# Patient Record
Sex: FEMALE | Race: WHITE
[De-identification: names, ages, dates, MRNs, and addresses within clinical notes are randomized per-mention and may not be internally consistent; named-entity substitution may affect disease eponyms.]

---

## 2017-03-24 ENCOUNTER — HOSPITAL ENCOUNTER (INPATIENT)
Dept: HOSPITAL 41 - JD.ED | Age: 52
LOS: 7 days | Discharge: NURSING, REHAB OR LONG-TERM CARE | DRG: 690 | End: 2017-03-31
Attending: INTERNAL MEDICINE | Admitting: INTERNAL MEDICINE
Payer: MEDICAID

## 2017-03-24 DIAGNOSIS — F32.9: ICD-10-CM

## 2017-03-24 DIAGNOSIS — Z79.899: ICD-10-CM

## 2017-03-24 DIAGNOSIS — R56.9: ICD-10-CM

## 2017-03-24 DIAGNOSIS — N39.0: Primary | ICD-10-CM

## 2017-03-24 DIAGNOSIS — M81.0: ICD-10-CM

## 2017-03-24 DIAGNOSIS — Z86.73: ICD-10-CM

## 2017-03-24 DIAGNOSIS — K59.00: ICD-10-CM

## 2017-03-24 DIAGNOSIS — G35: ICD-10-CM

## 2017-03-24 DIAGNOSIS — Z86.718: ICD-10-CM

## 2017-03-24 DIAGNOSIS — F02.80: ICD-10-CM

## 2017-03-24 DIAGNOSIS — R13.10: ICD-10-CM

## 2017-03-24 DIAGNOSIS — G47.00: ICD-10-CM

## 2017-03-24 DIAGNOSIS — E11.9: ICD-10-CM

## 2017-03-24 DIAGNOSIS — D64.9: ICD-10-CM

## 2017-03-24 DIAGNOSIS — E11.8: ICD-10-CM

## 2017-03-24 DIAGNOSIS — G30.9: ICD-10-CM

## 2017-03-24 DIAGNOSIS — B95.7: ICD-10-CM

## 2017-03-24 DIAGNOSIS — M19.90: ICD-10-CM

## 2017-03-24 DIAGNOSIS — R41.82: ICD-10-CM

## 2017-03-24 PROCEDURE — 71010: CPT

## 2017-03-24 PROCEDURE — 85025 COMPLETE CBC W/AUTO DIFF WBC: CPT

## 2017-03-24 PROCEDURE — 86140 C-REACTIVE PROTEIN: CPT

## 2017-03-24 PROCEDURE — P9612 CATHETERIZE FOR URINE SPEC: HCPCS

## 2017-03-24 PROCEDURE — 83605 ASSAY OF LACTIC ACID: CPT

## 2017-03-24 PROCEDURE — 99285 EMERGENCY DEPT VISIT HI MDM: CPT

## 2017-03-24 PROCEDURE — 96361 HYDRATE IV INFUSION ADD-ON: CPT

## 2017-03-24 PROCEDURE — 87186 SC STD MICRODIL/AGAR DIL: CPT

## 2017-03-24 PROCEDURE — 36415 COLL VENOUS BLD VENIPUNCTURE: CPT

## 2017-03-24 PROCEDURE — 87040 BLOOD CULTURE FOR BACTERIA: CPT

## 2017-03-24 PROCEDURE — 81001 URINALYSIS AUTO W/SCOPE: CPT

## 2017-03-24 PROCEDURE — 87088 URINE BACTERIA CULTURE: CPT

## 2017-03-24 PROCEDURE — 87181 SC STD AGAR DILUTION PER AGT: CPT

## 2017-03-24 PROCEDURE — 80053 COMPREHEN METABOLIC PANEL: CPT

## 2017-03-24 PROCEDURE — 87184 SC STD DISK METHOD PER PLATE: CPT

## 2017-03-24 PROCEDURE — 87804 INFLUENZA ASSAY W/OPTIC: CPT

## 2017-03-24 PROCEDURE — 87086 URINE CULTURE/COLONY COUNT: CPT

## 2017-03-24 NOTE — EDM.PDOC
ED HPI RENAL/





- General


Chief Complaint: Genitourinary Problem


Stated Complaint: [facility]


Time Seen by Provider: 03/24/17 20:05


Source of Information: Reports: EMS, Nursing home records


History Limitations: Reports: Physical impairment





- History of Present Illness


INITIAL COMMENTS - FREE TEXT/NARRATIVE: 





Patient is sent for the nursing home for evaluation and treatment of a fever 

and lethargy. Reports that she had fever of 100.5 at the nursing home. She has 

also been more lethargic than normal. Patient is nonverbal and is unable to 

provide any history. 





The patient was diagnosed with urinary tract infection and started on 

ciprofloxacin today. She has had one dose thus far.





- Related Data


Allergies/ADRs: 


 Allergies











Allergy/AdvReac Type Severity Reaction Status Date / Time


 


No Known Allergies Allergy   Verified 04/27/16 15:34











Home Meds: 


 Home Meds





Baclofen 30 mg PO TID 01/12/15 [History]


FLUoxetine [PROzac] 20 mg PO DAILY 01/12/15 [History]


LORazepam [Ativan] 0.25 mg PO BID 01/12/15 [History]


tiZANidine HCl [Zanaflex] 2 mg PO TID 01/12/15 [History]


Acetaminophen [Tylenol] 2 tab PO Q4HR PRN 04/07/15 [History]


Cholecalciferol (Vitamin D3) [Vitamin D3] 2,000 unit PO DAILY 04/07/15 [History]


Docusate Sodium/Sennosides [Senna Plus] 2 tab PO BID 04/07/15 [History]


Magnesium Oxide 400 mg PO DAILY 04/07/15 [History]


Multivitamin with Minerals [Multiple Vitamin] 1 tab PO DAILY 04/07/15 [History]


levETIRAcetam [Keppra] 1,000 mg PO BID 04/07/15 [History]


Lactulose [Chronulac] 15 ml PO ASDIRECTED PRN 05/13/15 [History]


Bisacodyl [Dulcolax] 10 mg PO ASDIRECTED PRN 04/27/16 [History]


Bisacodyl [Dulcolax] 10 mg RC ASDIRECTED PRN 04/27/16 [History]


Levofloxacin [Levaquin] 250 mg PO Q24H #3 tablet 04/30/16 [Rx]











Past Medical History


Genitourinary History: Reports: UTI, recurrent


Other Musculoskeletal History: MS


Neurological History: Reports: Alzheimers disease, CVA, MS


Psychiatric History: Reports: Depression


Endocrine/Metabolic History: Reports: Diabetes, type II


Hematologic History: Reports: Anemia, Other (see below)


Other Hematologic History: eosinophilia





Social & Family History





- Family History


Family Medical History: Unobtainable





- Tobacco Use


Smoking Status *Q: Unknown Ever Smoked


Second Hand Smoke Exposure: No





- Alcohol Use


Days Per Week of Alcohol Use: 0





- Recreational Drug Use


Recreational Drug Use: No





ED ROS GENERAL





- Review of Systems


Review Of Systems: Unable To Obtain


Constitutional: Reports: fever (100.5 per nursing home report), fatigue (per 

nursing home report)





ED EXAM, RENAL/





- Physical Exam


Exam: See Below


Exam Limited By: Language barrier (nonverbal)


General Appearance: alert, WD/WN, no apparent distress


Respiratory/Chest: no respiratory distress, lungs clear, normal breath sounds


Cardiovascular: normal peripheral pulses, regular rate, rhythm, no murmur


GI/Abdominal: soft, non tender


Neurological: alert


Skin Exam: Warm, Dry, Normal color





Course





- Vital Signs


Last Recorded V/S: 


 Last Vital Signs











Temp  37.3 C   03/24/17 22:00


 


Pulse  79   03/24/17 22:00


 


Resp  18   03/24/17 22:00


 


BP  108/66   03/24/17 22:00


 


Pulse Ox  96   03/24/17 22:00














- Orders/Labs/Meds


Orders: 


 Active Orders 24 hr











 Category Date Time Status


 


 Chest 1V Frontal [CR] Stat Exams  03/24/17 19:50 Taken


 


 BASIC METABOLIC PANEL,BMP [CHEM] Stat Lab  03/25/17 05:00 Ordered


 


 C-REACTIVE PROTEIN [CHEM] Stat Lab  03/25/17 05:00 Ordered


 


 CBC WITH AUTO DIFF [HEME] Stat Lab  03/25/17 05:00 Ordered


 


 CULTURE BLOOD [BC] Stat Lab  03/24/17 20:25 Received


 


 CULTURE BLOOD [BC] Stat Lab  03/24/17 20:30 Received


 


 CULTURE URINE [RM] Stat Lab  03/24/17 20:14 Received


 


 KEPPRA [REF] Stat Lab  03/25/17 05:00 Ordered


 


 Sodium Chloride 0.9% [Normal Saline] 1,000 ml Med  03/24/17 21:57 Active





 IV ONETIME   


 


 Sodium Chloride 0.9% [Saline Flush] Med  03/24/17 19:51 Active





 10 ml FLUSH ASDIRECTED PRN   


 


 Blood Culture x2 Reflex Set [OM.PC] Stat Oth  03/24/17 19:50 Ordered


 


 Peripheral IV Insertion Adult [OM.PC] Routine Oth  03/24/17 19:51 Ordered








 Medication Orders





Sodium Chloride (Normal Saline)  1,000 mls @ 125 mls/hr IV ONETIME ONE


   Stop: 03/25/17 05:56


   Last Admin: 03/24/17 22:13  Dose: 125 mls/hr


Sodium Chloride (Saline Flush)  10 ml FLUSH ASDIRECTED PRN


   PRN Reason: Keep Vein Open


   Last Admin: 03/24/17 20:18  Dose: 10 ml








Labs: 


 Laboratory Tests











  03/24/17 03/24/17 03/24/17 Range/Units





  20:14 20:25 20:25 


 


WBC    11.99 H  (3.98-10.04)  K/mm3


 


RBC    4.16  (3.98-5.22)  M/mm3


 


Hgb    11.8  (11.2-15.7)  gm/L


 


Hct    36.9  (34.1-44.9)  %


 


MCV    88.7  (79.4-94.8)  fl


 


MCH    28.4  (25.6-32.2)  pg


 


MCHC    32.0 L  (32.2-35.5)  g/dl


 


RDW Std Deviation    55.2 H  (36.4-46.3)  fL


 


Plt Count    249  (182-369)  K/mm3


 


MPV    10.1  (9.4-12.3)  fl


 


Neutrophils % (Manual)    65 H  (40-60)  %


 


Band Neutrophils %    0  (0-10)  %


 


Lymphocytes % (Manual)    20  (20-40)  %


 


Atypical Lymphs %    2  %


 


Monocytes % (Manual)    12 H  (2-10)  %


 


Eosinophils % (Manual)    1  (0.7-5.8)  %


 


Basophils % (Manual)    0 L  (0.1-1.2)  


 


Platelet Estimate    Adequate  


 


Plt Morphology Comment    Normal  


 


Poikilocytosis    1+ slight  


 


Anisocytosis    1+ slight  


 


Macrocytosis    1+ slight  


 


Target Cells    1+ slight  


 


RBC Morph Comment    Abnormal  


 


Sodium   142   (136-145)  mEq/L


 


Potassium   3.2 L   (3.5-5.1)  mEq/L


 


Chloride   104   ()  mEq/L


 


Carbon Dioxide   31   (21-32)  mEq/L


 


Anion Gap   10.2   (5-15)  


 


BUN   15   (7-18)  mg/dL


 


Creatinine   0.9   (0.55-1.02)  mg/dL


 


Est Cr Clr Drug Dosing   67.67   mL/min


 


Estimated GFR (MDRD)   > 60   (>60)  mL/min


 


BUN/Creatinine Ratio   16.7   (14-18)  


 


Glucose   119 H   ()  mg/dL


 


Lactic Acid     (0.4-2.0)  mmol/L


 


Calcium   9.7   (8.5-10.1)  mg/dL


 


Total Bilirubin   0.3   (0.2-1.0)  mg/dL


 


AST   29   (15-37)  U/L


 


ALT   32   (14-59)  U/L


 


Alkaline Phosphatase   78   ()  U/L


 


C-Reactive Protein   18.4 H*   (<1.0)  mg/dL


 


Total Protein   7.5   (6.4-8.2)  g/dl


 


Albumin   2.6 L   (3.4-5.0)  g/dl


 


Globulin   4.9   gm/dL


 


Albumin/Globulin Ratio   0.5 L   (1-2)  


 


Urine Color  Yellow    (Yellow)  


 


Urine Appearance  Cloudy H    (Clear)  


 


Urine pH  8.5 H    (5.0-8.0)  


 


Ur Specific Gravity  1.020    (1.005-1.030)  


 


Urine Protein  3+ H    (Negative)  


 


Urine Glucose (UA)  Negative    (Negative)  


 


Urine Ketones  Trace H    (Negative)  


 


Urine Occult Blood  3+ H    (Negative)  


 


Urine Nitrite  Negative    (Negative)  


 


Urine Bilirubin  Negative    (Negative)  


 


Urine Urobilinogen  0.2    (0.2-1.0)  


 


Ur Leukocyte Esterase  3+ H    (Negative)  


 


Urine RBC  10-20 H    (0-5)  /hpf


 


Urine WBC  10-20 H    (0-5)  /hpf


 


Ur Squamous Epith Cells  0-5    (0-5)  /hpf


 


Calcium Phosphate Cryst  Occasional    (NONE)  


 


Urine Bacteria  Many H    (FEW)  /hpf


 


Urine Mucus  Not seen    (FEW)  /hpf














  03/24/17 Range/Units





  20:25 


 


WBC   (3.98-10.04)  K/mm3


 


RBC   (3.98-5.22)  M/mm3


 


Hgb   (11.2-15.7)  gm/L


 


Hct   (34.1-44.9)  %


 


MCV   (79.4-94.8)  fl


 


MCH   (25.6-32.2)  pg


 


MCHC   (32.2-35.5)  g/dl


 


RDW Std Deviation   (36.4-46.3)  fL


 


Plt Count   (182-369)  K/mm3


 


MPV   (9.4-12.3)  fl


 


Neutrophils % (Manual)   (40-60)  %


 


Band Neutrophils %   (0-10)  %


 


Lymphocytes % (Manual)   (20-40)  %


 


Atypical Lymphs %   %


 


Monocytes % (Manual)   (2-10)  %


 


Eosinophils % (Manual)   (0.7-5.8)  %


 


Basophils % (Manual)   (0.1-1.2)  


 


Platelet Estimate   


 


Plt Morphology Comment   


 


Poikilocytosis   


 


Anisocytosis   


 


Macrocytosis   


 


Target Cells   


 


RBC Morph Comment   


 


Sodium   (136-145)  mEq/L


 


Potassium   (3.5-5.1)  mEq/L


 


Chloride   ()  mEq/L


 


Carbon Dioxide   (21-32)  mEq/L


 


Anion Gap   (5-15)  


 


BUN   (7-18)  mg/dL


 


Creatinine   (0.55-1.02)  mg/dL


 


Est Cr Clr Drug Dosing   mL/min


 


Estimated GFR (MDRD)   (>60)  mL/min


 


BUN/Creatinine Ratio   (14-18)  


 


Glucose   ()  mg/dL


 


Lactic Acid  1.3  (0.4-2.0)  mmol/L


 


Calcium   (8.5-10.1)  mg/dL


 


Total Bilirubin   (0.2-1.0)  mg/dL


 


AST   (15-37)  U/L


 


ALT   (14-59)  U/L


 


Alkaline Phosphatase   ()  U/L


 


C-Reactive Protein   (<1.0)  mg/dL


 


Total Protein   (6.4-8.2)  g/dl


 


Albumin   (3.4-5.0)  g/dl


 


Globulin   gm/dL


 


Albumin/Globulin Ratio   (1-2)  


 


Urine Color   (Yellow)  


 


Urine Appearance   (Clear)  


 


Urine pH   (5.0-8.0)  


 


Ur Specific Gravity   (1.005-1.030)  


 


Urine Protein   (Negative)  


 


Urine Glucose (UA)   (Negative)  


 


Urine Ketones   (Negative)  


 


Urine Occult Blood   (Negative)  


 


Urine Nitrite   (Negative)  


 


Urine Bilirubin   (Negative)  


 


Urine Urobilinogen   (0.2-1.0)  


 


Ur Leukocyte Esterase   (Negative)  


 


Urine RBC   (0-5)  /hpf


 


Urine WBC   (0-5)  /hpf


 


Ur Squamous Epith Cells   (0-5)  /hpf


 


Calcium Phosphate Cryst   (NONE)  


 


Urine Bacteria   (FEW)  /hpf


 


Urine Mucus   (FEW)  /hpf











Meds: 


Medications











Generic Name Dose Route Start Last Admin





  Trade Name Freq  PRN Reason Stop Dose Admin


 


Sodium Chloride  1,000 mls @ 125 mls/hr  03/24/17 21:57  03/24/17 22:13





  Normal Saline  IV  03/25/17 05:56  125 mls/hr





  ONETIME ONE   Administration


 


Sodium Chloride  10 ml  03/24/17 19:51  03/24/17 20:18





  Saline Flush  FLUSH   10 ml





  ASDIRECTED PRN   Administration





  Keep Vein Open   














Discontinued Medications














Generic Name Dose Route Start Last Admin





  Trade Name Freq  PRN Reason Stop Dose Admin


 


Sodium Chloride  1,000 mls @ 999 mls/hr  03/24/17 19:50  03/24/17 22:13





  Normal Saline  IV  03/24/17 20:50  Infused





  ONETIME ONE   Infusion


 


Ceftriaxone Sodium 1 gm/  100 mls @ 200 mls/hr  03/24/17 21:34  03/24/17 22:13





  Sodium Chloride  IV  03/24/17 22:03  200 mls/hr





  ONETIME ONE   Administration














- Radiology Interpretation


Free Text/Narrative:: 





chest 1 view shows no acute intrathoracic process. 





- Re-Assessments/Exams


Free Text/Narrative Re-Assessment/Exam: 





03/24/17 22:15


The patient's labs have returned.


WBC is 11.99, hgb is 11.8 and platelets are 249.


Sodium is 142, potassium 3.2 chloride is 104. Anion gap is 10.9. Glucose is 119.


CRP is elevated at 18.4.


UA is 3+ protein, 3+ blood, trace ketones, 3+ leukocytes and many bacteria.


influenza is negative





blood and urine cultures are pending.





IV Rocephin given.





Case discussed with Dr. Pickett Hospitalist on call. agrees to admission. Will 

admit [facility] with telemetry.





AM labs to include: basic Metabolic panel, Keppra level, CBC with differential, 

CRP.





Departure





- Departure


Time of Disposition: 22:00


Disposition: Admitted As Inpatient 64


Condition: fair


Clinical Impression: 


 UTI (urinary tract infection)








- My Orders


Last 24 Hours: 


My Active Orders





03/24/17 19:50


Chest 1V Frontal [CR] Stat 


Blood Culture x2 Reflex Set [OM.PC] Stat 





03/24/17 19:51


Sodium Chloride 0.9% [Saline Flush]   10 ml FLUSH ASDIRECTED PRN 


Peripheral IV Insertion Adult [OM.PC] Routine 





03/24/17 20:14


CULTURE URINE [RM] Stat 





03/24/17 20:25


CULTURE BLOOD [BC] Stat 





03/24/17 20:30


CULTURE BLOOD [BC] Stat 





03/24/17 21:57


Sodium Chloride 0.9% [Normal Saline] 1,000 ml IV ONETIME 





03/25/17 05:00


BASIC METABOLIC PANEL,BMP [CHEM] Stat 


C-REACTIVE PROTEIN [CHEM] Stat 


CBC WITH AUTO DIFF [HEME] Stat 


KEPPRA [REF] Stat 














- Assessment/Plan


Last 24 Hours: 


My Active Orders





03/24/17 19:50


Chest 1V Frontal [CR] Stat 


Blood Culture x2 Reflex Set [OM.PC] Stat 





03/24/17 19:51


Sodium Chloride 0.9% [Saline Flush]   10 ml FLUSH ASDIRECTED PRN 


Peripheral IV Insertion Adult [OM.PC] Routine 





03/24/17 20:14


CULTURE URINE [RM] Stat 





03/24/17 20:25


CULTURE BLOOD [BC] Stat 





03/24/17 20:30


CULTURE BLOOD [BC] Stat 





03/24/17 21:57


Sodium Chloride 0.9% [Normal Saline] 1,000 ml IV ONETIME 





03/25/17 05:00


BASIC METABOLIC PANEL,BMP [CHEM] Stat 


C-REACTIVE PROTEIN [CHEM] Stat 


CBC WITH AUTO DIFF [HEME] Stat 


KEPPRA [REF] Stat

## 2017-03-25 RX ADMIN — LEVOFLOXACIN SCH MLS/HR: 750 INJECTION, SOLUTION INTRAVENOUS at 13:30

## 2017-03-25 RX ADMIN — DEXTROSE MONOHYDRATE, SODIUM CHLORIDE, AND POTASSIUM CHLORIDE SCH MLS/HR: 50; 4.5; 1.49 INJECTION, SOLUTION INTRAVENOUS at 18:13

## 2017-03-25 RX ADMIN — DEXTROSE MONOHYDRATE, SODIUM CHLORIDE, AND POTASSIUM CHLORIDE SCH MLS/HR: 50; 4.5; 1.49 INJECTION, SOLUTION INTRAVENOUS at 16:39

## 2017-03-25 NOTE — PCM.HP
H&P History of Present Illness





- General


Date of Service: 03/25/17


Admit Problem/Dx: 


 Admission Diagnosis/Problem





Admission Diagnosis/Problem      Urinary tract infection








Source of Information: Provider


History Limitations: Reports: Altered mental status





- History of Present Illness


Initial Comments - Free Text/Narative: 








51 year old female from a SNF, nonverbal with MS became febrile with at least a 

temp of 100.5 documented by nursing staff before ED assessment.  She is more 

lethargic, and has at minimal a UTI.  UC and BCs have been drawn;  a dose of 

rocephin was given in the ED.  A swallow eval has been ordered, currently D5 1/

2 NS with KCL is running.  A transfer from [facility] telemetry has been made when the 

patient appeared to have seizure like activity;  a keppra level was ordered 

last night in the ED.  However the level is a send out lab study.  In the 

meantime, the patient has received Ativan 1mg, followed by keppra 1000 mg IV.  

The SZ like activity has stopped, the patient is more comfortable.  A repeat 

CXR is pending, a CT of the head was unremarkable for acute changes.


Onset of Symptoms: Reports: unknown/unsure


Duration of Symptoms: Reports: Day(s):, Getting worse.  Denies: Hour(s):


Location: Reports: abdomen


Quality: Reports: Same as previous episode


Severity: moderate


Improves with: Reports: Medication


Worsens with: Reports: None


Associated Symptoms: Reports: confusion, fever/chills





- Related Data


Allergies/Adverse Reactions: 


 Allergies











Allergy/AdvReac Type Severity Reaction Status Date / Time


 


No Known Allergies Allergy   Verified 04/27/16 15:34











Home Medications: 


 Home Meds





Baclofen 30 mg PO TID 01/12/15 [History]


FLUoxetine [PROzac] 10 mg PO DAILY 01/12/15 [History]


LORazepam [Ativan] 0.25 mg PO BID 01/12/15 [History]


tiZANidine HCl [Zanaflex] 2 mg PO TID 01/12/15 [History]


Acetaminophen [Tylenol] 2 tab PO Q4HR PRN 04/07/15 [History]


Cholecalciferol (Vitamin D3) [Vitamin D3] 2,000 unit PO DAILY 04/07/15 [History]


Docusate Sodium/Sennosides [Senna Plus] 2 tab PO BID 04/07/15 [History]


Magnesium Oxide 400 mg PO QPM 04/07/15 [History]


Multivitamin with Minerals [Multiple Vitamin] 1 tab PO DAILY 04/07/15 [History]


levETIRAcetam [Keppra] 1,000 mg PO BID 04/07/15 [History]


Lactulose [Chronulac] 15 ml PO DAILY PRN 05/13/15 [History]


Bisacodyl [Dulcolax] 10 mg PO DAILY PRN 04/27/16 [History]


Bisacodyl [Dulcolax] 10 mg RC ASDIRECTED PRN 04/27/16 [History]


Ciprofloxacin [Ciprofloxacin HCl] 250 mg PO BID 03/25/17 [History]











Past Medical History


HEENT History: Reports: Other (see below)


Other HEENT History: dysphagia


Gastrointestinal History: Reports: Chronic constipation


Genitourinary History: Reports: UTI, recurrent


Other Genitourinary History: sepsis secondary to e-coli


Musculoskeletal History: Reports: Back pain, chronic, Osteoarthritis, 

Osteoporosis, Other (see below)


Other Musculoskeletal History: MS


Neurological History: Reports: Alzheimers disease, CVA, MS


Other Neuro History: chronic embolisms/thrombosis of veins, recurrent strokes, 

convulsions


Psychiatric History: Reports: Depression


Other Psychiatric History: insomnia


Endocrine/Metabolic History: Reports: Diabetes, type II


Hematologic History: Reports: Anemia, Other (see below)


Other Hematologic History: eosinophilia





- Infectious Disease History


Infectious Disease History: Reports: Other (see below)


Other Infectious Disease History: unknown pt unable to answer and it is not 

listed on nursing home paperwork





- Past Surgical History


Head Surgeries/Procedures: Reports: None


HEENT Surgical History: Reports: None


GI Surgical History: Reports: None


Female  Surgical History: Reports: None


Endocrine Surgical History: Reports: None


Musculoskeletal Surgical History: Reports: None





Social & Family History





- Family History


Family Medical History: Unobtainable





- Tobacco Use


Smoking Status *Q: Unknown Ever Smoked


Tobacco Use Comment: pt unable to answer and her nursing home paperwork does 

not states


Second Hand Smoke Exposure: No





- Caffeine Use


Caffeine Use: Reports: None





- Alcohol Use


Days Per Week of Alcohol Use: 0





- Recreational Drug Use


Recreational Drug Use: No





H&P Review of Systems





- Review of Systems:


Review Of Systems: Unable To Obtain





Exam





- Exam


Exam: See Below





- Vital Signs


Vital Signs: 


 Last Vital Signs











Temp  37.2 C   03/25/17 10:00


 


Pulse  77   03/25/17 10:00


 


Resp  20   03/25/17 10:00


 


BP  112/68   03/25/17 10:00


 


Pulse Ox  96   03/25/17 10:00











Weight: 69.899 kg





- Exam


Quality Assessment: supplemental oxygen, DVT prophylaxis.  No: urinary catheter


General: alert, oriented


HEENT: Conjunctiva clear, EACs clear, EOMI, Nares patent, Pupils equal, Pupils 

reactive, Other (dry oral phaynx).  No: Mucosa moist & pink


Neck: supple, trachea midline


Lungs: Decreased breath sounds, Rhonchi


Cardiovascular: regular rate, tachycardia


Abdomen: normal bowel sounds, soft


 (Female) Exam: Deferred


Rectal (Female) Exam: Deferred


Back Exam: normal inspection


Extremities: normal pulses


Skin: warm


Neurological: cranial nerves intact, other (nonverbal)


Neuro Extensive - Mental Status: alert


Neuro Extensive - Motor, Sensory, Reflexes: CN II-XII intact


Psychiatric: alert





- Patient Data


Lab Results last 24 hrs: 


 Laboratory Results - last 24 hr











  03/25/17 03/25/17 Range/Units





  06:26 06:26 


 


WBC  10.70 H   (3.98-10.04)  K/mm3


 


RBC  4.30   (3.98-5.22)  M/mm3


 


Hgb  12.3   (11.2-15.7)  gm/L


 


Hct  38.0   (34.1-44.9)  %


 


MCV  88.4   (79.4-94.8)  fl


 


MCH  28.6   (25.6-32.2)  pg


 


MCHC  32.4   (32.2-35.5)  g/dl


 


RDW Std Deviation  53.9 H   (36.4-46.3)  fL


 


Plt Count  222   (182-369)  K/mm3


 


MPV  10.0   (9.4-12.3)  fl


 


Neut % (Auto)  67.6   (34.0-71.1)  %


 


Lymph % (Auto)  22.0   (19.3-51.7)  %


 


Mono % (Auto)  9.7   (4.7-12.5)  %


 


Eos % (Auto)  0.3 L   (0.7-5.8)  


 


Baso % (Auto)  0.2   (0.1-1.2)  %


 


Neut # (Auto)  7.24 H   (1.56-6.13)  K/mm3


 


Lymph # (Auto)  2.35   (1.18-3.74)  K/mm3


 


Mono # (Auto)  1.04 H   (0.24-0.36)  K/mm3


 


Eos # (Auto)  0.03 L   (0.04-0.36)  K/mm3


 


Baso # (Auto)  0.02   (0.01-0.08)  K/mm3


 


Manual Slide Review  Not Reportable   


 


Sodium   142  (136-145)  mEq/L


 


Potassium   3.3 L  (3.5-5.1)  mEq/L


 


Chloride   106  ()  mEq/L


 


Carbon Dioxide   27  (21-32)  mEq/L


 


Anion Gap   12.3  (5-15)  


 


BUN   10  (7-18)  mg/dL


 


Creatinine   0.7  (0.55-1.02)  mg/dL


 


Est Cr Clr Drug Dosing   87.01  mL/min


 


Estimated GFR (MDRD)   > 60  (>60)  mL/min


 


BUN/Creatinine Ratio   14.3  (14-18)  


 


Glucose   104  ()  mg/dL


 


Calcium   8.9  (8.5-10.1)  mg/dL


 


C-Reactive Protein   15.7 H*  (<1.0)  mg/dL











Result Diagrams: 


 03/26/17 05:30





 03/26/17 05:30





*Q Meaningful Use (ADM)





- VTE *Q


VTE Criteria *Q: 








- Stroke *Q


Stroke Criteria *Q: 








- AMI *Q


AMI Criteria *Q: 





Problem List Initiated/Reviewed/Updated: Yes


Orders Last 24hrs: 


 Active Orders 24 hr











 Category Date Time Status


 


 Bedrest [RC] ASDIRECTED Care  03/24/17 22:05 Active


 


 Swallow Screen [Nursing Bedside Swallow Screen] [] Care  03/25/17 09:14 

Active





 ASDIRECTED   


 


 Head wo Cont [CT] Routine Exams  03/25/17 14:00 Taken


 


 CULTURE MRSA SURVEY [] Routine Lab  03/25/17 01:40 Received








 Medication Orders





Sodium Chloride (Saline Flush)  10 ml FLUSH ASDIRECTED PRN


   PRN Reason: Keep Vein Open


   Last Admin: 03/24/17 20:18  Dose: 10 ml








Assessment/Plan Comment:: 





Impression:





Febrile with UTI


AMS, questionable SZ like activity


Risk of aspiration














Plan:





ICU


IV ATBs


IV Keppra if unable to take oral meds


Await Keppra level


Swallow study


Hydrate as needed, have reduced rate for now.


BP/HR control, address cause of elevation


DVT/GI prophylaxis


Consult PT/OT/SW





LOS<96 hours, expected.

## 2017-03-26 RX ADMIN — LEVOFLOXACIN SCH: 750 INJECTION, SOLUTION INTRAVENOUS at 18:47

## 2017-03-26 RX ADMIN — DEXTROSE MONOHYDRATE, SODIUM CHLORIDE, AND POTASSIUM CHLORIDE SCH MLS/HR: 50; 4.5; 1.49 INJECTION, SOLUTION INTRAVENOUS at 08:45

## 2017-03-26 NOTE — PCM.PN
- General Info


Date of Service: 03/26/17


Subjective Update: 








Patient had rhythmic motion which suggested seizure activity. Ativan and Keppra 

were given IV for seizures successfully.  Today more interactive, baseline is 

non verbal able to complete swallow study, will be changed to puree diet and 

resume oral meds.


Functional Status: Reports: urinating, other (no seizure activity)





- Review of Systems


General: Reports: Weakness


HEENT: Reports: no symptoms


Pulmonary: Reports: no symptoms


Cardiovascular: Reports: No Symptoms.  Denies: Other


Gastrointestinal: Reports: No symptoms


Genitourinary: Reports: no symptoms


Musculoskeletal: Reports: no symptoms


Skin: Reports: no symptoms


Neurological: Reports: No Symptoms.  Denies: Seizure


Psychiatric: Reports: no symptoms





- Patient Data


Vitals - most recent: 


 Last Vital Signs











Temp  36.6 C   03/26/17 09:00


 


Pulse  121 H  03/26/17 01:39


 


Resp  25 H  03/26/17 09:00


 


BP  113/82   03/26/17 09:00


 


Pulse Ox  94 L  03/26/17 09:00











Weight - most recent: 72.62 kg


I&O - last 24 hours: 


 Intake & Output











 03/25/17 03/26/17 03/26/17





 22:59 06:59 14:59


 


Intake Total 100 999 


 


Balance 100 999 











Lab Results last 24 hrs: 


 Laboratory Results - last 24 hr











  03/25/17 03/26/17 03/26/17 Range/Units





  20:33 05:30 05:30 


 


WBC   11.82 H   (3.98-10.04)  K/mm3


 


RBC   4.18   (3.98-5.22)  M/mm3


 


Hgb   12.0   (11.2-15.7)  gm/L


 


Hct   35.3   (34.1-44.9)  %


 


MCV   84.4   (79.4-94.8)  fl


 


MCH   28.7   (25.6-32.2)  pg


 


MCHC   34.0   (32.2-35.5)  g/dl


 


RDW Std Deviation   50.1 H   (36.4-46.3)  fL


 


Plt Count   240   (182-369)  K/mm3


 


MPV   9.5   (9.4-12.3)  fl


 


Neut % (Auto)   78.5 H   (34.0-71.1)  %


 


Lymph % (Auto)   12.2 L   (19.3-51.7)  %


 


Mono % (Auto)   8.9   (4.7-12.5)  %


 


Eos % (Auto)   0 L   (0.7-5.8)  


 


Baso % (Auto)   0.2   (0.1-1.2)  %


 


Neut # (Auto)   9.29 H   (1.56-6.13)  K/mm3


 


Lymph # (Auto)   1.44   (1.18-3.74)  K/mm3


 


Mono # (Auto)   1.05 H   (0.24-0.36)  K/mm3


 


Eos # (Auto)   0.00 L   (0.04-0.36)  K/mm3


 


Baso # (Auto)   0.02   (0.01-0.08)  K/mm3


 


Manual Slide Review   Normal smear   


 


Sodium    136  (136-145)  mEq/L


 


Potassium    3.3 L  (3.5-5.1)  mEq/L


 


Chloride    102  ()  mEq/L


 


Carbon Dioxide    23  (21-32)  mEq/L


 


Anion Gap    14.3  (5-15)  


 


BUN    10  (7-18)  mg/dL


 


Creatinine    1.0  (0.55-1.02)  mg/dL


 


Est Cr Clr Drug Dosing    60.91  mL/min


 


Estimated GFR (MDRD)    58  (>60)  mL/min


 


BUN/Creatinine Ratio    10.0 L  (14-18)  


 


Glucose    138 H  ()  mg/dL


 


POC Glucose  102    ()  mg/dL


 


Calcium    8.7  (8.5-10.1)  mg/dL


 


Magnesium    1.5 L  (1.8-2.4)  mg/dl


 


C-Reactive Protein    26.8 H*  (<1.0)  mg/dL














  03/26/17 Range/Units





  06:02 


 


WBC   (3.98-10.04)  K/mm3


 


RBC   (3.98-5.22)  M/mm3


 


Hgb   (11.2-15.7)  gm/L


 


Hct   (34.1-44.9)  %


 


MCV   (79.4-94.8)  fl


 


MCH   (25.6-32.2)  pg


 


MCHC   (32.2-35.5)  g/dl


 


RDW Std Deviation   (36.4-46.3)  fL


 


Plt Count   (182-369)  K/mm3


 


MPV   (9.4-12.3)  fl


 


Neut % (Auto)   (34.0-71.1)  %


 


Lymph % (Auto)   (19.3-51.7)  %


 


Mono % (Auto)   (4.7-12.5)  %


 


Eos % (Auto)   (0.7-5.8)  


 


Baso % (Auto)   (0.1-1.2)  %


 


Neut # (Auto)   (1.56-6.13)  K/mm3


 


Lymph # (Auto)   (1.18-3.74)  K/mm3


 


Mono # (Auto)   (0.24-0.36)  K/mm3


 


Eos # (Auto)   (0.04-0.36)  K/mm3


 


Baso # (Auto)   (0.01-0.08)  K/mm3


 


Manual Slide Review   


 


Sodium   (136-145)  mEq/L


 


Potassium   (3.5-5.1)  mEq/L


 


Chloride   ()  mEq/L


 


Carbon Dioxide   (21-32)  mEq/L


 


Anion Gap   (5-15)  


 


BUN   (7-18)  mg/dL


 


Creatinine   (0.55-1.02)  mg/dL


 


Est Cr Clr Drug Dosing   mL/min


 


Estimated GFR (MDRD)   (>60)  mL/min


 


BUN/Creatinine Ratio   (14-18)  


 


Glucose   ()  mg/dL


 


POC Glucose  120 H  ()  mg/dL


 


Calcium   (8.5-10.1)  mg/dL


 


Magnesium   (1.8-2.4)  mg/dl


 


C-Reactive Protein   (<1.0)  mg/dL











[facility] Results last 24 hrs: 


 Microbiology











 03/25/17 01:40 MRSA Surveillance Culture - Final





 Nasal/Axilla/Groin    NO MRSA ISOLATED











Med Orders - Current: 


 Current Medications





Acetaminophen (Tylenol)  650 mg RECTAL Q6H PRN


   PRN Reason: Fever


Baclofen (Lioresal)  30 mg PO TID [facility]


   Last Admin: 03/26/17 09:41 Dose:  30 mg


Dextrose/Water (Dextrose 50% In Water)  50 ml IVPUSH ASDIRECTED PRN


   PRN Reason: Hypoglycemia


Enoxaparin Sodium (Lovenox)  40 mg SUBCUT DAILY [facility]


   Last Admin: 03/26/17 09:40 Dose:  40 mg


Fluoxetine HCl (Prozac)  10 mg PO DAILY [facility]


   Last Admin: 03/26/17 09:41 Dose:  10 mg


Hydralazine HCl (Apresoline)  20 mg IVPUSH Q8H PRN


   PRN Reason: Hypertension


Levofloxacin/Dextrose 750 mg/ (Premix)  150 mls @ 100 mls/hr IV Q24H [facility]


   Last Admin: 03/25/17 13:30 Dose:  100 mls/hr


Potassium Chloride/Dextrose/Sod Cl (D5 1/2 Ns W/ 20 Meq/L Kcl)  1,000 mls @ 70 

mls/hr IV ASDIRECTED [facility]


   Last Admin: 03/26/17 08:45 Dose:  70 mls/hr


Vancomycin HCl 1 gm/ Sodium (Chloride)  250 mls @ 167 mls/hr IV Q12H [facility]


   Last Admin: 03/26/17 10:38 Dose:  167 mls/hr


Magnesium Sulfate 2 gm/ Premix  50 mls @ 25 mls/hr IV ONETIME ONE


   Stop: 03/26/17 13:06


Insulin Aspart (Novolog)  0 unit SUBCUT QIDACANDBED [facility]


   PRN Reason: Protocol


   Last Admin: 03/26/17 06:37 Dose:  Not Given


Levetiracetam (Keppra)  1,000 mg PO BID [facility]


   Last Admin: 03/26/17 10:38 Dose:  1,000 mg


Lorazepam (Ativan)  1 mg IVPUSH Q4H PRN


   PRN Reason: Seizures


Magnesium Oxide (Magnesium Oxide)  400 mg PO QPM [facility]


   Last Admin: 03/25/17 18:13 Dose:  Not Given


Metoprolol Tartrate (Lopressor)  2.5 mg IVPUSH Q4H PRN


   PRN Reason: heart rate


   Last Admin: 03/26/17 01:39 Dose:  2.5 mg


Senna/Docusate Sodium (Senna Plus)  2 tab PO BID [facility]


   Last Admin: 03/26/17 09:42 Dose:  2 tab


Sodium Chloride (Saline Flush)  10 ml FLUSH ASDIRECTED PRN


   PRN Reason: Keep Vein Open


   Last Admin: 03/24/17 20:18 Dose:  10 ml


Tizanidine HCl (Zanaflex)  2 mg PO TID [facility]


   Last Admin: 03/26/17 09:41 Dose:  2 mg


Vancomycin HCl (Pharmacy To Dose - Vancomycin)  0 dose .XX ASDIRECTED PRN


   PRN Reason: RX DOSE





Discontinued Medications





Sodium Chloride (Normal Saline)  1,000 mls @ 999 mls/hr IV ONETIME ONE


   Stop: 03/24/17 20:50


   Last Infusion: 03/24/17 22:13 Dose:  Infused


Ceftriaxone Sodium 1 gm/ (Sodium Chloride)  100 mls @ 200 mls/hr IV ONETIME ONE


   Stop: 03/24/17 22:03


   Last Admin: 03/24/17 22:13 Dose:  200 mls/hr


Sodium Chloride (Normal Saline)  1,000 mls @ 125 mls/hr IV ONETIME ONE


   Stop: 03/25/17 05:56


   Last Admin: 03/24/17 22:13 Dose:  125 mls/hr


Levetiracetam 1,000 mg/ Sodium (Chloride)  110 mls @ 400 mls/hr IV ONETIME ONE


   Stop: 03/25/17 15:38


   Last Admin: 03/25/17 16:07 Dose:  400 mls/hr


Levetiracetam 1,000 mg/ Sodium (Chloride)  110 mls @ 400 mls/hr IV Q12H [facility]


   Last Admin: 03/25/17 21:43 Dose:  400 mls/hr


Vancomycin HCl 1 gm/ Sodium (Chloride)  250 mls @ 250 mls/hr IV ONETIME ONE


   Stop: 03/26/17 00:03


   Last Admin: 03/25/17 23:18 Dose:  250 mls/hr


Levetiracetam (Keppra)  1,000 mg PO BID [facility]


   Last Admin: 03/25/17 20:27 Dose:  Not Given


Lorazepam (Ativan)  1 mg IVPUSH ONETIME ONE


   Stop: 03/25/17 15:31


   Last Admin: 03/25/17 16:03 Dose:  1 mg


Metoprolol Tartrate (Lopressor)  2.5 mg IVPUSH Q6H PRN


   PRN Reason: heart rate


   Last Admin: 03/25/17 21:28 Dose:  2.5 mg


Potassium Chloride (Potassium Chloride)  40 meq PO ONETIME ONE


   Stop: 03/26/17 11:08











- Exam


Quality Assessment: supplemental oxygen, urine catheter, DVT prophylaxis


General: alert, cooperative, no acute distress.  No: other


Neck: supple, trachea midline, no JVD


Lungs: Normal respiratory effort


Cardiovascular: Regular Rate, Tachycardia (low 100s, ST)


Abdomen: bowel sounds present, soft, no tenderness, no distension


 (Female) Exam: Deferred


Back Exam: normal inspection


Extremities: normal pulses


Skin: warm.  No: moist


Neurological: no new focal deficit.  No: other


Psy/Mental Status: alert.  No: other





- Problem List Review


Problem List Initiated/Reviewed/Updated: Yes





- My Orders


Last 24 Hours: 


My Active Orders





03/25/17 12:16


Antiembolic Devices [RC] PER UNIT ROUTINE 


ELSA Hose [Antiembolic Hose] [OM.PC] Routine 





03/25/17 12:38


SLP Evaluation and Treatment [CONS] Routine 





03/25/17 12:59


Vital Signs [RC] Q4H 





03/25/17 13:00


Levofloxacin/Dextrose 5%-Water [Levaquin in D5W 750 MG/150 ML] 750 mg   Premix 

Bag 1 bag IV Q24H 





03/25/17 13:03


Accu Check [Blood Glucose Check, Bedside] [RC] 07,11,17,21 





03/25/17 13:04


Dextrose 50% in Water   50 ml IVPUSH ASDIRECTED PRN 





03/25/17 13:15


D5 1/2 NS w/ 20 mEq/L KCl 1,000 ml IV ASDIRECTED 





03/25/17 14:00


Head wo Cont [CT] Routine 





03/25/17 15:00


Baclofen [Lioresal]   30 mg PO TID 


tiZANidine [Zanaflex]   2 mg PO TID 





03/25/17 15:24


Aspiration Precautions [RC] ASDIRECTED 





03/25/17 15:30


CXR [Chest 1V Frontal] [CR] Routine 





03/25/17 15:34


Patient Status [ADT] Routine 





03/25/17 17:00


Insulin Aspart [NovoLOG]   See Protocol  SUBCUT QIDACANDBED 





03/25/17 17:10


hydrALAZINE [Apresoline]   20 mg IVPUSH Q8H PRN 





03/25/17 17:11


Seizure Precautions [OM.PC] Routine 





03/25/17 18:00


Magnesium Oxide   400 mg PO QPM 





03/25/17 20:06


Oral Care [OM.PC] QID 





03/25/17 20:41


LORazepam [Ativan]   1 mg IVPUSH Q4H PRN 





03/25/17 21:00


Docusate Sodium/Sennosides [Senna Plus]   2 tab PO BID 





03/25/17 Dinner


NPO [Nothing Per Oral Diet] [DIET] 





03/26/17 00:11


Metoprolol Tartrate [Lopressor]   2.5 mg IVPUSH Q4H PRN 





03/26/17 04:19


Acetaminophen [Tylenol]   650 mg RECTAL Q6H PRN 





03/26/17 09:00


Enoxaparin [Lovenox]   40 mg SUBCUT DAILY 


FLUoxetine [PROzac]   10 mg PO DAILY 





03/26/17 10:04


Vancomycin Pharmacy to Dose [Pharmacy to Dose - Vancomycin]   0 dose .XX 

ASDIRECTED PRN 





03/26/17 10:30


levETIRAcetam [Keppra]   1,000 mg PO BID 





03/26/17 11:00


Vancomycin [Vancocin] 1 gm   Sodium Chloride 0.9% [Normal Saline] 250 ml IV 

Q12H 





03/26/17 11:07


Magnesium Sulfate/Water [Magnesium Sulfate 2 GM in Water 50 ML] 2 gm   Premix 

Bag 1 bag IV ONETIME 





03/26/17 20:06


Oral Care [OM.PC] QID 





03/27/17 05:00


BMP [BASIC METABOLIC PANEL,BMP] [CHEM] DAILY 


CBC WITH AUTO DIFF [HEME] DAILY 


CRP [C-REACTIVE PROTEIN] [CHEM] DAILY 


MAGNESIUM [CHEM] DAILY 





03/27/17 10:30


VANCOMYCIN TROUGH [CHEM] Timed 





03/27/17 20:06


Oral Care [OM.PC] QID 





03/28/17 05:00


BMP [BASIC METABOLIC PANEL,BMP] [CHEM] DAILY 


CBC WITH AUTO DIFF [HEME] DAILY 














- Plan


Plan:: 











Impression:





Febrile with UTI


Resolved AMS;  more alert without  SZ like activity


Risk of aspiration, HOP>45 degrees

















Plan:





ICU


IV ATBs, levoquin, empirically; vancomycin scheduled received 1 dose after Gm 

pos cocci noted.


Resume oral meds


Await Keppra level


Swallow study, will start puree diet


Hydrate as needed, have reduced rate for now; will DC after good oral intake.


BP/HR control, address cause of elevation


DVT/GI prophylaxis


Consult PT/OT/SW








LOS may be 96 hours, await response to therapy.

## 2017-03-27 RX ADMIN — DEXTROSE MONOHYDRATE, SODIUM CHLORIDE, AND POTASSIUM CHLORIDE SCH MLS/HR: 50; 4.5; 1.49 INJECTION, SOLUTION INTRAVENOUS at 01:23

## 2017-03-27 RX ADMIN — ALBUTEROL SULFATE SCH MG: 2.5 SOLUTION RESPIRATORY (INHALATION) at 20:33

## 2017-03-27 RX ADMIN — DEXTROSE MONOHYDRATE, SODIUM CHLORIDE, AND POTASSIUM CHLORIDE SCH MLS/HR: 50; 4.5; 1.49 INJECTION, SOLUTION INTRAVENOUS at 16:24

## 2017-03-27 NOTE — CR
Chest: Portable view of the chest was obtained.

 

Comparison: Previous chest x-ray of 04/27/16.

 

Heart size is normal.  Mild tortuosity of the thoracic aorta is seen. 

 Lungs are clear.  Bony structures are grossly intact.

 

Impression:

1.  Nothing acute is identified on portable chest x-ray.

 

Diagnostic code #2

## 2017-03-27 NOTE — PCM.PN
- General Info


Date of Service: 03/27/17


Functional Status: Reports: tolerating diet, urinating





- Review of Systems


General: Reports: No Symptoms


HEENT: Reports: no symptoms


Pulmonary: Reports: no symptoms


Cardiovascular: Reports: No Symptoms


Gastrointestinal: Reports: No symptoms


Genitourinary: Reports: no symptoms


Musculoskeletal: Reports: no symptoms


Skin: Reports: no symptoms


Neurological: Reports: No Symptoms


Psychiatric: Reports: no symptoms





- Patient Data


Vitals - most recent: 


 Last Vital Signs











Temp  36.8 C   03/27/17 17:24


 


Pulse  88   03/27/17 17:24


 


Resp  27 H  03/27/17 15:08


 


BP  105/60   03/27/17 15:08


 


Pulse Ox  95   03/27/17 15:08











Weight - most recent: 74.072 kg


I&O - last 24 hours: 


 Intake & Output











 03/27/17 03/27/17 03/27/17





 06:59 14:59 22:59


 


Intake Total 987 590 715


 


Balance 987 590 715











Lab Results last 24 hrs: 


 Laboratory Results - last 24 hr











  03/25/17 03/26/17 03/26/17 Range/Units





  15:39 16:32 21:06 


 


WBC     (3.98-10.04)  K/mm3


 


RBC     (3.98-5.22)  M/mm3


 


Hgb     (11.2-15.7)  gm/L


 


Hct     (34.1-44.9)  %


 


MCV     (79.4-94.8)  fl


 


MCH     (25.6-32.2)  pg


 


MCHC     (32.2-35.5)  g/dl


 


RDW Std Deviation     (36.4-46.3)  fL


 


Plt Count     (182-369)  K/mm3


 


MPV     (9.4-12.3)  fl


 


Neut % (Auto)     (34.0-71.1)  %


 


Lymph % (Auto)     (19.3-51.7)  %


 


Mono % (Auto)     (4.7-12.5)  %


 


Eos % (Auto)     (0.7-5.8)  


 


Baso % (Auto)     (0.1-1.2)  %


 


Neut # (Auto)     (1.56-6.13)  K/mm3


 


Lymph # (Auto)     (1.18-3.74)  K/mm3


 


Mono # (Auto)     (0.24-0.36)  K/mm3


 


Eos # (Auto)     (0.04-0.36)  K/mm3


 


Baso # (Auto)     (0.01-0.08)  K/mm3


 


Manual Slide Review     


 


Sodium     (136-145)  mEq/L


 


Potassium     (3.5-5.1)  mEq/L


 


Chloride     ()  mEq/L


 


Carbon Dioxide     (21-32)  mEq/L


 


Anion Gap     (5-15)  


 


BUN     (7-18)  mg/dL


 


Creatinine     (0.55-1.02)  mg/dL


 


Est Cr Clr Drug Dosing     mL/min


 


Estimated GFR (MDRD)     (>60)  mL/min


 


BUN/Creatinine Ratio     (14-18)  


 


Glucose     ()  mg/dL


 


POC Glucose  122 H  145 H  160 H  ()  mg/dL


 


Calcium     (8.5-10.1)  mg/dL


 


Magnesium     (1.8-2.4)  mg/dl


 


C-Reactive Protein     (<1.0)  mg/dL


 


Vancomycin Trough     (10.0-20.0)  














  03/27/17 03/27/17 03/27/17 Range/Units





  06:19 06:20 06:20 


 


WBC   12.60 H   (3.98-10.04)  K/mm3


 


RBC   3.98   (3.98-5.22)  M/mm3


 


Hgb   11.4   (11.2-15.7)  gm/L


 


Hct   33.5 L   (34.1-44.9)  %


 


MCV   84.2   (79.4-94.8)  fl


 


MCH   28.6   (25.6-32.2)  pg


 


MCHC   34.0   (32.2-35.5)  g/dl


 


RDW Std Deviation   50.1 H   (36.4-46.3)  fL


 


Plt Count   187   (182-369)  K/mm3


 


MPV   10.1   (9.4-12.3)  fl


 


Neut % (Auto)   65.4   (34.0-71.1)  %


 


Lymph % (Auto)   20.4   (19.3-51.7)  %


 


Mono % (Auto)   9.1   (4.7-12.5)  %


 


Eos % (Auto)   4.0   (0.7-5.8)  


 


Baso % (Auto)   0.2   (0.1-1.2)  %


 


Neut # (Auto)   8.23 H   (1.56-6.13)  K/mm3


 


Lymph # (Auto)   2.57   (1.18-3.74)  K/mm3


 


Mono # (Auto)   1.15 H   (0.24-0.36)  K/mm3


 


Eos # (Auto)   0.51 H   (0.04-0.36)  K/mm3


 


Baso # (Auto)   0.03   (0.01-0.08)  K/mm3


 


Manual Slide Review   Normal smear   


 


Sodium    135 L  (136-145)  mEq/L


 


Potassium    3.5  (3.5-5.1)  mEq/L


 


Chloride    103  ()  mEq/L


 


Carbon Dioxide    23  (21-32)  mEq/L


 


Anion Gap    12.5  (5-15)  


 


BUN    9  (7-18)  mg/dL


 


Creatinine    0.9  (0.55-1.02)  mg/dL


 


Est Cr Clr Drug Dosing    67.67  mL/min


 


Estimated GFR (MDRD)    > 60  (>60)  mL/min


 


BUN/Creatinine Ratio    10.0 L  (14-18)  


 


Glucose    119 H  ()  mg/dL


 


POC Glucose  121 H    ()  mg/dL


 


Calcium    8.3 L  (8.5-10.1)  mg/dL


 


Magnesium    1.8  (1.8-2.4)  mg/dl


 


C-Reactive Protein    32.3 H*  (<1.0)  mg/dL


 


Vancomycin Trough     (10.0-20.0)  














  03/27/17 Range/Units





  10:50 


 


WBC   (3.98-10.04)  K/mm3


 


RBC   (3.98-5.22)  M/mm3


 


Hgb   (11.2-15.7)  gm/L


 


Hct   (34.1-44.9)  %


 


MCV   (79.4-94.8)  fl


 


MCH   (25.6-32.2)  pg


 


MCHC   (32.2-35.5)  g/dl


 


RDW Std Deviation   (36.4-46.3)  fL


 


Plt Count   (182-369)  K/mm3


 


MPV   (9.4-12.3)  fl


 


Neut % (Auto)   (34.0-71.1)  %


 


Lymph % (Auto)   (19.3-51.7)  %


 


Mono % (Auto)   (4.7-12.5)  %


 


Eos % (Auto)   (0.7-5.8)  


 


Baso % (Auto)   (0.1-1.2)  %


 


Neut # (Auto)   (1.56-6.13)  K/mm3


 


Lymph # (Auto)   (1.18-3.74)  K/mm3


 


Mono # (Auto)   (0.24-0.36)  K/mm3


 


Eos # (Auto)   (0.04-0.36)  K/mm3


 


Baso # (Auto)   (0.01-0.08)  K/mm3


 


Manual Slide Review   


 


Sodium   (136-145)  mEq/L


 


Potassium   (3.5-5.1)  mEq/L


 


Chloride   ()  mEq/L


 


Carbon Dioxide   (21-32)  mEq/L


 


Anion Gap   (5-15)  


 


BUN   (7-18)  mg/dL


 


Creatinine   (0.55-1.02)  mg/dL


 


Est Cr Clr Drug Dosing   mL/min


 


Estimated GFR (MDRD)   (>60)  mL/min


 


BUN/Creatinine Ratio   (14-18)  


 


Glucose   ()  mg/dL


 


POC Glucose   ()  mg/dL


 


Calcium   (8.5-10.1)  mg/dL


 


Magnesium   (1.8-2.4)  mg/dl


 


C-Reactive Protein   (<1.0)  mg/dL


 


Vancomycin Trough  17.9  (10.0-20.0)  











Med Orders - Current: 


 Current Medications





Acetaminophen (Tylenol)  650 mg RECTAL Q6H PRN


   PRN Reason: Fever


   Last Admin: 03/27/17 15:07 Dose:  650 mg


Baclofen (Lioresal)  5 mg PO TID [facility]


Baclofen (Lioresal)  5 mg PO BID@1700,2300 [facility]


   Stop: 03/27/17 23:01


Dextrose/Water (Dextrose 50% In Water)  50 ml IVPUSH ASDIRECTED PRN


   PRN Reason: Hypoglycemia


Enoxaparin Sodium (Lovenox)  40 mg SUBCUT DAILY [facility]


   Last Admin: 03/27/17 08:00 Dose:  40 mg


Fluoxetine HCl (Prozac)  10 mg PO DAILY [facility]


   Last Admin: 03/27/17 08:01 Dose:  10 mg


Hydralazine HCl (Apresoline)  20 mg IVPUSH Q8H PRN


   PRN Reason: Hypertension


Potassium Chloride/Dextrose/Sod Cl (D5 1/2 Ns W/ 20 Meq/L Kcl)  1,000 mls @ 70 

mls/hr IV ASDIRECTED [facility]


   Last Admin: 03/27/17 16:24 Dose:  70 mls/hr


Vancomycin HCl 1 gm/ Sodium (Chloride)  250 mls @ 167 mls/hr IV Q12H [facility]


   Last Admin: 03/27/17 11:43 Dose:  167 mls/hr


Sodium Chloride (Normal Saline)  500 mls @ 999 mls/min IV .BOLUS [facility]


   Last Admin: 03/26/17 16:45 Dose:  999 mls/min


Piperacillin Sod/Tazobactam (Sod 4.5 gm/ Sodium Chloride)  100 mls @ 25 mls/hr 

IV Q8H [facility]


Magnesium Sulfate 2 gm/ Premix  50 mls @ 25 mls/hr IV ONETIME ONE


   Stop: 03/27/17 19:32


Insulin Aspart (Novolog)  0 unit SUBCUT QIDACANDBED [facility]


   PRN Reason: Protocol


   Last Admin: 03/27/17 17:15 Dose:  Not Given


Levetiracetam (Keppra)  1,000 mg PO BID [facility]


   Last Admin: 03/27/17 07:59 Dose:  1,000 mg


Lorazepam (Ativan)  1 mg IVPUSH Q4H PRN


   PRN Reason: Seizures


Magnesium Oxide (Magnesium Oxide)  400 mg PO QPM [facility]


   Last Admin: 03/27/17 17:18 Dose:  400 mg


Metoprolol Tartrate (Lopressor)  2.5 mg IVPUSH Q4H PRN


   PRN Reason: heart rate


   Last Admin: 03/26/17 01:39 Dose:  2.5 mg


Senna/Docusate Sodium (Senna Plus)  2 tab PO BID [facility]


   Last Admin: 03/27/17 08:00 Dose:  2 tab


Sodium Chloride (Saline Flush)  10 ml FLUSH ASDIRECTED PRN


   PRN Reason: Keep Vein Open


   Last Admin: 03/24/17 20:18 Dose:  10 ml


Tizanidine HCl (Zanaflex)  1 mg PO BID@1700,2300 [facility]


   Stop: 03/27/17 23:01


   Last Admin: 03/27/17 17:13 Dose:  1 mg


Tizanidine HCl (Zanaflex)  1 mg PO TID [facility]


Vancomycin HCl (Pharmacy To Dose - Vancomycin)  0 dose .XX ASDIRECTED PRN


   PRN Reason: RX DOSE





Discontinued Medications





Baclofen (Lioresal)  30 mg PO TID [facility]


   Last Admin: 03/26/17 15:29 Dose:  30 mg


Baclofen (Lioresal)  5 mg PO TID [facility]


   Last Admin: 03/27/17 07:59 Dose:  5 mg


Baclofen (Lioresal)  10 mg PO TID [facility]


Baclofen (Lioresal)  5 mg PO ONETIME ONE


   Stop: 03/27/17 09:38


   Last Admin: 03/27/17 10:20 Dose:  5 mg


Baclofen (Lioresal)  5 mg PO TID [facility]


Baclofen (Lioresal)  5 mg PO BID@1700,2300 [facility]


   Stop: 03/27/17 23:01


Baclofen (Lioresal)  5 mg PO ONETIME ONE


   Stop: 03/27/17 17:17


   Last Admin: 03/27/17 17:26 Dose:  5 mg


Bisacodyl (Dulcolax)  10 mg RECTAL ONETIME ONE


   Stop: 03/27/17 08:43


   Last Admin: 03/27/17 09:03 Dose:  10 mg


Diphtheria/Tetanus/Acell Pertussis (Boostrix)  0.5 ml IM .ONCE ONE


   Stop: 03/26/17 11:52


   Last Admin: 03/26/17 12:24 Dose:  Not Given


Sodium Chloride (Normal Saline)  1,000 mls @ 999 mls/hr IV ONETIME ONE


   Stop: 03/24/17 20:50


   Last Infusion: 03/24/17 22:13 Dose:  Infused


Ceftriaxone Sodium 1 gm/ (Sodium Chloride)  100 mls @ 200 mls/hr IV ONETIME ONE


   Stop: 03/24/17 22:03


   Last Admin: 03/24/17 22:13 Dose:  200 mls/hr


Sodium Chloride (Normal Saline)  1,000 mls @ 125 mls/hr IV ONETIME ONE


   Stop: 03/25/17 05:56


   Last Admin: 03/24/17 22:13 Dose:  125 mls/hr


Levofloxacin/Dextrose 750 mg/ (Premix)  150 mls @ 100 mls/hr IV Q24H [facility]


   Last Admin: 03/26/17 18:47 Dose:  Not Given


Levetiracetam 1,000 mg/ Sodium (Chloride)  110 mls @ 400 mls/hr IV ONETIME ONE


   Stop: 03/25/17 15:38


   Last Admin: 03/25/17 16:07 Dose:  400 mls/hr


Levetiracetam 1,000 mg/ Sodium (Chloride)  110 mls @ 400 mls/hr IV Q12H [facility]


   Last Admin: 03/26/17 12:26 Dose:  Not Given


Vancomycin HCl 1 gm/ Sodium (Chloride)  250 mls @ 250 mls/hr IV ONETIME ONE


   Stop: 03/26/17 00:03


   Last Admin: 03/25/17 23:18 Dose:  250 mls/hr


Magnesium Sulfate 2 gm/ Premix  50 mls @ 25 mls/hr IV ONETIME ONE


   Stop: 03/26/17 13:06


   Last Admin: 03/26/17 12:30 Dose:  25 mls/hr


Piperacillin Sod/Tazobactam (Sod 4.5 gm/ Sodium Chloride)  100 mls @ 200 mls/hr 

IV ONETIME ONE


   Stop: 03/27/17 13:29


   Last Admin: 03/27/17 13:47 Dose:  200 mls/hr


Levetiracetam (Keppra)  1,000 mg PO BID [facility]


   Last Admin: 03/25/17 20:27 Dose:  Not Given


Lorazepam (Ativan)  1 mg IVPUSH ONETIME ONE


   Stop: 03/25/17 15:31


   Last Admin: 03/25/17 16:03 Dose:  1 mg


Metoprolol Tartrate (Lopressor)  2.5 mg IVPUSH Q6H PRN


   PRN Reason: heart rate


   Last Admin: 03/25/17 21:28 Dose:  2.5 mg


Potassium Chloride (Potassium Chloride)  40 meq PO ONETIME ONE


   Stop: 03/26/17 11:08


   Last Admin: 03/26/17 12:33 Dose:  40 meq


Tizanidine HCl (Zanaflex)  2 mg PO TID [facility]


   Last Admin: 03/26/17 15:29 Dose:  2 mg


Tizanidine HCl (Zanaflex)  2 mg PO ONETIME STA


   Stop: 03/27/17 09:38


   Last Admin: 03/27/17 10:20 Dose:  2 mg











- Exam


Quality Assessment: urine catheter, DVT prophylaxis


General: alert, oriented, no acute distress


HEENT: Pupils equal, Pupils reactive, EOMI


Neck: trachea midline, no JVD


Lungs: Normal respiratory effort


Cardiovascular: Regular Rate


Abdomen: bowel sounds present, soft, no tenderness, no distension


 (Female) Exam: Deferred


Back Exam: normal inspection


Extremities: normal pulses


Peripheral Pulses: 2+: radial (L), radial (R)


Skin: warm


Neurological: no new focal deficit


Psy/Mental Status: alert





- Problem List Review


Problem List Initiated/Reviewed/Updated: Yes





- My Orders


Last 24 Hours: 


My Active Orders





03/26/17 16:45


Sodium Chloride 0.9% [Normal Saline] 500 ml IV .BOLUS 





03/26/17 20:06


Oral Care [OM.PC] QID 





03/27/17 15:17


Blood Culture x2 Reflex Set [OM.PC] Urgent 





03/27/17 15:34


CULTURE BLOOD [BC] Stat 





03/27/17 17:00


tiZANidine [Zanaflex]   1 mg PO BID@1700,2300 





03/27/17 17:07


Baclofen [Lioresal]   5 mg PO BID@1700,2300 





03/27/17 17:33


Magnesium Sulfate/Water [Magnesium Sulfate 2 GM in Water 50 ML] 2 gm   Premix 

Bag 1 bag IV ONETIME 





03/27/17 20:06


Oral Care [OM.PC] QID 





03/27/17 21:00


Piperacillin/Tazobactam [Zosyn] 4.5 gm   Sodium Chloride 0.9% [Normal Saline] 

100 ml IV Q8H 





03/28/17 05:00


BMP [BASIC METABOLIC PANEL,BMP] [CHEM] DAILY 


CBC WITH AUTO DIFF [HEME] DAILY 





03/28/17 09:00


Baclofen [Lioresal]   5 mg PO TID 


tiZANidine [Zanaflex]   1 mg PO TID 














- Plan


Plan:: 





Impression:





Febrile with UTI


AMS, questionable SZ like activity;  resolved


Risk of aspiration


Puree diet aas ordered


Drowsiness after Zanaflex and Baclofen














Plan:





ICU


IV ATBs


Adjust meds to avoid over sedation.


Await Keppra level


Swallow study


Hydrate as needed, have reduced rate for now.


BP/HR control, address cause of elevation


DVT/GI prophylaxis


Consult PT/OT/SW





LOS, unknown.

## 2017-03-27 NOTE — CR
Chest: Portable view of the chest was obtained.

 

Comparison: Previous chest x-ray of 03/25/17.

 

Heart size and mediastinum are normal.  Lungs are clear.  Bony 

structures show stable scoliosis and appear grossly intact.

 

Impression:

1.  Nothing acute is seen.  No significant change is seen from 

previous chest x-ray.

 

Diagnostic code #2

## 2017-03-27 NOTE — CT
Head CT

 

Technique: Multiple axial sections through the brain were obtained.  

Intravenous contrast was not utilized.

 

Comparison: Previous head CT study of 01/12/15.

 

Findings: Ventricles along with basal cisterns and sulci over the 

convexities are moderately prominent.  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 

seen on both sides.  These appear fairly stable from prior exam.  

Small vessel ischemic demyelination change also seen within the 

periventricular and subcortical white matter.  No other abnormal 

parenchymal densities are seen.  No evidence of intracranial 

hemorrhage.  No midline shift or mass effect is seen.

 

Bone window settings were reviewed which show no discrete calvarial 

abnormality.  Visualized sinuses are clear.

 

Impression:

1.  Senescent change.  Nothing acute is appreciated on noncontrast 

head CT study.  

 

Diagnostic code #2

 

I agree with preliminary report issued by [facility] 

(preliminary report dictated on 03/25/17, 12:54 PM Central Time)

## 2017-03-27 NOTE — CR
Chest: Frontal view of the chest was obtained utilizing portable 

technique.

 

Comparison: Previous chest x-ray of 03/24/17.

 

Heart size and mediastinum are normal.  Scoliosis noted within the 

spine.  Lungs are clear.  Bony structures are osteopenic.

 

Impression: 

1.  Incidental findings.  Nothing acute is identified on portable 

chest x-ray.  

 

Diagnostic code #2

 

I agree with preliminary report issued by [facility] 

(preliminary report dictated on 03/25/17, 5:00 PM Central Time)

## 2017-03-28 RX ADMIN — ALBUTEROL SULFATE SCH MG: 2.5 SOLUTION RESPIRATORY (INHALATION) at 09:18

## 2017-03-28 RX ADMIN — ALBUTEROL SULFATE SCH MG: 2.5 SOLUTION RESPIRATORY (INHALATION) at 05:37

## 2017-03-28 RX ADMIN — ALBUTEROL SULFATE SCH MG: 2.5 SOLUTION RESPIRATORY (INHALATION) at 15:58

## 2017-03-28 RX ADMIN — DEXTROSE MONOHYDRATE, SODIUM CHLORIDE, AND POTASSIUM CHLORIDE SCH MLS/HR: 50; 4.5; 1.49 INJECTION, SOLUTION INTRAVENOUS at 07:10

## 2017-03-28 RX ADMIN — ALBUTEROL SULFATE SCH MG: 2.5 SOLUTION RESPIRATORY (INHALATION) at 20:51

## 2017-03-28 NOTE — PCM.PN
- General Info


Date of Service: 03/28/17


Functional Status: Reports: tolerating diet (except thickened liquids), 

urinating





- Review of Systems


General: Reports: No Symptoms


HEENT: Reports: no symptoms


Pulmonary: Reports: no symptoms


Cardiovascular: Reports: No Symptoms


Gastrointestinal: Reports: No symptoms


Genitourinary: Reports: no symptoms


Musculoskeletal: Reports: no symptoms


Skin: Reports: no symptoms


Neurological: Reports: No Symptoms


Psychiatric: Reports: no symptoms





- Patient Data


Vitals - most recent: 


 Last Vital Signs











Temp  36.5 C   03/28/17 07:34


 


Pulse  99   03/28/17 07:34


 


Resp  16   03/28/17 07:34


 


BP  110/97 H  03/28/17 07:34


 


Pulse Ox  95   03/28/17 09:20











Weight - most recent: 73.482 kg


I&O - last 24 hours: 


 Intake & Output











 03/27/17 03/28/17 03/28/17





 22:59 06:59 14:59


 


Intake Total 715 1311 120


 


Balance 715 1311 120











Lab Results last 24 hrs: 


 Laboratory Results - last 24 hr











  03/27/17 03/27/17 03/27/17 Range/Units





  11:25 17:04 21:54 


 


WBC     (3.98-10.04)  K/mm3


 


RBC     (3.98-5.22)  M/mm3


 


Hgb     (11.2-15.7)  gm/L


 


Hct     (34.1-44.9)  %


 


MCV     (79.4-94.8)  fl


 


MCH     (25.6-32.2)  pg


 


MCHC     (32.2-35.5)  g/dl


 


RDW Std Deviation     (36.4-46.3)  fL


 


Plt Count     (182-369)  K/mm3


 


MPV     (9.4-12.3)  fl


 


Neut % (Auto)     (34.0-71.1)  %


 


Lymph % (Auto)     (19.3-51.7)  %


 


Mono % (Auto)     (4.7-12.5)  %


 


Eos % (Auto)     (0.7-5.8)  


 


Baso % (Auto)     (0.1-1.2)  %


 


Neut # (Auto)     (1.56-6.13)  K/mm3


 


Lymph # (Auto)     (1.18-3.74)  K/mm3


 


Mono # (Auto)     (0.24-0.36)  K/mm3


 


Eos # (Auto)     (0.04-0.36)  K/mm3


 


Baso # (Auto)     (0.01-0.08)  K/mm3


 


Manual Slide Review     


 


Sodium     (136-145)  mEq/L


 


Potassium     (3.5-5.1)  mEq/L


 


Chloride     ()  mEq/L


 


Carbon Dioxide     (21-32)  mEq/L


 


Anion Gap     (5-15)  


 


BUN     (7-18)  mg/dL


 


Creatinine     (0.55-1.02)  mg/dL


 


Est Cr Clr Drug Dosing     mL/min


 


Estimated GFR (MDRD)     (>60)  mL/min


 


BUN/Creatinine Ratio     (14-18)  


 


Glucose     ()  mg/dL


 


POC Glucose  102  119 H  152 H  ()  mg/dL


 


Calcium     (8.5-10.1)  mg/dL














  03/28/17 03/28/17 03/28/17 Range/Units





  06:37 06:40 06:40 


 


WBC   10.77 H   (3.98-10.04)  K/mm3


 


RBC   4.01   (3.98-5.22)  M/mm3


 


Hgb   11.5   (11.2-15.7)  gm/L


 


Hct   33.9 L   (34.1-44.9)  %


 


MCV   84.5   (79.4-94.8)  fl


 


MCH   28.7   (25.6-32.2)  pg


 


MCHC   33.9   (32.2-35.5)  g/dl


 


RDW Std Deviation   49.1 H   (36.4-46.3)  fL


 


Plt Count   280   (182-369)  K/mm3


 


MPV   9.5   (9.4-12.3)  fl


 


Neut % (Auto)   66.8   (34.0-71.1)  %


 


Lymph % (Auto)   24.6   (19.3-51.7)  %


 


Mono % (Auto)   6.7   (4.7-12.5)  %


 


Eos % (Auto)   1.0   (0.7-5.8)  


 


Baso % (Auto)   0.6   (0.1-1.2)  %


 


Neut # (Auto)   7.19 H   (1.56-6.13)  K/mm3


 


Lymph # (Auto)   2.65   (1.18-3.74)  K/mm3


 


Mono # (Auto)   0.72 H   (0.24-0.36)  K/mm3


 


Eos # (Auto)   0.11   (0.04-0.36)  K/mm3


 


Baso # (Auto)   0.07   (0.01-0.08)  K/mm3


 


Manual Slide Review   Normal smear   


 


Sodium    137  (136-145)  mEq/L


 


Potassium    3.3 L  (3.5-5.1)  mEq/L


 


Chloride    103  ()  mEq/L


 


Carbon Dioxide    23  (21-32)  mEq/L


 


Anion Gap    14.3  (5-15)  


 


BUN    7  (7-18)  mg/dL


 


Creatinine    0.8  (0.55-1.02)  mg/dL


 


Est Cr Clr Drug Dosing    76.13  mL/min


 


Estimated GFR (MDRD)    > 60  (>60)  mL/min


 


BUN/Creatinine Ratio    8.8 L  (14-18)  


 


Glucose    117 H  ()  mg/dL


 


POC Glucose  129 H    ()  mg/dL


 


Calcium    8.6  (8.5-10.1)  mg/dL











Med Orders - Current: 


 Current Medications





Acetaminophen (Tylenol)  650 mg RECTAL Q6H PRN


   PRN Reason: Fever


   Last Admin: 03/27/17 15:07 Dose:  650 mg


Albuterol (Proventil Neb Soln)  2.5 mg NEB QIDRT [facility]


   Last Admin: 03/28/17 09:18 Dose:  2.5 mg


Baclofen (Lioresal)  5 mg PO TID [facility]


   Last Admin: 03/28/17 08:08 Dose:  5 mg


Dextrose/Water (Dextrose 50% In Water)  50 ml IVPUSH ASDIRECTED PRN


   PRN Reason: Hypoglycemia


Enoxaparin Sodium (Lovenox)  40 mg SUBCUT DAILY [facility]


   Last Admin: 03/28/17 08:09 Dose:  40 mg


Fluoxetine HCl (Prozac)  10 mg PO DAILY [facility]


   Last Admin: 03/28/17 08:07 Dose:  10 mg


Hydralazine HCl (Apresoline)  20 mg IVPUSH Q8H PRN


   PRN Reason: Hypertension


Vancomycin HCl 1 gm/ Sodium (Chloride)  250 mls @ 167 mls/hr IV Q12H [facility]


   Last Admin: 03/28/17 10:50 Dose:  167 mls/hr


Sodium Chloride (Normal Saline)  500 mls @ 999 mls/min IV .BOLUS [facility]


   Last Admin: 03/26/17 16:45 Dose:  999 mls/min


Piperacillin Sod/Tazobactam (Sod 4.5 gm/ Sodium Chloride)  100 mls @ 25 mls/hr 

IV Q8H [facility]


   Last Admin: 03/28/17 04:44 Dose:  25 mls/hr


Potassium Chloride/Dextrose/Sod Cl (D5 1/2 Ns W/ 40 Meq/L Kcl)  1,000 mls @ 

69.307 mls/hr IV ASDIRECTED [facility]


   Last Admin: 03/28/17 11:06 Dose:  69.307 mls/hr


Insulin Aspart (Novolog)  0 unit SUBCUT QIDACANDBED [facility]


   PRN Reason: Protocol


   Last Admin: 03/28/17 11:35 Dose:  Not Given


Levetiracetam (Keppra)  1,000 mg PO BID [facility]


   Last Admin: 03/28/17 08:08 Dose:  1,000 mg


Lorazepam (Ativan)  1 mg IVPUSH Q4H PRN


   PRN Reason: Seizures


Magnesium Oxide (Magnesium Oxide)  400 mg PO QPM [facility]


   Last Admin: 03/27/17 17:18 Dose:  400 mg


Metoprolol Tartrate (Lopressor)  2.5 mg IVPUSH Q4H PRN


   PRN Reason: heart rate


   Last Admin: 03/26/17 01:39 Dose:  2.5 mg


Senna/Docusate Sodium (Senna Plus)  2 tab PO BID [facility]


   Last Admin: 03/28/17 08:07 Dose:  2 tab


Sodium Chloride (Saline Flush)  10 ml FLUSH ASDIRECTED PRN


   PRN Reason: Keep Vein Open


   Last Admin: 03/24/17 20:18 Dose:  10 ml


Tizanidine HCl (Zanaflex)  1 mg PO TID [facility]


   Last Admin: 03/28/17 08:08 Dose:  1 mg


Vancomycin HCl (Pharmacy To Dose - Vancomycin)  0 dose .XX ASDIRECTED PRN


   PRN Reason: RX DOSE





Discontinued Medications





Albuterol (Proventil Neb Soln)  0.63 mg NEB ONETIME ONE


   Stop: 03/27/17 17:53


   Last Admin: 03/27/17 18:02 Dose:  Not Given


Albuterol (Proventil Neb Soln)  2.5 mg NEB ONETIME ONE


   Stop: 03/27/17 17:53


   Last Admin: 03/27/17 18:01 Dose:  Not Given


Albuterol (Proventil Neb Soln) Confirm Administered Dose 2.5 mg .ROUTE .STK-MED 

ONE


   Stop: 03/27/17 17:54


   Last Admin: 03/27/17 17:58 Dose:  2.5 mg


Albuterol (Proventil Neb Soln)  0.63 mg NEB QIDRT [facility]


Baclofen (Lioresal)  30 mg PO TID [facility]


   Last Admin: 03/26/17 15:29 Dose:  30 mg


Baclofen (Lioresal)  5 mg PO TID [facility]


   Last Admin: 03/27/17 07:59 Dose:  5 mg


Baclofen (Lioresal)  10 mg PO TID [facility]


   Last Admin: 03/27/17 19:13 Dose:  Not Given


Baclofen (Lioresal)  5 mg PO ONETIME ONE


   Stop: 03/27/17 09:38


   Last Admin: 03/27/17 10:20 Dose:  5 mg


Baclofen (Lioresal)  5 mg PO TID [facility]


Baclofen (Lioresal)  5 mg PO BID@1700,2300 [facility]


   Stop: 03/27/17 23:01


   Last Admin: 03/27/17 19:13 Dose:  Not Given


Baclofen (Lioresal)  5 mg PO BID@1700,2300 [facility]


   Stop: 03/27/17 23:01


   Last Admin: 03/27/17 23:25 Dose:  5 mg


Baclofen (Lioresal)  5 mg PO ONETIME ONE


   Stop: 03/27/17 17:17


   Last Admin: 03/27/17 17:26 Dose:  5 mg


Bisacodyl (Dulcolax)  10 mg RECTAL ONETIME ONE


   Stop: 03/27/17 08:43


   Last Admin: 03/27/17 09:03 Dose:  10 mg


Diphtheria/Tetanus/Acell Pertussis (Boostrix)  0.5 ml IM .ONCE ONE


   Stop: 03/26/17 11:52


   Last Admin: 03/26/17 12:24 Dose:  Not Given


Sodium Chloride (Normal Saline)  1,000 mls @ 999 mls/hr IV ONETIME ONE


   Stop: 03/24/17 20:50


   Last Infusion: 03/24/17 22:13 Dose:  Infused


Ceftriaxone Sodium 1 gm/ (Sodium Chloride)  100 mls @ 200 mls/hr IV ONETIME ONE


   Stop: 03/24/17 22:03


   Last Admin: 03/24/17 22:13 Dose:  200 mls/hr


Sodium Chloride (Normal Saline)  1,000 mls @ 125 mls/hr IV ONETIME ONE


   Stop: 03/25/17 05:56


   Last Admin: 03/24/17 22:13 Dose:  125 mls/hr


Levofloxacin/Dextrose 750 mg/ (Premix)  150 mls @ 100 mls/hr IV Q24H [facility]


   Last Admin: 03/26/17 18:47 Dose:  Not Given


Potassium Chloride/Dextrose/Sod Cl (D5 1/2 Ns W/ 20 Meq/L Kcl)  1,000 mls @ 70 

mls/hr IV ASDIRECTED [facility]


   Last Admin: 03/28/17 07:10 Dose:  70 mls/hr


Levetiracetam 1,000 mg/ Sodium (Chloride)  110 mls @ 400 mls/hr IV ONETIME ONE


   Stop: 03/25/17 15:38


   Last Admin: 03/25/17 16:07 Dose:  400 mls/hr


Levetiracetam 1,000 mg/ Sodium (Chloride)  110 mls @ 400 mls/hr IV Q12H [facility]


   Last Admin: 03/26/17 12:26 Dose:  Not Given


Vancomycin HCl 1 gm/ Sodium (Chloride)  250 mls @ 250 mls/hr IV ONETIME ONE


   Stop: 03/26/17 00:03


   Last Admin: 03/25/17 23:18 Dose:  250 mls/hr


Magnesium Sulfate 2 gm/ Premix  50 mls @ 25 mls/hr IV ONETIME ONE


   Stop: 03/26/17 13:06


   Last Admin: 03/26/17 12:30 Dose:  25 mls/hr


Piperacillin Sod/Tazobactam (Sod 4.5 gm/ Sodium Chloride)  100 mls @ 200 mls/hr 

IV ONETIME ONE


   Stop: 03/27/17 13:29


   Last Admin: 03/27/17 13:47 Dose:  200 mls/hr


Magnesium Sulfate 2 gm/ Premix  50 mls @ 25 mls/hr IV ONETIME ONE


   Stop: 03/27/17 19:32


   Last Admin: 03/27/17 18:08 Dose:  25 mls/hr


Potassium Chloride 20 meq/Potassium Chloride/Dextrose/Sod Cl  1,010 mls @ 70 mls

/hr IV ASDIRECTED [facility]


Levetiracetam (Keppra)  1,000 mg PO BID [facility]


   Last Admin: 03/25/17 20:27 Dose:  Not Given


Lorazepam (Ativan)  1 mg IVPUSH ONETIME ONE


   Stop: 03/25/17 15:31


   Last Admin: 03/25/17 16:03 Dose:  1 mg


Metoprolol Tartrate (Lopressor)  2.5 mg IVPUSH Q6H PRN


   PRN Reason: heart rate


   Last Admin: 03/25/17 21:28 Dose:  2.5 mg


Potassium Chloride (Potassium Chloride)  40 meq PO ONETIME ONE


   Stop: 03/26/17 11:08


   Last Admin: 03/26/17 12:33 Dose:  40 meq


Tizanidine HCl (Zanaflex)  2 mg PO TID [facility]


   Last Admin: 03/27/17 19:13 Dose:  Not Given


Tizanidine HCl (Zanaflex)  2 mg PO ONETIME [facility]


   Stop: 03/27/17 09:38


   Last Admin: 03/27/17 10:20 Dose:  2 mg


Tizanidine HCl (Zanaflex)  1 mg PO BID@1700,2300 [facility]


   Stop: 03/27/17 23:01


   Last Admin: 03/27/17 23:24 Dose:  1 mg











- Exam


Quality Assessment: DVT prophylaxis


General: alert, oriented, cooperative, no acute distress


HEENT: Pupils equal, Pupils reactive, EOMI


Neck: supple, trachea midline


Lungs: Normal respiratory effort


Cardiovascular: Regular Rate, Regular Rhythm


Abdomen: bowel sounds present, soft, no tenderness, no distension


 (Female) Exam: Deferred


Back Exam: normal inspection


Extremities: normal pulses


Skin: warm


Neurological: no new focal deficit


Psy/Mental Status: alert, normal affect, normal mood





- Problem List Review


Problem List Initiated/Reviewed/Updated: Yes





- My Orders


Last 24 Hours: 


My Active Orders





03/27/17 15:17


Blood Culture x2 Reflex Set [OM.PC] Urgent 





03/27/17 15:34


CULTURE BLOOD [BC] Stat 





03/27/17 17:54


RT Aerosol Therapy [RC] ASDIRECTED 





03/27/17 20:06


Oral Care [OM.PC] QID 





03/27/17 21:00


Albuterol [Proventil Neb Soln]   2.5 mg NEB QIDRT 


Piperacillin/Tazobactam [Zosyn] 4.5 gm   Sodium Chloride 0.9% [Normal Saline] 

100 ml IV Q8H 





03/28/17 09:00


Baclofen [Lioresal]   5 mg PO TID 


tiZANidine [Zanaflex]   1 mg PO TID 





03/28/17 10:15


D5 1/2 NS w/ 40 mEq/L KCl 1,000 ml IV ASDIRECTED 





03/29/17 05:00


BMP [BASIC METABOLIC PANEL,BMP] [CHEM] DAILY 


CBC WITH AUTO DIFF [HEME] DAILY 


MAGNESIUM [CHEM] DAILY 





03/30/17 05:00


BMP [BASIC METABOLIC PANEL,BMP] [CHEM] DAILY 


CBC WITH AUTO DIFF [HEME] DAILY 


MAGNESIUM [CHEM] DAILY 





03/31/17 05:00


BMP [BASIC METABOLIC PANEL,BMP] [CHEM] DAILY 


CBC WITH AUTO DIFF [HEME] DAILY 


MAGNESIUM [CHEM] DAILY 














- Plan


Plan:: 





Impression:





AUTI, on 2 IV ATBs


AMS, resolved


Risk of aspiration; pulmonary toilet


Puree diet as ordered; patient is not drinking enough fluids;


Drowsiness after Zanaflex and Baclofen yesterday, with increase spasms off usual


regimen.


Family meeting, impromptu regarding PEG tube, patient is the decision maker.


Has declined today, but will reconsider the option.











Plan:





ICU


IV ATBs


Adjust meds to avoid over sedation.


Seems to tolerate baclofen 15 mg TID, zanaflex 1 mg TID, will


follow.


Await Keppra level


Hydrate as needed, have reduced rate for now.


BP/HR control, address cause of elevation


DVT/GI prophylaxis


Consult PT/OT/SW





LOS>96 hours expected with slow response to therapy.

## 2017-03-28 NOTE — CR
Chest: Portable view of the chest was obtained.

 

Comparison: Previous chest x-ray of 03/27/17.

 

Heart size and mediastinum are within normal limits.  Lung markings 

slightly increased believed to be accentuated from portable technique 

and stable from prior exam.  No acute pulmonary densities are felt to 

be present.  Bony structures are grossly intact.

 

Impression:

1.  Nothing acute is seen on portable chest x-ray.

 

Diagnostic code #1

## 2017-03-29 RX ADMIN — LORAZEPAM PRN MG: 2 INJECTION INTRAMUSCULAR; INTRAVENOUS at 15:31

## 2017-03-29 RX ADMIN — DEXTROSE, SODIUM CHLORIDE, AND POTASSIUM CHLORIDE SCH MLS/HR: 5; .45; .3 INJECTION INTRAVENOUS at 01:16

## 2017-03-29 RX ADMIN — ALBUTEROL SULFATE SCH: 2.5 SOLUTION RESPIRATORY (INHALATION) at 06:15

## 2017-03-29 RX ADMIN — DEXTROSE, SODIUM CHLORIDE, AND POTASSIUM CHLORIDE SCH MLS/HR: 5; .45; .3 INJECTION INTRAVENOUS at 15:33

## 2017-03-29 NOTE — PCM.PN
- General Info


Date of Service: 03/29/17


Functional Status: Reports: tolerating diet (decrease oral intake), urinating





- Review of Systems


General: Reports: No Symptoms


HEENT: Reports: no symptoms


Pulmonary: Reports: no symptoms


Cardiovascular: Reports: No Symptoms


Gastrointestinal: Reports: No symptoms


Genitourinary: Reports: no symptoms


Musculoskeletal: Reports: no symptoms


Skin: Reports: no symptoms


Neurological: Reports: No Symptoms


Psychiatric: Reports: no symptoms





- Patient Data


Vitals - most recent: 


 Last Vital Signs











Temp  36.2 C   03/29/17 08:00


 


Pulse  91   03/29/17 08:00


 


Resp  20   03/29/17 08:00


 


BP  155/105 H  03/29/17 08:00


 


Pulse Ox  96   03/29/17 08:00











Weight - most recent: 72.665 kg


I&O - last 24 hours: 


 Intake & Output











 03/28/17 03/29/17 03/29/17





 22:59 06:59 14:59


 


Intake Total 1702 1482 


 


Balance 1702 1482 











Lab Results last 24 hrs: 


 Laboratory Results - last 24 hr











  03/28/17 03/29/17 03/29/17 Range/Units





  20:50 06:05 06:05 


 


WBC   9.37   (3.98-10.04)  K/mm3


 


RBC   3.82 L   (3.98-5.22)  M/mm3


 


Hgb   10.9 L   (11.2-15.7)  gm/L


 


Hct   32.7 L   (34.1-44.9)  %


 


MCV   85.6   (79.4-94.8)  fl


 


MCH   28.5   (25.6-32.2)  pg


 


MCHC   33.3   (32.2-35.5)  g/dl


 


RDW Std Deviation   50.8 H   (36.4-46.3)  fL


 


Plt Count   308   (182-369)  K/mm3


 


MPV   9.8   (9.4-12.3)  fl


 


Neut % (Auto)   55.6   (34.0-71.1)  %


 


Lymph % (Auto)   31.4   (19.3-51.7)  %


 


Mono % (Auto)   9.4   (4.7-12.5)  %


 


Eos % (Auto)   1.9   (0.7-5.8)  


 


Baso % (Auto)   1.3 H   (0.1-1.2)  %


 


Neut # (Auto)   5.21   (1.56-6.13)  K/mm3


 


Lymph # (Auto)   2.94   (1.18-3.74)  K/mm3


 


Mono # (Auto)   0.88 H   (0.24-0.36)  K/mm3


 


Eos # (Auto)   0.18   (0.04-0.36)  K/mm3


 


Baso # (Auto)   0.12 H   (0.01-0.08)  K/mm3


 


Manual Slide Review   Normal smear   


 


Sodium    138  (136-145)  mEq/L


 


Potassium    4.1  (3.5-5.1)  mEq/L


 


Chloride    103  ()  mEq/L


 


Carbon Dioxide    24  (21-32)  mEq/L


 


Anion Gap    15.1 H  (5-15)  


 


BUN    3 L  (7-18)  mg/dL


 


Creatinine    0.8  (0.55-1.02)  mg/dL


 


Est Cr Clr Drug Dosing    76.13  mL/min


 


Estimated GFR (MDRD)    > 60  (>60)  mL/min


 


BUN/Creatinine Ratio    3.8 L  (14-18)  


 


Glucose    103  ()  mg/dL


 


POC Glucose  128 H    ()  mg/dL


 


Calcium    8.9  (8.5-10.1)  mg/dL


 


Magnesium    1.5 L  (1.8-2.4)  mg/dl














  03/29/17 Range/Units





  06:06 


 


WBC   (3.98-10.04)  K/mm3


 


RBC   (3.98-5.22)  M/mm3


 


Hgb   (11.2-15.7)  gm/L


 


Hct   (34.1-44.9)  %


 


MCV   (79.4-94.8)  fl


 


MCH   (25.6-32.2)  pg


 


MCHC   (32.2-35.5)  g/dl


 


RDW Std Deviation   (36.4-46.3)  fL


 


Plt Count   (182-369)  K/mm3


 


MPV   (9.4-12.3)  fl


 


Neut % (Auto)   (34.0-71.1)  %


 


Lymph % (Auto)   (19.3-51.7)  %


 


Mono % (Auto)   (4.7-12.5)  %


 


Eos % (Auto)   (0.7-5.8)  


 


Baso % (Auto)   (0.1-1.2)  %


 


Neut # (Auto)   (1.56-6.13)  K/mm3


 


Lymph # (Auto)   (1.18-3.74)  K/mm3


 


Mono # (Auto)   (0.24-0.36)  K/mm3


 


Eos # (Auto)   (0.04-0.36)  K/mm3


 


Baso # (Auto)   (0.01-0.08)  K/mm3


 


Manual Slide Review   


 


Sodium   (136-145)  mEq/L


 


Potassium   (3.5-5.1)  mEq/L


 


Chloride   ()  mEq/L


 


Carbon Dioxide   (21-32)  mEq/L


 


Anion Gap   (5-15)  


 


BUN   (7-18)  mg/dL


 


Creatinine   (0.55-1.02)  mg/dL


 


Est Cr Clr Drug Dosing   mL/min


 


Estimated GFR (MDRD)   (>60)  mL/min


 


BUN/Creatinine Ratio   (14-18)  


 


Glucose   ()  mg/dL


 


POC Glucose  104  ()  mg/dL


 


Calcium   (8.5-10.1)  mg/dL


 


Magnesium   (1.8-2.4)  mg/dl











Abran Results last 24 hrs: 


 Microbiology











 03/27/17 15:34 Aerobic Blood Culture - Preliminary





 Blood - Venous    NO GROWTH AFTER 1 DAY





 Anaerobic Blood Culture - Preliminary





    NO GROWTH AFTER 1 DAY











Med Orders - Current: 


 Current Medications





Acetaminophen (Tylenol)  650 mg RECTAL Q6H PRN


   PRN Reason: Fever


   Last Admin: 03/27/17 15:07 Dose:  650 mg


Baclofen (Lioresal)  15 mg PO TID [facility]


   Last Admin: 03/29/17 08:10 Dose:  15 mg


Dextrose/Water (Dextrose 50% In Water)  50 ml IVPUSH ASDIRECTED PRN


   PRN Reason: Hypoglycemia


Enoxaparin Sodium (Lovenox)  40 mg SUBCUT DAILY [facility]


   Last Admin: 03/29/17 08:09 Dose:  40 mg


Fluoxetine HCl (Prozac)  10 mg PO DAILY [facility]


   Last Admin: 03/29/17 08:10 Dose:  10 mg


Hydralazine HCl (Apresoline)  20 mg IVPUSH Q8H PRN


   PRN Reason: Hypertension


Vancomycin HCl 1 gm/ Sodium (Chloride)  250 mls @ 167 mls/hr IV Q12H [facility]


   Last Admin: 03/29/17 00:08 Dose:  167 mls/hr


Sodium Chloride (Normal Saline)  500 mls @ 999 mls/min IV .BOLUS [facility]


   Last Admin: 03/26/17 16:45 Dose:  999 mls/min


Piperacillin Sod/Tazobactam (Sod 4.5 gm/ Sodium Chloride)  100 mls @ 25 mls/hr 

IV Q8H [facility]


   Last Admin: 03/29/17 06:03 Dose:  25 mls/hr


Potassium Chloride/Dextrose/Sod Cl (D5 1/2 Ns W/ 40 Meq/L Kcl)  1,000 mls @ 70 

mls/hr IV ASDIRECTED [facility]


   Last Admin: 03/29/17 01:16 Dose:  70 mls/hr


Magnesium Sulfate 2 gm/ Premix  50 mls @ 25 mls/hr IV ONETIME ONE


   Stop: 03/29/17 11:33


   Last Admin: 03/29/17 10:02 Dose:  25 mls/hr


Insulin Aspart (Novolog)  0 unit SUBCUT QIDACANDBED [facility]


   PRN Reason: Protocol


   Last Admin: 03/29/17 06:06 Dose:  Not Given


Levetiracetam (Keppra)  1,000 mg PO BID [facility]


   Last Admin: 03/29/17 08:10 Dose:  1,000 mg


Lorazepam (Ativan)  1 mg IVPUSH Q4H PRN


   PRN Reason: Seizures


Magnesium Oxide (Magnesium Oxide)  400 mg PO QPM [facility]


   Last Admin: 03/28/17 17:54 Dose:  400 mg


Metoprolol Tartrate (Lopressor)  2.5 mg IVPUSH Q4H PRN


   PRN Reason: heart rate


   Last Admin: 03/26/17 01:39 Dose:  2.5 mg


Senna/Docusate Sodium (Senna Plus)  2 tab PO BID [facility]


   Last Admin: 03/29/17 08:10 Dose:  2 tab


Sodium Chloride (Saline Flush)  10 ml FLUSH ASDIRECTED PRN


   PRN Reason: Keep Vein Open


   Last Admin: 03/24/17 20:18 Dose:  10 ml


Tizanidine HCl (Zanaflex)  1 mg PO TID [facility]


   Last Admin: 03/29/17 08:10 Dose:  1 mg


Vancomycin HCl (Pharmacy To Dose - Vancomycin)  0 dose .XX ASDIRECTED PRN


   PRN Reason: RX DOSE





Discontinued Medications





Albuterol (Proventil Neb Soln)  0.63 mg NEB ONETIME ONE


   Stop: 03/27/17 17:53


   Last Admin: 03/27/17 18:02 Dose:  Not Given


Albuterol (Proventil Neb Soln)  2.5 mg NEB ONETIME ONE


   Stop: 03/27/17 17:53


   Last Admin: 03/27/17 18:01 Dose:  Not Given


Albuterol (Proventil Neb Soln) Confirm Administered Dose 2.5 mg .ROUTE .STK-MED 

ONE


   Stop: 03/27/17 17:54


   Last Admin: 03/27/17 17:58 Dose:  2.5 mg


Albuterol (Proventil Neb Soln)  0.63 mg NEB QIDRT [facility]


Albuterol (Proventil Neb Soln)  2.5 mg NEB QIDRT [facility]


   Last Admin: 03/29/17 06:15 Dose:  Not Given


Baclofen (Lioresal)  30 mg PO TID [facility]


   Last Admin: 03/26/17 15:29 Dose:  30 mg


Baclofen (Lioresal)  5 mg PO TID [facility]


   Last Admin: 03/27/17 07:59 Dose:  5 mg


Baclofen (Lioresal)  10 mg PO TID [facility]


   Last Admin: 03/27/17 19:13 Dose:  Not Given


Baclofen (Lioresal)  5 mg PO ONETIME ONE


   Stop: 03/27/17 09:38


   Last Admin: 03/27/17 10:20 Dose:  5 mg


Baclofen (Lioresal)  5 mg PO TID [facility]


Baclofen (Lioresal)  5 mg PO TID [facility]


   Last Admin: 03/28/17 08:08 Dose:  5 mg


Baclofen (Lioresal)  5 mg PO BID@1700,2300 [facility]


   Stop: 03/27/17 23:01


   Last Admin: 03/27/17 19:13 Dose:  Not Given


Baclofen (Lioresal)  5 mg PO BID@1700,2300 [facility]


   Stop: 03/27/17 23:01


   Last Admin: 03/27/17 23:25 Dose:  5 mg


Baclofen (Lioresal)  5 mg PO ONETIME ONE


   Stop: 03/27/17 17:17


   Last Admin: 03/27/17 17:26 Dose:  5 mg


Baclofen (Lioresal)  10 mg PO TID [facility]


   Last Admin: 03/28/17 14:32 Dose:  10 mg


Bisacodyl (Dulcolax)  10 mg RECTAL ONETIME ONE


   Stop: 03/27/17 08:43


   Last Admin: 03/27/17 09:03 Dose:  10 mg


Diphtheria/Tetanus/Acell Pertussis (Boostrix)  0.5 ml IM .ONCE ONE


   Stop: 03/26/17 11:52


   Last Admin: 03/26/17 12:24 Dose:  Not Given


Sodium Chloride (Normal Saline)  1,000 mls @ 999 mls/hr IV ONETIME ONE


   Stop: 03/24/17 20:50


   Last Infusion: 03/24/17 22:13 Dose:  Infused


Ceftriaxone Sodium 1 gm/ (Sodium Chloride)  100 mls @ 200 mls/hr IV ONETIME ONE


   Stop: 03/24/17 22:03


   Last Admin: 03/24/17 22:13 Dose:  200 mls/hr


Sodium Chloride (Normal Saline)  1,000 mls @ 125 mls/hr IV ONETIME ONE


   Stop: 03/25/17 05:56


   Last Admin: 03/24/17 22:13 Dose:  125 mls/hr


Levofloxacin/Dextrose 750 mg/ (Premix)  150 mls @ 100 mls/hr IV Q24H [facility]


   Last Admin: 03/26/17 18:47 Dose:  Not Given


Potassium Chloride/Dextrose/Sod Cl (D5 1/2 Ns W/ 20 Meq/L Kcl)  1,000 mls @ 70 

mls/hr IV ASDIRECTED [facility]


   Last Admin: 03/28/17 07:10 Dose:  70 mls/hr


Levetiracetam 1,000 mg/ Sodium (Chloride)  110 mls @ 400 mls/hr IV ONETIME ONE


   Stop: 03/25/17 15:38


   Last Admin: 03/25/17 16:07 Dose:  400 mls/hr


Levetiracetam 1,000 mg/ Sodium (Chloride)  110 mls @ 400 mls/hr IV Q12H [facility]


   Last Admin: 03/26/17 12:26 Dose:  Not Given


Vancomycin HCl 1 gm/ Sodium (Chloride)  250 mls @ 250 mls/hr IV ONETIME ONE


   Stop: 03/26/17 00:03


   Last Admin: 03/25/17 23:18 Dose:  250 mls/hr


Magnesium Sulfate 2 gm/ Premix  50 mls @ 25 mls/hr IV ONETIME ONE


   Stop: 03/26/17 13:06


   Last Admin: 03/26/17 12:30 Dose:  25 mls/hr


Piperacillin Sod/Tazobactam (Sod 4.5 gm/ Sodium Chloride)  100 mls @ 200 mls/hr 

IV ONETIME ONE


   Stop: 03/27/17 13:29


   Last Admin: 03/27/17 13:47 Dose:  200 mls/hr


Magnesium Sulfate 2 gm/ Premix  50 mls @ 25 mls/hr IV ONETIME ONE


   Stop: 03/27/17 19:32


   Last Admin: 03/27/17 18:08 Dose:  25 mls/hr


Potassium Chloride 20 meq/Potassium Chloride/Dextrose/Sod Cl  1,010 mls @ 70 mls

/hr IV ASDIRECTED [facility]


Potassium Chloride/Dextrose/Sod Cl (D5 1/2 Ns W/ 40 Meq/L Kcl)  1,000 mls @ 

69.307 mls/hr IV ASDIRECTED [facility]


   Last Admin: 03/28/17 11:06 Dose:  69.307 mls/hr


Levetiracetam (Keppra)  1,000 mg PO BID [facility]


   Last Admin: 03/25/17 20:27 Dose:  Not Given


Lorazepam (Ativan)  1 mg IVPUSH ONETIME ONE


   Stop: 03/25/17 15:31


   Last Admin: 03/25/17 16:03 Dose:  1 mg


Metoprolol Tartrate (Lopressor)  2.5 mg IVPUSH Q6H PRN


   PRN Reason: heart rate


   Last Admin: 03/25/17 21:28 Dose:  2.5 mg


Potassium Chloride (Potassium Chloride)  40 meq PO ONETIME ONE


   Stop: 03/26/17 11:08


   Last Admin: 03/26/17 12:33 Dose:  40 meq


Tizanidine HCl (Zanaflex)  2 mg PO TID [facility]


   Last Admin: 03/27/17 19:13 Dose:  Not Given


Tizanidine HCl (Zanaflex)  2 mg PO ONETIME STA


   Stop: 03/27/17 09:38


   Last Admin: 03/27/17 10:20 Dose:  2 mg


Tizanidine HCl (Zanaflex)  1 mg PO BID@1700,2300 [facility]


   Stop: 03/27/17 23:01


   Last Admin: 03/27/17 23:24 Dose:  1 mg











- Exam


Quality Assessment: DVT prophylaxis


General: alert, oriented, cooperative


HEENT: Pupils equal, Pupils reactive


Neck: supple, trachea midline


Lungs: Normal respiratory effort


Cardiovascular: Regular Rate


Abdomen: bowel sounds present, soft, no tenderness, no distension


 (Female) Exam: Deferred


Back Exam: normal inspection


Extremities: normal pulses


Skin: warm


Neurological: no new focal deficit, normal speech (at her baseline)


Psy/Mental Status: alert, normal affect, normal mood





- Problem List & Annotations


(1) UTI (urinary tract infection)


SNOMED Code(s): 34942015


   Code(s): N39.0 - URINARY TRACT INFECTION, SITE NOT SPECIFIED   Status: Acute

   Current Visit: Yes   





(2) Multiple sclerosis


SNOMED Code(s): 38830013


   Code(s): G35 - MULTIPLE SCLEROSIS   Status: Acute   Current Visit: No   





- Problem List Review


Problem List Initiated/Reviewed/Updated: Yes





- My Orders


Last 24 Hours: 


My Active Orders





03/28/17 17:53


Baclofen [Lioresal]   15 mg PO TID 





03/29/17 01:15


D5 1/2 NS w/ 40 mEq/L KCl 1,000 ml IV ASDIRECTED 





03/29/17 09:34


Magnesium Sulfate/Water [Magnesium Sulfate 2 GM in Water 50 ML] 2 gm   Premix 

Bag 1 bag IV ONETIME 





03/29/17 09:42


Transfer Patient (Change bed) [ADT] Routine 





03/30/17 05:00


BMP [BASIC METABOLIC PANEL,BMP] [CHEM] DAILY 


CBC WITH AUTO DIFF [HEME] DAILY 


MAGNESIUM [CHEM] DAILY 





03/31/17 05:00


BMP [BASIC METABOLIC PANEL,BMP] [CHEM] DAILY 


CBC WITH AUTO DIFF [HEME] DAILY 


MAGNESIUM [CHEM] DAILY 














- Plan


Plan:: 





Impression:





Patient has agreed to PEG tube


AUTI, on 2 IV ATBs, will stop today


AMS, resolved


Risk of aspiration; pulmonary toilet


Puree diet as ordered; patient is not drinking enough fluids;


Drowsiness after Zanaflex and Baclofen yesterday, with increase spasms off usual


regimen.


Family meeting, impromptu regarding PEG tube, patient is the decision maker.











Plan:





Gen Surg consult re: PEG tube


ICU


IV ATBs


Adjust meds to avoid over sedation.


Await Keppra level


Hydrate as needed, have reduced rate for now.


BP/HR control, address cause of elevation


DVT/GI prophylaxis


Consult PT/OT/SW





LOS>96 hours expected with slow response to therapy.

## 2017-03-29 NOTE — PCM.CONSN
<Jennifer Abarca - Last Filed: 03/30/17 06:47>





- General Info


Date of Service: 03/29/17


Admission Dx/Problem (Free Text): 


 Admission Diagnosis/Problem





Admission Diagnosis/Problem      Urinary tract infection








Subjective Update: 





 51 year old female with MS, current hospitalization for UTI.  Consulted for 

PEG tube placement by hospitalist, Dr. Pickett.  





Patient does have dysphagia and is unable to meet her hydration needs.  She is 

on a pureed diet, pudding thick liquids. Patient did have a family meeting 

regarding PEG tube placement for hydration.  She was agreeable to placement.  

Patient is her own guardian.  





 She did have what appeared to be seizure like activity 3/25/17.   CT head was 

unremarkable.  She was given Keppra and Ativan and seizure like activity 

stopped.  





Face sheet from [facility] reviewed.  PMH listed: MS, dementia, Anxiety, Depression, 

Sepsis due to Ecoli, Osteoporosis, TIA without residual effects, Hx of UTIs, CKD

, Eosinophilia, Contractures, Pain, MG disturbance, Insomnia, encephalopathy, 

Chronic embolism and thrombus of vein unspecified, constipation, hypercalcemia, 

DM2, Anemia, convulsions, dysphagia. 








She currently is receiving vancomycin and Zosyn for UTI.    





- Review of Systems


General: Reports: No Symptoms


HEENT: Reports: no symptoms


Pulmonary: Reports: no symptoms


Cardiovascular: Reports: No Symptoms


Gastrointestinal: Reports: No symptoms


Genitourinary: Reports: incontinence


Musculoskeletal: Reports: other (contractures)


Skin: Reports: no symptoms


Neurological: Reports: Trouble Speaking (due to MS ), Other





- Patient Data


Vitals - most recent: 


 Last Vital Signs











Temp  37.3 C   03/29/17 16:12


 


Pulse  93   03/29/17 16:12


 


Resp  20   03/29/17 16:12


 


BP  119/67   03/29/17 16:12


 


Pulse Ox  96   03/29/17 16:12











Weight - most recent: 162 lb 0.001 oz


I&O - last 24 hours: 


 Intake & Output











 03/29/17 03/29/17 03/29/17





 06:59 14:59 22:59


 


Intake Total 1482 0 260


 


Balance 1482 0 260











Lab Results last 24 hrs: 


 Laboratory Results - last 24 hr











  03/25/17 03/28/17 03/29/17 Range/Units





  06:26 20:50 06:05 


 


WBC    9.37  (3.98-10.04)  K/mm3


 


RBC    3.82 L  (3.98-5.22)  M/mm3


 


Hgb    10.9 L  (11.2-15.7)  gm/L


 


Hct    32.7 L  (34.1-44.9)  %


 


MCV    85.6  (79.4-94.8)  fl


 


MCH    28.5  (25.6-32.2)  pg


 


MCHC    33.3  (32.2-35.5)  g/dl


 


RDW Std Deviation    50.8 H  (36.4-46.3)  fL


 


Plt Count    308  (182-369)  K/mm3


 


MPV    9.8  (9.4-12.3)  fl


 


Neut % (Auto)    55.6  (34.0-71.1)  %


 


Lymph % (Auto)    31.4  (19.3-51.7)  %


 


Mono % (Auto)    9.4  (4.7-12.5)  %


 


Eos % (Auto)    1.9  (0.7-5.8)  


 


Baso % (Auto)    1.3 H  (0.1-1.2)  %


 


Neut # (Auto)    5.21  (1.56-6.13)  K/mm3


 


Lymph # (Auto)    2.94  (1.18-3.74)  K/mm3


 


Mono # (Auto)    0.88 H  (0.24-0.36)  K/mm3


 


Eos # (Auto)    0.18  (0.04-0.36)  K/mm3


 


Baso # (Auto)    0.12 H  (0.01-0.08)  K/mm3


 


Manual Slide Review    Normal smear  


 


Sodium     (136-145)  mEq/L


 


Potassium     (3.5-5.1)  mEq/L


 


Chloride     ()  mEq/L


 


Carbon Dioxide     (21-32)  mEq/L


 


Anion Gap     (5-15)  


 


BUN     (7-18)  mg/dL


 


Creatinine     (0.55-1.02)  mg/dL


 


Est Cr Clr Drug Dosing     mL/min


 


Estimated GFR (MDRD)     (>60)  mL/min


 


BUN/Creatinine Ratio     (14-18)  


 


Glucose     ()  mg/dL


 


POC Glucose   128 H   ()  mg/dL


 


Calcium     (8.5-10.1)  mg/dL


 


Magnesium     (1.8-2.4)  mg/dl


 


Levetiracetam  17    (5-30)  ug/mL














  03/29/17 03/29/17 03/29/17 Range/Units





  06:05 06:06 11:44 


 


WBC     (3.98-10.04)  K/mm3


 


RBC     (3.98-5.22)  M/mm3


 


Hgb     (11.2-15.7)  gm/L


 


Hct     (34.1-44.9)  %


 


MCV     (79.4-94.8)  fl


 


MCH     (25.6-32.2)  pg


 


MCHC     (32.2-35.5)  g/dl


 


RDW Std Deviation     (36.4-46.3)  fL


 


Plt Count     (182-369)  K/mm3


 


MPV     (9.4-12.3)  fl


 


Neut % (Auto)     (34.0-71.1)  %


 


Lymph % (Auto)     (19.3-51.7)  %


 


Mono % (Auto)     (4.7-12.5)  %


 


Eos % (Auto)     (0.7-5.8)  


 


Baso % (Auto)     (0.1-1.2)  %


 


Neut # (Auto)     (1.56-6.13)  K/mm3


 


Lymph # (Auto)     (1.18-3.74)  K/mm3


 


Mono # (Auto)     (0.24-0.36)  K/mm3


 


Eos # (Auto)     (0.04-0.36)  K/mm3


 


Baso # (Auto)     (0.01-0.08)  K/mm3


 


Manual Slide Review     


 


Sodium  138    (136-145)  mEq/L


 


Potassium  4.1    (3.5-5.1)  mEq/L


 


Chloride  103    ()  mEq/L


 


Carbon Dioxide  24    (21-32)  mEq/L


 


Anion Gap  15.1 H    (5-15)  


 


BUN  3 L    (7-18)  mg/dL


 


Creatinine  0.8    (0.55-1.02)  mg/dL


 


Est Cr Clr Drug Dosing  76.13    mL/min


 


Estimated GFR (MDRD)  > 60    (>60)  mL/min


 


BUN/Creatinine Ratio  3.8 L    (14-18)  


 


Glucose  103    ()  mg/dL


 


POC Glucose   104  101  ()  mg/dL


 


Calcium  8.9    (8.5-10.1)  mg/dL


 


Magnesium  1.5 L    (1.8-2.4)  mg/dl


 


Levetiracetam     (5-30)  ug/mL














  03/29/17 Range/Units





  16:18 


 


WBC   (3.98-10.04)  K/mm3


 


RBC   (3.98-5.22)  M/mm3


 


Hgb   (11.2-15.7)  gm/L


 


Hct   (34.1-44.9)  %


 


MCV   (79.4-94.8)  fl


 


MCH   (25.6-32.2)  pg


 


MCHC   (32.2-35.5)  g/dl


 


RDW Std Deviation   (36.4-46.3)  fL


 


Plt Count   (182-369)  K/mm3


 


MPV   (9.4-12.3)  fl


 


Neut % (Auto)   (34.0-71.1)  %


 


Lymph % (Auto)   (19.3-51.7)  %


 


Mono % (Auto)   (4.7-12.5)  %


 


Eos % (Auto)   (0.7-5.8)  


 


Baso % (Auto)   (0.1-1.2)  %


 


Neut # (Auto)   (1.56-6.13)  K/mm3


 


Lymph # (Auto)   (1.18-3.74)  K/mm3


 


Mono # (Auto)   (0.24-0.36)  K/mm3


 


Eos # (Auto)   (0.04-0.36)  K/mm3


 


Baso # (Auto)   (0.01-0.08)  K/mm3


 


Manual Slide Review   


 


Sodium   (136-145)  mEq/L


 


Potassium   (3.5-5.1)  mEq/L


 


Chloride   ()  mEq/L


 


Carbon Dioxide   (21-32)  mEq/L


 


Anion Gap   (5-15)  


 


BUN   (7-18)  mg/dL


 


Creatinine   (0.55-1.02)  mg/dL


 


Est Cr Clr Drug Dosing   mL/min


 


Estimated GFR (MDRD)   (>60)  mL/min


 


BUN/Creatinine Ratio   (14-18)  


 


Glucose   ()  mg/dL


 


POC Glucose  106 H  ()  mg/dL


 


Calcium   (8.5-10.1)  mg/dL


 


Magnesium   (1.8-2.4)  mg/dl


 


Levetiracetam   (5-30)  ug/mL











Abran Results last 24 hrs: 


 Microbiology











 03/27/17 15:34 Aerobic Blood Culture - Preliminary





 Blood - Venous    NO GROWTH AFTER 2 DAYS





 Anaerobic Blood Culture - Preliminary





    NO GROWTH AFTER 2 DAYS











Med Orders - Current: 


 Current Medications





Acetaminophen (Tylenol)  650 mg RECTAL Q6H PRN


   PRN Reason: Fever


   Last Admin: 03/27/17 15:07 Dose:  650 mg


Baclofen (Lioresal)  15 mg PO TID [facility]


   Last Admin: 03/29/17 15:29 Dose:  15 mg


Dextrose/Water (Dextrose 50% In Water)  50 ml IVPUSH ASDIRECTED PRN


   PRN Reason: Hypoglycemia


Enoxaparin Sodium (Lovenox)  40 mg SUBCUT DAILY [facility]


   Last Admin: 03/29/17 08:09 Dose:  40 mg


Fluoxetine HCl (Prozac)  10 mg PO DAILY [facility]


   Last Admin: 03/29/17 08:10 Dose:  10 mg


Hydralazine HCl (Apresoline)  20 mg IVPUSH Q8H PRN


   PRN Reason: Hypertension


Vancomycin HCl 1 gm/ Sodium (Chloride)  250 mls @ 167 mls/hr IV Q12H [facility]


   Last Admin: 03/29/17 15:26 Dose:  167 mls/hr


Sodium Chloride (Normal Saline)  500 mls @ 999 mls/min IV .BOLUS [facility]


   Last Admin: 03/26/17 16:45 Dose:  999 mls/min


Piperacillin Sod/Tazobactam (Sod 4.5 gm/ Sodium Chloride)  100 mls @ 25 mls/hr 

IV Q8H [facility]


   Last Admin: 03/29/17 17:04 Dose:  25 mls/hr


Potassium Chloride/Dextrose/Sod Cl (D5 1/2 Ns W/ 40 Meq/L Kcl)  1,000 mls @ 70 

mls/hr IV ASDIRECTED [facility]


   Last Admin: 03/29/17 15:33 Dose:  70 mls/hr


Insulin Aspart (Novolog)  0 unit SUBCUT QIDACANDBED [facility]


   PRN Reason: Protocol


   Last Admin: 03/29/17 17:03 Dose:  Not Given


Levetiracetam (Keppra)  1,000 mg PO BID [facility]


   Last Admin: 03/29/17 08:10 Dose:  1,000 mg


Lorazepam (Ativan)  1 mg IVPUSH Q4H PRN


   PRN Reason: Seizures


   Last Admin: 03/29/17 15:31 Dose:  1 mg


Magnesium Oxide (Magnesium Oxide)  400 mg PO QPM [facility]


   Last Admin: 03/29/17 17:06 Dose:  400 mg


Metoprolol Tartrate (Lopressor)  2.5 mg IVPUSH Q4H PRN


   PRN Reason: heart rate


   Last Admin: 03/26/17 01:39 Dose:  2.5 mg


Senna/Docusate Sodium (Senna Plus)  2 tab PO BID [facility]


   Last Admin: 03/29/17 08:10 Dose:  2 tab


Sodium Chloride (Saline Flush)  10 ml FLUSH ASDIRECTED PRN


   PRN Reason: Keep Vein Open


   Last Admin: 03/24/17 20:18 Dose:  10 ml


Tizanidine HCl (Zanaflex)  1 mg PO TID [facility]


   Last Admin: 03/29/17 15:29 Dose:  1 mg


Vancomycin HCl (Pharmacy To Dose - Vancomycin)  0 dose .XX ASDIRECTED PRN


   PRN Reason: RX DOSE





Discontinued Medications





Albuterol (Proventil Neb Soln)  0.63 mg NEB ONETIME ONE


   Stop: 03/27/17 17:53


   Last Admin: 03/27/17 18:02 Dose:  Not Given


Albuterol (Proventil Neb Soln)  2.5 mg NEB ONETIME ONE


   Stop: 03/27/17 17:53


   Last Admin: 03/27/17 18:01 Dose:  Not Given


Albuterol (Proventil Neb Soln) Confirm Administered Dose 2.5 mg .ROUTE .STK-MED 

ONE


   Stop: 03/27/17 17:54


   Last Admin: 03/27/17 17:58 Dose:  2.5 mg


Albuterol (Proventil Neb Soln)  0.63 mg NEB QIDRT [facility]


Albuterol (Proventil Neb Soln)  2.5 mg NEB QIDRT [facility]


   Last Admin: 03/29/17 06:15 Dose:  Not Given


Baclofen (Lioresal)  30 mg PO TID [facility]


   Last Admin: 03/26/17 15:29 Dose:  30 mg


Baclofen (Lioresal)  5 mg PO TID [facility]


   Last Admin: 03/27/17 07:59 Dose:  5 mg


Baclofen (Lioresal)  10 mg PO TID [facility]


   Last Admin: 03/27/17 19:13 Dose:  Not Given


Baclofen (Lioresal)  5 mg PO ONETIME ONE


   Stop: 03/27/17 09:38


   Last Admin: 03/27/17 10:20 Dose:  5 mg


Baclofen (Lioresal)  5 mg PO TID [facility]


Baclofen (Lioresal)  5 mg PO TID [facility]


   Last Admin: 03/28/17 08:08 Dose:  5 mg


Baclofen (Lioresal)  5 mg PO BID@1700,2300 [facility]


   Stop: 03/27/17 23:01


   Last Admin: 03/27/17 19:13 Dose:  Not Given


Baclofen (Lioresal)  5 mg PO BID@1700,2300 [facility]


   Stop: 03/27/17 23:01


   Last Admin: 03/27/17 23:25 Dose:  5 mg


Baclofen (Lioresal)  5 mg PO ONETIME ONE


   Stop: 03/27/17 17:17


   Last Admin: 03/27/17 17:26 Dose:  5 mg


Baclofen (Lioresal)  10 mg PO TID [facility]


   Last Admin: 03/28/17 14:32 Dose:  10 mg


Bisacodyl (Dulcolax)  10 mg RECTAL ONETIME ONE


   Stop: 03/27/17 08:43


   Last Admin: 03/27/17 09:03 Dose:  10 mg


Diphtheria/Tetanus/Acell Pertussis (Boostrix)  0.5 ml IM .ONCE ONE


   Stop: 03/26/17 11:52


   Last Admin: 03/26/17 12:24 Dose:  Not Given


Sodium Chloride (Normal Saline)  1,000 mls @ 999 mls/hr IV ONETIME ONE


   Stop: 03/24/17 20:50


   Last Infusion: 03/24/17 22:13 Dose:  Infused


Ceftriaxone Sodium 1 gm/ (Sodium Chloride)  100 mls @ 200 mls/hr IV ONETIME ONE


   Stop: 03/24/17 22:03


   Last Admin: 03/24/17 22:13 Dose:  200 mls/hr


Sodium Chloride (Normal Saline)  1,000 mls @ 125 mls/hr IV ONETIME ONE


   Stop: 03/25/17 05:56


   Last Admin: 03/24/17 22:13 Dose:  125 mls/hr


Levofloxacin/Dextrose 750 mg/ (Premix)  150 mls @ 100 mls/hr IV Q24H [facility]


   Last Admin: 03/26/17 18:47 Dose:  Not Given


Potassium Chloride/Dextrose/Sod Cl (D5 1/2 Ns W/ 20 Meq/L Kcl)  1,000 mls @ 70 

mls/hr IV ASDIRECTED [facility]


   Last Admin: 03/28/17 07:10 Dose:  70 mls/hr


Levetiracetam 1,000 mg/ Sodium (Chloride)  110 mls @ 400 mls/hr IV ONETIME ONE


   Stop: 03/25/17 15:38


   Last Admin: 03/25/17 16:07 Dose:  400 mls/hr


Levetiracetam 1,000 mg/ Sodium (Chloride)  110 mls @ 400 mls/hr IV Q12H [facility]


   Last Admin: 03/26/17 12:26 Dose:  Not Given


Vancomycin HCl 1 gm/ Sodium (Chloride)  250 mls @ 250 mls/hr IV ONETIME ONE


   Stop: 03/26/17 00:03


   Last Admin: 03/25/17 23:18 Dose:  250 mls/hr


Magnesium Sulfate 2 gm/ Premix  50 mls @ 25 mls/hr IV ONETIME ONE


   Stop: 03/26/17 13:06


   Last Admin: 03/26/17 12:30 Dose:  25 mls/hr


Piperacillin Sod/Tazobactam (Sod 4.5 gm/ Sodium Chloride)  100 mls @ 200 mls/hr 

IV ONETIME ONE


   Stop: 03/27/17 13:29


   Last Admin: 03/27/17 13:47 Dose:  200 mls/hr


Magnesium Sulfate 2 gm/ Premix  50 mls @ 25 mls/hr IV ONETIME ONE


   Stop: 03/27/17 19:32


   Last Admin: 03/27/17 18:08 Dose:  25 mls/hr


Potassium Chloride 20 meq/Potassium Chloride/Dextrose/Sod Cl  1,010 mls @ 70 mls

/hr IV ASDIRECTED [facility]


Potassium Chloride/Dextrose/Sod Cl (D5 1/2 Ns W/ 40 Meq/L Kcl)  1,000 mls @ 

69.307 mls/hr IV ASDIRECTED [facility]


   Last Admin: 03/28/17 11:06 Dose:  69.307 mls/hr


Magnesium Sulfate 2 gm/ Premix  50 mls @ 25 mls/hr IV ONETIME ONE


   Stop: 03/29/17 11:33


   Last Admin: 03/29/17 10:02 Dose:  25 mls/hr


Levetiracetam (Keppra)  1,000 mg PO BID [facility]


   Last Admin: 03/25/17 20:27 Dose:  Not Given


Lorazepam (Ativan)  1 mg IVPUSH ONETIME ONE


   Stop: 03/25/17 15:31


   Last Admin: 03/25/17 16:03 Dose:  1 mg


Metoprolol Tartrate (Lopressor)  2.5 mg IVPUSH Q6H PRN


   PRN Reason: heart rate


   Last Admin: 03/25/17 21:28 Dose:  2.5 mg


Potassium Chloride (Potassium Chloride)  40 meq PO ONETIME ONE


   Stop: 03/26/17 11:08


   Last Admin: 03/26/17 12:33 Dose:  40 meq


Tizanidine HCl (Zanaflex)  2 mg PO TID [facility]


   Last Admin: 03/27/17 19:13 Dose:  Not Given


Tizanidine HCl (Zanaflex)  2 mg PO ONETIME [facility]


   Stop: 03/27/17 09:38


   Last Admin: 03/27/17 10:20 Dose:  2 mg


Tizanidine HCl (Zanaflex)  1 mg PO BID@1700,2300 [facility]


   Stop: 03/27/17 23:01


   Last Admin: 03/27/17 23:24 Dose:  1 mg











- Exam


General: alert, cooperative, no acute distress


HEENT: Mucous membr. moist/pink.  No: Scleral icterus


Lungs: Clear to auscultation, Normal respiratory effort


Cardiovascular: Regular Rate, Regular Rhythm, No Murmurs


Abdomen: soft, no tenderness, no distension


Back Exam: normal inspection


Extremities: no edema, no tenderness/swelling, no clubbing, other (contractures)


Skin: warm, dry, intact (pallor)


Neurological: no new focal deficit, other (Nods, and says yes and no 

appropriately in conversation)


Psy/Mental Status: alert





Consult PN Assessment/Plan


Procedures: 


Procedures





ASSAY OF BLOOD OSMOLALITY (05/13/15)


ASSAY OF CK (CPK) (01/12/15)


ASSAY OF LACTIC ACID (04/27/16)


ASSAY OF MAGNESIUM (05/13/15)


ASSAY OF NATRIURETIC PEPTIDE (12/18/14)


ASSAY OF PHOSPHORUS (05/13/15)


ASSAY OF TROPONIN QUANT (05/13/15)


BLOOD CULTURE FOR BACTERIA (04/27/16)


BLOOD GASES ANY COMBINATION (01/12/15)


C-REACTIVE PROTEIN (04/27/16)


CHEST X-RAY 1 VIEW FRONTAL (04/27/16)


COMPLETE CBC W/AUTO DIFF WBC (04/27/16)


COMPREHEN METABOLIC PANEL (04/27/16)


CREATINE MB FRACTION (05/13/15)


CT ANGIOGRAPHY CHEST (12/18/14)


CT HEAD/BRAIN W/O DYE (01/12/15)


CULTURE AEROBIC IDENTIFY (04/07/15)


DANIEL SUBQ TISSUE 20 SQ CM/< (12/12/14)


ELECTROCARDIOGRAM TRACING (01/12/15)


EMERGENCY DEPT VISIT (04/27/16)


EMERGENCY DEPT VISIT (05/13/15)


EMERGENCY DEPT VISIT (04/07/15)


EMERGENCY DEPT VISIT (12/18/14)


FIBRIN DEGRADATION QUANT (12/18/14)


HYDRATE IV INFUSION ADD-ON (04/27/16)


HYDRATION IV INFUSION INIT (12/18/14)


INFLUENZA A/B AG IA (12/18/14)


INFLUENZA ASSAY W/OPTIC (04/07/15)


INSERT BLADDER CATHETER (12/18/14)


INSERT TEMP BLADDER CATH (04/07/15)


METABOLIC PANEL TOTAL CA (04/07/15)


MICROBE SUSCEPTIBLE DIFFUSE (04/27/16)


MICROBE SUSCEPTIBLE DISK (04/27/16)


MICROBE SUSCEPTIBLE ABRAN (04/27/16)


MR-STAPH DNA AMP PROBE (12/12/14)


OT EVALUATION (04/27/16)


PROTHROMBIN TIME (12/18/14)


PT EVALUATION (04/27/16)


RBC SED RATE AUTOMATED (05/13/15)


ROUTINE VENIPUNCTURE (04/27/16)


THER/DIAG CONCURRENT INF (01/12/15)


THER/PROPH/DIAG IV INF ADDON (04/27/16)


THER/PROPH/DIAG IV INF INIT (04/27/16)


TX/PRO/DX INJ NEW DRUG ADDON (04/07/15)


URINALYSIS AUTO W/SCOPE (04/27/16)


URINE BACTERIA CULTURE (04/27/16)


URINE CULTURE/COLONY COUNT (06/15/16)


WITHDRAWAL OF ARTERIAL BLOOD (01/12/15)


X-RAY EXAM OF ABDOMEN (04/27/16)








(1) Multiple sclerosis


SNOMED Code(s): 36775547


   Code(s): G35 - MULTIPLE SCLEROSIS   Current Visit: Yes   


Problem List Initiated/Reviewed/Updated: Yes


My Orders last 24 hours: 


My Active Orders





03/29/17 Dinner


NPO After Midnight [Nothing per Oral After Midnight Diet] [DIET] 











Plan: 


Assessment: MS, Dysphagia, inability to meet oral hydration needs, need for PEG 

tube placement.  





Plan: Dr. Alva did discuss PEG tube placement with patient.  Patient was 

agreeable to placement. Will plan to place PEG at 0730 on 3/30/17.  Patient 

should be NPO at [facility].  Hold Lovenox 24 hours prior to placement and 24 hours 

after placement.  





This patient was evaluated with Dr. Alva, plan formulated above. 





Jennifer Abarca, NP-C scribing for Dr. Rola Alva.  


General Surgery 








<Rola Alva - Last Filed: 03/31/17 14:05>





- Patient Data


Vitals - most recent: 


 Last Vital Signs











Temp  97.0 F   03/31/17 12:06


 


Pulse  84   03/31/17 12:06


 


Resp  19   03/31/17 12:06


 


BP  122/86   03/31/17 12:06


 


Pulse Ox  96   03/31/17 12:06











I&O - last 24 hours: 


 Intake & Output











 03/30/17 03/31/17 03/31/17





 22:59 06:59 14:59


 


Intake Total 1494 550 0


 


Output Total 300  


 


Balance 1194 550 0











Lab Results last 24 hrs: 


 Laboratory Results - last 24 hr











  03/30/17 03/30/17 03/31/17 Range/Units





  16:30 21:25 06:06 


 


WBC    8.65  (3.98-10.04)  K/mm3


 


RBC    4.08  (3.98-5.22)  M/mm3


 


Hgb    11.4  (11.2-15.7)  gm/L


 


Hct    35.4  (34.1-44.9)  %


 


MCV    86.8  (79.4-94.8)  fl


 


MCH    27.9  (25.6-32.2)  pg


 


MCHC    32.2  (32.2-35.5)  g/dl


 


RDW Std Deviation    52.6 H  (36.4-46.3)  fL


 


Plt Count    431 H  (182-369)  K/mm3


 


MPV    8.9 L  (9.4-12.3)  fl


 


Neut % (Auto)    61.2  (34.0-71.1)  %


 


Lymph % (Auto)    25.9  (19.3-51.7)  %


 


Mono % (Auto)    10.2  (4.7-12.5)  %


 


Eos % (Auto)    2.0  (0.7-5.8)  


 


Baso % (Auto)    0.2  (0.1-1.2)  %


 


Neut # (Auto)    5.30  (1.56-6.13)  K/mm3


 


Lymph # (Auto)    2.24  (1.18-3.74)  K/mm3


 


Mono # (Auto)    0.88 H  (0.24-0.36)  K/mm3


 


Eos # (Auto)    0.17  (0.04-0.36)  K/mm3


 


Baso # (Auto)    0.02  (0.01-0.08)  K/mm3


 


Manual Slide Review    Normal smear  


 


Sodium     (136-145)  mEq/L


 


Potassium     (3.5-5.1)  mEq/L


 


Chloride     ()  mEq/L


 


Carbon Dioxide     (21-32)  mEq/L


 


Anion Gap     (5-15)  


 


BUN     (7-18)  mg/dL


 


Creatinine     (0.55-1.02)  mg/dL


 


Est Cr Clr Drug Dosing     mL/min


 


Estimated GFR (MDRD)     (>60)  mL/min


 


BUN/Creatinine Ratio     (14-18)  


 


Glucose     ()  mg/dL


 


POC Glucose  89  99   ()  mg/dL


 


Calcium     (8.5-10.1)  mg/dL


 


Magnesium     (1.8-2.4)  mg/dl














  03/31/17 03/31/17 03/31/17 Range/Units





  06:06 06:09 11:28 


 


WBC     (3.98-10.04)  K/mm3


 


RBC     (3.98-5.22)  M/mm3


 


Hgb     (11.2-15.7)  gm/L


 


Hct     (34.1-44.9)  %


 


MCV     (79.4-94.8)  fl


 


MCH     (25.6-32.2)  pg


 


MCHC     (32.2-35.5)  g/dl


 


RDW Std Deviation     (36.4-46.3)  fL


 


Plt Count     (182-369)  K/mm3


 


MPV     (9.4-12.3)  fl


 


Neut % (Auto)     (34.0-71.1)  %


 


Lymph % (Auto)     (19.3-51.7)  %


 


Mono % (Auto)     (4.7-12.5)  %


 


Eos % (Auto)     (0.7-5.8)  


 


Baso % (Auto)     (0.1-1.2)  %


 


Neut # (Auto)     (1.56-6.13)  K/mm3


 


Lymph # (Auto)     (1.18-3.74)  K/mm3


 


Mono # (Auto)     (0.24-0.36)  K/mm3


 


Eos # (Auto)     (0.04-0.36)  K/mm3


 


Baso # (Auto)     (0.01-0.08)  K/mm3


 


Manual Slide Review     


 


Sodium  138    (136-145)  mEq/L


 


Potassium  3.8    (3.5-5.1)  mEq/L


 


Chloride  102    ()  mEq/L


 


Carbon Dioxide  28    (21-32)  mEq/L


 


Anion Gap  11.8    (5-15)  


 


BUN  3 L    (7-18)  mg/dL


 


Creatinine  0.9    (0.55-1.02)  mg/dL


 


Est Cr Clr Drug Dosing  68.04    mL/min


 


Estimated GFR (MDRD)  > 60    (>60)  mL/min


 


BUN/Creatinine Ratio  3.3 L    (14-18)  


 


Glucose  105    ()  mg/dL


 


POC Glucose   93  104  ()  mg/dL


 


Calcium  9.7    (8.5-10.1)  mg/dL


 


Magnesium  1.8    (1.8-2.4)  mg/dl











[facility] Results last 24 hrs: 


 Microbiology











 03/27/17 15:34 Aerobic Blood Culture - Preliminary





 Blood - Venous    NO GROWTH AFTER 3 DAYS





 Anaerobic Blood Culture - Preliminary





    NO GROWTH AFTER 3 DAYS











Med Orders - Current: 


 Current Medications





Acetaminophen (Tylenol)  650 mg RECTAL Q6H PRN


   PRN Reason: Fever


   Last Admin: 03/27/17 15:07 Dose:  650 mg


Baclofen (Lioresal)  15 mg PO TID [facility]


   Last Admin: 03/31/17 09:16 Dose:  15 mg


Dextrose/Water (Dextrose 50% In Water)  50 ml IVPUSH ASDIRECTED PRN


   PRN Reason: Hypoglycemia


Enoxaparin Sodium (Lovenox)  40 mg SUBCUT DAILY [facility]


   Last Admin: 03/31/17 09:21 Dose:  40 mg


Fluoxetine HCl (Prozac)  10 mg PO DAILY [facility]


   Last Admin: 03/31/17 09:17 Dose:  10 mg


Hydralazine HCl (Apresoline)  20 mg IVPUSH Q8H PRN


   PRN Reason: Hypertension


Vancomycin HCl 1 gm/ Sodium (Chloride)  250 mls @ 167 mls/hr IV Q12H [facility]


   Last Admin: 03/31/17 11:47 Dose:  167 mls/hr


Sodium Chloride (Normal Saline)  500 mls @ 999 mls/min IV .BOLUS [facility]


   Last Admin: 03/26/17 16:45 Dose:  999 mls/min


Piperacillin Sod/Tazobactam (Sod 4.5 gm/ Sodium Chloride)  100 mls @ 25 mls/hr 

IV Q8H [facility]


   Last Admin: 03/31/17 13:38 Dose:  Not Given


Potassium Chloride/Dextrose/Sod Cl (D5 1/2 Ns W/ 40 Meq/L Kcl)  1,000 mls @ 70 

mls/hr IV ASDIRECTED [facility]


   Last Admin: 03/31/17 04:10 Dose:  70 mls/hr


Insulin Aspart (Novolog)  0 unit SUBCUT QIDACANDBED [facility]


   PRN Reason: Protocol


   Last Admin: 03/31/17 11:48 Dose:  Not Given


Levetiracetam (Keppra)  1,000 mg PO BID [facility]


   Last Admin: 03/31/17 09:16 Dose:  1,000 mg


Lorazepam (Ativan)  1 mg IVPUSH Q4H PRN


   PRN Reason: Seizures


   Last Admin: 03/30/17 03:34 Dose:  1 mg


Magnesium Oxide (Magnesium Oxide)  400 mg PO QPM [facility]


   Last Admin: 03/30/17 18:03 Dose:  400 mg


Metoprolol Tartrate (Lopressor)  2.5 mg IVPUSH Q4H PRN


   PRN Reason: heart rate


   Last Admin: 03/26/17 01:39 Dose:  2.5 mg


Senna/Docusate Sodium (Senna Plus)  2 tab PO BID [facility]


   Last Admin: 03/31/17 09:16 Dose:  2 tab


Sodium Chloride (Saline Flush)  10 ml FLUSH ASDIRECTED PRN


   PRN Reason: Keep Vein Open


   Last Admin: 03/24/17 20:18 Dose:  10 ml


Tizanidine HCl (Zanaflex)  1 mg PO TID [facility]


   Last Admin: 03/31/17 09:17 Dose:  1 mg


Vancomycin HCl (Pharmacy To Dose - Vancomycin)  0 dose .XX ASDIRECTED PRN


   PRN Reason: RX DOSE





Discontinued Medications





Albuterol (Proventil Neb Soln)  0.63 mg NEB ONETIME ONE


   Stop: 03/27/17 17:53


   Last Admin: 03/27/17 18:02 Dose:  Not Given


Albuterol (Proventil Neb Soln)  2.5 mg NEB ONETIME ONE


   Stop: 03/27/17 17:53


   Last Admin: 03/27/17 18:01 Dose:  Not Given


Albuterol (Proventil Neb Soln) Confirm Administered Dose 2.5 mg .ROUTE .STK-MED 

ONE


   Stop: 03/27/17 17:54


   Last Admin: 03/27/17 17:58 Dose:  2.5 mg


Albuterol (Proventil Neb Soln)  0.63 mg NEB QIDRT [facility]


Albuterol (Proventil Neb Soln)  2.5 mg NEB QIDRT [facility]


   Last Admin: 03/29/17 06:15 Dose:  Not Given


Baclofen (Lioresal)  30 mg PO TID [facility]


   Last Admin: 03/26/17 15:29 Dose:  30 mg


Baclofen (Lioresal)  5 mg PO TID [facility]


   Last Admin: 03/27/17 07:59 Dose:  5 mg


Baclofen (Lioresal)  10 mg PO TID [facility]


   Last Admin: 03/27/17 19:13 Dose:  Not Given


Baclofen (Lioresal)  5 mg PO ONETIME ONE


   Stop: 03/27/17 09:38


   Last Admin: 03/27/17 10:20 Dose:  5 mg


Baclofen (Lioresal)  5 mg PO TID [facility]


Baclofen (Lioresal)  5 mg PO TID [facility]


   Last Admin: 03/28/17 08:08 Dose:  5 mg


Baclofen (Lioresal)  5 mg PO BID@1700,2300 [facility]


   Stop: 03/27/17 23:01


   Last Admin: 03/27/17 19:13 Dose:  Not Given


Baclofen (Lioresal)  5 mg PO BID@1700,2300 [facility]


   Stop: 03/27/17 23:01


   Last Admin: 03/27/17 23:25 Dose:  5 mg


Baclofen (Lioresal)  5 mg PO ONETIME ONE


   Stop: 03/27/17 17:17


   Last Admin: 03/27/17 17:26 Dose:  5 mg


Baclofen (Lioresal)  10 mg PO TID [facility]


   Last Admin: 03/28/17 14:32 Dose:  10 mg


Bisacodyl (Dulcolax)  10 mg RECTAL ONETIME ONE


   Stop: 03/27/17 08:43


   Last Admin: 03/27/17 09:03 Dose:  10 mg


Diphtheria/Tetanus/Acell Pertussis (Boostrix)  0.5 ml IM .ONCE ONE


   Stop: 03/26/17 11:52


   Last Admin: 03/26/17 12:24 Dose:  Not Given


Enoxaparin Sodium (Lovenox)  40 mg SUBCUT DAILY [facility]


   Last Admin: 03/29/17 08:09 Dose:  40 mg


Fentanyl (Sublimaze) Confirm Administered Dose 100 mcg .ROUTE .STK-MED ONE


   Stop: 03/30/17 07:14


Sodium Chloride (Normal Saline)  1,000 mls @ 999 mls/hr IV ONETIME ONE


   Stop: 03/24/17 20:50


   Last Infusion: 03/24/17 22:13 Dose:  Infused


Ceftriaxone Sodium 1 gm/ (Sodium Chloride)  100 mls @ 200 mls/hr IV ONETIME ONE


   Stop: 03/24/17 22:03


   Last Admin: 03/24/17 22:13 Dose:  200 mls/hr


Sodium Chloride (Normal Saline)  1,000 mls @ 125 mls/hr IV ONETIME ONE


   Stop: 03/25/17 05:56


   Last Admin: 03/24/17 22:13 Dose:  125 mls/hr


Levofloxacin/Dextrose 750 mg/ (Premix)  150 mls @ 100 mls/hr IV Q24H [facility]


   Last Admin: 03/26/17 18:47 Dose:  Not Given


Potassium Chloride/Dextrose/Sod Cl (D5 1/2 Ns W/ 20 Meq/L Kcl)  1,000 mls @ 70 

mls/hr IV ASDIRECTED [facility]


   Last Admin: 03/28/17 07:10 Dose:  70 mls/hr


Levetiracetam 1,000 mg/ Sodium (Chloride)  110 mls @ 400 mls/hr IV ONETIME ONE


   Stop: 03/25/17 15:38


   Last Admin: 03/25/17 16:07 Dose:  400 mls/hr


Levetiracetam 1,000 mg/ Sodium (Chloride)  110 mls @ 400 mls/hr IV Q12H [facility]


   Last Admin: 03/26/17 12:26 Dose:  Not Given


Vancomycin HCl 1 gm/ Sodium (Chloride)  250 mls @ 250 mls/hr IV ONETIME ONE


   Stop: 03/26/17 00:03


   Last Admin: 03/25/17 23:18 Dose:  250 mls/hr


Magnesium Sulfate 2 gm/ Premix  50 mls @ 25 mls/hr IV ONETIME ONE


   Stop: 03/26/17 13:06


   Last Admin: 03/26/17 12:30 Dose:  25 mls/hr


Piperacillin Sod/Tazobactam (Sod 4.5 gm/ Sodium Chloride)  100 mls @ 200 mls/hr 

IV ONETIME ONE


   Stop: 03/27/17 13:29


   Last Admin: 03/27/17 13:47 Dose:  200 mls/hr


Magnesium Sulfate 2 gm/ Premix  50 mls @ 25 mls/hr IV ONETIME ONE


   Stop: 03/27/17 19:32


   Last Admin: 03/27/17 18:08 Dose:  25 mls/hr


Potassium Chloride 20 meq/Potassium Chloride/Dextrose/Sod Cl  1,010 mls @ 70 mls

/hr IV ASDIRECTED [facility]


Potassium Chloride/Dextrose/Sod Cl (D5 1/2 Ns W/ 40 Meq/L Kcl)  1,000 mls @ 

69.307 mls/hr IV ASDIRECTED [facility]


   Last Admin: 03/28/17 11:06 Dose:  69.307 mls/hr


Magnesium Sulfate 2 gm/ Premix  50 mls @ 25 mls/hr IV ONETIME ONE


   Stop: 03/29/17 11:33


   Last Admin: 03/29/17 10:02 Dose:  25 mls/hr


Lidocaine HCl (Xylocaine-Mpf 1%) Confirm Administered Dose 2 mls @ as directed 

.ROUTE .STK-MED ONE


   Stop: 03/30/17 07:19


Magnesium Sulfate 2 gm/ Premix  50 mls @ 25 mls/hr IV ONETIME ONE


   Stop: 03/30/17 12:42


   Last Admin: 03/30/17 11:16 Dose:  25 mls/hr


Lactated Ringer's (Ringers, Lactated) Confirm Administered Dose 1,000 mls @ as 

directed .ROUTE .STK-MED ONE


   Stop: 03/30/17 10:53


Magnesium Sulfate 2 gm/ Premix  50 mls @ 25 mls/hr IV ONETIME ONE


   Stop: 03/31/17 12:16


   Last Admin: 03/31/17 11:47 Dose:  25 mls/hr


Levetiracetam (Keppra)  1,000 mg PO BID [facility]


   Last Admin: 03/25/17 20:27 Dose:  Not Given


Lorazepam (Ativan)  1 mg IVPUSH ONETIME ONE


   Stop: 03/25/17 15:31


   Last Admin: 03/25/17 16:03 Dose:  1 mg


Metoprolol Tartrate (Lopressor)  2.5 mg IVPUSH Q6H PRN


   PRN Reason: heart rate


   Last Admin: 03/25/17 21:28 Dose:  2.5 mg


Midazolam HCl (Versed 1 Mg/Ml) Confirm Administered Dose 2 mg .ROUTE .STK-MED 

ONE


   Stop: 03/30/17 07:14


Potassium Chloride (Potassium Chloride)  40 meq PO ONETIME ONE


   Stop: 03/26/17 11:08


   Last Admin: 03/26/17 12:33 Dose:  40 meq


Propofol (Diprivan  20 Ml) Confirm Administered Dose 200 mg .ROUTE .STK-MED ONE


   Stop: 03/30/17 07:14


Tizanidine HCl (Zanaflex)  2 mg PO TID [facility]


   Last Admin: 03/27/17 19:13 Dose:  Not Given


Tizanidine HCl (Zanaflex)  2 mg PO ONETIME STA


   Stop: 03/27/17 09:38


   Last Admin: 03/27/17 10:20 Dose:  2 mg


Tizanidine HCl (Zanaflex)  1 mg PO BID@1700,2300 [facility]


   Stop: 03/27/17 23:01


   Last Admin: 03/27/17 23:24 Dose:  1 mg











Consult PN Assessment/Plan


Procedures: 


Procedures





ASSAY OF BLOOD OSMOLALITY (05/13/15)


ASSAY OF CK (CPK) (01/12/15)


ASSAY OF LACTIC ACID (04/27/16)


ASSAY OF MAGNESIUM (05/13/15)


ASSAY OF NATRIURETIC PEPTIDE (12/18/14)


ASSAY OF PHOSPHORUS (05/13/15)


ASSAY OF TROPONIN QUANT (05/13/15)


BLOOD CULTURE FOR BACTERIA (04/27/16)


BLOOD GASES ANY COMBINATION (01/12/15)


C-REACTIVE PROTEIN (04/27/16)


CHEST X-RAY 1 VIEW FRONTAL (04/27/16)


COMPLETE CBC W/AUTO DIFF WBC (04/27/16)


COMPREHEN METABOLIC PANEL (04/27/16)


CREATINE MB FRACTION (05/13/15)


CT ANGIOGRAPHY CHEST (12/18/14)


CT HEAD/BRAIN W/O DYE (01/12/15)


CULTURE AEROBIC IDENTIFY (04/07/15)


DANIEL SUBQ TISSUE 20 SQ CM/< (12/12/14)


ELECTROCARDIOGRAM TRACING (01/12/15)


EMERGENCY DEPT VISIT (04/27/16)


EMERGENCY DEPT VISIT (05/13/15)


EMERGENCY DEPT VISIT (04/07/15)


EMERGENCY DEPT VISIT (12/18/14)


FIBRIN DEGRADATION QUANT (12/18/14)


HYDRATE IV INFUSION ADD-ON (04/27/16)


HYDRATION IV INFUSION INIT (12/18/14)


INFLUENZA A/B AG IA (12/18/14)


INFLUENZA ASSAY W/OPTIC (04/07/15)


INSERT BLADDER CATHETER (12/18/14)


INSERT TEMP BLADDER CATH (04/07/15)


METABOLIC PANEL TOTAL CA (04/07/15)


MICROBE SUSCEPTIBLE DIFFUSE (04/27/16)


MICROBE SUSCEPTIBLE DISK (04/27/16)


MICROBE SUSCEPTIBLE ABRAN (04/27/16)


MR-STAPH DNA AMP PROBE (12/12/14)


OT EVALUATION (04/27/16)


PROTHROMBIN TIME (12/18/14)


PT EVALUATION (04/27/16)


RBC SED RATE AUTOMATED (05/13/15)


ROUTINE VENIPUNCTURE (04/27/16)


THER/DIAG CONCURRENT INF (01/12/15)


THER/PROPH/DIAG IV INF ADDON (04/27/16)


THER/PROPH/DIAG IV INF INIT (04/27/16)


TX/PRO/DX INJ NEW DRUG ADDON (04/07/15)


URINALYSIS AUTO W/SCOPE (04/27/16)


URINE BACTERIA CULTURE (04/27/16)


URINE CULTURE/COLONY COUNT (06/15/16)


WITHDRAWAL OF ARTERIAL BLOOD (01/12/15)


X-RAY EXAM OF ABDOMEN (04/27/16)








(1) Dysphagia


SNOMED Code(s): 15759337, 310568865


   Code(s): R13.10 - DYSPHAGIA, UNSPECIFIED   Current Visit: Yes   





(2) Multiple sclerosis


SNOMED Code(s): 33234514


   Code(s): G35 - MULTIPLE SCLEROSIS   Current Visit: Yes   


My Orders last 24 hours: 


My Active Orders





03/30/17 16:04


Communication Order [RC] ASDIRECTED 











Plan: 





Patient seen and examined by me. I agree with documentation as per MARIBEL Trujillo.

## 2017-03-29 NOTE — PCM.PREANE
Preanesthetic Assessment





- Anesthesia/Transfusion/Family Hx


Transfusion History: Unknown





- Physical Assessment


Pulse: 93


O2 Sat by Pulse Oximetry: 96


Respiratory Rate: 20


Blood Pressure: 119/67


Temperature: 37.3 C


Vital Signs: 





 Last Vital Signs











Temp  37.3 C   03/29/17 16:12


 


Pulse  93   03/29/17 16:12


 


Resp  20   03/29/17 16:12


 


BP  119/67   03/29/17 16:12


 


Pulse Ox  96   03/29/17 16:12











Height: 1.68 m


Weight: 73.482 kg





- Lab


Values: 





 Laboratory Last Values











WBC  9.37 K/mm3 (3.98-10.04)   03/29/17  06:05    


 


RBC  3.82 M/mm3 (3.98-5.22)  L  03/29/17  06:05    


 


Hgb  10.9 gm/L (11.2-15.7)  L  03/29/17  06:05    


 


Hct  32.7 % (34.1-44.9)  L  03/29/17  06:05    


 


MCV  85.6 fl (79.4-94.8)   03/29/17  06:05    


 


MCH  28.5 pg (25.6-32.2)   03/29/17  06:05    


 


MCHC  33.3 g/dl (32.2-35.5)   03/29/17  06:05    


 


RDW Std Deviation  50.8 fL (36.4-46.3)  H  03/29/17  06:05    


 


Plt Count  308 K/mm3 (182-369)   03/29/17  06:05    


 


MPV  9.8 fl (9.4-12.3)   03/29/17  06:05    


 


Neut % (Auto)  55.6 % (34.0-71.1)   03/29/17  06:05    


 


Lymph % (Auto)  31.4 % (19.3-51.7)   03/29/17  06:05    


 


Mono % (Auto)  9.4 % (4.7-12.5)   03/29/17  06:05    


 


Eos % (Auto)  1.9  (0.7-5.8)   03/29/17  06:05    


 


Baso % (Auto)  1.3 % (0.1-1.2)  H  03/29/17  06:05    


 


Neut # (Auto)  5.21 K/mm3 (1.56-6.13)   03/29/17  06:05    


 


Lymph # (Auto)  2.94 K/mm3 (1.18-3.74)   03/29/17  06:05    


 


Mono # (Auto)  0.88 K/mm3 (0.24-0.36)  H  03/29/17  06:05    


 


Eos # (Auto)  0.18 K/mm3 (0.04-0.36)   03/29/17  06:05    


 


Baso # (Auto)  0.12 K/mm3 (0.01-0.08)  H  03/29/17  06:05    


 


Neutrophils % (Manual)  65 % (40-60)  H  03/24/17  20:25    


 


Band Neutrophils %  0 % (0-10)   03/24/17  20:25    


 


Lymphocytes % (Manual)  20 % (20-40)   03/24/17  20:25    


 


Atypical Lymphs %  2 %  03/24/17  20:25    


 


Monocytes % (Manual)  12 % (2-10)  H  03/24/17  20:25    


 


Eosinophils % (Manual)  1 % (0.7-5.8)   03/24/17  20:25    


 


Basophils % (Manual)  0  (0.1-1.2)  L  03/24/17  20:25    


 


Manual Slide Review  Normal smear   03/29/17  06:05    


 


Platelet Estimate  Adequate   03/24/17  20:25    


 


Plt Morphology Comment  Normal   03/24/17  20:25    


 


Poikilocytosis  1+ slight   03/24/17  20:25    


 


Anisocytosis  1+ slight   03/24/17  20:25    


 


Macrocytosis  1+ slight   03/24/17  20:25    


 


Target Cells  1+ slight   03/24/17  20:25    


 


RBC Morph Comment  Abnormal   03/24/17  20:25    


 


Sodium  138 mEq/L (136-145)   03/29/17  06:05    


 


Potassium  4.1 mEq/L (3.5-5.1)   03/29/17  06:05    


 


Chloride  103 mEq/L ()   03/29/17  06:05    


 


Carbon Dioxide  24 mEq/L (21-32)   03/29/17  06:05    


 


Anion Gap  15.1  (5-15)  H  03/29/17  06:05    


 


BUN  3 mg/dL (7-18)  L  03/29/17  06:05    


 


Creatinine  0.8 mg/dL (0.55-1.02)   03/29/17  06:05    


 


Est Cr Clr Drug Dosing  76.13 mL/min  03/29/17  06:05    


 


Estimated GFR (MDRD)  > 60 mL/min (>60)   03/29/17  06:05    


 


BUN/Creatinine Ratio  3.8  (14-18)  L  03/29/17  06:05    


 


Glucose  103 mg/dL ()   03/29/17  06:05    


 


POC Glucose  106 mg/dL ()  H  03/29/17  16:18    


 


Lactic Acid  1.3 mmol/L (0.4-2.0)   03/24/17  20:25    


 


Calcium  8.9 mg/dL (8.5-10.1)   03/29/17  06:05    


 


Magnesium  1.5 mg/dl (1.8-2.4)  L  03/29/17  06:05    


 


Total Bilirubin  0.3 mg/dL (0.2-1.0)   03/24/17  20:25    


 


AST  29 U/L (15-37)   03/24/17  20:25    


 


ALT  32 U/L (14-59)   03/24/17  20:25    


 


Alkaline Phosphatase  78 U/L ()   03/24/17  20:25    


 


C-Reactive Protein  32.3 mg/dL (<1.0)  H*  03/27/17  06:20    


 


Total Protein  7.5 g/dl (6.4-8.2)   03/24/17  20:25    


 


Albumin  2.6 g/dl (3.4-5.0)  L  03/24/17  20:25    


 


Globulin  4.9 gm/dL  03/24/17  20:25    


 


Albumin/Globulin Ratio  0.5  (1-2)  L  03/24/17  20:25    


 


Urine Color  Yellow  (Yellow)   03/24/17  20:14    


 


Urine Appearance  Cloudy  (Clear)  H  03/24/17  20:14    


 


Urine pH  8.5  (5.0-8.0)  H  03/24/17  20:14    


 


Ur Specific Gravity  1.020  (1.005-1.030)   03/24/17  20:14    


 


Urine Protein  3+  (Negative)  H  03/24/17  20:14    


 


Urine Glucose (UA)  Negative  (Negative)   03/24/17  20:14    


 


Urine Ketones  Trace  (Negative)  H  03/24/17  20:14    


 


Urine Occult Blood  3+  (Negative)  H  03/24/17  20:14    


 


Urine Nitrite  Negative  (Negative)   03/24/17  20:14    


 


Urine Bilirubin  Negative  (Negative)   03/24/17  20:14    


 


Urine Urobilinogen  0.2  (0.2-1.0)   03/24/17  20:14    


 


Ur Leukocyte Esterase  3+  (Negative)  H  03/24/17  20:14    


 


Urine RBC  10-20 /hpf (0-5)  H  03/24/17  20:14    


 


Urine WBC  10-20 /hpf (0-5)  H  03/24/17  20:14    


 


Ur Squamous Epith Cells  0-5 /hpf (0-5)   03/24/17  20:14    


 


Calcium Phosphate Cryst  Occasional  (NONE)   03/24/17  20:14    


 


Urine Bacteria  Many /hpf (FEW)  H  03/24/17  20:14    


 


Urine Mucus  Not seen /hpf (FEW)   03/24/17  20:14    


 


Vancomycin Trough  17.9  (10.0-20.0)   03/27/17  10:50    


 


Levetiracetam  17 ug/mL (5-30)   03/25/17  06:26    














- Allergies


Allergies/Adverse Reactions: 


 Allergies











Allergy/AdvReac Type Severity Reaction Status Date / Time


 


No Known Allergies Allergy   Verified 04/27/16 15:34














PreAnesthesia Questionnaire


HEENT History: Reports: Other (see below)


Other HEENT History: dysphagia


Cardiovascular History: Reports: None


Gastrointestinal History: Reports: Chronic constipation


Genitourinary History: Reports: UTI, recurrent


Other Genitourinary History: sepsis secondary to e-coli


Musculoskeletal History: Reports: Back pain, chronic, Osteoarthritis, 

Osteoporosis, Other (see below)


Other Musculoskeletal History: MS


Neurological History: Reports: Alzheimers disease, CVA, MS


Other Neuro History: chronic embolisms/thrombosis of veins, recurrent strokes, 

convulsions


Psychiatric History: Reports: Alzheimers disease (Questionable (documented on 

patients chart)), Depression


Other Psychiatric History: insomnia


Endocrine/Metabolic History: Reports: Diabetes, type II


Hematologic History: Reports: Anemia, Other (see below)


Other Hematologic History: eosinophilia





- Infectious Disease History


Infectious Disease History: Reports: Other (see below)


Other Infectious Disease History: unknown pt unable to answer and it is not 

listed on nursing home paperwork





- Past Surgical History


Head Surgeries/Procedures: Reports: None


HEENT Surgical History: Reports: None


GI Surgical History: Reports: None


Female  Surgical History: Reports: None


Endocrine Surgical History: Reports: None


Musculoskeletal Surgical History: Reports: None





- SUBSTANCE USE


Smoking Status *Q: Unknown Ever Smoked


Tobacco Use Within Last Twelve Months: No


Second Hand Smoke Exposure: No


Days Per Week of Alcohol Use: 0


Recreational Drug Use History: No





- HOME MEDS


Home Medications: 


 Home Meds





Baclofen 30 mg PO TID 01/12/15 [History]


FLUoxetine [PROzac] 10 mg PO DAILY 01/12/15 [History]


LORazepam [Ativan] 0.25 mg PO BID 01/12/15 [History]


tiZANidine HCl [Zanaflex] 2 mg PO TID 01/12/15 [History]


Acetaminophen [Tylenol] 2 tab PO Q4HR PRN 04/07/15 [History]


Cholecalciferol (Vitamin D3) [Vitamin D3] 2,000 unit PO DAILY 04/07/15 [History]


Docusate Sodium/Sennosides [Senna Plus] 2 tab PO BID 04/07/15 [History]


Magnesium Oxide 400 mg PO QPM 04/07/15 [History]


Multivitamin with Minerals [Multiple Vitamin] 1 tab PO DAILY 04/07/15 [History]


levETIRAcetam [Keppra] 1,000 mg PO BID 04/07/15 [History]


Lactulose [Chronulac] 15 ml PO DAILY PRN 05/13/15 [History]


Bisacodyl [Dulcolax] 10 mg PO DAILY PRN 04/27/16 [History]


Bisacodyl [Dulcolax] 10 mg RC ASDIRECTED PRN 04/27/16 [History]


Ciprofloxacin [Ciprofloxacin HCl] 250 mg PO BID 03/25/17 [History]











- CURRENT (IN HOUSE) MEDS


Current Meds: 





 Current Medications





Acetaminophen (Tylenol)  650 mg RECTAL Q6H PRN


   PRN Reason: Fever


   Last Admin: 03/27/17 15:07 Dose:  650 mg


Baclofen (Lioresal)  15 mg PO TID [facility]


   Last Admin: 03/29/17 15:29 Dose:  15 mg


Dextrose/Water (Dextrose 50% In Water)  50 ml IVPUSH ASDIRECTED PRN


   PRN Reason: Hypoglycemia


Enoxaparin Sodium (Lovenox)  40 mg SUBCUT DAILY [facility]


   Last Admin: 03/29/17 08:09 Dose:  40 mg


Fluoxetine HCl (Prozac)  10 mg PO DAILY [facility]


   Last Admin: 03/29/17 08:10 Dose:  10 mg


Hydralazine HCl (Apresoline)  20 mg IVPUSH Q8H PRN


   PRN Reason: Hypertension


Vancomycin HCl 1 gm/ Sodium (Chloride)  250 mls @ 167 mls/hr IV Q12H [facility]


   Last Admin: 03/29/17 15:26 Dose:  167 mls/hr


Sodium Chloride (Normal Saline)  500 mls @ 999 mls/min IV .BOLUS [facility]


   Last Admin: 03/26/17 16:45 Dose:  999 mls/min


Piperacillin Sod/Tazobactam (Sod 4.5 gm/ Sodium Chloride)  100 mls @ 25 mls/hr 

IV Q8H [facility]


   Last Admin: 03/29/17 17:04 Dose:  25 mls/hr


Potassium Chloride/Dextrose/Sod Cl (D5 1/2 Ns W/ 40 Meq/L Kcl)  1,000 mls @ 70 

mls/hr IV ASDIRECTED [facility]


   Last Admin: 03/29/17 15:33 Dose:  70 mls/hr


Insulin Aspart (Novolog)  0 unit SUBCUT QIDACANDBED [facility]


   PRN Reason: Protocol


   Last Admin: 03/29/17 17:03 Dose:  Not Given


Levetiracetam (Keppra)  1,000 mg PO BID [facility]


   Last Admin: 03/29/17 08:10 Dose:  1,000 mg


Lorazepam (Ativan)  1 mg IVPUSH Q4H PRN


   PRN Reason: Seizures


   Last Admin: 03/29/17 15:31 Dose:  1 mg


Magnesium Oxide (Magnesium Oxide)  400 mg PO QPM [facility]


   Last Admin: 03/29/17 17:06 Dose:  400 mg


Metoprolol Tartrate (Lopressor)  2.5 mg IVPUSH Q4H PRN


   PRN Reason: heart rate


   Last Admin: 03/26/17 01:39 Dose:  2.5 mg


Senna/Docusate Sodium (Senna Plus)  2 tab PO BID [facility]


   Last Admin: 03/29/17 08:10 Dose:  2 tab


Sodium Chloride (Saline Flush)  10 ml FLUSH ASDIRECTED PRN


   PRN Reason: Keep Vein Open


   Last Admin: 03/24/17 20:18 Dose:  10 ml


Tizanidine HCl (Zanaflex)  1 mg PO TID [facility]


   Last Admin: 03/29/17 15:29 Dose:  1 mg


Vancomycin HCl (Pharmacy To Dose - Vancomycin)  0 dose .XX ASDIRECTED PRN


   PRN Reason: RX DOSE





Discontinued Medications





Albuterol (Proventil Neb Soln)  0.63 mg NEB ONETIME ONE


   Stop: 03/27/17 17:53


   Last Admin: 03/27/17 18:02 Dose:  Not Given


Albuterol (Proventil Neb Soln)  2.5 mg NEB ONETIME ONE


   Stop: 03/27/17 17:53


   Last Admin: 03/27/17 18:01 Dose:  Not Given


Albuterol (Proventil Neb Soln) Confirm Administered Dose 2.5 mg .ROUTE .STK-MED 

ONE


   Stop: 03/27/17 17:54


   Last Admin: 03/27/17 17:58 Dose:  2.5 mg


Albuterol (Proventil Neb Soln)  0.63 mg NEB QIDRT [facility]


Albuterol (Proventil Neb Soln)  2.5 mg NEB QIDRT [facility]


   Last Admin: 03/29/17 06:15 Dose:  Not Given


Baclofen (Lioresal)  30 mg PO TID [facility]


   Last Admin: 03/26/17 15:29 Dose:  30 mg


Baclofen (Lioresal)  5 mg PO TID [facility]


   Last Admin: 03/27/17 07:59 Dose:  5 mg


Baclofen (Lioresal)  10 mg PO TID [facility]


   Last Admin: 03/27/17 19:13 Dose:  Not Given


Baclofen (Lioresal)  5 mg PO ONETIME ONE


   Stop: 03/27/17 09:38


   Last Admin: 03/27/17 10:20 Dose:  5 mg


Baclofen (Lioresal)  5 mg PO TID [facility]


Baclofen (Lioresal)  5 mg PO TID [facility]


   Last Admin: 03/28/17 08:08 Dose:  5 mg


Baclofen (Lioresal)  5 mg PO BID@1700,2300 [facility]


   Stop: 03/27/17 23:01


   Last Admin: 03/27/17 19:13 Dose:  Not Given


Baclofen (Lioresal)  5 mg PO BID@1700,2300 [facility]


   Stop: 03/27/17 23:01


   Last Admin: 03/27/17 23:25 Dose:  5 mg


Baclofen (Lioresal)  5 mg PO ONETIME ONE


   Stop: 03/27/17 17:17


   Last Admin: 03/27/17 17:26 Dose:  5 mg


Baclofen (Lioresal)  10 mg PO TID [facility]


   Last Admin: 03/28/17 14:32 Dose:  10 mg


Bisacodyl (Dulcolax)  10 mg RECTAL ONETIME ONE


   Stop: 03/27/17 08:43


   Last Admin: 03/27/17 09:03 Dose:  10 mg


Diphtheria/Tetanus/Acell Pertussis (Boostrix)  0.5 ml IM .ONCE ONE


   Stop: 03/26/17 11:52


   Last Admin: 03/26/17 12:24 Dose:  Not Given


Sodium Chloride (Normal Saline)  1,000 mls @ 999 mls/hr IV ONETIME ONE


   Stop: 03/24/17 20:50


   Last Infusion: 03/24/17 22:13 Dose:  Infused


Ceftriaxone Sodium 1 gm/ (Sodium Chloride)  100 mls @ 200 mls/hr IV ONETIME ONE


   Stop: 03/24/17 22:03


   Last Admin: 03/24/17 22:13 Dose:  200 mls/hr


Sodium Chloride (Normal Saline)  1,000 mls @ 125 mls/hr IV ONETIME ONE


   Stop: 03/25/17 05:56


   Last Admin: 03/24/17 22:13 Dose:  125 mls/hr


Levofloxacin/Dextrose 750 mg/ (Premix)  150 mls @ 100 mls/hr IV Q24H [facility]


   Last Admin: 03/26/17 18:47 Dose:  Not Given


Potassium Chloride/Dextrose/Sod Cl (D5 1/2 Ns W/ 20 Meq/L Kcl)  1,000 mls @ 70 

mls/hr IV ASDIRECTED [facility]


   Last Admin: 03/28/17 07:10 Dose:  70 mls/hr


Levetiracetam 1,000 mg/ Sodium (Chloride)  110 mls @ 400 mls/hr IV ONETIME ONE


   Stop: 03/25/17 15:38


   Last Admin: 03/25/17 16:07 Dose:  400 mls/hr


Levetiracetam 1,000 mg/ Sodium (Chloride)  110 mls @ 400 mls/hr IV Q12H [facility]


   Last Admin: 03/26/17 12:26 Dose:  Not Given


Vancomycin HCl 1 gm/ Sodium (Chloride)  250 mls @ 250 mls/hr IV ONETIME ONE


   Stop: 03/26/17 00:03


   Last Admin: 03/25/17 23:18 Dose:  250 mls/hr


Magnesium Sulfate 2 gm/ Premix  50 mls @ 25 mls/hr IV ONETIME ONE


   Stop: 03/26/17 13:06


   Last Admin: 03/26/17 12:30 Dose:  25 mls/hr


Piperacillin Sod/Tazobactam (Sod 4.5 gm/ Sodium Chloride)  100 mls @ 200 mls/hr 

IV ONETIME ONE


   Stop: 03/27/17 13:29


   Last Admin: 03/27/17 13:47 Dose:  200 mls/hr


Magnesium Sulfate 2 gm/ Premix  50 mls @ 25 mls/hr IV ONETIME ONE


   Stop: 03/27/17 19:32


   Last Admin: 03/27/17 18:08 Dose:  25 mls/hr


Potassium Chloride 20 meq/Potassium Chloride/Dextrose/Sod Cl  1,010 mls @ 70 mls

/hr IV ASDIRECTED [facility]


Potassium Chloride/Dextrose/Sod Cl (D5 1/2 Ns W/ 40 Meq/L Kcl)  1,000 mls @ 

69.307 mls/hr IV ASDIRECTED [facility]


   Last Admin: 03/28/17 11:06 Dose:  69.307 mls/hr


Magnesium Sulfate 2 gm/ Premix  50 mls @ 25 mls/hr IV ONETIME ONE


   Stop: 03/29/17 11:33


   Last Admin: 03/29/17 10:02 Dose:  25 mls/hr


Levetiracetam (Keppra)  1,000 mg PO BID [facility]


   Last Admin: 03/25/17 20:27 Dose:  Not Given


Lorazepam (Ativan)  1 mg IVPUSH ONETIME ONE


   Stop: 03/25/17 15:31


   Last Admin: 03/25/17 16:03 Dose:  1 mg


Metoprolol Tartrate (Lopressor)  2.5 mg IVPUSH Q6H PRN


   PRN Reason: heart rate


   Last Admin: 03/25/17 21:28 Dose:  2.5 mg


Potassium Chloride (Potassium Chloride)  40 meq PO ONETIME ONE


   Stop: 03/26/17 11:08


   Last Admin: 03/26/17 12:33 Dose:  40 meq


Tizanidine HCl (Zanaflex)  2 mg PO TID [facility]


   Last Admin: 03/27/17 19:13 Dose:  Not Given


Tizanidine HCl (Zanaflex)  2 mg PO ONETIME [facility]


   Stop: 03/27/17 09:38


   Last Admin: 03/27/17 10:20 Dose:  2 mg


Tizanidine HCl (Zanaflex)  1 mg PO BID@1700,2300 [facility]


   Stop: 03/27/17 23:01


   Last Admin: 03/27/17 23:24 Dose:  1 mg











Preanesthetic Assessment





- ANESTHESIA/TRANSFUSION/FAMILY HX


Anesthesia/Transfusion History: Unknown


Type of Anesthesia Reaction: Reports: Unknown


Family History of Anesthesia Reaction: No


Intubation History: Unknown


Type of Transfusion Reactions: Reports: Unknown


Additional History: 





Patient has MS with contractures.  Report from nursing staff that she is able 

to sign her own consents but at this time she is weak and unable to  her 

arm.  Patient is the decision maker per nursing staff and patient.  Contacted 

patients sister (Yaneli) and mother (Serene) and to update them on the patients 

condition/ plan for procedure on 3-30-17.  Sister and mother both agree for a 

MAC anesthetic for Maureen and also said they were at the hospital earlier in the 

day and read the PEG tube pamphlet to the patient and she agreed to proceed 

with the procedure.  Telephone consent obtained from mother and sister.  Will 

attempt to obtain actual patients consent the morning of surgery in hopes of 

the patient being less sedated and more awake.   





Most information obtained from prior medical records and by nodding of the 

patient to yes or no questions.  





- REVIEW OF SYSTEMS


Constitutional: Reports: fever (low grade), feeling ill


CNS: Reports: seizure (seizure like activity recently, patient taking keppra 

and benzos), weakness (MS with contractures)


Respiratory: Reports: no symptoms


Cardiovascular: Reports: no symptoms


GI: Reports: difficulty swallowing


Other: Reports: Diabetes, Depression





- PHYSICAL ASSESSMENT


HR: 93


O2 Sat by Pulse Oximetry: 96


RR: 20


BP: 119/67


Temp: 37.3 C


Vital Signs: 





 Last Vital Signs











Temp  37.3 C   03/29/17 16:12


 


Pulse  93   03/29/17 16:12


 


Resp  20   03/29/17 16:12


 


BP  119/67   03/29/17 16:12


 


Pulse Ox  96   03/29/17 16:12











Height: 1.68 m


Weight: 73.482 kg


NPO Status Date: 03/29/17


NPO Status Time: 23:59


ASA Class: 3


Mental Status: Other (Patient has a questionable history of Alzheimers and has 

been administered benzos for agitation and questionable seizure like activity)


Airway Class: Mallampati = 2 (Difficult to assess due to patient unable to open 

mouth properly due to sedation)


Dentition: Reports: Normal Dentition (Difficult to assess due to patient unable 

to open mouth properly due to sedation)


Thyro-Mental Finger Breadths: 3


Mouth Opening Finger Breadths: 1


ROM/Head Extension: Full


Respiratory Status: diminished breath sounds


Cardiovascular Status: regular rate & rhythm, normal S1, S2, no murmur, blood 

pressure WNL





- LAB


Values: 





 Laboratory Last Values











WBC  9.37 K/mm3 (3.98-10.04)   03/29/17  06:05    


 


RBC  3.82 M/mm3 (3.98-5.22)  L  03/29/17  06:05    


 


Hgb  10.9 gm/L (11.2-15.7)  L  03/29/17  06:05    


 


Hct  32.7 % (34.1-44.9)  L  03/29/17  06:05    


 


MCV  85.6 fl (79.4-94.8)   03/29/17  06:05    


 


MCH  28.5 pg (25.6-32.2)   03/29/17  06:05    


 


MCHC  33.3 g/dl (32.2-35.5)   03/29/17  06:05    


 


RDW Std Deviation  50.8 fL (36.4-46.3)  H  03/29/17  06:05    


 


Plt Count  308 K/mm3 (182-369)   03/29/17  06:05    


 


MPV  9.8 fl (9.4-12.3)   03/29/17  06:05    


 


Neut % (Auto)  55.6 % (34.0-71.1)   03/29/17  06:05    


 


Lymph % (Auto)  31.4 % (19.3-51.7)   03/29/17  06:05    


 


Mono % (Auto)  9.4 % (4.7-12.5)   03/29/17  06:05    


 


Eos % (Auto)  1.9  (0.7-5.8)   03/29/17  06:05    


 


Baso % (Auto)  1.3 % (0.1-1.2)  H  03/29/17  06:05    


 


Neut # (Auto)  5.21 K/mm3 (1.56-6.13)   03/29/17  06:05    


 


Lymph # (Auto)  2.94 K/mm3 (1.18-3.74)   03/29/17  06:05    


 


Mono # (Auto)  0.88 K/mm3 (0.24-0.36)  H  03/29/17  06:05    


 


Eos # (Auto)  0.18 K/mm3 (0.04-0.36)   03/29/17  06:05    


 


Baso # (Auto)  0.12 K/mm3 (0.01-0.08)  H  03/29/17  06:05    


 


Neutrophils % (Manual)  65 % (40-60)  H  03/24/17  20:25    


 


Band Neutrophils %  0 % (0-10)   03/24/17  20:25    


 


Lymphocytes % (Manual)  20 % (20-40)   03/24/17  20:25    


 


Atypical Lymphs %  2 %  03/24/17  20:25    


 


Monocytes % (Manual)  12 % (2-10)  H  03/24/17  20:25    


 


Eosinophils % (Manual)  1 % (0.7-5.8)   03/24/17  20:25    


 


Basophils % (Manual)  0  (0.1-1.2)  L  03/24/17  20:25    


 


Manual Slide Review  Normal smear   03/29/17  06:05    


 


Platelet Estimate  Adequate   03/24/17  20:25    


 


Plt Morphology Comment  Normal   03/24/17  20:25    


 


Poikilocytosis  1+ slight   03/24/17  20:25    


 


Anisocytosis  1+ slight   03/24/17  20:25    


 


Macrocytosis  1+ slight   03/24/17  20:25    


 


Target Cells  1+ slight   03/24/17  20:25    


 


RBC Morph Comment  Abnormal   03/24/17  20:25    


 


Sodium  138 mEq/L (136-145)   03/29/17  06:05    


 


Potassium  4.1 mEq/L (3.5-5.1)   03/29/17  06:05    


 


Chloride  103 mEq/L ()   03/29/17  06:05    


 


Carbon Dioxide  24 mEq/L (21-32)   03/29/17  06:05    


 


Anion Gap  15.1  (5-15)  H  03/29/17  06:05    


 


BUN  3 mg/dL (7-18)  L  03/29/17  06:05    


 


Creatinine  0.8 mg/dL (0.55-1.02)   03/29/17  06:05    


 


Est Cr Clr Drug Dosing  76.13 mL/min  03/29/17  06:05    


 


Estimated GFR (MDRD)  > 60 mL/min (>60)   03/29/17  06:05    


 


BUN/Creatinine Ratio  3.8  (14-18)  L  03/29/17  06:05    


 


Glucose  103 mg/dL ()   03/29/17  06:05    


 


POC Glucose  106 mg/dL ()  H  03/29/17  16:18    


 


Lactic Acid  1.3 mmol/L (0.4-2.0)   03/24/17  20:25    


 


Calcium  8.9 mg/dL (8.5-10.1)   03/29/17  06:05    


 


Magnesium  1.5 mg/dl (1.8-2.4)  L  03/29/17  06:05    


 


Total Bilirubin  0.3 mg/dL (0.2-1.0)   03/24/17  20:25    


 


AST  29 U/L (15-37)   03/24/17  20:25    


 


ALT  32 U/L (14-59)   03/24/17  20:25    


 


Alkaline Phosphatase  78 U/L ()   03/24/17  20:25    


 


C-Reactive Protein  32.3 mg/dL (<1.0)  H*  03/27/17  06:20    


 


Total Protein  7.5 g/dl (6.4-8.2)   03/24/17  20:25    


 


Albumin  2.6 g/dl (3.4-5.0)  L  03/24/17  20:25    


 


Globulin  4.9 gm/dL  03/24/17  20:25    


 


Albumin/Globulin Ratio  0.5  (1-2)  L  03/24/17  20:25    


 


Urine Color  Yellow  (Yellow)   03/24/17  20:14    


 


Urine Appearance  Cloudy  (Clear)  H  03/24/17  20:14    


 


Urine pH  8.5  (5.0-8.0)  H  03/24/17  20:14    


 


Ur Specific Gravity  1.020  (1.005-1.030)   03/24/17  20:14    


 


Urine Protein  3+  (Negative)  H  03/24/17  20:14    


 


Urine Glucose (UA)  Negative  (Negative)   03/24/17  20:14    


 


Urine Ketones  Trace  (Negative)  H  03/24/17  20:14    


 


Urine Occult Blood  3+  (Negative)  H  03/24/17  20:14    


 


Urine Nitrite  Negative  (Negative)   03/24/17  20:14    


 


Urine Bilirubin  Negative  (Negative)   03/24/17  20:14    


 


Urine Urobilinogen  0.2  (0.2-1.0)   03/24/17  20:14    


 


Ur Leukocyte Esterase  3+  (Negative)  H  03/24/17  20:14    


 


Urine RBC  10-20 /hpf (0-5)  H  03/24/17  20:14    


 


Urine WBC  10-20 /hpf (0-5)  H  03/24/17  20:14    


 


Ur Squamous Epith Cells  0-5 /hpf (0-5)   03/24/17  20:14    


 


Calcium Phosphate Cryst  Occasional  (NONE)   03/24/17  20:14    


 


Urine Bacteria  Many /hpf (FEW)  H  03/24/17  20:14    


 


Urine Mucus  Not seen /hpf (FEW)   03/24/17  20:14    


 


Vancomycin Trough  17.9  (10.0-20.0)   03/27/17  10:50    


 


Levetiracetam  17 ug/mL (5-30)   03/25/17  06:26    














- ALLERGIES


Allergies/Adverse Reactions: 


 Allergies











Allergy/AdvReac Type Severity Reaction Status Date / Time


 


No Known Allergies Allergy   Verified 04/27/16 15:34














- BLOOD


Blood Available: No


Product(s) Available: None





- ANESTHESIA PLAN


Preop Beta Blocker: No


Anesthesia Type Planned: MAC (Patients nodded head appropiately when I was 

explaining the anesthetic protion of her procedure to her. Risks and 

complications explained to both the patient and family (sister and mother))





- ACKNOWLEDGEMENTS


Pt an Appropriate Candidate for the Planned Anesthesia: Yes


Alternatives and Risks of Anesthesia Discussed w Pt/Guardian: Yes


Pt/Guardian Understands and Agrees with Anesthesia Plan: Yes

## 2017-03-30 PROCEDURE — 0DH63UZ INSERTION OF FEEDING DEVICE INTO STOMACH, PERCUTANEOUS APPROACH: ICD-10-PCS | Performed by: SURGERY

## 2017-03-30 RX ADMIN — LORAZEPAM PRN MG: 2 INJECTION INTRAMUSCULAR; INTRAVENOUS at 03:34

## 2017-03-30 RX ADMIN — DEXTROSE, SODIUM CHLORIDE, AND POTASSIUM CHLORIDE SCH MLS/HR: 5; .45; .3 INJECTION INTRAVENOUS at 06:06

## 2017-03-30 NOTE — PCM.POSTAN
POST ANESTHESIA ASSESSMENT





- MENTAL STATUS


Mental Status: somnolent (transfered to recoverey room for further monitoring )





- VITAL SIGNS


Pulse Rate: 90


SaO2: 98


Resp Rate: 16


Blood Pressure: 149/83


Temperature: 98.1 C





- RESPIRATORY


Respiratory Status: respiratory rate WNL, airway patent, O2 saturation stable





- CARDIOVASCULAR


CV Status: pulse rate WNL, elevated blood pressure





- GASTROINTESTINAL


GI Status: no symptoms





- POST OP HYDRATION


Hydration Status: adequate & stable





- OBSERVATIONS


Free Text/Narrative:: 





patient not reporting a pain level

## 2017-03-30 NOTE — PCM.CONSN
- General Info


Date of Service: 03/30/17





- Review of Systems


Systems Review Comment:: 





Patient with no issues overnight.  No questions regarding PEG tube placement 

this morning.





- Patient Data


Vitals - most recent: 


 Last Vital Signs











Temp  98.1 F   03/30/17 12:43


 


Pulse  91   03/30/17 12:43


 


Resp  18   03/30/17 12:43


 


BP  106/53 L  03/30/17 12:43


 


Pulse Ox  94 L  03/30/17 12:43











Weight - most recent: 162 lb 0.001 oz


I&O - last 24 hours: 


 Intake & Output











 03/30/17 03/30/17 03/30/17





 06:59 14:59 22:59


 


Intake Total 1108  400


 


Balance 1108  400











Lab Results last 24 hrs: 


 Laboratory Results - last 24 hr











  03/25/17 03/28/17 03/28/17 Range/Units





  06:26 11:12 17:10 


 


WBC     (3.98-10.04)  K/mm3


 


RBC     (3.98-5.22)  M/mm3


 


Hgb     (11.2-15.7)  gm/L


 


Hct     (34.1-44.9)  %


 


MCV     (79.4-94.8)  fl


 


MCH     (25.6-32.2)  pg


 


MCHC     (32.2-35.5)  g/dl


 


RDW Std Deviation     (36.4-46.3)  fL


 


Plt Count     (182-369)  K/mm3


 


MPV     (9.4-12.3)  fl


 


Neut % (Auto)     (34.0-71.1)  %


 


Lymph % (Auto)     (19.3-51.7)  %


 


Mono % (Auto)     (4.7-12.5)  %


 


Eos % (Auto)     (0.7-5.8)  


 


Baso % (Auto)     (0.1-1.2)  %


 


Neut # (Auto)     (1.56-6.13)  K/mm3


 


Lymph # (Auto)     (1.18-3.74)  K/mm3


 


Mono # (Auto)     (0.24-0.36)  K/mm3


 


Eos # (Auto)     (0.04-0.36)  K/mm3


 


Baso # (Auto)     (0.01-0.08)  K/mm3


 


Manual Slide Review     


 


Sodium     (136-145)  mEq/L


 


Potassium     (3.5-5.1)  mEq/L


 


Chloride     ()  mEq/L


 


Carbon Dioxide     (21-32)  mEq/L


 


Anion Gap     (5-15)  


 


BUN     (7-18)  mg/dL


 


Creatinine     (0.55-1.02)  mg/dL


 


Est Cr Clr Drug Dosing     mL/min


 


Estimated GFR (MDRD)     (>60)  mL/min


 


BUN/Creatinine Ratio     (14-18)  


 


Glucose     ()  mg/dL


 


POC Glucose   124 H  123 H  ()  mg/dL


 


Calcium     (8.5-10.1)  mg/dL


 


Magnesium     (1.8-2.4)  mg/dl


 


Levetiracetam  17    (5-30)  ug/mL














  03/29/17 03/30/17 03/30/17 Range/Units





  21:10 06:10 06:16 


 


WBC    9.66  (3.98-10.04)  K/mm3


 


RBC    3.91 L  (3.98-5.22)  M/mm3


 


Hgb    11.0 L  (11.2-15.7)  gm/L


 


Hct    34.3  (34.1-44.9)  %


 


MCV    87.7  (79.4-94.8)  fl


 


MCH    28.1  (25.6-32.2)  pg


 


MCHC    32.1 L  (32.2-35.5)  g/dl


 


RDW Std Deviation    54.1 H  (36.4-46.3)  fL


 


Plt Count    418 H  (182-369)  K/mm3


 


MPV    9.0 L  (9.4-12.3)  fl


 


Neut % (Auto)    56.1  (34.0-71.1)  %


 


Lymph % (Auto)    30.4  (19.3-51.7)  %


 


Mono % (Auto)    9.4  (4.7-12.5)  %


 


Eos % (Auto)    2.5  (0.7-5.8)  


 


Baso % (Auto)    1.3 H  (0.1-1.2)  %


 


Neut # (Auto)    5.41  (1.56-6.13)  K/mm3


 


Lymph # (Auto)    2.94  (1.18-3.74)  K/mm3


 


Mono # (Auto)    0.91 H  (0.24-0.36)  K/mm3


 


Eos # (Auto)    0.24  (0.04-0.36)  K/mm3


 


Baso # (Auto)    0.13 H  (0.01-0.08)  K/mm3


 


Manual Slide Review    Normal smear  


 


Sodium     (136-145)  mEq/L


 


Potassium     (3.5-5.1)  mEq/L


 


Chloride     ()  mEq/L


 


Carbon Dioxide     (21-32)  mEq/L


 


Anion Gap     (5-15)  


 


BUN     (7-18)  mg/dL


 


Creatinine     (0.55-1.02)  mg/dL


 


Est Cr Clr Drug Dosing     mL/min


 


Estimated GFR (MDRD)     (>60)  mL/min


 


BUN/Creatinine Ratio     (14-18)  


 


Glucose     ()  mg/dL


 


POC Glucose  117 H  102   ()  mg/dL


 


Calcium     (8.5-10.1)  mg/dL


 


Magnesium     (1.8-2.4)  mg/dl


 


Levetiracetam     (5-30)  ug/mL














  03/30/17 03/30/17 03/30/17 Range/Units





  06:16 11:12 16:30 


 


WBC     (3.98-10.04)  K/mm3


 


RBC     (3.98-5.22)  M/mm3


 


Hgb     (11.2-15.7)  gm/L


 


Hct     (34.1-44.9)  %


 


MCV     (79.4-94.8)  fl


 


MCH     (25.6-32.2)  pg


 


MCHC     (32.2-35.5)  g/dl


 


RDW Std Deviation     (36.4-46.3)  fL


 


Plt Count     (182-369)  K/mm3


 


MPV     (9.4-12.3)  fl


 


Neut % (Auto)     (34.0-71.1)  %


 


Lymph % (Auto)     (19.3-51.7)  %


 


Mono % (Auto)     (4.7-12.5)  %


 


Eos % (Auto)     (0.7-5.8)  


 


Baso % (Auto)     (0.1-1.2)  %


 


Neut # (Auto)     (1.56-6.13)  K/mm3


 


Lymph # (Auto)     (1.18-3.74)  K/mm3


 


Mono # (Auto)     (0.24-0.36)  K/mm3


 


Eos # (Auto)     (0.04-0.36)  K/mm3


 


Baso # (Auto)     (0.01-0.08)  K/mm3


 


Manual Slide Review     


 


Sodium  140    (136-145)  mEq/L


 


Potassium  4.0    (3.5-5.1)  mEq/L


 


Chloride  104    ()  mEq/L


 


Carbon Dioxide  26    (21-32)  mEq/L


 


Anion Gap  14.0    (5-15)  


 


BUN  3 L    (7-18)  mg/dL


 


Creatinine  0.8    (0.55-1.02)  mg/dL


 


Est Cr Clr Drug Dosing  76.55    mL/min


 


Estimated GFR (MDRD)  > 60    (>60)  mL/min


 


BUN/Creatinine Ratio  3.8 L    (14-18)  


 


Glucose  105    ()  mg/dL


 


POC Glucose   92  89  ()  mg/dL


 


Calcium  9.9    (8.5-10.1)  mg/dL


 


Magnesium  1.7 L    (1.8-2.4)  mg/dl


 


Levetiracetam     (5-30)  ug/mL











[facility]an Results last 24 hrs: 


 Microbiology











 03/27/17 15:34 Aerobic Blood Culture - Preliminary





 Blood - Venous    NO GROWTH AFTER 3 DAYS





 Anaerobic Blood Culture - Preliminary





    NO GROWTH AFTER 3 DAYS











Med Orders - Current: 


 Current Medications





Acetaminophen (Tylenol)  650 mg RECTAL Q6H PRN


   PRN Reason: Fever


   Last Admin: 03/27/17 15:07 Dose:  650 mg


Baclofen (Lioresal)  15 mg PO TID [facility]


   Last Admin: 03/30/17 15:18 Dose:  15 mg


Dextrose/Water (Dextrose 50% In Water)  50 ml IVPUSH ASDIRECTED PRN


   PRN Reason: Hypoglycemia


Enoxaparin Sodium (Lovenox)  40 mg SUBCUT DAILY [facility]


Fluoxetine HCl (Prozac)  10 mg PO DAILY [facility]


   Last Admin: 03/30/17 11:14 Dose:  10 mg


Hydralazine HCl (Apresoline)  20 mg IVPUSH Q8H PRN


   PRN Reason: Hypertension


Vancomycin HCl 1 gm/ Sodium (Chloride)  250 mls @ 167 mls/hr IV Q12H [facility]


   Last Admin: 03/30/17 11:32 Dose:  167 mls/hr


Sodium Chloride (Normal Saline)  500 mls @ 999 mls/min IV .BOLUS [facility]


   Last Admin: 03/26/17 16:45 Dose:  999 mls/min


Piperacillin Sod/Tazobactam (Sod 4.5 gm/ Sodium Chloride)  100 mls @ 25 mls/hr 

IV Q8H [facility]


   Last Admin: 03/30/17 13:11 Dose:  25 mls/hr


Potassium Chloride/Dextrose/Sod Cl (D5 1/2 Ns W/ 40 Meq/L Kcl)  1,000 mls @ 70 

mls/hr IV ASDIRECTED [facility]


   Last Admin: 03/30/17 06:06 Dose:  70 mls/hr


Insulin Aspart (Novolog)  0 unit SUBCUT QIDACANDBED [facility]


   PRN Reason: Protocol


   Last Admin: 03/30/17 16:33 Dose:  Not Given


Levetiracetam (Keppra)  1,000 mg PO BID [facility]


   Last Admin: 03/30/17 11:14 Dose:  1,000 mg


Lorazepam (Ativan)  1 mg IVPUSH Q4H PRN


   PRN Reason: Seizures


   Last Admin: 03/30/17 03:34 Dose:  1 mg


Magnesium Oxide (Magnesium Oxide)  400 mg PO QPM [facility]


   Last Admin: 03/29/17 17:06 Dose:  400 mg


Metoprolol Tartrate (Lopressor)  2.5 mg IVPUSH Q4H PRN


   PRN Reason: heart rate


   Last Admin: 03/26/17 01:39 Dose:  2.5 mg


Senna/Docusate Sodium (Senna Plus)  2 tab PO BID [facility]


   Last Admin: 03/30/17 11:13 Dose:  2 tab


Sodium Chloride (Saline Flush)  10 ml FLUSH ASDIRECTED PRN


   PRN Reason: Keep Vein Open


   Last Admin: 03/24/17 20:18 Dose:  10 ml


Tizanidine HCl (Zanaflex)  1 mg PO TID [facility]


   Last Admin: 03/30/17 15:19 Dose:  1 mg


Vancomycin HCl (Pharmacy To Dose - Vancomycin)  0 dose .XX ASDIRECTED PRN


   PRN Reason: RX DOSE





Discontinued Medications





Albuterol (Proventil Neb Soln)  0.63 mg NEB ONETIME ONE


   Stop: 03/27/17 17:53


   Last Admin: 03/27/17 18:02 Dose:  Not Given


Albuterol (Proventil Neb Soln)  2.5 mg NEB ONETIME ONE


   Stop: 03/27/17 17:53


   Last Admin: 03/27/17 18:01 Dose:  Not Given


Albuterol (Proventil Neb Soln) Confirm Administered Dose 2.5 mg .ROUTE .STK-MED 

ONE


   Stop: 03/27/17 17:54


   Last Admin: 03/27/17 17:58 Dose:  2.5 mg


Albuterol (Proventil Neb Soln)  0.63 mg NEB QIDRT [facility]


Albuterol (Proventil Neb Soln)  2.5 mg NEB QIDRT [facility]


   Last Admin: 03/29/17 06:15 Dose:  Not Given


Baclofen (Lioresal)  30 mg PO TID [facility]


   Last Admin: 03/26/17 15:29 Dose:  30 mg


Baclofen (Lioresal)  5 mg PO TID [facility]


   Last Admin: 03/27/17 07:59 Dose:  5 mg


Baclofen (Lioresal)  10 mg PO TID [facility]


   Last Admin: 03/27/17 19:13 Dose:  Not Given


Baclofen (Lioresal)  5 mg PO ONETIME ONE


   Stop: 03/27/17 09:38


   Last Admin: 03/27/17 10:20 Dose:  5 mg


Baclofen (Lioresal)  5 mg PO TID [facility]


Baclofen (Lioresal)  5 mg PO TID [facility]


   Last Admin: 03/28/17 08:08 Dose:  5 mg


Baclofen (Lioresal)  5 mg PO BID@1700,2300 [facility]


   Stop: 03/27/17 23:01


   Last Admin: 03/27/17 19:13 Dose:  Not Given


Baclofen (Lioresal)  5 mg PO BID@1700,2300 [facility]


   Stop: 03/27/17 23:01


   Last Admin: 03/27/17 23:25 Dose:  5 mg


Baclofen (Lioresal)  5 mg PO ONETIME ONE


   Stop: 03/27/17 17:17


   Last Admin: 03/27/17 17:26 Dose:  5 mg


Baclofen (Lioresal)  10 mg PO TID [facility]


   Last Admin: 03/28/17 14:32 Dose:  10 mg


Bisacodyl (Dulcolax)  10 mg RECTAL ONETIME ONE


   Stop: 03/27/17 08:43


   Last Admin: 03/27/17 09:03 Dose:  10 mg


Diphtheria/Tetanus/Acell Pertussis (Boostrix)  0.5 ml IM .ONCE ONE


   Stop: 03/26/17 11:52


   Last Admin: 03/26/17 12:24 Dose:  Not Given


Enoxaparin Sodium (Lovenox)  40 mg SUBCUT DAILY [facility]


   Last Admin: 03/29/17 08:09 Dose:  40 mg


Fentanyl (Sublimaze) Confirm Administered Dose 100 mcg .ROUTE .STK-MED ONE


   Stop: 03/30/17 07:14


Sodium Chloride (Normal Saline)  1,000 mls @ 999 mls/hr IV ONETIME ONE


   Stop: 03/24/17 20:50


   Last Infusion: 03/24/17 22:13 Dose:  Infused


Ceftriaxone Sodium 1 gm/ (Sodium Chloride)  100 mls @ 200 mls/hr IV ONETIME ONE


   Stop: 03/24/17 22:03


   Last Admin: 03/24/17 22:13 Dose:  200 mls/hr


Sodium Chloride (Normal Saline)  1,000 mls @ 125 mls/hr IV ONETIME ONE


   Stop: 03/25/17 05:56


   Last Admin: 03/24/17 22:13 Dose:  125 mls/hr


Levofloxacin/Dextrose 750 mg/ (Premix)  150 mls @ 100 mls/hr IV Q24H [facility]


   Last Admin: 03/26/17 18:47 Dose:  Not Given


Potassium Chloride/Dextrose/Sod Cl (D5 1/2 Ns W/ 20 Meq/L Kcl)  1,000 mls @ 70 

mls/hr IV ASDIRECTED [facility]


   Last Admin: 03/28/17 07:10 Dose:  70 mls/hr


Levetiracetam 1,000 mg/ Sodium (Chloride)  110 mls @ 400 mls/hr IV ONETIME ONE


   Stop: 03/25/17 15:38


   Last Admin: 03/25/17 16:07 Dose:  400 mls/hr


Levetiracetam 1,000 mg/ Sodium (Chloride)  110 mls @ 400 mls/hr IV Q12H [facility]


   Last Admin: 03/26/17 12:26 Dose:  Not Given


Vancomycin HCl 1 gm/ Sodium (Chloride)  250 mls @ 250 mls/hr IV ONETIME ONE


   Stop: 03/26/17 00:03


   Last Admin: 03/25/17 23:18 Dose:  250 mls/hr


Magnesium Sulfate 2 gm/ Premix  50 mls @ 25 mls/hr IV ONETIME ONE


   Stop: 03/26/17 13:06


   Last Admin: 03/26/17 12:30 Dose:  25 mls/hr


Piperacillin Sod/Tazobactam (Sod 4.5 gm/ Sodium Chloride)  100 mls @ 200 mls/hr 

IV ONETIME ONE


   Stop: 03/27/17 13:29


   Last Admin: 03/27/17 13:47 Dose:  200 mls/hr


Magnesium Sulfate 2 gm/ Premix  50 mls @ 25 mls/hr IV ONETIME ONE


   Stop: 03/27/17 19:32


   Last Admin: 03/27/17 18:08 Dose:  25 mls/hr


Potassium Chloride 20 meq/Potassium Chloride/Dextrose/Sod Cl  1,010 mls @ 70 mls

/hr IV ASDIRECTED [facility]


Potassium Chloride/Dextrose/Sod Cl (D5 1/2 Ns W/ 40 Meq/L Kcl)  1,000 mls @ 

69.307 mls/hr IV ASDIRECTED [facility]


   Last Admin: 03/28/17 11:06 Dose:  69.307 mls/hr


Magnesium Sulfate 2 gm/ Premix  50 mls @ 25 mls/hr IV ONETIME ONE


   Stop: 03/29/17 11:33


   Last Admin: 03/29/17 10:02 Dose:  25 mls/hr


Lidocaine HCl (Xylocaine-Mpf 1%) Confirm Administered Dose 2 mls @ as directed 

.ROUTE .STK-MED ONE


   Stop: 03/30/17 07:19


Magnesium Sulfate 2 gm/ Premix  50 mls @ 25 mls/hr IV ONETIME ONE


   Stop: 03/30/17 12:42


   Last Admin: 03/30/17 11:16 Dose:  25 mls/hr


Lactated Ringer's (Ringers, Lactated) Confirm Administered Dose 1,000 mls @ as 

directed .ROUTE .STK-MED ONE


   Stop: 03/30/17 10:53


Levetiracetam (Keppra)  1,000 mg PO BID [facility]


   Last Admin: 03/25/17 20:27 Dose:  Not Given


Lorazepam (Ativan)  1 mg IVPUSH ONETIME ONE


   Stop: 03/25/17 15:31


   Last Admin: 03/25/17 16:03 Dose:  1 mg


Metoprolol Tartrate (Lopressor)  2.5 mg IVPUSH Q6H PRN


   PRN Reason: heart rate


   Last Admin: 03/25/17 21:28 Dose:  2.5 mg


Midazolam HCl (Versed 1 Mg/Ml) Confirm Administered Dose 2 mg .ROUTE .STK-MED 

ONE


   Stop: 03/30/17 07:14


Potassium Chloride (Potassium Chloride)  40 meq PO ONETIME ONE


   Stop: 03/26/17 11:08


   Last Admin: 03/26/17 12:33 Dose:  40 meq


Propofol (Diprivan  20 Ml) Confirm Administered Dose 200 mg .ROUTE .STK-MED ONE


   Stop: 03/30/17 07:14


Tizanidine HCl (Zanaflex)  2 mg PO TID [facility]


   Last Admin: 03/27/17 19:13 Dose:  Not Given


Tizanidine HCl (Zanaflex)  2 mg PO ONETIME [facility]


   Stop: 03/27/17 09:38


   Last Admin: 03/27/17 10:20 Dose:  2 mg


Tizanidine HCl (Zanaflex)  1 mg PO BID@1700,2300 [facility]


   Stop: 03/27/17 23:01


   Last Admin: 03/27/17 23:24 Dose:  1 mg











- Exam


General: alert, cooperative, no acute distress


Lungs: Clear to auscultation, Normal respiratory effort


Cardiovascular: Regular Rate, Regular Rhythm


Abdomen: soft, no tenderness, no distension


Skin: warm, dry, intact





Consult PN Assessment/Plan


Procedures: 


Procedures





ASSAY OF BLOOD OSMOLALITY (05/13/15)


ASSAY OF CK (CPK) (01/12/15)


ASSAY OF LACTIC ACID (04/27/16)


ASSAY OF MAGNESIUM (05/13/15)


ASSAY OF NATRIURETIC PEPTIDE (12/18/14)


ASSAY OF PHOSPHORUS (05/13/15)


ASSAY OF TROPONIN QUANT (05/13/15)


BLOOD CULTURE FOR BACTERIA (04/27/16)


BLOOD GASES ANY COMBINATION (01/12/15)


C-REACTIVE PROTEIN (04/27/16)


CHEST X-RAY 1 VIEW FRONTAL (04/27/16)


COMPLETE CBC W/AUTO DIFF WBC (04/27/16)


COMPREHEN METABOLIC PANEL (04/27/16)


CREATINE MB FRACTION (05/13/15)


CT ANGIOGRAPHY CHEST (12/18/14)


CT HEAD/BRAIN W/O DYE (01/12/15)


CULTURE AEROBIC IDENTIFY (04/07/15)


DANIEL SUBQ TISSUE 20 SQ CM/< (12/12/14)


ELECTROCARDIOGRAM TRACING (01/12/15)


EMERGENCY DEPT VISIT (04/27/16)


EMERGENCY DEPT VISIT (05/13/15)


EMERGENCY DEPT VISIT (04/07/15)


EMERGENCY DEPT VISIT (12/18/14)


FIBRIN DEGRADATION QUANT (12/18/14)


HYDRATE IV INFUSION ADD-ON (04/27/16)


HYDRATION IV INFUSION INIT (12/18/14)


INFLUENZA A/B AG IA (12/18/14)


INFLUENZA ASSAY W/OPTIC (04/07/15)


INSERT BLADDER CATHETER (12/18/14)


INSERT TEMP BLADDER CATH (04/07/15)


METABOLIC PANEL TOTAL CA (04/07/15)


MICROBE SUSCEPTIBLE DIFFUSE (04/27/16)


MICROBE SUSCEPTIBLE DISK (04/27/16)


MICROBE SUSCEPTIBLE ABRAN (04/27/16)


MR-STAPH DNA AMP PROBE (12/12/14)


OT EVALUATION (04/27/16)


PROTHROMBIN TIME (12/18/14)


PT EVALUATION (04/27/16)


RBC SED RATE AUTOMATED (05/13/15)


ROUTINE VENIPUNCTURE (04/27/16)


THER/DIAG CONCURRENT INF (01/12/15)


THER/PROPH/DIAG IV INF ADDON (04/27/16)


THER/PROPH/DIAG IV INF INIT (04/27/16)


TX/PRO/DX INJ NEW DRUG ADDON (04/07/15)


URINALYSIS AUTO W/SCOPE (04/27/16)


URINE BACTERIA CULTURE (04/27/16)


URINE CULTURE/COLONY COUNT (06/15/16)


WITHDRAWAL OF ARTERIAL BLOOD (01/12/15)


X-RAY EXAM OF ABDOMEN (04/27/16)








(1) Dysphagia


SNOMED Code(s): 80619892, 693530786


   Code(s): R13.10 - DYSPHAGIA, UNSPECIFIED   Current Visit: Yes   





(2) Multiple sclerosis


SNOMED Code(s): 41470221


   Code(s): G35 - MULTIPLE SCLEROSIS   Current Visit: Yes   


Problem List Initiated/Reviewed/Updated: Yes


My Orders last 24 hours: 


My Active Orders





03/30/17 16:04


Communication Order [RC] ASDIRECTED 











Plan: 





51-year-old woman with MS and dysphagia





Proceed with PEG tube placement today as planned.  Consent obtained from 

patient yesterday.  Patient was not able to sign so it was listed as a verbal 

consent.

## 2017-03-30 NOTE — PCM.PN
- General Info


Date of Service: 03/30/17


Functional Status: Reports: pain controlled





- Review of Systems


General: Reports: No Symptoms


HEENT: Reports: no symptoms


Pulmonary: Reports: no symptoms


Cardiovascular: Reports: No Symptoms


Gastrointestinal: Reports: No symptoms


Genitourinary: Reports: no symptoms


Musculoskeletal: Reports: no symptoms


Skin: Reports: no symptoms


Neurological: Reports: No Symptoms


Psychiatric: Reports: no symptoms





- Patient Data


Vitals - most recent: 


 Last Vital Signs











Temp  36.4 C   03/30/17 08:40


 


Pulse  90   03/30/17 08:22


 


Resp  16   03/30/17 08:40


 


BP  116/75   03/30/17 08:40


 


Pulse Ox  96   03/30/17 08:40











Weight - most recent: 73.482 kg


I&O - last 24 hours: 


 Intake & Output











 03/29/17 03/30/17 03/30/17





 22:59 06:59 14:59


 


Intake Total 1537 1108 


 


Balance 1537 1108 











Lab Results last 24 hrs: 


 Laboratory Results - last 24 hr











  03/25/17 03/28/17 03/28/17 Range/Units





  06:26 11:12 17:10 


 


WBC     (3.98-10.04)  K/mm3


 


RBC     (3.98-5.22)  M/mm3


 


Hgb     (11.2-15.7)  gm/L


 


Hct     (34.1-44.9)  %


 


MCV     (79.4-94.8)  fl


 


MCH     (25.6-32.2)  pg


 


MCHC     (32.2-35.5)  g/dl


 


RDW Std Deviation     (36.4-46.3)  fL


 


Plt Count     (182-369)  K/mm3


 


MPV     (9.4-12.3)  fl


 


Neut % (Auto)     (34.0-71.1)  %


 


Lymph % (Auto)     (19.3-51.7)  %


 


Mono % (Auto)     (4.7-12.5)  %


 


Eos % (Auto)     (0.7-5.8)  


 


Baso % (Auto)     (0.1-1.2)  %


 


Neut # (Auto)     (1.56-6.13)  K/mm3


 


Lymph # (Auto)     (1.18-3.74)  K/mm3


 


Mono # (Auto)     (0.24-0.36)  K/mm3


 


Eos # (Auto)     (0.04-0.36)  K/mm3


 


Baso # (Auto)     (0.01-0.08)  K/mm3


 


Manual Slide Review     


 


Sodium     (136-145)  mEq/L


 


Potassium     (3.5-5.1)  mEq/L


 


Chloride     ()  mEq/L


 


Carbon Dioxide     (21-32)  mEq/L


 


Anion Gap     (5-15)  


 


BUN     (7-18)  mg/dL


 


Creatinine     (0.55-1.02)  mg/dL


 


Est Cr Clr Drug Dosing     mL/min


 


Estimated GFR (MDRD)     (>60)  mL/min


 


BUN/Creatinine Ratio     (14-18)  


 


Glucose     ()  mg/dL


 


POC Glucose   124 H  123 H  ()  mg/dL


 


Calcium     (8.5-10.1)  mg/dL


 


Magnesium     (1.8-2.4)  mg/dl


 


Levetiracetam  17    (5-30)  ug/mL














  03/29/17 03/29/17 03/30/17 Range/Units





  16:18 21:10 06:10 


 


WBC     (3.98-10.04)  K/mm3


 


RBC     (3.98-5.22)  M/mm3


 


Hgb     (11.2-15.7)  gm/L


 


Hct     (34.1-44.9)  %


 


MCV     (79.4-94.8)  fl


 


MCH     (25.6-32.2)  pg


 


MCHC     (32.2-35.5)  g/dl


 


RDW Std Deviation     (36.4-46.3)  fL


 


Plt Count     (182-369)  K/mm3


 


MPV     (9.4-12.3)  fl


 


Neut % (Auto)     (34.0-71.1)  %


 


Lymph % (Auto)     (19.3-51.7)  %


 


Mono % (Auto)     (4.7-12.5)  %


 


Eos % (Auto)     (0.7-5.8)  


 


Baso % (Auto)     (0.1-1.2)  %


 


Neut # (Auto)     (1.56-6.13)  K/mm3


 


Lymph # (Auto)     (1.18-3.74)  K/mm3


 


Mono # (Auto)     (0.24-0.36)  K/mm3


 


Eos # (Auto)     (0.04-0.36)  K/mm3


 


Baso # (Auto)     (0.01-0.08)  K/mm3


 


Manual Slide Review     


 


Sodium     (136-145)  mEq/L


 


Potassium     (3.5-5.1)  mEq/L


 


Chloride     ()  mEq/L


 


Carbon Dioxide     (21-32)  mEq/L


 


Anion Gap     (5-15)  


 


BUN     (7-18)  mg/dL


 


Creatinine     (0.55-1.02)  mg/dL


 


Est Cr Clr Drug Dosing     mL/min


 


Estimated GFR (MDRD)     (>60)  mL/min


 


BUN/Creatinine Ratio     (14-18)  


 


Glucose     ()  mg/dL


 


POC Glucose  106 H  117 H  102  ()  mg/dL


 


Calcium     (8.5-10.1)  mg/dL


 


Magnesium     (1.8-2.4)  mg/dl


 


Levetiracetam     (5-30)  ug/mL














  03/30/17 03/30/17 03/30/17 Range/Units





  06:16 06:16 11:12 


 


WBC  9.66    (3.98-10.04)  K/mm3


 


RBC  3.91 L    (3.98-5.22)  M/mm3


 


Hgb  11.0 L    (11.2-15.7)  gm/L


 


Hct  34.3    (34.1-44.9)  %


 


MCV  87.7    (79.4-94.8)  fl


 


MCH  28.1    (25.6-32.2)  pg


 


MCHC  32.1 L    (32.2-35.5)  g/dl


 


RDW Std Deviation  54.1 H    (36.4-46.3)  fL


 


Plt Count  418 H    (182-369)  K/mm3


 


MPV  9.0 L    (9.4-12.3)  fl


 


Neut % (Auto)  56.1    (34.0-71.1)  %


 


Lymph % (Auto)  30.4    (19.3-51.7)  %


 


Mono % (Auto)  9.4    (4.7-12.5)  %


 


Eos % (Auto)  2.5    (0.7-5.8)  


 


Baso % (Auto)  1.3 H    (0.1-1.2)  %


 


Neut # (Auto)  5.41    (1.56-6.13)  K/mm3


 


Lymph # (Auto)  2.94    (1.18-3.74)  K/mm3


 


Mono # (Auto)  0.91 H    (0.24-0.36)  K/mm3


 


Eos # (Auto)  0.24    (0.04-0.36)  K/mm3


 


Baso # (Auto)  0.13 H    (0.01-0.08)  K/mm3


 


Manual Slide Review  Normal smear    


 


Sodium   140   (136-145)  mEq/L


 


Potassium   4.0   (3.5-5.1)  mEq/L


 


Chloride   104   ()  mEq/L


 


Carbon Dioxide   26   (21-32)  mEq/L


 


Anion Gap   14.0   (5-15)  


 


BUN   3 L   (7-18)  mg/dL


 


Creatinine   0.8   (0.55-1.02)  mg/dL


 


Est Cr Clr Drug Dosing   76.55   mL/min


 


Estimated GFR (MDRD)   > 60   (>60)  mL/min


 


BUN/Creatinine Ratio   3.8 L   (14-18)  


 


Glucose   105   ()  mg/dL


 


POC Glucose    92  ()  mg/dL


 


Calcium   9.9   (8.5-10.1)  mg/dL


 


Magnesium   1.7 L   (1.8-2.4)  mg/dl


 


Levetiracetam     (5-30)  ug/mL











Abran Results last 24 hrs: 


 Microbiology











 03/27/17 15:34 Aerobic Blood Culture - Preliminary





 Blood - Venous    NO GROWTH AFTER 2 DAYS





 Anaerobic Blood Culture - Preliminary





    NO GROWTH AFTER 2 DAYS











Med Orders - Current: 


 Current Medications





Acetaminophen (Tylenol)  650 mg RECTAL Q6H PRN


   PRN Reason: Fever


   Last Admin: 03/27/17 15:07 Dose:  650 mg


Baclofen (Lioresal)  15 mg PO TID [facility]


   Last Admin: 03/30/17 11:14 Dose:  15 mg


Dextrose/Water (Dextrose 50% In Water)  50 ml IVPUSH ASDIRECTED PRN


   PRN Reason: Hypoglycemia


Enoxaparin Sodium (Lovenox)  40 mg SUBCUT DAILY [facility]


Fluoxetine HCl (Prozac)  10 mg PO DAILY [facility]


   Last Admin: 03/30/17 11:14 Dose:  10 mg


Hydralazine HCl (Apresoline)  20 mg IVPUSH Q8H PRN


   PRN Reason: Hypertension


Vancomycin HCl 1 gm/ Sodium (Chloride)  250 mls @ 167 mls/hr IV Q12H [facility]


   Last Admin: 03/30/17 11:32 Dose:  167 mls/hr


Sodium Chloride (Normal Saline)  500 mls @ 999 mls/min IV .BOLUS [facility]


   Last Admin: 03/26/17 16:45 Dose:  999 mls/min


Piperacillin Sod/Tazobactam (Sod 4.5 gm/ Sodium Chloride)  100 mls @ 25 mls/hr 

IV Q8H [facility]


   Last Admin: 03/30/17 04:29 Dose:  25 mls/hr


Potassium Chloride/Dextrose/Sod Cl (D5 1/2 Ns W/ 40 Meq/L Kcl)  1,000 mls @ 70 

mls/hr IV ASDIRECTED [facility]


   Last Admin: 03/30/17 06:06 Dose:  70 mls/hr


Magnesium Sulfate 2 gm/ Premix  50 mls @ 25 mls/hr IV ONETIME ONE


   Stop: 03/30/17 12:42


   Last Admin: 03/30/17 11:16 Dose:  25 mls/hr


Insulin Aspart (Novolog)  0 unit SUBCUT QIDACANDBED [facility]


   PRN Reason: Protocol


   Last Admin: 03/30/17 11:13 Dose:  Not Given


Levetiracetam (Keppra)  1,000 mg PO BID [facility]


   Last Admin: 03/30/17 11:14 Dose:  1,000 mg


Lorazepam (Ativan)  1 mg IVPUSH Q4H PRN


   PRN Reason: Seizures


   Last Admin: 03/30/17 03:34 Dose:  1 mg


Magnesium Oxide (Magnesium Oxide)  400 mg PO QPM [facility]


   Last Admin: 03/29/17 17:06 Dose:  400 mg


Metoprolol Tartrate (Lopressor)  2.5 mg IVPUSH Q4H PRN


   PRN Reason: heart rate


   Last Admin: 03/26/17 01:39 Dose:  2.5 mg


Senna/Docusate Sodium (Senna Plus)  2 tab PO BID [facility]


   Last Admin: 03/30/17 11:13 Dose:  2 tab


Sodium Chloride (Saline Flush)  10 ml FLUSH ASDIRECTED PRN


   PRN Reason: Keep Vein Open


   Last Admin: 03/24/17 20:18 Dose:  10 ml


Tizanidine HCl (Zanaflex)  1 mg PO TID [facility]


   Last Admin: 03/30/17 11:14 Dose:  1 mg


Vancomycin HCl (Pharmacy To Dose - Vancomycin)  0 dose .XX ASDIRECTED PRN


   PRN Reason: RX DOSE





Discontinued Medications





Albuterol (Proventil Neb Soln)  0.63 mg NEB ONETIME ONE


   Stop: 03/27/17 17:53


   Last Admin: 03/27/17 18:02 Dose:  Not Given


Albuterol (Proventil Neb Soln)  2.5 mg NEB ONETIME ONE


   Stop: 03/27/17 17:53


   Last Admin: 03/27/17 18:01 Dose:  Not Given


Albuterol (Proventil Neb Soln) Confirm Administered Dose 2.5 mg .ROUTE .STK-MED 

ONE


   Stop: 03/27/17 17:54


   Last Admin: 03/27/17 17:58 Dose:  2.5 mg


Albuterol (Proventil Neb Soln)  0.63 mg NEB QIDRT [facility]


Albuterol (Proventil Neb Soln)  2.5 mg NEB QIDRT [facility]


   Last Admin: 03/29/17 06:15 Dose:  Not Given


Baclofen (Lioresal)  30 mg PO TID [facility]


   Last Admin: 03/26/17 15:29 Dose:  30 mg


Baclofen (Lioresal)  5 mg PO TID [facility]


   Last Admin: 03/27/17 07:59 Dose:  5 mg


Baclofen (Lioresal)  10 mg PO TID [facility]


   Last Admin: 03/27/17 19:13 Dose:  Not Given


Baclofen (Lioresal)  5 mg PO ONETIME ONE


   Stop: 03/27/17 09:38


   Last Admin: 03/27/17 10:20 Dose:  5 mg


Baclofen (Lioresal)  5 mg PO TID [facility]


Baclofen (Lioresal)  5 mg PO TID [facility]


   Last Admin: 03/28/17 08:08 Dose:  5 mg


Baclofen (Lioresal)  5 mg PO BID@1700,2300 [facility]


   Stop: 03/27/17 23:01


   Last Admin: 03/27/17 19:13 Dose:  Not Given


Baclofen (Lioresal)  5 mg PO BID@1700,2300 [facility]


   Stop: 03/27/17 23:01


   Last Admin: 03/27/17 23:25 Dose:  5 mg


Baclofen (Lioresal)  5 mg PO ONETIME ONE


   Stop: 03/27/17 17:17


   Last Admin: 03/27/17 17:26 Dose:  5 mg


Baclofen (Lioresal)  10 mg PO TID [facility]


   Last Admin: 03/28/17 14:32 Dose:  10 mg


Bisacodyl (Dulcolax)  10 mg RECTAL ONETIME ONE


   Stop: 03/27/17 08:43


   Last Admin: 03/27/17 09:03 Dose:  10 mg


Diphtheria/Tetanus/Acell Pertussis (Boostrix)  0.5 ml IM .ONCE ONE


   Stop: 03/26/17 11:52


   Last Admin: 03/26/17 12:24 Dose:  Not Given


Enoxaparin Sodium (Lovenox)  40 mg SUBCUT DAILY [facility]


   Last Admin: 03/29/17 08:09 Dose:  40 mg


Fentanyl (Sublimaze) Confirm Administered Dose 100 mcg .ROUTE .STK-MED ONE


   Stop: 03/30/17 07:14


Sodium Chloride (Normal Saline)  1,000 mls @ 999 mls/hr IV ONETIME ONE


   Stop: 03/24/17 20:50


   Last Infusion: 03/24/17 22:13 Dose:  Infused


Ceftriaxone Sodium 1 gm/ (Sodium Chloride)  100 mls @ 200 mls/hr IV ONETIME ONE


   Stop: 03/24/17 22:03


   Last Admin: 03/24/17 22:13 Dose:  200 mls/hr


Sodium Chloride (Normal Saline)  1,000 mls @ 125 mls/hr IV ONETIME ONE


   Stop: 03/25/17 05:56


   Last Admin: 03/24/17 22:13 Dose:  125 mls/hr


Levofloxacin/Dextrose 750 mg/ (Premix)  150 mls @ 100 mls/hr IV Q24H [facility]


   Last Admin: 03/26/17 18:47 Dose:  Not Given


Potassium Chloride/Dextrose/Sod Cl (D5 1/2 Ns W/ 20 Meq/L Kcl)  1,000 mls @ 70 

mls/hr IV ASDIRECTED [facility]


   Last Admin: 03/28/17 07:10 Dose:  70 mls/hr


Levetiracetam 1,000 mg/ Sodium (Chloride)  110 mls @ 400 mls/hr IV ONETIME ONE


   Stop: 03/25/17 15:38


   Last Admin: 03/25/17 16:07 Dose:  400 mls/hr


Levetiracetam 1,000 mg/ Sodium (Chloride)  110 mls @ 400 mls/hr IV Q12H [facility]


   Last Admin: 03/26/17 12:26 Dose:  Not Given


Vancomycin HCl 1 gm/ Sodium (Chloride)  250 mls @ 250 mls/hr IV ONETIME ONE


   Stop: 03/26/17 00:03


   Last Admin: 03/25/17 23:18 Dose:  250 mls/hr


Magnesium Sulfate 2 gm/ Premix  50 mls @ 25 mls/hr IV ONETIME ONE


   Stop: 03/26/17 13:06


   Last Admin: 03/26/17 12:30 Dose:  25 mls/hr


Piperacillin Sod/Tazobactam (Sod 4.5 gm/ Sodium Chloride)  100 mls @ 200 mls/hr 

IV ONETIME ONE


   Stop: 03/27/17 13:29


   Last Admin: 03/27/17 13:47 Dose:  200 mls/hr


Magnesium Sulfate 2 gm/ Premix  50 mls @ 25 mls/hr IV ONETIME ONE


   Stop: 03/27/17 19:32


   Last Admin: 03/27/17 18:08 Dose:  25 mls/hr


Potassium Chloride 20 meq/Potassium Chloride/Dextrose/Sod Cl  1,010 mls @ 70 mls

/hr IV ASDIRECTED [facility]


Potassium Chloride/Dextrose/Sod Cl (D5 1/2 Ns W/ 40 Meq/L Kcl)  1,000 mls @ 

69.307 mls/hr IV ASDIRECTED [facility]


   Last Admin: 03/28/17 11:06 Dose:  69.307 mls/hr


Magnesium Sulfate 2 gm/ Premix  50 mls @ 25 mls/hr IV ONETIME ONE


   Stop: 03/29/17 11:33


   Last Admin: 03/29/17 10:02 Dose:  25 mls/hr


Lidocaine HCl (Xylocaine-Mpf 1%) Confirm Administered Dose 2 mls @ as directed 

.ROUTE .STK-MED ONE


   Stop: 03/30/17 07:19


Lactated Ringer's (Ringers, Lactated) Confirm Administered Dose 1,000 mls @ as 

directed .ROUTE .STK-MED ONE


   Stop: 03/30/17 10:53


Levetiracetam (Keppra)  1,000 mg PO BID [facility]


   Last Admin: 03/25/17 20:27 Dose:  Not Given


Lorazepam (Ativan)  1 mg IVPUSH ONETIME ONE


   Stop: 03/25/17 15:31


   Last Admin: 03/25/17 16:03 Dose:  1 mg


Metoprolol Tartrate (Lopressor)  2.5 mg IVPUSH Q6H PRN


   PRN Reason: heart rate


   Last Admin: 03/25/17 21:28 Dose:  2.5 mg


Midazolam HCl (Versed 1 Mg/Ml) Confirm Administered Dose 2 mg .ROUTE .STK-MED 

ONE


   Stop: 03/30/17 07:14


Potassium Chloride (Potassium Chloride)  40 meq PO ONETIME ONE


   Stop: 03/26/17 11:08


   Last Admin: 03/26/17 12:33 Dose:  40 meq


Propofol (Diprivan  20 Ml) Confirm Administered Dose 200 mg .ROUTE .STK-MED ONE


   Stop: 03/30/17 07:14


Tizanidine HCl (Zanaflex)  2 mg PO TID [facility]


   Last Admin: 03/27/17 19:13 Dose:  Not Given


Tizanidine HCl (Zanaflex)  2 mg PO ONETIME STA


   Stop: 03/27/17 09:38


   Last Admin: 03/27/17 10:20 Dose:  2 mg


Tizanidine HCl (Zanaflex)  1 mg PO BID@1700,2300 [facility]


   Stop: 03/27/17 23:01


   Last Admin: 03/27/17 23:24 Dose:  1 mg











- Exam


Quality Assessment: supplemental oxygen, DVT prophylaxis


General: sedated (arousable)


HEENT: Pupils equal, Pupils reactive, EOMI


Neck: supple, no JVD


Lungs: Normal respiratory effort


Cardiovascular: Regular Rate, Regular Rhythm


Abdomen: bowel sounds present, soft, other (PEG tube)


 (Female) Exam: Deferred


Back Exam: normal inspection


Extremities: normal pulses


Skin: warm


Neurological: no new focal deficit


Psy/Mental Status: other (see above)





- Problem List Review


Problem List Initiated/Reviewed/Updated: Yes





- My Orders


Last 24 Hours: 


My Active Orders





03/30/17 10:43


Magnesium Sulfate/Water [Magnesium Sulfate 2 GM in Water 50 ML] 2 gm   Premix 

Bag 1 bag IV ONETIME 





03/31/17 05:00


BMP [BASIC METABOLIC PANEL,BMP] [CHEM] DAILY 


CBC WITH AUTO DIFF [HEME] DAILY 


MAGNESIUM [CHEM] DAILY 





03/31/17 09:00


Enoxaparin [Lovenox]   40 mg SUBCUT DAILY 














- Plan


Plan:: 





Impression:





AUTI, on 2 IV ATBs


AMS, resolved


Risk of aspiration; pulmonary toilet


Puree diet as ordered; patient is not drinking enough fluids;


Drowsiness after Zanaflex and Baclofen yesterday, with increase spasms off usual


regimen.


Consent given by patient for PEG tube yesterday.














Plan:





MS lolyd, changed yesterday.


IV ATBs, will stop 


PEG tube placed today, will start free water and determine protein needs when 

not taking enough


orally.


Adjust meds to avoid over sedation.


Seems to tolerate baclofen 15 mg TID, zanaflex 1 mg TID, will


follow; advance as tolerated.


Await Keppra level


Hydrate as needed IVF, will use PEG tube on 3/31/17.


BP/HR control, address cause of elevation


DVT/GI prophylaxis


Consult PT/OT/SW





LOS>96 hours.

## 2017-03-30 NOTE — PCM.OPNOTE
- General Post-Op/Procedure Note


Date of Surgery/Procedure: 03/30/17


Operative Procedure(s): EGD with percutaneous endoscopic gastrostomy tube 

placement.


Pre Op Diagnosis: Multiple sclerosis and dysphagia


Post-Op Diagnosis: Same


Anesthesia Technique: Local, MAC


Primary Surgeon: Rola Alva


Anesthesia Provider: Ayanna Starr


Pathology: 





None


Fluid Replacement, Intraop: 400 (mL crystalloid )


EBL in mLs: 2


Drain/Tube Comments:: PEG tube secured at 5.5 cm at the flange placed to 

gravity drainage


Complications: None


Condition: Good


Free Text/Narrative:: 








INDICATION FOR PROCEDURE: The patient is a 51-year-old woman who was referred 

to me by Dr. Dayanara Pickett for PEG tube placement for dysphagia related to her 

MS.  EGD had been discussed with the patient and risks of the associated 

procedure.  She had provided verbal consent as she was unable to physically 

sign her name.  The patient found the risks acceptable and agreed to proceed.





DESCRIPTION OF PROCEDURE: The patient was taken to the operating room and 

placed in a supine position.  After induction of adequate sedation, a bite 

block was placed.  A standard Olympus gastroscope was inserted into the 

oropharynx and guided down the esophagus without difficulty. There was no 

evidence of stricture or esophageal ulcerations.  The scope was advanced into 

the stomach, and there were no obvious abnormalities.  The scope was passed 

into the proximal jejunum and the duodenum which were unremarkable.  There were 

no petechiae or ulcerations.  The proximal jejunum was grossly normal in 

appearance.  The scope was retroflexed, and there appeared to be a medium sized 

hiatal hernia, however I was not able to obtain good photographic documentation 

of this.    





Attention was then turned to the PEG tube placement.  Direct pressure over the 

abdomen, which had been previously prepped with chlorhexidine and draped, was 

easily visible on the insufflated stomach wall.  Local anesthetic was injected 

and a small skin incision was made.  The introducer needle was placed into the 

stomach under direct visualization the guidewire was threaded through the 

introducer needle.  The snare was then placed down the gastroscope and the 

guidewire was grasped.  The gastroscope was withdrawn along with the snare and 

guidewire. The guidewire and the PEG tube were connected.  The PEG tube was 

then pulled through the patient's mouth. The PEG tube was then pulled flush 

with the abdominal wall.  The flange was then placed over the PEG tube and 

secured at 5.5 cm. The PEG tube was secured with 2-0 Prolene interrupted 

sutures through the flange.  The clamp was applied and the PEG tube was 

trimmed.  The cap was placed on the PEG tube.  The PEG tube was placed to 

dependent drainage in a urometer bag.  Folded 4 x 4's were placed as a drain 

sponge over the flange and secured with paper tape.





The gastroscope was reinserted, the PEG tube flange was clearly seen along the 

greater curvature.  There is no evidence of bleeding.   The scope was withdrawn 

through the remainder of the esophagus and no further abnormalities were noted.

  The posterior oropharynx was grossly normal in appearance. The scope was then 

fully withdrawn. The patient was awakened from sedation and transferred to the 

recovery room in stable condition having tolerated the procedure well.





POSTOPERATIVE PLAN: I discussed with the hospitalist team my intraoperative 

findings and postoperative recommendations.  The PEG tube is to be left to 

drainage for the next 24 hours.  The patient is to be nothing by mouth except 

for medications during this time.  Tomorrow morning the PEG tube may be used 

for fluids, feeds, or medications as desired.  The patient may follow-up with 

me in approximately 6 weeks for Eric tube sizing and tube exchange.  The 

sutures may be removed from the flange in 2 weeks at her nursing home.

## 2017-03-30 NOTE — [id number]
Post Anesthesia Note





- EVALUATION WITHIN 48HRS OF ANESTHETIC


Vital Signs in Normal Range: Yes


Patient Participated in Evaluation: Yes


Respiratory Function Stable: Yes


Airway Patent: Yes


Cardiovascular Function Stable: Yes


Hydration Status Stable: Yes


Pain Control Satisfactory: Yes


Nausea and Vomiting Control Satisfactory: Yes


Mental Status Recovered: Yes





- COMMENTS/OBSERVATIONS


Free Text/Narrative:: 





Patient evaluated and RN spoke to post discharge.

## 2017-03-31 VITALS — DIASTOLIC BLOOD PRESSURE: 86 MMHG | SYSTOLIC BLOOD PRESSURE: 122 MMHG

## 2017-03-31 RX ADMIN — DEXTROSE, SODIUM CHLORIDE, AND POTASSIUM CHLORIDE SCH MLS/HR: 5; .45; .3 INJECTION INTRAVENOUS at 04:10

## 2017-03-31 NOTE — PCM.CONSN
- General Info


Date of Service: 03/31/17





- Review of Systems


Systems Review Comment:: 





No events overnight. Afebrile. 





- Patient Data


Vitals - most recent: 


 Last Vital Signs











Temp  98.6 F   03/31/17 03:05


 


Pulse  93   03/31/17 03:05


 


Resp  14   03/31/17 03:05


 


BP  107/96 H  03/31/17 03:05


 


Pulse Ox  96   03/31/17 03:05











Weight - most recent: 161 lb 1.6 oz


I&O - last 24 hours: 


 Intake & Output











 03/30/17 03/31/17 03/31/17





 22:59 06:59 14:59


 


Intake Total 1494 550 0


 


Output Total 300  


 


Balance 1194 550 0











Lab Results last 24 hrs: 


 Laboratory Results - last 24 hr











  03/30/17 03/30/17 03/31/17 Range/Units





  16:30 21:25 06:06 


 


WBC    8.65  (3.98-10.04)  K/mm3


 


RBC    4.08  (3.98-5.22)  M/mm3


 


Hgb    11.4  (11.2-15.7)  gm/L


 


Hct    35.4  (34.1-44.9)  %


 


MCV    86.8  (79.4-94.8)  fl


 


MCH    27.9  (25.6-32.2)  pg


 


MCHC    32.2  (32.2-35.5)  g/dl


 


RDW Std Deviation    52.6 H  (36.4-46.3)  fL


 


Plt Count    431 H  (182-369)  K/mm3


 


MPV    8.9 L  (9.4-12.3)  fl


 


Neut % (Auto)    61.2  (34.0-71.1)  %


 


Lymph % (Auto)    25.9  (19.3-51.7)  %


 


Mono % (Auto)    10.2  (4.7-12.5)  %


 


Eos % (Auto)    2.0  (0.7-5.8)  


 


Baso % (Auto)    0.2  (0.1-1.2)  %


 


Neut # (Auto)    5.30  (1.56-6.13)  K/mm3


 


Lymph # (Auto)    2.24  (1.18-3.74)  K/mm3


 


Mono # (Auto)    0.88 H  (0.24-0.36)  K/mm3


 


Eos # (Auto)    0.17  (0.04-0.36)  K/mm3


 


Baso # (Auto)    0.02  (0.01-0.08)  K/mm3


 


Manual Slide Review    Normal smear  


 


Sodium     (136-145)  mEq/L


 


Potassium     (3.5-5.1)  mEq/L


 


Chloride     ()  mEq/L


 


Carbon Dioxide     (21-32)  mEq/L


 


Anion Gap     (5-15)  


 


BUN     (7-18)  mg/dL


 


Creatinine     (0.55-1.02)  mg/dL


 


Est Cr Clr Drug Dosing     mL/min


 


Estimated GFR (MDRD)     (>60)  mL/min


 


BUN/Creatinine Ratio     (14-18)  


 


Glucose     ()  mg/dL


 


POC Glucose  89  99   ()  mg/dL


 


Calcium     (8.5-10.1)  mg/dL


 


Magnesium     (1.8-2.4)  mg/dl














  03/31/17 03/31/17 03/31/17 Range/Units





  06:06 06:09 11:28 


 


WBC     (3.98-10.04)  K/mm3


 


RBC     (3.98-5.22)  M/mm3


 


Hgb     (11.2-15.7)  gm/L


 


Hct     (34.1-44.9)  %


 


MCV     (79.4-94.8)  fl


 


MCH     (25.6-32.2)  pg


 


MCHC     (32.2-35.5)  g/dl


 


RDW Std Deviation     (36.4-46.3)  fL


 


Plt Count     (182-369)  K/mm3


 


MPV     (9.4-12.3)  fl


 


Neut % (Auto)     (34.0-71.1)  %


 


Lymph % (Auto)     (19.3-51.7)  %


 


Mono % (Auto)     (4.7-12.5)  %


 


Eos % (Auto)     (0.7-5.8)  


 


Baso % (Auto)     (0.1-1.2)  %


 


Neut # (Auto)     (1.56-6.13)  K/mm3


 


Lymph # (Auto)     (1.18-3.74)  K/mm3


 


Mono # (Auto)     (0.24-0.36)  K/mm3


 


Eos # (Auto)     (0.04-0.36)  K/mm3


 


Baso # (Auto)     (0.01-0.08)  K/mm3


 


Manual Slide Review     


 


Sodium  138    (136-145)  mEq/L


 


Potassium  3.8    (3.5-5.1)  mEq/L


 


Chloride  102    ()  mEq/L


 


Carbon Dioxide  28    (21-32)  mEq/L


 


Anion Gap  11.8    (5-15)  


 


BUN  3 L    (7-18)  mg/dL


 


Creatinine  0.9    (0.55-1.02)  mg/dL


 


Est Cr Clr Drug Dosing  68.04    mL/min


 


Estimated GFR (MDRD)  > 60    (>60)  mL/min


 


BUN/Creatinine Ratio  3.3 L    (14-18)  


 


Glucose  105    ()  mg/dL


 


POC Glucose   93  104  ()  mg/dL


 


Calcium  9.7    (8.5-10.1)  mg/dL


 


Magnesium  1.8    (1.8-2.4)  mg/dl











Abran Results last 24 hrs: 


 Microbiology











 03/27/17 15:34 Aerobic Blood Culture - Preliminary





 Blood - Venous    NO GROWTH AFTER 3 DAYS





 Anaerobic Blood Culture - Preliminary





    NO GROWTH AFTER 3 DAYS











Med Orders - Current: 


 Current Medications





Acetaminophen (Tylenol)  650 mg RECTAL Q6H PRN


   PRN Reason: Fever


   Last Admin: 03/27/17 15:07 Dose:  650 mg


Baclofen (Lioresal)  15 mg PO TID [facility]


   Last Admin: 03/31/17 09:16 Dose:  15 mg


Dextrose/Water (Dextrose 50% In Water)  50 ml IVPUSH ASDIRECTED PRN


   PRN Reason: Hypoglycemia


Enoxaparin Sodium (Lovenox)  40 mg SUBCUT DAILY [facility]


   Last Admin: 03/31/17 09:21 Dose:  40 mg


Fluoxetine HCl (Prozac)  10 mg PO DAILY [facility]


   Last Admin: 03/31/17 09:17 Dose:  10 mg


Hydralazine HCl (Apresoline)  20 mg IVPUSH Q8H PRN


   PRN Reason: Hypertension


Vancomycin HCl 1 gm/ Sodium (Chloride)  250 mls @ 167 mls/hr IV Q12H [facility]


   Last Admin: 03/31/17 11:47 Dose:  167 mls/hr


Sodium Chloride (Normal Saline)  500 mls @ 999 mls/min IV .BOLUS [facility]


   Last Admin: 03/26/17 16:45 Dose:  999 mls/min


Piperacillin Sod/Tazobactam (Sod 4.5 gm/ Sodium Chloride)  100 mls @ 25 mls/hr 

IV Q8H [facility]


   Last Admin: 03/31/17 04:11 Dose:  25 mls/hr


Potassium Chloride/Dextrose/Sod Cl (D5 1/2 Ns W/ 40 Meq/L Kcl)  1,000 mls @ 70 

mls/hr IV ASDIRECTED [facility]


   Last Admin: 03/31/17 04:10 Dose:  70 mls/hr


Insulin Aspart (Novolog)  0 unit SUBCUT QIDACANDBED [facility]


   PRN Reason: Protocol


   Last Admin: 03/31/17 11:48 Dose:  Not Given


Levetiracetam (Keppra)  1,000 mg PO BID [facility]


   Last Admin: 03/31/17 09:16 Dose:  1,000 mg


Lorazepam (Ativan)  1 mg IVPUSH Q4H PRN


   PRN Reason: Seizures


   Last Admin: 03/30/17 03:34 Dose:  1 mg


Magnesium Oxide (Magnesium Oxide)  400 mg PO QPM [facility]


   Last Admin: 03/30/17 18:03 Dose:  400 mg


Metoprolol Tartrate (Lopressor)  2.5 mg IVPUSH Q4H PRN


   PRN Reason: heart rate


   Last Admin: 03/26/17 01:39 Dose:  2.5 mg


Senna/Docusate Sodium (Senna Plus)  2 tab PO BID [facility]


   Last Admin: 03/31/17 09:16 Dose:  2 tab


Sodium Chloride (Saline Flush)  10 ml FLUSH ASDIRECTED PRN


   PRN Reason: Keep Vein Open


   Last Admin: 03/24/17 20:18 Dose:  10 ml


Tizanidine HCl (Zanaflex)  1 mg PO TID [facility]


   Last Admin: 03/31/17 09:17 Dose:  1 mg


Vancomycin HCl (Pharmacy To Dose - Vancomycin)  0 dose .XX ASDIRECTED PRN


   PRN Reason: RX DOSE





Discontinued Medications





Albuterol (Proventil Neb Soln)  0.63 mg NEB ONETIME ONE


   Stop: 03/27/17 17:53


   Last Admin: 03/27/17 18:02 Dose:  Not Given


Albuterol (Proventil Neb Soln)  2.5 mg NEB ONETIME ONE


   Stop: 03/27/17 17:53


   Last Admin: 03/27/17 18:01 Dose:  Not Given


Albuterol (Proventil Neb Soln) Confirm Administered Dose 2.5 mg .ROUTE .STK-MED 

ONE


   Stop: 03/27/17 17:54


   Last Admin: 03/27/17 17:58 Dose:  2.5 mg


Albuterol (Proventil Neb Soln)  0.63 mg NEB QIDRT [facility]


Albuterol (Proventil Neb Soln)  2.5 mg NEB QIDRT [facility]


   Last Admin: 03/29/17 06:15 Dose:  Not Given


Baclofen (Lioresal)  30 mg PO TID [facility]


   Last Admin: 03/26/17 15:29 Dose:  30 mg


Baclofen (Lioresal)  5 mg PO TID [facility]


   Last Admin: 03/27/17 07:59 Dose:  5 mg


Baclofen (Lioresal)  10 mg PO TID [facility]


   Last Admin: 03/27/17 19:13 Dose:  Not Given


Baclofen (Lioresal)  5 mg PO ONETIME ONE


   Stop: 03/27/17 09:38


   Last Admin: 03/27/17 10:20 Dose:  5 mg


Baclofen (Lioresal)  5 mg PO TID [facility]


Baclofen (Lioresal)  5 mg PO TID [facility]


   Last Admin: 03/28/17 08:08 Dose:  5 mg


Baclofen (Lioresal)  5 mg PO BID@1700,2300 [facility]


   Stop: 03/27/17 23:01


   Last Admin: 03/27/17 19:13 Dose:  Not Given


Baclofen (Lioresal)  5 mg PO BID@1700,2300 [facility]


   Stop: 03/27/17 23:01


   Last Admin: 03/27/17 23:25 Dose:  5 mg


Baclofen (Lioresal)  5 mg PO ONETIME ONE


   Stop: 03/27/17 17:17


   Last Admin: 03/27/17 17:26 Dose:  5 mg


Baclofen (Lioresal)  10 mg PO TID [facility]


   Last Admin: 03/28/17 14:32 Dose:  10 mg


Bisacodyl (Dulcolax)  10 mg RECTAL ONETIME ONE


   Stop: 03/27/17 08:43


   Last Admin: 03/27/17 09:03 Dose:  10 mg


Diphtheria/Tetanus/Acell Pertussis (Boostrix)  0.5 ml IM .ONCE ONE


   Stop: 03/26/17 11:52


   Last Admin: 03/26/17 12:24 Dose:  Not Given


Enoxaparin Sodium (Lovenox)  40 mg SUBCUT DAILY [facility]


   Last Admin: 03/29/17 08:09 Dose:  40 mg


Fentanyl (Sublimaze) Confirm Administered Dose 100 mcg .ROUTE .STK-MED ONE


   Stop: 03/30/17 07:14


Sodium Chloride (Normal Saline)  1,000 mls @ 999 mls/hr IV ONETIME ONE


   Stop: 03/24/17 20:50


   Last Infusion: 03/24/17 22:13 Dose:  Infused


Ceftriaxone Sodium 1 gm/ (Sodium Chloride)  100 mls @ 200 mls/hr IV ONETIME ONE


   Stop: 03/24/17 22:03


   Last Admin: 03/24/17 22:13 Dose:  200 mls/hr


Sodium Chloride (Normal Saline)  1,000 mls @ 125 mls/hr IV ONETIME ONE


   Stop: 03/25/17 05:56


   Last Admin: 03/24/17 22:13 Dose:  125 mls/hr


Levofloxacin/Dextrose 750 mg/ (Premix)  150 mls @ 100 mls/hr IV Q24H [facility]


   Last Admin: 03/26/17 18:47 Dose:  Not Given


Potassium Chloride/Dextrose/Sod Cl (D5 1/2 Ns W/ 20 Meq/L Kcl)  1,000 mls @ 70 

mls/hr IV ASDIRECTED [facility]


   Last Admin: 03/28/17 07:10 Dose:  70 mls/hr


Levetiracetam 1,000 mg/ Sodium (Chloride)  110 mls @ 400 mls/hr IV ONETIME ONE


   Stop: 03/25/17 15:38


   Last Admin: 03/25/17 16:07 Dose:  400 mls/hr


Levetiracetam 1,000 mg/ Sodium (Chloride)  110 mls @ 400 mls/hr IV Q12H [facility]


   Last Admin: 03/26/17 12:26 Dose:  Not Given


Vancomycin HCl 1 gm/ Sodium (Chloride)  250 mls @ 250 mls/hr IV ONETIME ONE


   Stop: 03/26/17 00:03


   Last Admin: 03/25/17 23:18 Dose:  250 mls/hr


Magnesium Sulfate 2 gm/ Premix  50 mls @ 25 mls/hr IV ONETIME ONE


   Stop: 03/26/17 13:06


   Last Admin: 03/26/17 12:30 Dose:  25 mls/hr


Piperacillin Sod/Tazobactam (Sod 4.5 gm/ Sodium Chloride)  100 mls @ 200 mls/hr 

IV ONETIME ONE


   Stop: 03/27/17 13:29


   Last Admin: 03/27/17 13:47 Dose:  200 mls/hr


Magnesium Sulfate 2 gm/ Premix  50 mls @ 25 mls/hr IV ONETIME ONE


   Stop: 03/27/17 19:32


   Last Admin: 03/27/17 18:08 Dose:  25 mls/hr


Potassium Chloride 20 meq/Potassium Chloride/Dextrose/Sod Cl  1,010 mls @ 70 mls

/hr IV ASDIRECTED [facility]


Potassium Chloride/Dextrose/Sod Cl (D5 1/2 Ns W/ 40 Meq/L Kcl)  1,000 mls @ 

69.307 mls/hr IV ASDIRECTED [facility]


   Last Admin: 03/28/17 11:06 Dose:  69.307 mls/hr


Magnesium Sulfate 2 gm/ Premix  50 mls @ 25 mls/hr IV ONETIME ONE


   Stop: 03/29/17 11:33


   Last Admin: 03/29/17 10:02 Dose:  25 mls/hr


Lidocaine HCl (Xylocaine-Mpf 1%) Confirm Administered Dose 2 mls @ as directed 

.ROUTE .STK-MED ONE


   Stop: 03/30/17 07:19


Magnesium Sulfate 2 gm/ Premix  50 mls @ 25 mls/hr IV ONETIME ONE


   Stop: 03/30/17 12:42


   Last Admin: 03/30/17 11:16 Dose:  25 mls/hr


Lactated Ringer's (Ringers, Lactated) Confirm Administered Dose 1,000 mls @ as 

directed .ROUTE .STK-MED ONE


   Stop: 03/30/17 10:53


Magnesium Sulfate 2 gm/ Premix  50 mls @ 25 mls/hr IV ONETIME ONE


   Stop: 03/31/17 12:16


   Last Admin: 03/31/17 11:47 Dose:  25 mls/hr


Levetiracetam (Keppra)  1,000 mg PO BID [facility]


   Last Admin: 03/25/17 20:27 Dose:  Not Given


Lorazepam (Ativan)  1 mg IVPUSH ONETIME ONE


   Stop: 03/25/17 15:31


   Last Admin: 03/25/17 16:03 Dose:  1 mg


Metoprolol Tartrate (Lopressor)  2.5 mg IVPUSH Q6H PRN


   PRN Reason: heart rate


   Last Admin: 03/25/17 21:28 Dose:  2.5 mg


Midazolam HCl (Versed 1 Mg/Ml) Confirm Administered Dose 2 mg .ROUTE .STK-MED 

ONE


   Stop: 03/30/17 07:14


Potassium Chloride (Potassium Chloride)  40 meq PO ONETIME ONE


   Stop: 03/26/17 11:08


   Last Admin: 03/26/17 12:33 Dose:  40 meq


Propofol (Diprivan  20 Ml) Confirm Administered Dose 200 mg .ROUTE .STK-MED ONE


   Stop: 03/30/17 07:14


Tizanidine HCl (Zanaflex)  2 mg PO TID [facility]


   Last Admin: 03/27/17 19:13 Dose:  Not Given


Tizanidine HCl (Zanaflex)  2 mg PO ONETIME STA


   Stop: 03/27/17 09:38


   Last Admin: 03/27/17 10:20 Dose:  2 mg


Tizanidine HCl (Zanaflex)  1 mg PO BID@1700,2300 [facility]


   Stop: 03/27/17 23:01


   Last Admin: 03/27/17 23:24 Dose:  1 mg











- Exam


General: alert, cooperative, no acute distress


HEENT: Scleral icterus


Neck: supple, trachea midline


Lungs: Clear to auscultation, Normal respiratory effort


Cardiovascular: Regular Rate, Regular Rhythm


Abdomen: soft, no tenderness, no distension, other (PEG tube in position at 5.5 

cm, no bleeding )


Extremities: no cyanosis


Skin: warm, dry, intact


Wound/Incisions: healing well, dressing dry and intact


Neurological: no new focal deficit


Psy/Mental Status: alert, normal affect, normal mood





Consult PN Assessment/Plan


Procedures: 


Procedures





ASSAY OF BLOOD OSMOLALITY (05/13/15)


ASSAY OF CK (CPK) (01/12/15)


ASSAY OF LACTIC ACID (04/27/16)


ASSAY OF MAGNESIUM (05/13/15)


ASSAY OF NATRIURETIC PEPTIDE (12/18/14)


ASSAY OF PHOSPHORUS (05/13/15)


ASSAY OF TROPONIN QUANT (05/13/15)


BLOOD CULTURE FOR BACTERIA (04/27/16)


BLOOD GASES ANY COMBINATION (01/12/15)


C-REACTIVE PROTEIN (04/27/16)


CHEST X-RAY 1 VIEW FRONTAL (04/27/16)


COMPLETE CBC W/AUTO DIFF WBC (04/27/16)


COMPREHEN METABOLIC PANEL (04/27/16)


CREATINE MB FRACTION (05/13/15)


CT ANGIOGRAPHY CHEST (12/18/14)


CT HEAD/BRAIN W/O DYE (01/12/15)


CULTURE AEROBIC IDENTIFY (04/07/15)


DANIEL SUBQ TISSUE 20 SQ CM/< (12/12/14)


ELECTROCARDIOGRAM TRACING (01/12/15)


EMERGENCY DEPT VISIT (04/27/16)


EMERGENCY DEPT VISIT (05/13/15)


EMERGENCY DEPT VISIT (04/07/15)


EMERGENCY DEPT VISIT (12/18/14)


FIBRIN DEGRADATION QUANT (12/18/14)


HYDRATE IV INFUSION ADD-ON (04/27/16)


HYDRATION IV INFUSION INIT (12/18/14)


INFLUENZA A/B AG IA (12/18/14)


INFLUENZA ASSAY W/OPTIC (04/07/15)


INSERT BLADDER CATHETER (12/18/14)


INSERT TEMP BLADDER CATH (04/07/15)


METABOLIC PANEL TOTAL CA (04/07/15)


MICROBE SUSCEPTIBLE DIFFUSE (04/27/16)


MICROBE SUSCEPTIBLE DISK (04/27/16)


MICROBE SUSCEPTIBLE ABRAN (04/27/16)


MR-STAPH DNA AMP PROBE (12/12/14)


OT EVALUATION (04/27/16)


PROTHROMBIN TIME (12/18/14)


PT EVALUATION (04/27/16)


RBC SED RATE AUTOMATED (05/13/15)


ROUTINE VENIPUNCTURE (04/27/16)


THER/DIAG CONCURRENT INF (01/12/15)


THER/PROPH/DIAG IV INF ADDON (04/27/16)


THER/PROPH/DIAG IV INF INIT (04/27/16)


TX/PRO/DX INJ NEW DRUG ADDON (04/07/15)


URINALYSIS AUTO W/SCOPE (04/27/16)


URINE BACTERIA CULTURE (04/27/16)


URINE CULTURE/COLONY COUNT (06/15/16)


WITHDRAWAL OF ARTERIAL BLOOD (01/12/15)


X-RAY EXAM OF ABDOMEN (04/27/16)








(1) Dysphagia


SNOMED Code(s): 06154338, 816358646


   Code(s): R13.10 - DYSPHAGIA, UNSPECIFIED   Current Visit: Yes   





(2) Multiple sclerosis


SNOMED Code(s): 73030080


   Code(s): G35 - MULTIPLE SCLEROSIS   Current Visit: Yes   


Problem List Initiated/Reviewed/Updated: Yes


My Orders last 24 hours: 


My Active Orders





03/30/17 16:04


Communication Order [RC] ASDIRECTED 











Plan: 





50 yo POD #1 s/p PEG tube placement 





No complications


May use PEG for meds/ feeds 


Flush PEG q12HR with 30 mL water 


Remove sutures (can be done at nursing home in 2 weeks) 


Return to my clinic in 6 weeks for ABRAN-KEY sizing and G-tube exchange

## 2017-03-31 NOTE — PCM.PN
- General Info


Date of Service: 03/31/17





- Patient Data


Vitals - most recent: 


 Last Vital Signs











Temp  37.0 C   03/31/17 03:05


 


Pulse  93   03/31/17 03:05


 


Resp  14   03/31/17 03:05


 


BP  107/96 H  03/31/17 03:05


 


Pulse Ox  96   03/31/17 03:05











Weight - most recent: 73.074 kg


I&O - last 24 hours: 


 Intake & Output











 03/30/17 03/31/17 03/31/17





 22:59 06:59 14:59


 


Intake Total 1494 550 0


 


Output Total 300  


 


Balance 1194 550 0











Lab Results last 24 hrs: 


 Laboratory Results - last 24 hr











  03/30/17 03/30/17 03/31/17 Range/Units





  16:30 21:25 06:06 


 


WBC    8.65  (3.98-10.04)  K/mm3


 


RBC    4.08  (3.98-5.22)  M/mm3


 


Hgb    11.4  (11.2-15.7)  gm/L


 


Hct    35.4  (34.1-44.9)  %


 


MCV    86.8  (79.4-94.8)  fl


 


MCH    27.9  (25.6-32.2)  pg


 


MCHC    32.2  (32.2-35.5)  g/dl


 


RDW Std Deviation    52.6 H  (36.4-46.3)  fL


 


Plt Count    431 H  (182-369)  K/mm3


 


MPV    8.9 L  (9.4-12.3)  fl


 


Neut % (Auto)    61.2  (34.0-71.1)  %


 


Lymph % (Auto)    25.9  (19.3-51.7)  %


 


Mono % (Auto)    10.2  (4.7-12.5)  %


 


Eos % (Auto)    2.0  (0.7-5.8)  


 


Baso % (Auto)    0.2  (0.1-1.2)  %


 


Neut # (Auto)    5.30  (1.56-6.13)  K/mm3


 


Lymph # (Auto)    2.24  (1.18-3.74)  K/mm3


 


Mono # (Auto)    0.88 H  (0.24-0.36)  K/mm3


 


Eos # (Auto)    0.17  (0.04-0.36)  K/mm3


 


Baso # (Auto)    0.02  (0.01-0.08)  K/mm3


 


Manual Slide Review    Normal smear  


 


Sodium     (136-145)  mEq/L


 


Potassium     (3.5-5.1)  mEq/L


 


Chloride     ()  mEq/L


 


Carbon Dioxide     (21-32)  mEq/L


 


Anion Gap     (5-15)  


 


BUN     (7-18)  mg/dL


 


Creatinine     (0.55-1.02)  mg/dL


 


Est Cr Clr Drug Dosing     mL/min


 


Estimated GFR (MDRD)     (>60)  mL/min


 


BUN/Creatinine Ratio     (14-18)  


 


Glucose     ()  mg/dL


 


POC Glucose  89  99   ()  mg/dL


 


Calcium     (8.5-10.1)  mg/dL


 


Magnesium     (1.8-2.4)  mg/dl














  03/31/17 03/31/17 03/31/17 Range/Units





  06:06 06:09 11:28 


 


WBC     (3.98-10.04)  K/mm3


 


RBC     (3.98-5.22)  M/mm3


 


Hgb     (11.2-15.7)  gm/L


 


Hct     (34.1-44.9)  %


 


MCV     (79.4-94.8)  fl


 


MCH     (25.6-32.2)  pg


 


MCHC     (32.2-35.5)  g/dl


 


RDW Std Deviation     (36.4-46.3)  fL


 


Plt Count     (182-369)  K/mm3


 


MPV     (9.4-12.3)  fl


 


Neut % (Auto)     (34.0-71.1)  %


 


Lymph % (Auto)     (19.3-51.7)  %


 


Mono % (Auto)     (4.7-12.5)  %


 


Eos % (Auto)     (0.7-5.8)  


 


Baso % (Auto)     (0.1-1.2)  %


 


Neut # (Auto)     (1.56-6.13)  K/mm3


 


Lymph # (Auto)     (1.18-3.74)  K/mm3


 


Mono # (Auto)     (0.24-0.36)  K/mm3


 


Eos # (Auto)     (0.04-0.36)  K/mm3


 


Baso # (Auto)     (0.01-0.08)  K/mm3


 


Manual Slide Review     


 


Sodium  138    (136-145)  mEq/L


 


Potassium  3.8    (3.5-5.1)  mEq/L


 


Chloride  102    ()  mEq/L


 


Carbon Dioxide  28    (21-32)  mEq/L


 


Anion Gap  11.8    (5-15)  


 


BUN  3 L    (7-18)  mg/dL


 


Creatinine  0.9    (0.55-1.02)  mg/dL


 


Est Cr Clr Drug Dosing  68.04    mL/min


 


Estimated GFR (MDRD)  > 60    (>60)  mL/min


 


BUN/Creatinine Ratio  3.3 L    (14-18)  


 


Glucose  105    ()  mg/dL


 


POC Glucose   93  104  ()  mg/dL


 


Calcium  9.7    (8.5-10.1)  mg/dL


 


Magnesium  1.8    (1.8-2.4)  mg/dl











[facility]bran Results last 24 hrs: 


 Microbiology











 03/27/17 15:34 Aerobic Blood Culture - Preliminary





 Blood - Venous    NO GROWTH AFTER 3 DAYS





 Anaerobic Blood Culture - Preliminary





    NO GROWTH AFTER 3 DAYS











Med Orders - Current: 


 Current Medications





Acetaminophen (Tylenol)  650 mg RECTAL Q6H PRN


   PRN Reason: Fever


   Last Admin: 03/27/17 15:07 Dose:  650 mg


Baclofen (Lioresal)  15 mg PO TID [facility]


   Last Admin: 03/31/17 09:16 Dose:  15 mg


Dextrose/Water (Dextrose 50% In Water)  50 ml IVPUSH ASDIRECTED PRN


   PRN Reason: Hypoglycemia


Enoxaparin Sodium (Lovenox)  40 mg SUBCUT DAILY [facility]


   Last Admin: 03/31/17 09:21 Dose:  40 mg


Fluoxetine HCl (Prozac)  10 mg PO DAILY [facility]


   Last Admin: 03/31/17 09:17 Dose:  10 mg


Hydralazine HCl (Apresoline)  20 mg IVPUSH Q8H PRN


   PRN Reason: Hypertension


Vancomycin HCl 1 gm/ Sodium (Chloride)  250 mls @ 167 mls/hr IV Q12H [facility]


   Last Admin: 03/31/17 11:47 Dose:  167 mls/hr


Sodium Chloride (Normal Saline)  500 mls @ 999 mls/min IV .BOLUS [facility]


   Last Admin: 03/26/17 16:45 Dose:  999 mls/min


Piperacillin Sod/Tazobactam (Sod 4.5 gm/ Sodium Chloride)  100 mls @ 25 mls/hr 

IV Q8H [facility]


   Last Admin: 03/31/17 04:11 Dose:  25 mls/hr


Potassium Chloride/Dextrose/Sod Cl (D5 1/2 Ns W/ 40 Meq/L Kcl)  1,000 mls @ 70 

mls/hr IV ASDIRECTED [facility]


   Last Admin: 03/31/17 04:10 Dose:  70 mls/hr


Magnesium Sulfate 2 gm/ Premix  50 mls @ 25 mls/hr IV ONETIME ONE


   Stop: 03/31/17 12:16


   Last Admin: 03/31/17 11:47 Dose:  25 mls/hr


Insulin Aspart (Novolog)  0 unit SUBCUT QIDACANDBED [facility]


   PRN Reason: Protocol


   Last Admin: 03/31/17 11:48 Dose:  Not Given


Levetiracetam (Keppra)  1,000 mg PO BID [facility]


   Last Admin: 03/31/17 09:16 Dose:  1,000 mg


Lorazepam (Ativan)  1 mg IVPUSH Q4H PRN


   PRN Reason: Seizures


   Last Admin: 03/30/17 03:34 Dose:  1 mg


Magnesium Oxide (Magnesium Oxide)  400 mg PO QPM [facility]


   Last Admin: 03/30/17 18:03 Dose:  400 mg


Metoprolol Tartrate (Lopressor)  2.5 mg IVPUSH Q4H PRN


   PRN Reason: heart rate


   Last Admin: 03/26/17 01:39 Dose:  2.5 mg


Senna/Docusate Sodium (Senna Plus)  2 tab PO BID [facility]


   Last Admin: 03/31/17 09:16 Dose:  2 tab


Sodium Chloride (Saline Flush)  10 ml FLUSH ASDIRECTED PRN


   PRN Reason: Keep Vein Open


   Last Admin: 03/24/17 20:18 Dose:  10 ml


Tizanidine HCl (Zanaflex)  1 mg PO TID [facility]


   Last Admin: 03/31/17 09:17 Dose:  1 mg


Vancomycin HCl (Pharmacy To Dose - Vancomycin)  0 dose .XX ASDIRECTED PRN


   PRN Reason: RX DOSE





Discontinued Medications





Albuterol (Proventil Neb Soln)  0.63 mg NEB ONETIME ONE


   Stop: 03/27/17 17:53


   Last Admin: 03/27/17 18:02 Dose:  Not Given


Albuterol (Proventil Neb Soln)  2.5 mg NEB ONETIME ONE


   Stop: 03/27/17 17:53


   Last Admin: 03/27/17 18:01 Dose:  Not Given


Albuterol (Proventil Neb Soln) Confirm Administered Dose 2.5 mg .ROUTE .STK-MED 

ONE


   Stop: 03/27/17 17:54


   Last Admin: 03/27/17 17:58 Dose:  2.5 mg


Albuterol (Proventil Neb Soln)  0.63 mg NEB QIDRT [facility]


Albuterol (Proventil Neb Soln)  2.5 mg NEB QIDRT [facility]


   Last Admin: 03/29/17 06:15 Dose:  Not Given


Baclofen (Lioresal)  30 mg PO TID [facility]


   Last Admin: 03/26/17 15:29 Dose:  30 mg


Baclofen (Lioresal)  5 mg PO TID [facility]


   Last Admin: 03/27/17 07:59 Dose:  5 mg


Baclofen (Lioresal)  10 mg PO TID [facility]


   Last Admin: 03/27/17 19:13 Dose:  Not Given


Baclofen (Lioresal)  5 mg PO ONETIME ONE


   Stop: 03/27/17 09:38


   Last Admin: 03/27/17 10:20 Dose:  5 mg


Baclofen (Lioresal)  5 mg PO TID [facility]


Baclofen (Lioresal)  5 mg PO TID [facility]


   Last Admin: 03/28/17 08:08 Dose:  5 mg


Baclofen (Lioresal)  5 mg PO BID@1700,2300 [facility]


   Stop: 03/27/17 23:01


   Last Admin: 03/27/17 19:13 Dose:  Not Given


Baclofen (Lioresal)  5 mg PO BID@1700,2300 [facility]


   Stop: 03/27/17 23:01


   Last Admin: 03/27/17 23:25 Dose:  5 mg


Baclofen (Lioresal)  5 mg PO ONETIME ONE


   Stop: 03/27/17 17:17


   Last Admin: 03/27/17 17:26 Dose:  5 mg


Baclofen (Lioresal)  10 mg PO TID [facility]


   Last Admin: 03/28/17 14:32 Dose:  10 mg


Bisacodyl (Dulcolax)  10 mg RECTAL ONETIME ONE


   Stop: 03/27/17 08:43


   Last Admin: 03/27/17 09:03 Dose:  10 mg


Diphtheria/Tetanus/Acell Pertussis (Boostrix)  0.5 ml IM .ONCE ONE


   Stop: 03/26/17 11:52


   Last Admin: 03/26/17 12:24 Dose:  Not Given


Enoxaparin Sodium (Lovenox)  40 mg SUBCUT DAILY [facility]


   Last Admin: 03/29/17 08:09 Dose:  40 mg


Fentanyl (Sublimaze) Confirm Administered Dose 100 mcg .ROUTE .STK-MED ONE


   Stop: 03/30/17 07:14


Sodium Chloride (Normal Saline)  1,000 mls @ 999 mls/hr IV ONETIME ONE


   Stop: 03/24/17 20:50


   Last Infusion: 03/24/17 22:13 Dose:  Infused


Ceftriaxone Sodium 1 gm/ (Sodium Chloride)  100 mls @ 200 mls/hr IV ONETIME ONE


   Stop: 03/24/17 22:03


   Last Admin: 03/24/17 22:13 Dose:  200 mls/hr


Sodium Chloride (Normal Saline)  1,000 mls @ 125 mls/hr IV ONETIME ONE


   Stop: 03/25/17 05:56


   Last Admin: 03/24/17 22:13 Dose:  125 mls/hr


Levofloxacin/Dextrose 750 mg/ (Premix)  150 mls @ 100 mls/hr IV Q24H [facility]


   Last Admin: 03/26/17 18:47 Dose:  Not Given


Potassium Chloride/Dextrose/Sod Cl (D5 1/2 Ns W/ 20 Meq/L Kcl)  1,000 mls @ 70 

mls/hr IV ASDIRECTED [facility]


   Last Admin: 03/28/17 07:10 Dose:  70 mls/hr


Levetiracetam 1,000 mg/ Sodium (Chloride)  110 mls @ 400 mls/hr IV ONETIME ONE


   Stop: 03/25/17 15:38


   Last Admin: 03/25/17 16:07 Dose:  400 mls/hr


Levetiracetam 1,000 mg/ Sodium (Chloride)  110 mls @ 400 mls/hr IV Q12H [facility]


   Last Admin: 03/26/17 12:26 Dose:  Not Given


Vancomycin HCl 1 gm/ Sodium (Chloride)  250 mls @ 250 mls/hr IV ONETIME ONE


   Stop: 03/26/17 00:03


   Last Admin: 03/25/17 23:18 Dose:  250 mls/hr


Magnesium Sulfate 2 gm/ Premix  50 mls @ 25 mls/hr IV ONETIME ONE


   Stop: 03/26/17 13:06


   Last Admin: 03/26/17 12:30 Dose:  25 mls/hr


Piperacillin Sod/Tazobactam (Sod 4.5 gm/ Sodium Chloride)  100 mls @ 200 mls/hr 

IV ONETIME ONE


   Stop: 03/27/17 13:29


   Last Admin: 03/27/17 13:47 Dose:  200 mls/hr


Magnesium Sulfate 2 gm/ Premix  50 mls @ 25 mls/hr IV ONETIME ONE


   Stop: 03/27/17 19:32


   Last Admin: 03/27/17 18:08 Dose:  25 mls/hr


Potassium Chloride 20 meq/Potassium Chloride/Dextrose/Sod Cl  1,010 mls @ 70 mls

/hr IV ASDIRECTED [facility]


Potassium Chloride/Dextrose/Sod Cl (D5 1/2 Ns W/ 40 Meq/L Kcl)  1,000 mls @ 

69.307 mls/hr IV ASDIRECTED [facility]


   Last Admin: 03/28/17 11:06 Dose:  69.307 mls/hr


Magnesium Sulfate 2 gm/ Premix  50 mls @ 25 mls/hr IV ONETIME ONE


   Stop: 03/29/17 11:33


   Last Admin: 03/29/17 10:02 Dose:  25 mls/hr


Lidocaine HCl (Xylocaine-Mpf 1%) Confirm Administered Dose 2 mls @ as directed 

.ROUTE .STK-MED ONE


   Stop: 03/30/17 07:19


Magnesium Sulfate 2 gm/ Premix  50 mls @ 25 mls/hr IV ONETIME ONE


   Stop: 03/30/17 12:42


   Last Admin: 03/30/17 11:16 Dose:  25 mls/hr


Lactated Ringer's (Ringers, Lactated) Confirm Administered Dose 1,000 mls @ as 

directed .ROUTE .STK-MED ONE


   Stop: 03/30/17 10:53


Levetiracetam (Keppra)  1,000 mg PO BID [facility]


   Last Admin: 03/25/17 20:27 Dose:  Not Given


Lorazepam (Ativan)  1 mg IVPUSH ONETIME ONE


   Stop: 03/25/17 15:31


   Last Admin: 03/25/17 16:03 Dose:  1 mg


Metoprolol Tartrate (Lopressor)  2.5 mg IVPUSH Q6H PRN


   PRN Reason: heart rate


   Last Admin: 03/25/17 21:28 Dose:  2.5 mg


Midazolam HCl (Versed 1 Mg/Ml) Confirm Administered Dose 2 mg .ROUTE .STK-MED 

ONE


   Stop: 03/30/17 07:14


Potassium Chloride (Potassium Chloride)  40 meq PO ONETIME ONE


   Stop: 03/26/17 11:08


   Last Admin: 03/26/17 12:33 Dose:  40 meq


Propofol (Diprivan  20 Ml) Confirm Administered Dose 200 mg .ROUTE .STK-MED ONE


   Stop: 03/30/17 07:14


Tizanidine HCl (Zanaflex)  2 mg PO TID [facility]


   Last Admin: 03/27/17 19:13 Dose:  Not Given


Tizanidine HCl (Zanaflex)  2 mg PO ONETIME [facility]


   Stop: 03/27/17 09:38


   Last Admin: 03/27/17 10:20 Dose:  2 mg


Tizanidine HCl (Zanaflex)  1 mg PO BID@1700,2300 [facility]


   Stop: 03/27/17 23:01


   Last Admin: 03/27/17 23:24 Dose:  1 mg











- My Orders


Last 24 Hours: 


My Active Orders





03/31/17 09:00


Enoxaparin [Lovenox]   40 mg SUBCUT DAILY 





03/31/17 10:17


Magnesium Sulfate/Water [Magnesium Sulfate 2 GM in Water 50 ML] 2 gm   Premix 

Bag 1 bag IV ONETIME 














- Plan


Plan:: 





Impression:





AUTI, on 2 IV ATBs


AMS, resolved


Risk of aspiration; pulmonary toilet


Puree diet as ordered; patient is not drinking enough fluids;


Drowsiness after Zanaflex and Baclofen yesterday, with increase spasms off usual


regimen.


Family meeting, impromptu regarding PEG tube, patient is the decision maker.


Has declined today, but will reconsider the option.











Plan:





MS lloyd, changed yesterday.


IV ATBs, will stop 


PEG tube placed today, will start free water and determine protein needs when 

not taking enough


orally.


Adjust meds to avoid over sedation.


Seems to tolerate baclofen 15 mg TID, zanaflex 1 mg TID, will


follow; advance as tolerated.


Await Keppra level


Hydrate as needed, have reduced rate for now.


BP/HR control, address cause of elevation


DVT/GI prophylaxis


Consult PT/OT/SW





LOS>96 hours.

## 2017-03-31 NOTE — [id number]
<Lorin Hart M - Last Filed: 03/31/17 13:00>





**Discharge Summary





- Hospital Course


Free Text/Narrative:: 





51 year old female from a SNF, nonverbal with MS became febrile with at least a 

temp of 100.5 documented by nursing staff before ED assessment.  She is more 

lethargic, and has at minimal a UTI.  UC and BCs have been drawn;  a dose of 

rocephin was given in the ED.  A swallow eval has been ordered, currently D5 1/

2 NS with KCL is running.  A transfer from [facility] telemetry has been made when the 

patient appeared to have seizure like activity;  a keppra level was ordered 

last night in the ED.  However the level is a send out lab study.  In the 

meantime, the patient has received Ativan 1mg, followed by keppra 1000 mg IV.  

The SZ like activity has stopped, the patient is more comfortable.  A repeat 

CXR is pending, a CT of the head was unremarkable for acute changes.





Course of hospital stay: 


Patient was stabilized in ICU; no further seizure like activity was noted. She 

was transferred back to medical surgical floor. Maintained on keppra PO BID. UA/

UC did grow gemella species. BC also were + for staphylococcus epidermidis and 

aerococcus urinae. She was treated with IV antibiotics during her 7 day stay. 

She was really unable to keep up with her oral fluid needs. ST was consulted 

for swallow eval with MS history and dysphagia history. She had been on pudding 

consistency in the past and recommend continue. SW and Dr. Pickett talked to 

patient at length about PEG tube placement for increase in free water which 

could help her from getting recurrent UTI's and dehydration. She was in 

agreement to PEG tube placement. Dr. Alva was consulted and Peg tube was 

inserted without incident, tolerated well. Tube was flushing well with free 

water prior to discharge. Patient is tolerating usual diet post insertion.  

Repeat BC were negative. VSS, labs stable. She will be discharged back to SNF 

with PO levaquin x 5 more days with f/up with PCP, Dr. Ely in 5-7 days for 

recheck. 





- Discharge Data


Discharge Date: 03/31/17 (admit dat3 3/24/17)


Discharge Disposition: DC/Tfer to SNF 03


Condition: Good





- Patient Summary/Data


Operative Procedure(s) Performed: EGD with percutaneous endoscopic gastrostomy 

tube placement.


Complications: None


Consults: 


 Consultations





03/25/17 12:38


SLP Evaluation and Treatment [CONS] Routine 











Labs Pending at D/C: 





None


Recommended Follow-up Testing/Procedures: 





Follow up with Dr. Ely, PCP within 5-7 days of discharge. 


Planned Operative Procedure(s) after DC: None


Hospital Course: 





As above





- Patient Instructions


Diet: Pureed


Activity: As Tolerated (PT/OT/ST to continue at NH)


Showering/Bathing: May Shower


Notify Provider of: Fever, Increased Pain, Nausea and/or Vomiting





- Discharge Plan


Prescriptions/Med Rec: 


Levofloxacin [Levaquin] 500 mg PO Q24H #5 tablet


Home Medications: 


 Home Meds





Baclofen 30 mg PO TID 01/12/15 [History]


FLUoxetine [PROzac] 10 mg PO DAILY 01/12/15 [History]


LORazepam [Ativan] 0.25 mg PO BID 01/12/15 [History]


tiZANidine HCl [Zanaflex] 2 mg PO TID 01/12/15 [History]


Acetaminophen [Tylenol] 2 tab PO Q4HR PRN 04/07/15 [History]


Cholecalciferol (Vitamin D3) [Vitamin D3] 2,000 unit PO DAILY 04/07/15 [History]


Docusate Sodium/Sennosides [Senna Plus] 2 tab PO BID 04/07/15 [History]


Magnesium Oxide 400 mg PO QPM 04/07/15 [History]


Multivitamin with Minerals [Multiple Vitamin] 1 tab PO DAILY 04/07/15 [History]


levETIRAcetam [Keppra] 1,000 mg PO BID 04/07/15 [History]


Lactulose [Chronulac] 15 ml PO DAILY PRN 05/13/15 [History]


Bisacodyl [Dulcolax] 10 mg PO DAILY PRN 04/27/16 [History]


Bisacodyl [Dulcolax] 10 mg RC ASDIRECTED PRN 04/27/16 [History]


Levofloxacin [Levaquin] 500 mg PO Q24H #5 tablet 03/31/17 [Rx]








Patient Handouts:  PEG Tube Home Guide, Easy-to-Read, Urinary Tract Infection, 

Adult, Bacteremia


Forms:  ED Department Discharge


Referrals: 


Olaf Ely MD [Primary Care Provider] - 





- Discharge Summary/Plan Comment


DC Time >30 min.: Yes (40 min)





- General Info


Date of Service: 03/31/17


Admission Dx/Problem (Free Text: 


 Admission Diagnosis/Problem





Admission Diagnosis/Problem      Urinary tract infection








Functional Status: Reports: pain controlled, tolerating diet, urinating (

incontinent).  Denies: new symptoms





- Review of Systems


General: Reports: No Symptoms


HEENT: Reports: no symptoms


Pulmonary: Reports: no symptoms


Cardiovascular: Reports: No Symptoms


Gastrointestinal: Reports: No symptoms.  Denies: Abdominal pain, Diarrhea, 

Nausea, Vomiting


Genitourinary: Reports: no symptoms


Musculoskeletal: Reports: no symptoms


Neurological: Reports: Other (MS)


Psychiatric: Reports: anxiety





- Patient Data


Vitals - Most Recent: 


 Last Vital Signs











Temp  98.6 F   03/31/17 03:05


 


Pulse  93   03/31/17 03:05


 


Resp  14   03/31/17 03:05


 


BP  107/96 H  03/31/17 03:05


 


Pulse Ox  96   03/31/17 03:05











Weight - Most Recent: 73.074 kg


I&O - Last 24 hours: 


 Intake & Output











 03/30/17 03/31/17 03/31/17





 22:59 06:59 14:59


 


Intake Total 1494 550 0


 


Output Total 300  


 


Balance 1194 550 0











Lab Results - Last 24 hrs: 


 Laboratory Results - last 24 hr











  03/30/17 03/30/17 03/31/17 Range/Units





  16:30 21:25 06:06 


 


WBC    8.65  (3.98-10.04)  K/mm3


 


RBC    4.08  (3.98-5.22)  M/mm3


 


Hgb    11.4  (11.2-15.7)  gm/L


 


Hct    35.4  (34.1-44.9)  %


 


MCV    86.8  (79.4-94.8)  fl


 


MCH    27.9  (25.6-32.2)  pg


 


MCHC    32.2  (32.2-35.5)  g/dl


 


RDW Std Deviation    52.6 H  (36.4-46.3)  fL


 


Plt Count    431 H  (182-369)  K/mm3


 


MPV    8.9 L  (9.4-12.3)  fl


 


Neut % (Auto)    61.2  (34.0-71.1)  %


 


Lymph % (Auto)    25.9  (19.3-51.7)  %


 


Mono % (Auto)    10.2  (4.7-12.5)  %


 


Eos % (Auto)    2.0  (0.7-5.8)  


 


Baso % (Auto)    0.2  (0.1-1.2)  %


 


Neut # (Auto)    5.30  (1.56-6.13)  K/mm3


 


Lymph # (Auto)    2.24  (1.18-3.74)  K/mm3


 


Mono # (Auto)    0.88 H  (0.24-0.36)  K/mm3


 


Eos # (Auto)    0.17  (0.04-0.36)  K/mm3


 


Baso # (Auto)    0.02  (0.01-0.08)  K/mm3


 


Manual Slide Review    Normal smear  


 


Sodium     (136-145)  mEq/L


 


Potassium     (3.5-5.1)  mEq/L


 


Chloride     ()  mEq/L


 


Carbon Dioxide     (21-32)  mEq/L


 


Anion Gap     (5-15)  


 


BUN     (7-18)  mg/dL


 


Creatinine     (0.55-1.02)  mg/dL


 


Est Cr Clr Drug Dosing     mL/min


 


Estimated GFR (MDRD)     (>60)  mL/min


 


BUN/Creatinine Ratio     (14-18)  


 


Glucose     ()  mg/dL


 


POC Glucose  89  99   ()  mg/dL


 


Calcium     (8.5-10.1)  mg/dL


 


Magnesium     (1.8-2.4)  mg/dl














  03/31/17 03/31/17 03/31/17 Range/Units





  06:06 06:09 11:28 


 


WBC     (3.98-10.04)  K/mm3


 


RBC     (3.98-5.22)  M/mm3


 


Hgb     (11.2-15.7)  gm/L


 


Hct     (34.1-44.9)  %


 


MCV     (79.4-94.8)  fl


 


MCH     (25.6-32.2)  pg


 


MCHC     (32.2-35.5)  g/dl


 


RDW Std Deviation     (36.4-46.3)  fL


 


Plt Count     (182-369)  K/mm3


 


MPV     (9.4-12.3)  fl


 


Neut % (Auto)     (34.0-71.1)  %


 


Lymph % (Auto)     (19.3-51.7)  %


 


Mono % (Auto)     (4.7-12.5)  %


 


Eos % (Auto)     (0.7-5.8)  


 


Baso % (Auto)     (0.1-1.2)  %


 


Neut # (Auto)     (1.56-6.13)  K/mm3


 


Lymph # (Auto)     (1.18-3.74)  K/mm3


 


Mono # (Auto)     (0.24-0.36)  K/mm3


 


Eos # (Auto)     (0.04-0.36)  K/mm3


 


Baso # (Auto)     (0.01-0.08)  K/mm3


 


Manual Slide Review     


 


Sodium  138    (136-145)  mEq/L


 


Potassium  3.8    (3.5-5.1)  mEq/L


 


Chloride  102    ()  mEq/L


 


Carbon Dioxide  28    (21-32)  mEq/L


 


Anion Gap  11.8    (5-15)  


 


BUN  3 L    (7-18)  mg/dL


 


Creatinine  0.9    (0.55-1.02)  mg/dL


 


Est Cr Clr Drug Dosing  68.04    mL/min


 


Estimated GFR (MDRD)  > 60    (>60)  mL/min


 


BUN/Creatinine Ratio  3.3 L    (14-18)  


 


Glucose  105    ()  mg/dL


 


POC Glucose   93  104  ()  mg/dL


 


Calcium  9.7    (8.5-10.1)  mg/dL


 


Magnesium  1.8    (1.8-2.4)  mg/dl











SURAJ Results - Last 24 hrs: 


 Microbiology











 03/27/17 15:34 Aerobic Blood Culture - Preliminary





 Blood - Venous    NO GROWTH AFTER 3 DAYS





 Anaerobic Blood Culture - Preliminary





    NO GROWTH AFTER 3 DAYS











Med Orders - Current: 


 Current Medications





Acetaminophen (Tylenol)  650 mg RECTAL Q6H PRN


   PRN Reason: Fever


   Last Admin: 03/27/17 15:07 Dose:  650 mg


Baclofen (Lioresal)  15 mg PO TID [facility]


   Last Admin: 03/31/17 09:16 Dose:  15 mg


Dextrose/Water (Dextrose 50% In Water)  50 ml IVPUSH ASDIRECTED PRN


   PRN Reason: Hypoglycemia


Enoxaparin Sodium (Lovenox)  40 mg SUBCUT DAILY [facility]


   Last Admin: 03/31/17 09:21 Dose:  40 mg


Fluoxetine HCl (Prozac)  10 mg PO DAILY [facility]


   Last Admin: 03/31/17 09:17 Dose:  10 mg


Hydralazine HCl (Apresoline)  20 mg IVPUSH Q8H PRN


   PRN Reason: Hypertension


Vancomycin HCl 1 gm/ Sodium (Chloride)  250 mls @ 167 mls/hr IV Q12H [facility]


   Last Admin: 03/31/17 11:47 Dose:  167 mls/hr


Sodium Chloride (Normal Saline)  500 mls @ 999 mls/min IV .BOLUS [facility]


   Last Admin: 03/26/17 16:45 Dose:  999 mls/min


Piperacillin Sod/Tazobactam (Sod 4.5 gm/ Sodium Chloride)  100 mls @ 25 mls/hr 

IV Q8H [facility]


   Last Admin: 03/31/17 04:11 Dose:  25 mls/hr


Potassium Chloride/Dextrose/Sod Cl (D5 1/2 Ns W/ 40 Meq/L Kcl)  1,000 mls @ 70 

mls/hr IV ASDIRECTED [facility]


   Last Admin: 03/31/17 04:10 Dose:  70 mls/hr


Insulin Aspart (Novolog)  0 unit SUBCUT QIDACANDBED [facility]


   PRN Reason: Protocol


   Last Admin: 03/31/17 11:48 Dose:  Not Given


Levetiracetam (Keppra)  1,000 mg PO BID [facility]


   Last Admin: 03/31/17 09:16 Dose:  1,000 mg


Lorazepam (Ativan)  1 mg IVPUSH Q4H PRN


   PRN Reason: Seizures


   Last Admin: 03/30/17 03:34 Dose:  1 mg


Magnesium Oxide (Magnesium Oxide)  400 mg PO QPM [facility]


   Last Admin: 03/30/17 18:03 Dose:  400 mg


Metoprolol Tartrate (Lopressor)  2.5 mg IVPUSH Q4H PRN


   PRN Reason: heart rate


   Last Admin: 03/26/17 01:39 Dose:  2.5 mg


Senna/Docusate Sodium (Senna Plus)  2 tab PO BID [facility]


   Last Admin: 03/31/17 09:16 Dose:  2 tab


Sodium Chloride (Saline Flush)  10 ml FLUSH ASDIRECTED PRN


   PRN Reason: Keep Vein Open


   Last Admin: 03/24/17 20:18 Dose:  10 ml


Tizanidine HCl (Zanaflex)  1 mg PO TID [facility]


   Last Admin: 03/31/17 09:17 Dose:  1 mg


Vancomycin HCl (Pharmacy To Dose - Vancomycin)  0 dose .XX ASDIRECTED PRN


   PRN Reason: RX DOSE





Discontinued Medications





Albuterol (Proventil Neb Soln)  0.63 mg NEB ONETIME ONE


   Stop: 03/27/17 17:53


   Last Admin: 03/27/17 18:02 Dose:  Not Given


Albuterol (Proventil Neb Soln)  2.5 mg NEB ONETIME ONE


   Stop: 03/27/17 17:53


   Last Admin: 03/27/17 18:01 Dose:  Not Given


Albuterol (Proventil Neb Soln) Confirm Administered Dose 2.5 mg .ROUTE .STK-MED 

ONE


   Stop: 03/27/17 17:54


   Last Admin: 03/27/17 17:58 Dose:  2.5 mg


Albuterol (Proventil Neb Soln)  0.63 mg NEB QIDRT YOLIS


Albuterol (Proventil Neb Soln)  2.5 mg NEB QIDRT [facility]


   Last Admin: 03/29/17 06:15 Dose:  Not Given


Baclofen (Lioresal)  30 mg PO TID [facility]


   Last Admin: 03/26/17 15:29 Dose:  30 mg


Baclofen (Lioresal)  5 mg PO TID [facility]


   Last Admin: 03/27/17 07:59 Dose:  5 mg


Baclofen (Lioresal)  10 mg PO TID [facility]


   Last Admin: 03/27/17 19:13 Dose:  Not Given


Baclofen (Lioresal)  5 mg PO ONETIME ONE


   Stop: 03/27/17 09:38


   Last Admin: 03/27/17 10:20 Dose:  5 mg


Baclofen (Lioresal)  5 mg PO TID YOLIS


Baclofen (Lioresal)  5 mg PO TID [facility]


   Last Admin: 03/28/17 08:08 Dose:  5 mg


Baclofen (Lioresal)  5 mg PO BID@1700,2300 [facility]


   Stop: 03/27/17 23:01


   Last Admin: 03/27/17 19:13 Dose:  Not Given


Baclofen (Lioresal)  5 mg PO BID@1700,2300 [facility]


   Stop: 03/27/17 23:01


   Last Admin: 03/27/17 23:25 Dose:  5 mg


Baclofen (Lioresal)  5 mg PO ONETIME ONE


   Stop: 03/27/17 17:17


   Last Admin: 03/27/17 17:26 Dose:  5 mg


Baclofen (Lioresal)  10 mg PO TID [facility]


   Last Admin: 03/28/17 14:32 Dose:  10 mg


Bisacodyl (Dulcolax)  10 mg RECTAL ONETIME ONE


   Stop: 03/27/17 08:43


   Last Admin: 03/27/17 09:03 Dose:  10 mg


Diphtheria/Tetanus/Acell Pertussis (Boostrix)  0.5 ml IM .ONCE ONE


   Stop: 03/26/17 11:52


   Last Admin: 03/26/17 12:24 Dose:  Not Given


Enoxaparin Sodium (Lovenox)  40 mg SUBCUT DAILY [facility]


   Last Admin: 03/29/17 08:09 Dose:  40 mg


Fentanyl (Sublimaze) Confirm Administered Dose 100 mcg .ROUTE .STK-MED ONE


   Stop: 03/30/17 07:14


Sodium Chloride (Normal Saline)  1,000 mls @ 999 mls/hr IV ONETIME ONE


   Stop: 03/24/17 20:50


   Last Infusion: 03/24/17 22:13 Dose:  Infused


Ceftriaxone Sodium 1 gm/ (Sodium Chloride)  100 mls @ 200 mls/hr IV ONETIME ONE


   Stop: 03/24/17 22:03


   Last Admin: 03/24/17 22:13 Dose:  200 mls/hr


Sodium Chloride (Normal Saline)  1,000 mls @ 125 mls/hr IV ONETIME ONE


   Stop: 03/25/17 05:56


   Last Admin: 03/24/17 22:13 Dose:  125 mls/hr


Levofloxacin/Dextrose 750 mg/ (Premix)  150 mls @ 100 mls/hr IV Q24H [facility]


   Last Admin: 03/26/17 18:47 Dose:  Not Given


Potassium Chloride/Dextrose/Sod Cl (D5 1/2 Ns W/ 20 Meq/L Kcl)  1,000 mls @ 70 

mls/hr IV ASDIRECTED [facility]


   Last Admin: 03/28/17 07:10 Dose:  70 mls/hr


Levetiracetam 1,000 mg/ Sodium (Chloride)  110 mls @ 400 mls/hr IV ONETIME ONE


   Stop: 03/25/17 15:38


   Last Admin: 03/25/17 16:07 Dose:  400 mls/hr


Levetiracetam 1,000 mg/ Sodium (Chloride)  110 mls @ 400 mls/hr IV Q12H [facility]


   Last Admin: 03/26/17 12:26 Dose:  Not Given


Vancomycin HCl 1 gm/ Sodium (Chloride)  250 mls @ 250 mls/hr IV ONETIME ONE


   Stop: 03/26/17 00:03


   Last Admin: 03/25/17 23:18 Dose:  250 mls/hr


Magnesium Sulfate 2 gm/ Premix  50 mls @ 25 mls/hr IV ONETIME ONE


   Stop: 03/26/17 13:06


   Last Admin: 03/26/17 12:30 Dose:  25 mls/hr


Piperacillin Sod/Tazobactam (Sod 4.5 gm/ Sodium Chloride)  100 mls @ 200 mls/hr 

IV ONETIME ONE


   Stop: 03/27/17 13:29


   Last Admin: 03/27/17 13:47 Dose:  200 mls/hr


Magnesium Sulfate 2 gm/ Premix  50 mls @ 25 mls/hr IV ONETIME ONE


   Stop: 03/27/17 19:32


   Last Admin: 03/27/17 18:08 Dose:  25 mls/hr


Potassium Chloride 20 meq/Potassium Chloride/Dextrose/Sod Cl  1,010 mls @ 70 mls

/hr IV ASDIRECTED [facility]


Potassium Chloride/Dextrose/Sod Cl (D5 1/2 Ns W/ 40 Meq/L Kcl)  1,000 mls @ 

69.307 mls/hr IV ASDIRECTED [facility]


   Last Admin: 03/28/17 11:06 Dose:  69.307 mls/hr


Magnesium Sulfate 2 gm/ Premix  50 mls @ 25 mls/hr IV ONETIME ONE


   Stop: 03/29/17 11:33


   Last Admin: 03/29/17 10:02 Dose:  25 mls/hr


Lidocaine HCl (Xylocaine-Mpf 1%) Confirm Administered Dose 2 mls @ as directed 

.ROUTE .STK-MED ONE


   Stop: 03/30/17 07:19


Magnesium Sulfate 2 gm/ Premix  50 mls @ 25 mls/hr IV ONETIME ONE


   Stop: 03/30/17 12:42


   Last Admin: 03/30/17 11:16 Dose:  25 mls/hr


Lactated Ringer's (Ringers, Lactated) Confirm Administered Dose 1,000 mls @ as 

directed .ROUTE .STK-MED ONE


   Stop: 03/30/17 10:53


Magnesium Sulfate 2 gm/ Premix  50 mls @ 25 mls/hr IV ONETIME ONE


   Stop: 03/31/17 12:16


   Last Admin: 03/31/17 11:47 Dose:  25 mls/hr


Levetiracetam (Keppra)  1,000 mg PO BID [facility]


   Last Admin: 03/25/17 20:27 Dose:  Not Given


Lorazepam (Ativan)  1 mg IVPUSH ONETIME ONE


   Stop: 03/25/17 15:31


   Last Admin: 03/25/17 16:03 Dose:  1 mg


Metoprolol Tartrate (Lopressor)  2.5 mg IVPUSH Q6H PRN


   PRN Reason: heart rate


   Last Admin: 03/25/17 21:28 Dose:  2.5 mg


Midazolam HCl (Versed 1 Mg/Ml) Confirm Administered Dose 2 mg .ROUTE .STK-MED 

ONE


   Stop: 03/30/17 07:14


Potassium Chloride (Potassium Chloride)  40 meq PO ONETIME ONE


   Stop: 03/26/17 11:08


   Last Admin: 03/26/17 12:33 Dose:  40 meq


Propofol (Diprivan  20 Ml) Confirm Administered Dose 200 mg .ROUTE .STK-MED ONE


   Stop: 03/30/17 07:14


Tizanidine HCl (Zanaflex)  2 mg PO TID [facility]


   Last Admin: 03/27/17 19:13 Dose:  Not Given


Tizanidine HCl (Zanaflex)  2 mg PO ONETIME [facility]


   Stop: 03/27/17 09:38


   Last Admin: 03/27/17 10:20 Dose:  2 mg


Tizanidine HCl (Zanaflex)  1 mg PO BID@1700,2300 [facility]


   Stop: 03/27/17 23:01


   Last Admin: 03/27/17 23:24 Dose:  1 mg











- Exam


Quality Assessment: Reports: DVT prophylaxis


General: Reports: alert, cooperative, no acute distress


HEENT: Reports: Pupils equal, Pupils reactive, EOMI, Mucous membr. moist/pink


Neck: Reports: supple


Lungs: Reports: Clear to auscultation, Normal respiratory effort, Decreased 

breath sounds (to bases)


Cardiovascular: Reports: Regular Rate, Regular Rhythm


Abdomen: Reports: bowel sounds present, soft, no tenderness, no distension, 

other (Peg site without s/s of infection)


 (Female) Exam: Deferred


Rectal (Female) Exam: Deferred


Extremities: Reports: no edema


Psy/Mental Status: Reports: alert





*Q Meaningful Use (DIS)





- VTE *Q


VTE Criteria *Q: 








- Stroke *Q


Stroke Criteria *Q: 








- AMI *Q


AMI Criteria *Q: 








<Dayanara Pickett - Last Filed: 03/31/17 14:36>





**Discharge Summary





- Hospital Course


Free Text/Narrative:: 





Suggest free water, 240 cc TID to avoid dehydration.  Eventually may require 

PEG tube for tube feedings.  





- Patient Summary/Data


Consults: 


 Consultations





03/25/17 12:38


SLP Evaluation and Treatment [CONS] Routine 














- Patient Data


Vitals - Most Recent: 


 Last Vital Signs











Temp  36.1 C   03/31/17 12:06


 


Pulse  84   03/31/17 12:06


 


Resp  19   03/31/17 12:06


 


BP  122/86   03/31/17 12:06


 


Pulse Ox  96   03/31/17 12:06











I&O - Last 24 hours: 


 Intake & Output











 03/30/17 03/31/17 03/31/17





 22:59 06:59 14:59


 


Intake Total 1494 550 0


 


Output Total 300  


 


Balance 1194 550 0











Lab Results - Last 24 hrs: 


 Laboratory Results - last 24 hr











  03/30/17 03/30/17 03/31/17 Range/Units





  16:30 21:25 06:06 


 


WBC    8.65  (3.98-10.04)  K/mm3


 


RBC    4.08  (3.98-5.22)  M/mm3


 


Hgb    11.4  (11.2-15.7)  gm/L


 


Hct    35.4  (34.1-44.9)  %


 


MCV    86.8  (79.4-94.8)  fl


 


MCH    27.9  (25.6-32.2)  pg


 


MCHC    32.2  (32.2-35.5)  g/dl


 


RDW Std Deviation    52.6 H  (36.4-46.3)  fL


 


Plt Count    431 H  (182-369)  K/mm3


 


MPV    8.9 L  (9.4-12.3)  fl


 


Neut % (Auto)    61.2  (34.0-71.1)  %


 


Lymph % (Auto)    25.9  (19.3-51.7)  %


 


Mono % (Auto)    10.2  (4.7-12.5)  %


 


Eos % (Auto)    2.0  (0.7-5.8)  


 


Baso % (Auto)    0.2  (0.1-1.2)  %


 


Neut # (Auto)    5.30  (1.56-6.13)  K/mm3


 


Lymph # (Auto)    2.24  (1.18-3.74)  K/mm3


 


Mono # (Auto)    0.88 H  (0.24-0.36)  K/mm3


 


Eos # (Auto)    0.17  (0.04-0.36)  K/mm3


 


Baso # (Auto)    0.02  (0.01-0.08)  K/mm3


 


Manual Slide Review    Normal smear  


 


Sodium     (136-145)  mEq/L


 


Potassium     (3.5-5.1)  mEq/L


 


Chloride     ()  mEq/L


 


Carbon Dioxide     (21-32)  mEq/L


 


Anion Gap     (5-15)  


 


BUN     (7-18)  mg/dL


 


Creatinine     (0.55-1.02)  mg/dL


 


Est Cr Clr Drug Dosing     mL/min


 


Estimated GFR (MDRD)     (>60)  mL/min


 


BUN/Creatinine Ratio     (14-18)  


 


Glucose     ()  mg/dL


 


POC Glucose  89  99   ()  mg/dL


 


Calcium     (8.5-10.1)  mg/dL


 


Magnesium     (1.8-2.4)  mg/dl














  03/31/17 03/31/17 03/31/17 Range/Units





  06:06 06:09 11:28 


 


WBC     (3.98-10.04)  K/mm3


 


RBC     (3.98-5.22)  M/mm3


 


Hgb     (11.2-15.7)  gm/L


 


Hct     (34.1-44.9)  %


 


MCV     (79.4-94.8)  fl


 


MCH     (25.6-32.2)  pg


 


MCHC     (32.2-35.5)  g/dl


 


RDW Std Deviation     (36.4-46.3)  fL


 


Plt Count     (182-369)  K/mm3


 


MPV     (9.4-12.3)  fl


 


Neut % (Auto)     (34.0-71.1)  %


 


Lymph % (Auto)     (19.3-51.7)  %


 


Mono % (Auto)     (4.7-12.5)  %


 


Eos % (Auto)     (0.7-5.8)  


 


Baso % (Auto)     (0.1-1.2)  %


 


Neut # (Auto)     (1.56-6.13)  K/mm3


 


Lymph # (Auto)     (1.18-3.74)  K/mm3


 


Mono # (Auto)     (0.24-0.36)  K/mm3


 


Eos # (Auto)     (0.04-0.36)  K/mm3


 


Baso # (Auto)     (0.01-0.08)  K/mm3


 


Manual Slide Review     


 


Sodium  138    (136-145)  mEq/L


 


Potassium  3.8    (3.5-5.1)  mEq/L


 


Chloride  102    ()  mEq/L


 


Carbon Dioxide  28    (21-32)  mEq/L


 


Anion Gap  11.8    (5-15)  


 


BUN  3 L    (7-18)  mg/dL


 


Creatinine  0.9    (0.55-1.02)  mg/dL


 


Est Cr Clr Drug Dosing  68.04    mL/min


 


Estimated GFR (MDRD)  > 60    (>60)  mL/min


 


BUN/Creatinine Ratio  3.3 L    (14-18)  


 


Glucose  105    ()  mg/dL


 


POC Glucose   93  104  ()  mg/dL


 


Calcium  9.7    (8.5-10.1)  mg/dL


 


Magnesium  1.8    (1.8-2.4)  mg/dl











SURAJ Results - Last 24 hrs: 


 Microbiology











 03/27/17 15:34 Aerobic Blood Culture - Preliminary





 Blood - Venous    NO GROWTH AFTER 3 DAYS





 Anaerobic Blood Culture - Preliminary





    NO GROWTH AFTER 3 DAYS











Med Orders - Current: 


 Current Medications





Acetaminophen (Tylenol)  650 mg RECTAL Q6H PRN


   PRN Reason: Fever


   Last Admin: 03/27/17 15:07 Dose:  650 mg


Baclofen (Lioresal)  15 mg PO TID [facility]


   Last Admin: 03/31/17 09:16 Dose:  15 mg


Dextrose/Water (Dextrose 50% In Water)  50 ml IVPUSH ASDIRECTED PRN


   PRN Reason: Hypoglycemia


Enoxaparin Sodium (Lovenox)  40 mg SUBCUT DAILY [facility]


   Last Admin: 03/31/17 09:21 Dose:  40 mg


Fluoxetine HCl (Prozac)  10 mg PO DAILY [facility]


   Last Admin: 03/31/17 09:17 Dose:  10 mg


Hydralazine HCl (Apresoline)  20 mg IVPUSH Q8H PRN


   PRN Reason: Hypertension


Vancomycin HCl 1 gm/ Sodium (Chloride)  250 mls @ 167 mls/hr IV Q12H [facility]


   Last Admin: 03/31/17 11:47 Dose:  167 mls/hr


Sodium Chloride (Normal Saline)  500 mls @ 999 mls/min IV .BOLUS [facility]


   Last Admin: 03/26/17 16:45 Dose:  999 mls/min


Piperacillin Sod/Tazobactam (Sod 4.5 gm/ Sodium Chloride)  100 mls @ 25 mls/hr 

IV Q8H [facility]


   Last Admin: 03/31/17 13:38 Dose:  Not Given


Potassium Chloride/Dextrose/Sod Cl (D5 1/2 Ns W/ 40 Meq/L Kcl)  1,000 mls @ 70 

mls/hr IV ASDIRECTED [facility]


   Last Admin: 03/31/17 04:10 Dose:  70 mls/hr


Insulin Aspart (Novolog)  0 unit SUBCUT QIDACANDBED [facility]


   PRN Reason: Protocol


   Last Admin: 03/31/17 11:48 Dose:  Not Given


Levetiracetam (Keppra)  1,000 mg PO BID [facility]


   Last Admin: 03/31/17 09:16 Dose:  1,000 mg


Lorazepam (Ativan)  1 mg IVPUSH Q4H PRN


   PRN Reason: Seizures


   Last Admin: 03/30/17 03:34 Dose:  1 mg


Magnesium Oxide (Magnesium Oxide)  400 mg PO QPM [facility]


   Last Admin: 03/30/17 18:03 Dose:  400 mg


Metoprolol Tartrate (Lopressor)  2.5 mg IVPUSH Q4H PRN


   PRN Reason: heart rate


   Last Admin: 03/26/17 01:39 Dose:  2.5 mg


Senna/Docusate Sodium (Senna Plus)  2 tab PO BID [facility]


   Last Admin: 03/31/17 09:16 Dose:  2 tab


Sodium Chloride (Saline Flush)  10 ml FLUSH ASDIRECTED PRN


   PRN Reason: Keep Vein Open


   Last Admin: 03/24/17 20:18 Dose:  10 ml


Tizanidine HCl (Zanaflex)  1 mg PO TID [facility]


   Last Admin: 03/31/17 09:17 Dose:  1 mg


Vancomycin HCl (Pharmacy To Dose - Vancomycin)  0 dose .XX ASDIRECTED PRN


   PRN Reason: RX DOSE





Discontinued Medications





Albuterol (Proventil Neb Soln)  0.63 mg NEB ONETIME ONE


   Stop: 03/27/17 17:53


   Last Admin: 03/27/17 18:02 Dose:  Not Given


Albuterol (Proventil Neb Soln)  2.5 mg NEB ONETIME ONE


   Stop: 03/27/17 17:53


   Last Admin: 03/27/17 18:01 Dose:  Not Given


Albuterol (Proventil Neb Soln) Confirm Administered Dose 2.5 mg .ROUTE .STK-MED 

ONE


   Stop: 03/27/17 17:54


   Last Admin: 03/27/17 17:58 Dose:  2.5 mg


Albuterol (Proventil Neb Soln)  0.63 mg NEB QIDRT YOLIS


Albuterol (Proventil Neb Soln)  2.5 mg NEB QIDRT [facility]


   Last Admin: 03/29/17 06:15 Dose:  Not Given


Baclofen (Lioresal)  30 mg PO TID [facility]


   Last Admin: 03/26/17 15:29 Dose:  30 mg


Baclofen (Lioresal)  5 mg PO TID [facility]


   Last Admin: 03/27/17 07:59 Dose:  5 mg


Baclofen (Lioresal)  10 mg PO TID [facility]


   Last Admin: 03/27/17 19:13 Dose:  Not Given


Baclofen (Lioresal)  5 mg PO ONETIME ONE


   Stop: 03/27/17 09:38


   Last Admin: 03/27/17 10:20 Dose:  5 mg


Baclofen (Lioresal)  5 mg PO TID [facility]


Baclofen (Lioresal)  5 mg PO TID [facility]


   Last Admin: 03/28/17 08:08 Dose:  5 mg


Baclofen (Lioresal)  5 mg PO BID@1700,2300 [facility]


   Stop: 03/27/17 23:01


   Last Admin: 03/27/17 19:13 Dose:  Not Given


Baclofen (Lioresal)  5 mg PO BID@1700,2300 [facility]


   Stop: 03/27/17 23:01


   Last Admin: 03/27/17 23:25 Dose:  5 mg


Baclofen (Lioresal)  5 mg PO ONETIME ONE


   Stop: 03/27/17 17:17


   Last Admin: 03/27/17 17:26 Dose:  5 mg


Baclofen (Lioresal)  10 mg PO TID [facility]


   Last Admin: 03/28/17 14:32 Dose:  10 mg


Bisacodyl (Dulcolax)  10 mg RECTAL ONETIME ONE


   Stop: 03/27/17 08:43


   Last Admin: 03/27/17 09:03 Dose:  10 mg


Diphtheria/Tetanus/Acell Pertussis (Boostrix)  0.5 ml IM .ONCE ONE


   Stop: 03/26/17 11:52


   Last Admin: 03/26/17 12:24 Dose:  Not Given


Enoxaparin Sodium (Lovenox)  40 mg SUBCUT DAILY [facility]


   Last Admin: 03/29/17 08:09 Dose:  40 mg


Fentanyl (Sublimaze) Confirm Administered Dose 100 mcg .ROUTE .STK-MED ONE


   Stop: 03/30/17 07:14


Sodium Chloride (Normal Saline)  1,000 mls @ 999 mls/hr IV ONETIME ONE


   Stop: 03/24/17 20:50


   Last Infusion: 03/24/17 22:13 Dose:  Infused


Ceftriaxone Sodium 1 gm/ (Sodium Chloride)  100 mls @ 200 mls/hr IV ONETIME ONE


   Stop: 03/24/17 22:03


   Last Admin: 03/24/17 22:13 Dose:  200 mls/hr


Sodium Chloride (Normal Saline)  1,000 mls @ 125 mls/hr IV ONETIME ONE


   Stop: 03/25/17 05:56


   Last Admin: 03/24/17 22:13 Dose:  125 mls/hr


Levofloxacin/Dextrose 750 mg/ (Premix)  150 mls @ 100 mls/hr IV Q24H [facility]


   Last Admin: 03/26/17 18:47 Dose:  Not Given


Potassium Chloride/Dextrose/Sod Cl (D5 1/2 Ns W/ 20 Meq/L Kcl)  1,000 mls @ 70 

mls/hr IV ASDIRECTED [facility]


   Last Admin: 03/28/17 07:10 Dose:  70 mls/hr


Levetiracetam 1,000 mg/ Sodium (Chloride)  110 mls @ 400 mls/hr IV ONETIME ONE


   Stop: 03/25/17 15:38


   Last Admin: 03/25/17 16:07 Dose:  400 mls/hr


Levetiracetam 1,000 mg/ Sodium (Chloride)  110 mls @ 400 mls/hr IV Q12H [facility]


   Last Admin: 03/26/17 12:26 Dose:  Not Given


Vancomycin HCl 1 gm/ Sodium (Chloride)  250 mls @ 250 mls/hr IV ONETIME ONE


   Stop: 03/26/17 00:03


   Last Admin: 03/25/17 23:18 Dose:  250 mls/hr


Magnesium Sulfate 2 gm/ Premix  50 mls @ 25 mls/hr IV ONETIME ONE


   Stop: 03/26/17 13:06


   Last Admin: 03/26/17 12:30 Dose:  25 mls/hr


Piperacillin Sod/Tazobactam (Sod 4.5 gm/ Sodium Chloride)  100 mls @ 200 mls/hr 

IV ONETIME ONE


   Stop: 03/27/17 13:29


   Last Admin: 03/27/17 13:47 Dose:  200 mls/hr


Magnesium Sulfate 2 gm/ Premix  50 mls @ 25 mls/hr IV ONETIME ONE


   Stop: 03/27/17 19:32


   Last Admin: 03/27/17 18:08 Dose:  25 mls/hr


Potassium Chloride 20 meq/Potassium Chloride/Dextrose/Sod Cl  1,010 mls @ 70 mls

/hr IV ASDIRECTED [facility]


Potassium Chloride/Dextrose/Sod Cl (D5 1/2 Ns W/ 40 Meq/L Kcl)  1,000 mls @ 

69.307 mls/hr IV ASDIRECTED [facility]


   Last Admin: 03/28/17 11:06 Dose:  69.307 mls/hr


Magnesium Sulfate 2 gm/ Premix  50 mls @ 25 mls/hr IV ONETIME ONE


   Stop: 03/29/17 11:33


   Last Admin: 03/29/17 10:02 Dose:  25 mls/hr


Lidocaine HCl (Xylocaine-Mpf 1%) Confirm Administered Dose 2 mls @ as directed 

.ROUTE .STK-MED ONE


   Stop: 03/30/17 07:19


Magnesium Sulfate 2 gm/ Premix  50 mls @ 25 mls/hr IV ONETIME ONE


   Stop: 03/30/17 12:42


   Last Admin: 03/30/17 11:16 Dose:  25 mls/hr


Lactated Ringer's (Ringers, Lactated) Confirm Administered Dose 1,000 mls @ as 

directed .ROUTE .STK-MED ONE


   Stop: 03/30/17 10:53


Magnesium Sulfate 2 gm/ Premix  50 mls @ 25 mls/hr IV ONETIME ONE


   Stop: 03/31/17 12:16


   Last Admin: 03/31/17 11:47 Dose:  25 mls/hr


Levetiracetam (Keppra)  1,000 mg PO BID [facility]


   Last Admin: 03/25/17 20:27 Dose:  Not Given


Lorazepam (Ativan)  1 mg IVPUSH ONETIME ONE


   Stop: 03/25/17 15:31


   Last Admin: 03/25/17 16:03 Dose:  1 mg


Metoprolol Tartrate (Lopressor)  2.5 mg IVPUSH Q6H PRN


   PRN Reason: heart rate


   Last Admin: 03/25/17 21:28 Dose:  2.5 mg


Midazolam HCl (Versed 1 Mg/Ml) Confirm Administered Dose 2 mg .ROUTE .STK-MED 

ONE


   Stop: 03/30/17 07:14


Potassium Chloride (Potassium Chloride)  40 meq PO ONETIME ONE


   Stop: 03/26/17 11:08


   Last Admin: 03/26/17 12:33 Dose:  40 meq


Propofol (Diprivan  20 Ml) Confirm Administered Dose 200 mg .ROUTE .STK-MED ONE


   Stop: 03/30/17 07:14


Tizanidine HCl (Zanaflex)  2 mg PO TID [facility]


   Last Admin: 03/27/17 19:13 Dose:  Not Given


Tizanidine HCl (Zanaflex)  2 mg PO ONETIME STA


   Stop: 03/27/17 09:38


   Last Admin: 03/27/17 10:20 Dose:  2 mg


Tizanidine HCl (Zanaflex)  1 mg PO BID@1700,2300 [facility]


   Stop: 03/27/17 23:01


   Last Admin: 03/27/17 23:24 Dose:  1 mg











*Q Meaningful Use (DIS)





- VTE *Q


VTE Criteria *Q: 








- Stroke *Q


Stroke Criteria *Q: 








- AMI *Q


AMI Criteria *Q:

## 2017-06-15 ENCOUNTER — HOSPITAL ENCOUNTER (INPATIENT)
Dept: HOSPITAL 41 - JD.ED | Age: 52
LOS: 5 days | Discharge: NURSING, REHAB OR LONG-TERM CARE | DRG: 871 | End: 2017-06-20
Admitting: EMERGENCY MEDICINE
Payer: MEDICAID

## 2017-06-15 DIAGNOSIS — G30.9: ICD-10-CM

## 2017-06-15 DIAGNOSIS — R32: ICD-10-CM

## 2017-06-15 DIAGNOSIS — R65.21: ICD-10-CM

## 2017-06-15 DIAGNOSIS — I05.0: ICD-10-CM

## 2017-06-15 DIAGNOSIS — D64.9: ICD-10-CM

## 2017-06-15 DIAGNOSIS — R13.10: ICD-10-CM

## 2017-06-15 DIAGNOSIS — Z66: ICD-10-CM

## 2017-06-15 DIAGNOSIS — Z79.899: ICD-10-CM

## 2017-06-15 DIAGNOSIS — Z93.1: ICD-10-CM

## 2017-06-15 DIAGNOSIS — Z86.718: ICD-10-CM

## 2017-06-15 DIAGNOSIS — F02.80: ICD-10-CM

## 2017-06-15 DIAGNOSIS — E11.9: ICD-10-CM

## 2017-06-15 DIAGNOSIS — F32.9: ICD-10-CM

## 2017-06-15 DIAGNOSIS — M81.0: ICD-10-CM

## 2017-06-15 DIAGNOSIS — Z86.73: ICD-10-CM

## 2017-06-15 DIAGNOSIS — R47.01: ICD-10-CM

## 2017-06-15 DIAGNOSIS — A41.9: Primary | ICD-10-CM

## 2017-06-15 DIAGNOSIS — K59.09: ICD-10-CM

## 2017-06-15 DIAGNOSIS — B96.20: ICD-10-CM

## 2017-06-15 DIAGNOSIS — L89.152: ICD-10-CM

## 2017-06-15 DIAGNOSIS — G35: ICD-10-CM

## 2017-06-15 DIAGNOSIS — D72.1: ICD-10-CM

## 2017-06-15 DIAGNOSIS — G40.909: ICD-10-CM

## 2017-06-15 DIAGNOSIS — G47.00: ICD-10-CM

## 2017-06-15 DIAGNOSIS — M54.9: ICD-10-CM

## 2017-06-15 DIAGNOSIS — M19.90: ICD-10-CM

## 2017-06-15 DIAGNOSIS — N39.0: ICD-10-CM

## 2017-06-15 DIAGNOSIS — G89.29: ICD-10-CM

## 2017-06-15 PROCEDURE — 99152 MOD SED SAME PHYS/QHP 5/>YRS: CPT

## 2017-06-15 PROCEDURE — C1751 CATH, INF, PER/CENT/MIDLINE: HCPCS

## 2017-06-15 PROCEDURE — 87088 URINE BACTERIA CULTURE: CPT

## 2017-06-15 PROCEDURE — 99153 MOD SED SAME PHYS/QHP EA: CPT

## 2017-06-15 PROCEDURE — 36415 COLL VENOUS BLD VENIPUNCTURE: CPT

## 2017-06-15 PROCEDURE — 96365 THER/PROPH/DIAG IV INF INIT: CPT

## 2017-06-15 PROCEDURE — 83735 ASSAY OF MAGNESIUM: CPT

## 2017-06-15 PROCEDURE — 83605 ASSAY OF LACTIC ACID: CPT

## 2017-06-15 PROCEDURE — 96376 TX/PRO/DX INJ SAME DRUG ADON: CPT

## 2017-06-15 PROCEDURE — 36556 INSERT NON-TUNNEL CV CATH: CPT

## 2017-06-15 PROCEDURE — 36600 WITHDRAWAL OF ARTERIAL BLOOD: CPT

## 2017-06-15 PROCEDURE — 84484 ASSAY OF TROPONIN QUANT: CPT

## 2017-06-15 PROCEDURE — 82553 CREATINE MB FRACTION: CPT

## 2017-06-15 PROCEDURE — 83880 ASSAY OF NATRIURETIC PEPTIDE: CPT

## 2017-06-15 PROCEDURE — 81001 URINALYSIS AUTO W/SCOPE: CPT

## 2017-06-15 PROCEDURE — 51702 INSERT TEMP BLADDER CATH: CPT

## 2017-06-15 PROCEDURE — 82533 TOTAL CORTISOL: CPT

## 2017-06-15 PROCEDURE — 02HV33Z INSERTION OF INFUSION DEVICE INTO SUPERIOR VENA CAVA, PERCUTANEOUS APPROACH: ICD-10-PCS

## 2017-06-15 PROCEDURE — 87086 URINE CULTURE/COLONY COUNT: CPT

## 2017-06-15 PROCEDURE — 96375 TX/PRO/DX INJ NEW DRUG ADDON: CPT

## 2017-06-15 PROCEDURE — 96366 THER/PROPH/DIAG IV INF ADDON: CPT

## 2017-06-15 PROCEDURE — 99285 EMERGENCY DEPT VISIT HI MDM: CPT

## 2017-06-15 PROCEDURE — 93005 ELECTROCARDIOGRAM TRACING: CPT

## 2017-06-15 PROCEDURE — 86140 C-REACTIVE PROTEIN: CPT

## 2017-06-15 PROCEDURE — 87186 SC STD MICRODIL/AGAR DIL: CPT

## 2017-06-15 PROCEDURE — 85025 COMPLETE CBC W/AUTO DIFF WBC: CPT

## 2017-06-15 PROCEDURE — 71010: CPT

## 2017-06-15 PROCEDURE — 87040 BLOOD CULTURE FOR BACTERIA: CPT

## 2017-06-15 PROCEDURE — 82803 BLOOD GASES ANY COMBINATION: CPT

## 2017-06-15 PROCEDURE — 85610 PROTHROMBIN TIME: CPT

## 2017-06-15 PROCEDURE — 80053 COMPREHEN METABOLIC PANEL: CPT

## 2017-06-15 PROCEDURE — 96368 THER/DIAG CONCURRENT INF: CPT

## 2017-06-15 RX ADMIN — HYDROCORTISONE SODIUM SUCCINATE SCH MG: 100 INJECTION, POWDER, FOR SOLUTION INTRAMUSCULAR; INTRAVENOUS at 22:03

## 2017-06-15 RX ADMIN — SODIUM CHLORIDE ONE: 9 INJECTION, SOLUTION INTRAVENOUS at 17:13

## 2017-06-15 RX ADMIN — SODIUM CHLORIDE ONE MG: 9 INJECTION, SOLUTION INTRAVENOUS at 17:15

## 2017-06-15 RX ADMIN — METOROPROLOL TARTRATE PRN MG: 5 INJECTION, SOLUTION INTRAVENOUS at 22:04

## 2017-06-15 RX ADMIN — HYDROCORTISONE SODIUM SUCCINATE SCH: 100 INJECTION, POWDER, FOR SOLUTION INTRAMUSCULAR; INTRAVENOUS at 23:25

## 2017-06-15 RX ADMIN — SODIUM CHLORIDE ONE: 9 INJECTION, SOLUTION INTRAVENOUS at 18:25

## 2017-06-15 NOTE — PCM.HP
<Alex Grullon T - Last Filed: 06/16/17 21:47>





H&P History of Present Illness





- General


Date of Service: 06/15/17


Admit Problem/Dx: 


Septic Shock and UTI





Source of Information: Patient, Old Records, Provider, RN Notes Reviewed


History Limitations: Reports: Altered Mental Status, Physical Impairment





- History of Present Illness


Initial Comments - Free Text/Narative: 


This is a 51-year-old white female with past medical history of MS, Dysphagia S/

p PEG tube placement, chronic urinary tract infection, urinary incontinence, 

chronic constipation, chronic back pain, nausea arthritis, osteoporosis, 

Alzheimer's disease, history of CVA, seizure disorder, MS, history of chronic 

thromboembolism, history of depression, insomnia, type 2 diabetes, chronic 

anemia, and eosinophilia who comes from a local nursing home due to acute 

febrile illness and declining overall health. On presentation to the emergency 

department patient was found to be severely hypotensive with blood pressure as 

low at 57/37 mmHg.





Her initial workup in the emergency department shows a CBC remarkable for WBC 

of 13.020, RBC of 2.9, hemoglobin of 8.5, hematocrit of 26.5, and neutrophils 

of 78%. PT of 10.3 and INR of 0.95. Her ABG shows a pH of 7.44, PCO2 of 34, PO2 

of 94, HCO3 of 22.8 and O2 sat of 98.8% on 5 L nasal cannula. Her chemistry is 

remarkable for glucose of 112, lactic acid of 1.8, magnesium of 1.7, CRP of 10 

and albumin of 2.2. CK-MB and troponin 1 were both negative. Her initial chest 

x-ray shows nothing acute seen.





Patient is currently sedated from meds she got in ED. Central line access was 

attempted in ED but w/o any success. Patient is being admitted for Urosepsis 

with Septic shock. She is DNR per ED medical documentation.








- Related Data


Allergies/Adverse Reactions: 


 Allergies











Allergy/AdvReac Type Severity Reaction Status Date / Time


 


No Known Allergies Allergy   Verified 04/27/16 15:34











Home Medications: 


 Home Meds





Baclofen 30 mg PO TID 01/12/15 [History]


FLUoxetine [PROzac] 10 mg PO DAILY 01/12/15 [History]


LORazepam [Ativan] 0.25 mg PO BID 01/12/15 [History]


tiZANidine HCl [Zanaflex] 2 mg PO TID 01/12/15 [History]


Acetaminophen [Tylenol] 2 tab PO Q4HR PRN 04/07/15 [History]


Cholecalciferol (Vitamin D3) [Vitamin D3] 2,000 unit PO DAILY 04/07/15 [History]


Docusate Sodium/Sennosides [Senna Plus] 2 tab PO BID 04/07/15 [History]


Magnesium Oxide 400 mg PO QPM 04/07/15 [History]


Multivitamin with Minerals [Multiple Vitamin] 1 tab PO DAILY 04/07/15 [History]


levETIRAcetam [Keppra] 1,000 mg PO BID 04/07/15 [History]


Lactulose [Chronulac] 15 ml PO DAILY PRN 05/13/15 [History]


Nystatin [Nystatin Crm] 1 applic TOP BID 06/15/17 [History]











Past Medical History


HEENT History: Reports: Other (See Below)


Other HEENT History: dysphagia


Cardiovascular History: Reports: None


Gastrointestinal History: Reports: Chronic Constipation


Other Gastrointestinal History: encephalopathy


Genitourinary History: Reports: UTI, Recurrent


Other Genitourinary History: sepsis secondary to e-coli


Musculoskeletal History: Reports: Back Pain, Chronic, Osteoarthritis, 

Osteoporosis, Other (See Below)


Other Musculoskeletal History: MS


Neurological History: Reports: Alzheimers Disease, CVA, MS, Seizure (Has a 

known seizure disorder and is on Keppra for this.)


Other Neuro History: chronic embolisms/thrombosis of veins, recurrent strokes, 

convulsions


Psychiatric History: Reports: Alzheimers Disease, Depression


Other Psychiatric History: insomnia


Endocrine/Metabolic History: Reports: Diabetes, Type II


Other Endocrine/Metabolic History: eosinophilia, follicular disorder, disorders 

of magnesium metabolismm anemia, hypercalcemia, hypokalemia


Hematologic History: Reports: Anemia, Other (See Below)


Other Hematologic History: eosinophilia





- Infectious Disease History


Infectious Disease History: Reports: Other (See Below)


Other Infectious Disease History: unknown pt unable to answer and it is not 

listed on nursing home paperwork





- Past Surgical History


Head Surgeries/Procedures: Reports: None





Social & Family History





- Family History


Family Medical History: Unobtainable





- Tobacco Use


Smoking Status *Q: Unknown Ever Smoked


Second Hand Smoke Exposure: No





- Caffeine Use


Caffeine Use: Reports: None





- Alcohol Use


Days Per Week of Alcohol Use: 0





- Recreational Drug Use


Recreational Drug Use: No





H&P Review of Systems





- Review of Systems:


Review Of Systems: Unable To Obtain


Review of Systems Comment:: 


Patient is currently sedated from meds she got in ED








Exam





- Exam


Exam: See Below





- Vital Signs


Vital Signs: 


 Last Vital Signs











Temp  39.0 C H  06/15/17 13:02


 


Pulse  89   06/15/17 13:02


 


Resp  18   06/15/17 13:02


 


BP  80/44 L  06/15/17 13:02


 


Pulse Ox  99   06/15/17 13:02











Weight: 54.8 kg





- Exam


General: Sedated, Lethargic.  No: Mild Distress


HEENT: No: Mucosa Moist & Pink, Nares Patent, Normal Nasal Septum, Posterior 

Pharynx Clear


Neck: Supple, Trachea Midline


Lungs: Normal Respiratory Effort, Decreased Breath Sounds


Cardiovascular: Tachycardia


Abdomen: Normal Bowel Sounds, Distention, Other (PEG tube ).  No: Organomegaly, 

Tenderness


 (Female) Exam: Other (indwelling mcpherson catheter)


Rectal (Female) Exam: Deferred


Back Exam: Normal Inspection, Decreased Range of Motion


Extremities: Normal Inspection, Normal Pulses


Peripheral Pulses: 2+: Posterior Tibial (L), Posterior Tibial (R), Dorsalis 

Pedis (L), Dorsalis Pedis (R)


Skin: Warm, Dry, Intact, Decubitis


Skin Alteration Location    (Drawings Not To Scale): 


  __________________________














  __________________________





 1 - stage 2-3 sacral ulcer





Physical Exam Comments:: 


Patient is currently sedated from meds she got in ED. Physical examination is 

very limited.








- Patient Data


Lab Results Last 24 hrs: 


 Laboratory Results - last 24 hr











  06/15/17 06/15/17 06/15/17 Range/Units





  13:07 13:45 13:45 


 


WBC   13.02 H   (3.98-10.04)  K/mm3


 


RBC   2.93 L   (3.98-5.22)  M/mm3


 


Hgb   8.5 L   (11.2-15.7)  gm/L


 


Hct   26.5 L   (34.1-44.9)  %


 


MCV   90.4   (79.4-94.8)  fl


 


MCH   29.0   (25.6-32.2)  pg


 


MCHC   32.1 L   (32.2-35.5)  g/dl


 


RDW Std Deviation   55.0 H   (36.4-46.3)  fL


 


Plt Count   309   (182-369)  K/mm3


 


MPV   10.0   (9.4-12.3)  fl


 


Neutrophils % (Manual)   78 H   (40-60)  %


 


Band Neutrophils %   0   (0-10)  %


 


Lymphocytes % (Manual)   16 L   (20-40)  %


 


Atypical Lymphs %   1   %


 


Monocytes % (Manual)   5   (2-10)  %


 


Eosinophils % (Manual)   0 L   (0.7-5.8)  %


 


Basophils % (Manual)   0 L   (0.1-1.2)  


 


Platelet Estimate   Adequate   


 


Microcytosis   2+ moderate   


 


Macrocytosis   2+ moderate   


 


RBC Morph Comment   Not Reportable   


 


PT    10.3  (8.0-13.0)  SECONDS


 


INR    0.95  


 


Puncture Site  Lt radial    


 


ABG pH  7.44    (7.35-7.45)  


 


ABG pCO2  34.0 L    (35.0-45.0)  mmHg


 


ABG pO2  94.0    (80.0-100.0)  mmHg


 


ABG HCO3  22.8    (22.0-26.0)  meq/L


 


ABG O2 Saturation  98.8 H    (96.0-97.0)  %


 


ABG Base Excess  -0.5    (-2-2.0)  


 


Angel Test  Positive    


 


A-a Gradient  119    mmHg


 


O2 Delivery Device  Nasal cannula    


 


Oxygen Flow Rate  5.0    


 


FiO2  0.00 L    (21..00)  %


 


Sodium     (136-145)  mEq/L


 


Potassium     (3.5-5.1)  mEq/L


 


Chloride     ()  mEq/L


 


Carbon Dioxide     (21-32)  mEq/L


 


Anion Gap     (5-15)  


 


BUN     (7-18)  mg/dL


 


Creatinine     (0.55-1.02)  mg/dL


 


Est Cr Clr Drug Dosing     mL/min


 


Estimated GFR (MDRD)     (>60)  mL/min


 


BUN/Creatinine Ratio     (14-18)  


 


Glucose     ()  mg/dL


 


Lactic Acid     (0.4-2.0)  mmol/L


 


Calcium     (8.5-10.1)  mg/dL


 


Magnesium     (1.8-2.4)  mg/dl


 


Total Bilirubin     (0.2-1.0)  mg/dL


 


AST     (15-37)  U/L


 


ALT     (14-59)  U/L


 


Alkaline Phosphatase     ()  U/L


 


CK-MB (CK-2)     (0-3.6)  ng/ml


 


Troponin I     (0.00-0.056)  ng/mL


 


C-Reactive Protein     (<1.0)  mg/dL


 


Total Protein     (6.4-8.2)  g/dl


 


Albumin     (3.4-5.0)  g/dl


 


Globulin     gm/dL


 


Albumin/Globulin Ratio     (1-2)  


 


Urine Color     (Yellow)  


 


Urine Appearance     (Clear)  


 


Urine pH     (5.0-8.0)  


 


Ur Specific Gravity     (1.005-1.030)  


 


Urine Protein     (Negative)  


 


Urine Glucose (UA)     (Negative)  


 


Urine Ketones     (Negative)  


 


Urine Occult Blood     (Negative)  


 


Urine Nitrite     (Negative)  


 


Urine Bilirubin     (Negative)  


 


Urine Urobilinogen     (0.2-1.0)  


 


Ur Leukocyte Esterase     (Negative)  


 


Urine RBC     (0-5)  /hpf


 


Urine WBC     (0-5)  /hpf


 


Urine WBC Clumps     (NOT SEEN)  /hpf


 


Ur Epithelial Cells     (0-5)  /hpf


 


Ur Squamous Epith Cells     (0-5)  /hpf


 


Ur Transition Epith Cell     (0-5)  


 


Urine Bacteria     (FEW)  /hpf


 


Urine Mucus     (FEW)  /hpf














  06/15/17 06/15/17 06/15/17 Range/Units





  13:45 13:45 15:30 


 


WBC     (3.98-10.04)  K/mm3


 


RBC     (3.98-5.22)  M/mm3


 


Hgb     (11.2-15.7)  gm/L


 


Hct     (34.1-44.9)  %


 


MCV     (79.4-94.8)  fl


 


MCH     (25.6-32.2)  pg


 


MCHC     (32.2-35.5)  g/dl


 


RDW Std Deviation     (36.4-46.3)  fL


 


Plt Count     (182-369)  K/mm3


 


MPV     (9.4-12.3)  fl


 


Neutrophils % (Manual)     (40-60)  %


 


Band Neutrophils %     (0-10)  %


 


Lymphocytes % (Manual)     (20-40)  %


 


Atypical Lymphs %     %


 


Monocytes % (Manual)     (2-10)  %


 


Eosinophils % (Manual)     (0.7-5.8)  %


 


Basophils % (Manual)     (0.1-1.2)  


 


Platelet Estimate     


 


Microcytosis     


 


Macrocytosis     


 


RBC Morph Comment     


 


PT     (8.0-13.0)  SECONDS


 


INR     


 


Puncture Site     


 


ABG pH     (7.35-7.45)  


 


ABG pCO2     (35.0-45.0)  mmHg


 


ABG pO2     (80.0-100.0)  mmHg


 


ABG HCO3     (22.0-26.0)  meq/L


 


ABG O2 Saturation     (96.0-97.0)  %


 


ABG Base Excess     (-2-2.0)  


 


Angel Test     


 


A-a Gradient     mmHg


 


O2 Delivery Device     


 


Oxygen Flow Rate     


 


FiO2     (21..00)  %


 


Sodium  139    (136-145)  mEq/L


 


Potassium  4.0    (3.5-5.1)  mEq/L


 


Chloride  105    ()  mEq/L


 


Carbon Dioxide  27    (21-32)  mEq/L


 


Anion Gap  11.0    (5-15)  


 


BUN  18    (7-18)  mg/dL


 


Creatinine  0.8    (0.55-1.02)  mg/dL


 


Est Cr Clr Drug Dosing  64.32    mL/min


 


Estimated GFR (MDRD)  > 60    (>60)  mL/min


 


BUN/Creatinine Ratio  22.5 H    (14-18)  


 


Glucose  112 H    ()  mg/dL


 


Lactic Acid   1.8   (0.4-2.0)  mmol/L


 


Calcium  8.6    (8.5-10.1)  mg/dL


 


Magnesium  1.7 L    (1.8-2.4)  mg/dl


 


Total Bilirubin  0.3    (0.2-1.0)  mg/dL


 


AST  15    (15-37)  U/L


 


ALT  18    (14-59)  U/L


 


Alkaline Phosphatase  68    ()  U/L


 


CK-MB (CK-2)  < 0.5    (0-3.6)  ng/ml


 


Troponin I  < 0.017    (0.00-0.056)  ng/mL


 


C-Reactive Protein  10.0 H*    (<1.0)  mg/dL


 


Total Protein  6.7    (6.4-8.2)  g/dl


 


Albumin  2.2 L    (3.4-5.0)  g/dl


 


Globulin  4.5    gm/dL


 


Albumin/Globulin Ratio  0.5 L    (1-2)  


 


Urine Color    Yellow  (Yellow)  


 


Urine Appearance    Cloudy H  (Clear)  


 


Urine pH    7.0  (5.0-8.0)  


 


Ur Specific Gravity    1.020  (1.005-1.030)  


 


Urine Protein    1+ H  (Negative)  


 


Urine Glucose (UA)    Negative  (Negative)  


 


Urine Ketones    Negative  (Negative)  


 


Urine Occult Blood    1+ H  (Negative)  


 


Urine Nitrite    Positive H  (Negative)  


 


Urine Bilirubin    Negative  (Negative)  


 


Urine Urobilinogen    0.2  (0.2-1.0)  


 


Ur Leukocyte Esterase    3+ H  (Negative)  


 


Urine RBC    0-5  (0-5)  /hpf


 


Urine WBC    >100 H  (0-5)  /hpf


 


Urine WBC Clumps    Moderate  (NOT SEEN)  /hpf


 


Ur Epithelial Cells    10-20 H  (0-5)  /hpf


 


Ur Squamous Epith Cells    10-20 H  (0-5)  /hpf


 


Ur Transition Epith Cell    0-5  (0-5)  


 


Urine Bacteria    Many H  (FEW)  /hpf


 


Urine Mucus    Few  (FEW)  /hpf











Result Diagrams: 


 06/16/17 04:48





 06/16/17 04:48





*Q Meaningful Use (ADM)





- VTE *Q


VTE Criteria *Q: 








- Stroke *Q


Stroke Criteria *Q: 








- AMI *Q


AMI Criteria *Q: 





Problem List Initiated/Reviewed/Updated: Yes


Orders Last 24hrs: 


 Active Orders 24 hr











 Category Date Time Status


 


 EKG Documentation Completion [RC] STAT Care  06/15/17 13:09 Active


 


 Insert Mcpherson Catheter [Insert Urinary Catheter] [OM.PC] Care  06/15/17 13:15 

Ordered





 Q24H   


 


 Urinary Catheter Assessment [RC] ASDIRECTED Care  06/15/17 13:07 Active


 


 CULTURE BLOOD [BC] Stat Lab  06/15/17 13:45 Received


 


 CULTURE BLOOD [BC] Stat Lab  06/15/17 14:22 Received


 


 CULTURE URINE [RM] Stat Lab  06/15/17 16:16 Ordered


 


 Lactated Ringers [Ringers, Lactated] 1,000 ml Med  06/15/17 14:40 Active





 IV ASDIRECTED   


 


 Norepinephrine [Levophed] 4 mg Med  06/15/17 13:45 Active





 Dextrose 5% in Water 246 ml   





 IV TITRATE   


 


 Sodium Chloride 0.9% [Normal Saline] 1,000 ml Med  06/15/17 13:15 Active





 IV ASDIRECTED   


 


 Vancomycin 1 gm Med  06/15/17 15:53 Active





 Sodium Chloride 0.9% [Normal Saline] 250 ml   





 IV ONETIME   


 


 Vancomycin [Vancocin] 1 gm Med  06/15/17 15:53 Active





 Sodium Chloride 0.9% [Normal Saline] 250 ml   





 IV ONETIME   


 


 Blood Culture x2 Reflex Set [OM.PC] Stat Oth  06/15/17 13:05 Ordered








 Medication Orders





Sodium Chloride (Normal Saline)  1,000 mls @ 999 mls/hr IV ASDIRECTED YOLIS


   Last Admin: 06/15/17 13:04  Dose: 999 mls/hr


Norepinephrine Bitartrate 4 mg (/ Dextrose/Water)  250 mls @ 15 mls/hr IV 

TITRATE YOLIS; 4 MCG/MIN


   PRN Reason: Protocol


   Last Admin: 06/15/17 14:20  Dose: 4 mcg/min, 15 mls/hr


Lactated Ringer's (Ringers, Lactated)  1,000 mls @ 500 mls/hr IV ASDIRECTED YOLIS


   Last Admin: 06/15/17 14:40  Dose: 500 mls/hr


Vancomycin HCl 1 gm/ Sodium (Chloride)  250 mls @ 250 mls/hr IV ONETIME ONE


   Stop: 06/15/17 16:52


Vancomycin HCl 1 gm/ Sodium (Chloride)  250 mls @ 250 mls/hr IV ONETIME ONE


   Stop: 06/15/17 16:52








Assessment/Plan Comment:: 


Assessment/Plan:


Acute:


Sepsis with Shock


   - Likely 2/2 Urosepsis +/- Adrenal Insufficiency given her hx/o MS form 

chronic steroid use


   - On levophed drip, titrate to maintain MAP at or > 65 mmHg


   - Cortisol level then 100 mg IV solucortef Q6


   - Blood Culture x 2


   - Start IV Zosyn for pharmacy to dose and continue IV Levaquin in am


   - Probiotic 250 mg po TID


   - Supportive Care





Urinary Tract Infection


   - Risk Factors: Incontinence, Immobility and Possible Neurogenic Bladder


   - UA pos


   - Received Levaquin 750 mg IV x1 in ED


   - UA Cx/Sx





Anemia


   - Hgb is 8.5 on admission


   - Baseline is 10-11


   - Hemoccult test 


   - H/H at 2000 tonight


   - Will monitor





Leukocytosis


   - WBC 13.02 and CRP 10.10


   - Will monitor





Stage 2-3 Sacral Decubitus Ulcer


   - PT for wound care





Need for central line access


   - Attempted in ED but w/o any any success    - 


   - We will try a PICC line placement by CRNA





Chronic:


MS


Dysphagia


S/p PEG Tube Placement


Constipation


Recurrent UTI


OA


Chronic Back Pain


Osteoporosis


Azheimer's Disease


Hx/o CVA


Seizure


Hx/o VTEs/DVTs


Depression


Insomnia


Eosinophilia


Chronic Debility





Plan:


Admit to ICU


Resume Home Meds


Routine AM Labs


LA at 2200


PT/OT consult


PT for wound care


Dietray consult for PEG tube feeds


SW/CM for d/c planning


Code status: DNR





   








<Clifford Valles - Last Filed: 06/19/17 08:23>





H&P History of Present Illness





- General


Admit Problem/Dx: 


 Admission Diagnosis/Problem





Admission Diagnosis/Problem      Sepsis due to urinary tract infection











Exam





- Vital Signs


Vital Signs: 


 Last Vital Signs











Temp  39.0 C H  06/15/17 13:02


 


Pulse  89   06/15/17 13:02


 


Resp  18   06/15/17 13:02


 


BP  80/44 L  06/15/17 13:02


 


Pulse Ox  99   06/15/17 13:02














- Patient Data


Result Diagrams: 


 06/19/17 08:10





 06/18/17 04:45





*Q Meaningful Use (ADM)





- VTE *Q


VTE Criteria *Q: 








- Stroke *Q


Stroke Criteria *Q: 








- AMI *Q


AMI Criteria *Q: 





Orders Last 24hrs: 


 Active Orders 24 hr











 Category Date Time Status


 


 RT Aerosol Therapy [RC] ASDIRECTED Care  06/15/17 16:25 Active


 


 Consult to Case Management [CONS] Routine Cons  06/15/17 16:25 Active


 


 Consult to  [CONS] Routine Cons  06/15/17 16:25 Active


 


 Consult to Spiritual Care [CONS] Routine Cons  06/15/17 16:25 Active


 


 OT Evaluation and Treatment [CONS] Routine Cons  06/15/17 16:25 Active


 


 PT Evaluation and Treatment [CONS] Routine Cons  06/15/17 16:25 Active


 


 Respiratory Care Assess and Treatment [CONS] Routine Cons  06/15/17 16:25 

Active


 


 Chest 1V Frontal [CR] Routine Exams  06/15/17 16:45 Ordered


 


 Chest 1V Frontal [CR] Stat Exams  06/15/17 16:50 Ordered


 


 BASIC METABOLIC PANEL,BMP [CHEM] AM Lab  06/16/17 05:11 Ordered


 


 BASIC METABOLIC PANEL,BMP [CHEM] AM Lab  06/17/17 05:11 Ordered


 


 BASIC METABOLIC PANEL,BMP [CHEM] AM Lab  06/18/17 05:11 Ordered


 


 BASIC METABOLIC PANEL,BMP [CHEM] AM Lab  06/19/17 05:11 Ordered


 


 C-REACTIVE PROTEIN [CHEM] AM Lab  06/16/17 05:11 Ordered


 


 C-REACTIVE PROTEIN [CHEM] AM Lab  06/17/17 05:11 Ordered


 


 C-REACTIVE PROTEIN [CHEM] AM Lab  06/18/17 05:11 Ordered


 


 C-REACTIVE PROTEIN [CHEM] AM Lab  06/19/17 05:11 Ordered


 


 CORTISOL [REF] Stat Lab  06/15/17 14:22 Received


 


 MAGNESIUM [CHEM] AM Lab  06/16/17 05:11 Ordered


 


 MAGNESIUM [CHEM] AM Lab  06/17/17 05:11 Ordered


 


 MAGNESIUM [CHEM] AM Lab  06/18/17 05:11 Ordered


 


 MAGNESIUM [CHEM] AM Lab  06/19/17 05:11 Ordered


 


 Dextrose 5%-0.9% NaCl [Dextrose 5%-Normal Saline] 1,000 Med  06/15/17 16:45 

Active





 ml   





 IV ASDIRECTED   


 


 Hydrocortisone Sod Succinate [Solu-CORTEF] Med  06/15/17 17:00 Active





 100 mg IVPUSH Q6H   


 


 Piperacillin/Tazobactam [Zosyn] 4.5 gm Med  06/15/17 17:00 Active





 Sodium Chloride 0.9% [Normal Saline] 100 ml   





 IV ONETIME   


 


 Piperacillin/Tazobactam [Zosyn] 4.5 gm Med  06/15/17 23:00 Active





 Sodium Chloride 0.9% [Normal Saline] 100 ml   





 IV Q8H   








 Medication Orders





Acetaminophen (Tylenol)  650 mg PO Q4H PRN


   PRN Reason: Pain (Mild 1-3)/fever


Hydrocodone Bitart/Acetaminophen (Norco 325-5 Mg)  1 tab PO Q4H PRN


   PRN Reason: Pain (moderate 4-6)


Albuterol/Ipratropium (Duoneb 3.0-0.5 Mg/3 Ml)  3 ml NEB Q4H PRN


   PRN Reason: Shortness Of Breath/wheezing


Bisacodyl (Dulcolax)  5 mg PO DAILY PRN


   PRN Reason: Constipation


Docusate Sodium (Colace)  100 mg PO BID PRN


   PRN Reason: Constipation


Enoxaparin Sodium (Lovenox)  40 mg SUBCUT DAILY YOLIS


Hydrocortisone Sodium Succinate (Solu-Cortef)  100 mg IVPUSH Q6H YOLIS


Hydromorphone HCl (Dilaudid)  0.25 mg IVPUSH Q2H PRN


   PRN Reason: Pain (severe 7-10)


Sodium Chloride (Normal Saline)  1,000 mls @ 999 mls/hr IV ASDIRECTED YOLIS


   Last Admin: 06/15/17 13:04  Dose: 999 mls/hr


Norepinephrine Bitartrate 4 mg (/ Dextrose/Water)  250 mls @ 15 mls/hr IV 

TITRATE YOLIS; 4 MCG/MIN


   PRN Reason: Protocol


   Last Admin: 06/15/17 14:20  Dose: 4 mcg/min, 15 mls/hr


Lactated Ringer's (Ringers, Lactated)  1,000 mls @ 500 mls/hr IV ASDIRECTED YOLIS


   Last Admin: 06/15/17 14:40  Dose: 500 mls/hr


Vancomycin HCl 1 gm/ Sodium (Chloride)  250 mls @ 250 mls/hr IV ONETIME ONE


   Stop: 06/15/17 16:52


Vancomycin HCl 1 gm/ Sodium (Chloride)  250 mls @ 250 mls/hr IV ONETIME ONE


   Stop: 06/15/17 16:52


   Last Admin: 06/15/17 16:36  Dose: 100 mls/hr


Promethazine HCl 12.5 mg/ (Sodium Chloride)  50.5 mls @ 100 mls/hr IV Q6H PRN


   PRN Reason: Nausea/Vomiting


Piperacillin Sod/Tazobactam (Sod 4.5 gm/ Sodium Chloride)  100 mls @ 25 mls/hr 

IV Q8H YOLIS


Dextrose/Sodium Chloride (Dextrose 5%-Normal Saline)  1,000 mls @ 100 mls/hr IV 

ASDIRECTED YOLIS


Piperacillin Sod/Tazobactam (Sod 4.5 gm/ Sodium Chloride)  100 mls @ 200 mls/hr 

IV ONETIME ONE


   Stop: 06/15/17 17:29


Lorazepam (Ativan)  1 mg IV Q6H PRN


   PRN Reason: Anxiety


Ondansetron HCl (Zofran)  4 mg IV Q6H PRN


   PRN Reason: Nausea/Vomiting


Polyethylene Glycol (Miralax)  17 gm PO DAILY PRN


   PRN Reason: Constipation


Senna/Docusate Sodium (Senna Plus)  1 tab PO BID PRN


   PRN Reason: Constipation


Temazepam (Restoril)  15 mg PO BEDTIME PRN


   PRN Reason: Sleep

## 2017-06-15 NOTE — PCM.PRNOTE
- Free Text/Narrative


Note: 





6-15-17


Start 1830


End 1945





Ultrasound guided PICC placement





Called from ICU staff regarding a septic patient on vasopressor support without 

a central line. Multiple attempts were made for central venous access in ED 

with no success per ED physician.  Order per Dr Grullon.  Chart reviewed.  

Patient family educated.  Patient had received multiple doses of sedation in ED 

and was sedated for the consent and procedure.  Family agreed to proceed.  

Consent signed.  Site cleansed with ChloraPrep.  Lidocaine 1% local anesthetic 

injected prior to 20ga IV catheter insertion using ultrasound guidance.  

Sterile gown, gloves and drape used. Masks worn by all personnel in room. 5fr 

Groshong NXT ClearVue double lumen PICC inserted 44cm per sterile technique at 

proximal left antecubital.  Secured with statlock.  Flushes well with NaCl with 

good blood return.  Dressed with opsite with CHG.  2cm PICC catheter remains 

out for measurement purposes.  Chest Xray taken.  Confirmed SCV placement per 

radiologist.  





Yoel Lucio CRNA

## 2017-06-15 NOTE — CR
Chest: Frontal view of the chest was obtained.

 

Comparison: Previous chest x-ray of 03/28/17.

 

Heart size and mediastinum are normal.  Lungs are clear.  Bony 

structures are grossly intact.

 

Impression:

1.  Nothing acute is seen on frontal chest x-ray.  No significant 

change is seen from prior exam.

 

Diagnostic code #1

## 2017-06-15 NOTE — EDM.PDOC
ED HPI GENERAL MEDICAL PROBLEM





- General


Chief Complaint: Fever


Stated Complaint: [facility]


Time Seen by Provider: 06/15/17 13:00


Source of Information: Reports: EMS Notes Reviewed, Nursing Home Records


History Limitations: Reports: Other (non verbal. )





- History of Present Illness


INITIAL COMMENTS - FREE TEXT/NARRATIVE: 





51-year-old female sent across from local nursing home by ambulance due to 

acute febrile illness and decline in health. Noted to be hypoxic on room air 

and blood pressure low. Apparently she vomited last night there was some 

concern that she may have aspirated. Patient has primary multiple sclerosis 

nonverbal. Therefore no useful history was gleaned from the patient. She is on 

5 L of oxygen by nasal cannula started by the paramedics. One IV has been 

started and she has received 500 mils of normal saline en route to the 

hospital. Her pressure upon arrival was 80 on 52 but has subsequently dropped 

to 57/37. Second IV to be started Ringer's lactated open. First IV will be 

normal saline at open. Appears that she is suffering septic shock. 10 images 

102.2. Lungs are clear to auscultation suggesting another source of sepsis such 

as urinary tract infection.


Onset: Sudden


Onset Date: 06/14/17 (Unknown for sure but suspect she was ill yesterday that 

produced the nausea and vomiting.)


Duration: Hour(s):


Location: Reports: Generalized (Acute febrile illness with low blood pressure 

and suspect sepsis.)


Severity: Severe


Improves with: Reports: None


Worsens with: Reports: None


Context: Reports: Other (Patient is bedridden or wheelchair ridden.).  Denies: 

Activity, Exercise, Lifting, Sick Contact, Trauma


Associated Symptoms: Reports: Fever/Chills, Malaise, Nausea/Vomiting (Barely 

had vomiting once last night. No vomiting reported today), Shortness of Breath (

With hypoxia.), Weakness.  Denies: Confusion, Chest Pain, Cough, cough w sputum

, Diaphoresis (Temperature 102.2), Headaches, Syncope


Treatments PTA: Reports: Other (see below) (Fluids given by paramedics.)





- Related Data


 Allergies











Allergy/AdvReac Type Severity Reaction Status Date / Time


 


No Known Allergies Allergy   Verified 04/27/16 15:34











Home Meds: 


 Home Meds





Baclofen 30 mg PO TID 01/12/15 [History]


FLUoxetine [PROzac] 10 mg PO DAILY 01/12/15 [History]


LORazepam [Ativan] 0.25 mg PO BID 01/12/15 [History]


tiZANidine HCl [Zanaflex] 2 mg PO TID 01/12/15 [History]


Acetaminophen [Tylenol] 2 tab PO Q4HR PRN 04/07/15 [History]


Cholecalciferol (Vitamin D3) [Vitamin D3] 2,000 unit PO DAILY 04/07/15 [History]


Docusate Sodium/Sennosides [Senna Plus] 2 tab PO BID 04/07/15 [History]


Magnesium Oxide 400 mg PO QPM 04/07/15 [History]


Multivitamin with Minerals [Multiple Vitamin] 1 tab PO DAILY 04/07/15 [History]


levETIRAcetam [Keppra] 1,000 mg PO BID 04/07/15 [History]


Lactulose [Chronulac] 15 ml PO DAILY PRN 05/13/15 [History]


Nystatin [Nystatin Crm] 1 applic TOP BID 06/15/17 [History]











Past Medical History


HEENT History: Reports: Other (See Below)


Other HEENT History: dysphagia


Cardiovascular History: Reports: None


Gastrointestinal History: Reports: Chronic Constipation


Genitourinary History: Reports: UTI, Recurrent


Other Genitourinary History: sepsis secondary to e-coli


Musculoskeletal History: Reports: Back Pain, Chronic, Osteoarthritis, 

Osteoporosis, Other (See Below)


Other Musculoskeletal History: MS


Neurological History: Reports: Alzheimers Disease, CVA, MS, Seizure (Has a 

known seizure disorder and is on Keppra for this.)


Other Neuro History: chronic embolisms/thrombosis of veins, recurrent strokes, 

convulsions


Psychiatric History: Reports: Alzheimers Disease, Depression


Other Psychiatric History: insomnia


Endocrine/Metabolic History: Reports: Diabetes, Type II


Hematologic History: Reports: Anemia, Other (See Below)


Other Hematologic History: eosinophilia





- Infectious Disease History


Infectious Disease History: Reports: Other (See Below)


Other Infectious Disease History: unknown pt unable to answer and it is not 

listed on nursing home paperwork





- Past Surgical History


Head Surgeries/Procedures: Reports: None





Social & Family History





- Family History


Family Medical History: Unobtainable





- Tobacco Use


Smoking Status *Q: Unknown Ever Smoked


Second Hand Smoke Exposure: No





- Caffeine Use


Caffeine Use: Reports: None





- Alcohol Use


Days Per Week of Alcohol Use: 0





- Recreational Drug Use


Recreational Drug Use: No





ED ROS GENERAL





- Review of Systems


Review Of Systems: Unable To Obtain (Patient is nonverbal.)





ED EXAM, GENERAL





- Physical Exam


Exam: See Below


Exam Limited By: Physical Impairment (Very warm to palpation. Patient is 

nonverbal due to her severe multiple sclerosis. She has a DO NOT RESUSCITATE 

order.)


General Appearance: Lethargic, Moderate Distress (Respiratory distress she's to 

Thickened hypoxia. Also hypotensive.)


Eye Exam: Bilateral Eye: Normal Inspection


Ears: Normal External Exam, Normal Canal, Normal TMs


Throat/Mouth: Normal Inspection, Normal Lips, Normal Oropharynx (Tongue is 

mildly dry and coated.), Other


Head: Atraumatic, Normocephalic


Neck: Normal Inspection, Supple, Non-Tender, Full Range of Motion, Other (No 

jugular venous pulsations).  No: Lymphadenopathy (L), Lymphadenopathy (R)


Respiratory/Chest: Respiratory Distress, Decreased Breath Sounds (Moderate 

respiratory distress with tachypnea of 22-24/m. O2 sats are 99% on 5 L by nasal 

cannula. Lungs however are clear anteriorly with decreased air entry to the 

bases. Decreased to the lower 40% of lung fields due to poor inspiratory effort.

), Other (No adventitial sounds were heard.)


Cardiovascular: Regular Rate, Rhythm, No Edema, No Gallop, No Murmur, No Rub.  

No: Normal Peripheral Pulses


Peripheral Pulses: 1+: Posterior Tibial (L), Posterior Tibial (R), Dorsalis 

Pedis (L), Dorsalis Pedis (R)


GI/Abdominal: Soft, Non-Tender, No Organomegaly, No Distention, No Abnormal 

Bruit, Abnormal Bowel Sounds (Hypoactive bowel sounds.).  No: Guarding, Rigid, 

Rebound


Back Exam: Normal Inspection


Extremities: Other (Mild PPD edema in both lower extremities.).  No: Normal 

Range of Motion, Non-Tender


Neurological: Abnormal Reflexes (Hyperactive reflexes 4+ nature's 4+ biceps.), 

Other.  No: Normal Cognition, Normal Reflexes


Skin Exam: Warm, Dry, Intact, Normal Color, No Rash





Course





- Vital Signs


Last Recorded V/S: 


 Last Vital Signs











Temp  39.0 C H  06/15/17 13:02


 


Pulse  89   06/15/17 13:02


 


Resp  18   06/15/17 13:02


 


BP  80/44 L  06/15/17 13:02


 


Pulse Ox  99   06/15/17 13:02














- Orders/Labs/Meds


Orders: 


 Active Orders 24 hr











 Category Date Time Status


 


 Admission Status [Patient Status] [ADT] Routine ADT  06/15/17 16:24 Ordered


 


 EKG Documentation Completion [RC] STAT Care  06/15/17 13:09 Active


 


 Insert Aguirre Catheter [Insert Urinary Catheter] [OM.PC] Care  06/15/17 13:15 

Ordered





 Q24H   


 


 Urinary Catheter Assessment [RC] ASDIRECTED Care  06/15/17 13:07 Active


 


 CULTURE BLOOD [BC] Stat Lab  06/15/17 13:45 Received


 


 CULTURE BLOOD [BC] Stat Lab  06/15/17 14:22 Received


 


 CULTURE URINE [RM] Stat Lab  06/15/17 15:30 Received


 


 Lactated Ringers [Ringers, Lactated] 1,000 ml Med  06/15/17 14:40 Active





 IV ASDIRECTED   


 


 Norepinephrine [Levophed] 4 mg Med  06/15/17 13:45 Active





 Dextrose 5% in Water 246 ml   





 IV TITRATE   


 


 Sodium Chloride 0.9% [Normal Saline] 1,000 ml Med  06/15/17 13:15 Active





 IV ASDIRECTED   


 


 Vancomycin 1 gm Med  06/15/17 15:53 Active





 Sodium Chloride 0.9% [Normal Saline] 250 ml   





 IV ONETIME   


 


 Vancomycin [Vancocin] 1 gm Med  06/15/17 15:53 Active





 Sodium Chloride 0.9% [Normal Saline] 250 ml   





 IV ONETIME   


 


 Blood Culture x2 Reflex Set [OM.PC] Stat Oth  06/15/17 13:05 Ordered








 Medication Orders





Sodium Chloride (Normal Saline)  1,000 mls @ 999 mls/hr IV ASDIRECTED YOLIS


   Last Admin: 06/15/17 13:04  Dose: 999 mls/hr


Norepinephrine Bitartrate 4 mg (/ Dextrose/Water)  250 mls @ 15 mls/hr IV 

TITRATE YOLIS; 4 MCG/MIN


   PRN Reason: Protocol


   Last Admin: 06/15/17 14:20  Dose: 4 mcg/min, 15 mls/hr


Lactated Ringer's (Ringers, Lactated)  1,000 mls @ 500 mls/hr IV ASDIRECTED YOLIS


   Last Admin: 06/15/17 14:40  Dose: 500 mls/hr


Vancomycin HCl 1 gm/ Sodium (Chloride)  250 mls @ 250 mls/hr IV ONETIME ONE


   Stop: 06/15/17 16:52


Vancomycin HCl 1 gm/ Sodium (Chloride)  250 mls @ 250 mls/hr IV ONETIME ONE


   Stop: 06/15/17 16:52








Labs: 


 Laboratory Tests











  06/15/17 06/15/17 06/15/17 Range/Units





  13:07 13:45 13:45 


 


WBC   13.02 H   (3.98-10.04)  K/mm3


 


RBC   2.93 L   (3.98-5.22)  M/mm3


 


Hgb   8.5 L   (11.2-15.7)  gm/L


 


Hct   26.5 L   (34.1-44.9)  %


 


MCV   90.4   (79.4-94.8)  fl


 


MCH   29.0   (25.6-32.2)  pg


 


MCHC   32.1 L   (32.2-35.5)  g/dl


 


RDW Std Deviation   55.0 H   (36.4-46.3)  fL


 


Plt Count   309   (182-369)  K/mm3


 


MPV   10.0   (9.4-12.3)  fl


 


Neutrophils % (Manual)   78 H   (40-60)  %


 


Band Neutrophils %   0   (0-10)  %


 


Lymphocytes % (Manual)   16 L   (20-40)  %


 


Atypical Lymphs %   1   %


 


Monocytes % (Manual)   5   (2-10)  %


 


Eosinophils % (Manual)   0 L   (0.7-5.8)  %


 


Basophils % (Manual)   0 L   (0.1-1.2)  


 


Platelet Estimate   Adequate   


 


Microcytosis   2+ moderate   


 


Macrocytosis   2+ moderate   


 


RBC Morph Comment   Not Reportable   


 


PT    10.3  (8.0-13.0)  SECONDS


 


INR    0.95  


 


Puncture Site  Lt radial    


 


ABG pH  7.44    (7.35-7.45)  


 


ABG pCO2  34.0 L    (35.0-45.0)  mmHg


 


ABG pO2  94.0    (80.0-100.0)  mmHg


 


ABG HCO3  22.8    (22.0-26.0)  meq/L


 


ABG O2 Saturation  98.8 H    (96.0-97.0)  %


 


ABG Base Excess  -0.5    (-2-2.0)  


 


Angel Test  Positive    


 


A-a Gradient  119    mmHg


 


O2 Delivery Device  Nasal cannula    


 


Oxygen Flow Rate  5.0    


 


FiO2  0.00 L    (21..00)  %


 


Sodium     (136-145)  mEq/L


 


Potassium     (3.5-5.1)  mEq/L


 


Chloride     ()  mEq/L


 


Carbon Dioxide     (21-32)  mEq/L


 


Anion Gap     (5-15)  


 


BUN     (7-18)  mg/dL


 


Creatinine     (0.55-1.02)  mg/dL


 


Est Cr Clr Drug Dosing     mL/min


 


Estimated GFR (MDRD)     (>60)  mL/min


 


BUN/Creatinine Ratio     (14-18)  


 


Glucose     ()  mg/dL


 


Lactic Acid     (0.4-2.0)  mmol/L


 


Calcium     (8.5-10.1)  mg/dL


 


Magnesium     (1.8-2.4)  mg/dl


 


Total Bilirubin     (0.2-1.0)  mg/dL


 


AST     (15-37)  U/L


 


ALT     (14-59)  U/L


 


Alkaline Phosphatase     ()  U/L


 


CK-MB (CK-2)     (0-3.6)  ng/ml


 


Troponin I     (0.00-0.056)  ng/mL


 


C-Reactive Protein     (<1.0)  mg/dL


 


Total Protein     (6.4-8.2)  g/dl


 


Albumin     (3.4-5.0)  g/dl


 


Globulin     gm/dL


 


Albumin/Globulin Ratio     (1-2)  


 


Urine Color     (Yellow)  


 


Urine Appearance     (Clear)  


 


Urine pH     (5.0-8.0)  


 


Ur Specific Gravity     (1.005-1.030)  


 


Urine Protein     (Negative)  


 


Urine Glucose (UA)     (Negative)  


 


Urine Ketones     (Negative)  


 


Urine Occult Blood     (Negative)  


 


Urine Nitrite     (Negative)  


 


Urine Bilirubin     (Negative)  


 


Urine Urobilinogen     (0.2-1.0)  


 


Ur Leukocyte Esterase     (Negative)  


 


Urine RBC     (0-5)  /hpf


 


Urine WBC     (0-5)  /hpf


 


Urine WBC Clumps     (NOT SEEN)  /hpf


 


Ur Epithelial Cells     (0-5)  /hpf


 


Ur Squamous Epith Cells     (0-5)  /hpf


 


Ur Transition Epith Cell     (0-5)  


 


Urine Bacteria     (FEW)  /hpf


 


Urine Mucus     (FEW)  /hpf














  06/15/17 06/15/17 06/15/17 Range/Units





  13:45 13:45 15:30 


 


WBC     (3.98-10.04)  K/mm3


 


RBC     (3.98-5.22)  M/mm3


 


Hgb     (11.2-15.7)  gm/L


 


Hct     (34.1-44.9)  %


 


MCV     (79.4-94.8)  fl


 


MCH     (25.6-32.2)  pg


 


MCHC     (32.2-35.5)  g/dl


 


RDW Std Deviation     (36.4-46.3)  fL


 


Plt Count     (182-369)  K/mm3


 


MPV     (9.4-12.3)  fl


 


Neutrophils % (Manual)     (40-60)  %


 


Band Neutrophils %     (0-10)  %


 


Lymphocytes % (Manual)     (20-40)  %


 


Atypical Lymphs %     %


 


Monocytes % (Manual)     (2-10)  %


 


Eosinophils % (Manual)     (0.7-5.8)  %


 


Basophils % (Manual)     (0.1-1.2)  


 


Platelet Estimate     


 


Microcytosis     


 


Macrocytosis     


 


RBC Morph Comment     


 


PT     (8.0-13.0)  SECONDS


 


INR     


 


Puncture Site     


 


ABG pH     (7.35-7.45)  


 


ABG pCO2     (35.0-45.0)  mmHg


 


ABG pO2     (80.0-100.0)  mmHg


 


ABG HCO3     (22.0-26.0)  meq/L


 


ABG O2 Saturation     (96.0-97.0)  %


 


ABG Base Excess     (-2-2.0)  


 


Angel Test     


 


A-a Gradient     mmHg


 


O2 Delivery Device     


 


Oxygen Flow Rate     


 


FiO2     (21..00)  %


 


Sodium  139    (136-145)  mEq/L


 


Potassium  4.0    (3.5-5.1)  mEq/L


 


Chloride  105    ()  mEq/L


 


Carbon Dioxide  27    (21-32)  mEq/L


 


Anion Gap  11.0    (5-15)  


 


BUN  18    (7-18)  mg/dL


 


Creatinine  0.8    (0.55-1.02)  mg/dL


 


Est Cr Clr Drug Dosing  64.32    mL/min


 


Estimated GFR (MDRD)  > 60    (>60)  mL/min


 


BUN/Creatinine Ratio  22.5 H    (14-18)  


 


Glucose  112 H    ()  mg/dL


 


Lactic Acid   1.8   (0.4-2.0)  mmol/L


 


Calcium  8.6    (8.5-10.1)  mg/dL


 


Magnesium  1.7 L    (1.8-2.4)  mg/dl


 


Total Bilirubin  0.3    (0.2-1.0)  mg/dL


 


AST  15    (15-37)  U/L


 


ALT  18    (14-59)  U/L


 


Alkaline Phosphatase  68    ()  U/L


 


CK-MB (CK-2)  < 0.5    (0-3.6)  ng/ml


 


Troponin I  < 0.017    (0.00-0.056)  ng/mL


 


C-Reactive Protein  10.0 H*    (<1.0)  mg/dL


 


Total Protein  6.7    (6.4-8.2)  g/dl


 


Albumin  2.2 L    (3.4-5.0)  g/dl


 


Globulin  4.5    gm/dL


 


Albumin/Globulin Ratio  0.5 L    (1-2)  


 


Urine Color    Yellow  (Yellow)  


 


Urine Appearance    Cloudy H  (Clear)  


 


Urine pH    7.0  (5.0-8.0)  


 


Ur Specific Gravity    1.020  (1.005-1.030)  


 


Urine Protein    1+ H  (Negative)  


 


Urine Glucose (UA)    Negative  (Negative)  


 


Urine Ketones    Negative  (Negative)  


 


Urine Occult Blood    1+ H  (Negative)  


 


Urine Nitrite    Positive H  (Negative)  


 


Urine Bilirubin    Negative  (Negative)  


 


Urine Urobilinogen    0.2  (0.2-1.0)  


 


Ur Leukocyte Esterase    3+ H  (Negative)  


 


Urine RBC    0-5  (0-5)  /hpf


 


Urine WBC    >100 H  (0-5)  /hpf


 


Urine WBC Clumps    Moderate  (NOT SEEN)  /hpf


 


Ur Epithelial Cells    10-20 H  (0-5)  /hpf


 


Ur Squamous Epith Cells    10-20 H  (0-5)  /hpf


 


Ur Transition Epith Cell    0-5  (0-5)  


 


Urine Bacteria    Many H  (FEW)  /hpf


 


Urine Mucus    Few  (FEW)  /hpf











Meds: 


Medications











Generic Name Dose Route Start Last Admin





  Trade Name Julioq  PRN Reason Stop Dose Admin


 


Sodium Chloride  1,000 mls @ 999 mls/hr  06/15/17 13:15  06/15/17 13:04





  Normal Saline  IV   999 mls/hr





  ASDIRECTED YOLIS   Administration


 


Norepinephrine Bitartrate 4 mg  250 mls @ 15 mls/hr  06/15/17 13:45  06/15/17 14

:20





  / Dextrose/Water  IV   4 mcg/min





  TITRATE YOLIS   15 mls/hr





  Protocol   Administration





  4 MCG/MIN   


 


Lactated Ringer's  1,000 mls @ 500 mls/hr  06/15/17 14:40  06/15/17 14:40





  Ringers, Lactated  IV   500 mls/hr





  ASDIRECTED YOLIS   Administration


 


Vancomycin HCl 1 gm/ Sodium  250 mls @ 250 mls/hr  06/15/17 15:53  





  Chloride  IV  06/15/17 16:52  





  ONETIME ONE   


 


Vancomycin HCl 1 gm/ Sodium  250 mls @ 250 mls/hr  06/15/17 15:53  





  Chloride  IV  06/15/17 16:52  





  ONETIME ONE   














Discontinued Medications














Generic Name Dose Route Start Last Admin





  Trade Name Hao  PRN Reason Stop Dose Admin


 


Etomidate  Confirm  06/15/17 14:16  06/15/17 15:58





  Amidate  Administered  06/15/17 14:17  Not Given





  Dose   





  40 mg   





  IVPUSH   





  .STK-MED ONE   


 


Etomidate  15 mg  06/15/17 14:18  06/15/17 14:18





  Amidate  IVPUSH  06/15/17 14:19  15 mg





  ONETIME ONE   Administration


 


Etomidate  30 mg  06/15/17 15:00  06/15/17 15:00





  Amidate  IVPUSH  06/15/17 15:01  30 mg





  ONETIME ONE   Administration


 


Fentanyl  Confirm  06/15/17 14:32  06/15/17 15:59





  Sublimaze  Administered  06/15/17 14:33  Not Given





  Dose   





  100 mcg   





  .ROUTE   





  .STK-MED ONE   


 


Fentanyl  100 mcg  06/15/17 14:30  06/15/17 14:30





  Sublimaze  IVPUSH  06/15/17 14:31  100 mcg





  ONETIME ONE   Administration


 


Sodium Chloride  Confirm  06/15/17 13:06  06/15/17 13:15





  Normal Saline  Administered  06/15/17 13:07  Not Given





  Dose   





  1,000 mls @ as directed   





  .ROUTE   





  .STK-MED ONE   


 


Lactated Ringer's  1,000 mls @ 999 mls/hr  06/15/17 13:11  06/15/17 13:13





  Ringers, Lactated  IV  06/15/17 14:11  999 mls/hr





  .BOLUS ONE   Administration


 


Levofloxacin/Dextrose 750 mg/  150 mls @ 100 mls/hr  06/15/17 13:28  06/15/17 13

:45





  Premix  IV  06/15/17 14:57  100 mls/hr





  ONETIME ONE   Administration


 


Midazolam HCl  Confirm  06/15/17 14:10  06/15/17 15:59





  Versed 1 Mg/Ml  Administered  06/15/17 14:11  Not Given





  Dose   





  2 mg   





  .ROUTE   





  .STK-MED ONE   


 


Midazolam HCl  2 mg  06/15/17 14:15  06/15/17 14:15





  Versed 1 Mg/Ml  IVPUSH  06/15/17 14:16  2 mg





  ONETIME ONE   Administration


 


Midazolam HCl  2 mg  06/15/17 14:31  06/15/17 14:31





  Versed 1 Mg/Ml  IVPUSH  06/15/17 14:32  2 mg





  ONETIME ONE   Administration


 


Ondansetron HCl  4 mg  06/15/17 13:40  06/15/17 13:50





  Zofran  IVPUSH  06/15/17 13:41  4 mg





  ONETIME ONE   Administration














- Radiology Interpretation


Free Text/Narrative:: 





51-year-old female with primary multiple sclerosis and across from the nursing 

home due to noted hypoxemia hypo-tension and fever. Fairly she vomited once 

last night and the concern was for possible aspiration pneumonitis. On 

examination however she is hypotensive with BP initially 80/40 and then it fell 

to as low as 57/37. She is very warm to palpation suspect septic shock. 2 IVs 

will be started one will be LR 1 OB normal saline both open to see if we can 

restore her blood pressure. Otherwise vasopressors will be utilized fairly 

immediately. She has DO NOT RESUSCITATE status and therefore difficult to know 

exactly how far to proceed. Septic workup will be done. Will start antibiotics 

as soon as blood cultures 2 are completed. O2 sats are 99% on 5 L by nasal 

cannula.





- Re-Assessments/Exams


Free Text/Narrative Re-Assessment/Exam: 





06/15/17 13:28 BP is 80/43.





06/15/17:  Remains hypotensive at 80/41. Will start her on Levophed at 4 mg IV 

to prevent nausea from the levophed.


06/15/17 13:52 unable to gain decent IV access they've a 22-gauge and 20-gauge 

in place. She is going to need fluids. Laboratory draw one blood culture. I 

will therefore start a central line and hopefully in the subclavian with a view 

to obtaining a blood culture and being able to utilize the Levothroid drip more 

effectively and fluid resuscitation. Patient is not able to give consent. He 

will be done on an emergent basis.





06/15/17 15:12 multiple attempts at starting a right subclavian line failed. 

Blood was obtained on 2 occasions from the right subclavian vein and enough was 

obtained for blood culture and other labs. However I was unable to advance the 

guidewire due to hitting the clavicle and first rib. 2 attempts failed 

necessary. One attempt in the right femoral although I could never find a 

femoral arterial pulse. Even ultrasound was utilized but could not identify 

femoral artery or vein well enough to cannulate. BP has now improved to 134 

systolic. This is on ephedrine at 4 mcg/m. Procedure would offer be abandoned 

at this time although her peripheral venous access is tenuous at best one is a 

24-gauge and one is a 22-gauge. Tentatively if her pressure comes up enough she 

may well start to have other peripheral veins that may be more accessible. 

Failing this I will attempt subclavian on the left side.





06/15/17 15:17 total white count was 13.02 was 70% neutrophils and no bands. 

Hemoglobin is low at 8.5 and I will therefore recross best for 2 units of 

packed cells. Hematocrit is 26.5 platelets are 309,000. Blood gases done 

earlier were 7.44 pH PCO2 of 34 PO2 was 94 and 5 L by nasal cannula. INR was 

0.95 PT is 10.3. Sodium was 139 potassium is 4.0 chloride 105. Glucose 112 

blood gas was 1.8. CRP was 10. Troponin is less than 0.017. Tentatively this 

patient will be admitted to the intensive care unit due to sepsis suspect 

urinary tract pathogen. The chest x-ray was clear without any signs of 

pneumonia.





06/15/17 16:14 urinalysis reveals positive nitrates and 3+ leukocyte esterase 

urine culture ordered. Therefore the urine does appear to once again to be the 

source of her sepsis. Catheterization got 240 mils of urine out which was very 

foul-smelling and cloudy. She needs to be monitored for possible urinary 

retention as a cause of recurrent urinary tract infections and sepsis.


06/15/17 16:25 spoke with on-call hospitalist Dr. Grullon  and patient will be 

admitted to the intensive care unit. Blood pressure is well maintained on 

current Levothroid drip at 4 mg micrograms per minute. Last BP is98/48 on 

Lephofed 4mcg/min. 








Departure





- Departure


Time of Disposition: 16:27


Disposition: Admitted As Inpatient 64


Condition: Serious


Clinical Impression: 


 Septic shock, UTI, Urinary tract infectious disease, Nonverbal








- Discharge Information





Critical Care Note





- Critical Care Note


Total Time (mins): 60





- My Orders


Last 24 Hours: 


My Active Orders





06/15/17 13:05


Blood Culture x2 Reflex Set [OM.PC] Stat 





06/15/17 13:07


Urinary Catheter Assessment [RC] ASDIRECTED 





06/15/17 13:09


EKG Documentation Completion [RC] STAT 





06/15/17 13:15


Insert Aguirre Catheter [Insert Urinary Catheter] [OM.PC] Q24H 


Sodium Chloride 0.9% [Normal Saline] 1,000 ml IV ASDIRECTED 





06/15/17 13:45


CULTURE BLOOD [BC] Stat 


Norepinephrine [Levophed] 4 mg   Dextrose 5% in Water 246 ml IV TITRATE 





06/15/17 14:22


CULTURE BLOOD [BC] Stat 





06/15/17 14:40


Lactated Ringers [Ringers, Lactated] 1,000 ml IV ASDIRECTED 





06/15/17 15:30


CULTURE URINE [RM] Stat 





06/15/17 15:53


Vancomycin 1 gm   Sodium Chloride 0.9% [Normal Saline] 250 ml IV ONETIME 


Vancomycin [Vancocin] 1 gm   Sodium Chloride 0.9% [Normal Saline] 250 ml IV 

ONETIME 





06/15/17 16:24


Admission Status [Patient Status] [ADT] Routine 














- Assessment/Plan


Last 24 Hours: 


My Active Orders





06/15/17 13:05


Blood Culture x2 Reflex Set [OM.PC] Stat 





06/15/17 13:07


Urinary Catheter Assessment [RC] ASDIRECTED 





06/15/17 13:09


EKG Documentation Completion [RC] STAT 





06/15/17 13:15


Insert Aguirre Catheter [Insert Urinary Catheter] [OM.PC] Q24H 


Sodium Chloride 0.9% [Normal Saline] 1,000 ml IV ASDIRECTED 





06/15/17 13:45


CULTURE BLOOD [BC] Stat 


Norepinephrine [Levophed] 4 mg   Dextrose 5% in Water 246 ml IV TITRATE 





06/15/17 14:22


CULTURE BLOOD [BC] Stat 





06/15/17 14:40


Lactated Ringers [Ringers, Lactated] 1,000 ml IV ASDIRECTED 





06/15/17 15:30


CULTURE URINE [RM] Stat 





06/15/17 15:53


Vancomycin 1 gm   Sodium Chloride 0.9% [Normal Saline] 250 ml IV ONETIME 


Vancomycin [Vancocin] 1 gm   Sodium Chloride 0.9% [Normal Saline] 250 ml IV 

ONETIME 





06/15/17 16:24


Admission Status [Patient Status] [ADT] Routine

## 2017-06-15 NOTE — CR
Chest: Frontal view of the chest was obtained.

 

Comparison: Previous chest x-ray performed earlier on the same date 

(time 1:48 PM).

 

Heart size and mediastinum are within normal limits.  Lungs are clear.

  Bony structures are grossly intact.

 

Impression:

1.  Nothing acute is seen on frontal chest x-ray.  No significant 

change is seen from prior chest x-ray. 

 

Diagnostic code #1

## 2017-06-16 RX ADMIN — NYSTATIN SCH APPLIC: 100000 CREAM TOPICAL at 20:58

## 2017-06-16 RX ADMIN — LORAZEPAM PRN MG: 2 INJECTION INTRAMUSCULAR; INTRAVENOUS at 18:22

## 2017-06-16 RX ADMIN — HYDROCORTISONE SODIUM SUCCINATE SCH MG: 100 INJECTION, POWDER, FOR SOLUTION INTRAMUSCULAR; INTRAVENOUS at 16:41

## 2017-06-16 RX ADMIN — NYSTATIN SCH APPLIC: 100000 CREAM TOPICAL at 09:12

## 2017-06-16 RX ADMIN — THERA TABS SCH EACH: TAB at 09:07

## 2017-06-16 RX ADMIN — HYDROCORTISONE SODIUM SUCCINATE SCH MG: 100 INJECTION, POWDER, FOR SOLUTION INTRAMUSCULAR; INTRAVENOUS at 23:43

## 2017-06-16 RX ADMIN — HYDROCORTISONE SODIUM SUCCINATE SCH MG: 100 INJECTION, POWDER, FOR SOLUTION INTRAMUSCULAR; INTRAVENOUS at 05:35

## 2017-06-16 RX ADMIN — Medication SCH MG: at 20:58

## 2017-06-16 RX ADMIN — HYDROCORTISONE SODIUM SUCCINATE SCH MG: 100 INJECTION, POWDER, FOR SOLUTION INTRAMUSCULAR; INTRAVENOUS at 10:47

## 2017-06-16 RX ADMIN — VITAMIN D, TAB 1000IU (100/BT) SCH UNITS: 25 TAB at 09:10

## 2017-06-16 RX ADMIN — LEVOFLOXACIN SCH MLS/HR: 500 INJECTION, SOLUTION INTRAVENOUS at 09:12

## 2017-06-16 RX ADMIN — Medication SCH MG: at 09:07

## 2017-06-16 NOTE — CR
Chest: Frontal view of the chest was obtained centered to the left 

side.  Exam time is 7:22 PM.

 

Comparison: Previous chest x-ray performed earlier on the same day 

(4:44 PM).

 

Left-sided PICC line is seen curled within the left subclavian vein 

extending down the thoracic vein.  Atelectasis is noted within the 

right lung base.

 

Impression:

1.  Abnormal position of left-sided PICC line as described above.

 

Diagnostic code #3

## 2017-06-16 NOTE — CR
Chest: Frontal view of the left side of the chest was obtained.  Time 

of exam is 7:23 PM.

 

Comparison: Previous chest x-ray performed earlier on the same day 

(7:22 PM).

 

PICC line has been withdrawn and loop of catheter down the thoracic 

vein has been corrected.  Persistent atelectasis within the right lung

 base is seen.

 

Impression:

1.  Repositioning of PICC line as noted above.

 

Diagnostic code #2

## 2017-06-16 NOTE — CR
Chest: Frontal view of the chest was obtained centered to the left 

chest.  Time of exam is 7:25 PM.

 

Comparison: Previous chest x-ray performed earlier on the same day 

(7:24 PM).

 

PICC line has been advanced.  Tip lies at the region of the superior 

vena cava and right atrial junction in satisfactory position.  

Increased density presumably due to atelectasis seen within the right 

lung base.  Left lung is clear.

 

Impression:

1.  Satisfactory position of PICC line.

2.  Presumed persistent atelectasis within the right lung base.

 

Diagnostic code #2

## 2017-06-16 NOTE — CR
Chest: Frontal view of the chest was obtained centered to the left 

side.  Time of exam is 7:24 PM

 

Comparison: Previous chest x-ray performed on the same day (7:23 PM).

 

PICC line has been advanced and lies within the left brachiocephalic 

vein.  Chest is otherwise unchanged.

 

Impression:

1.  PICC line within the left brachiocephalic vein.

 

Diagnostic code #2

## 2017-06-16 NOTE — CR
Chest: Frontal view of the chest was obtained centered to the left 

side.  Time of exam is 7:26 PM.

 

Comparison: Previous chest x-ray performed earlier on the same day 

(7:25 PM).

 

PICC line has been advanced into the right atrium.  Other portions of 

the exam remain unchanged.

 

Impression:

1.  Tip of PICC line within the right atrium.

 

Diagnostic code #3

 

Agree with preliminary report issued by [facility] ([facility] 

preliminary report dictated on 06/15/17, 9:23 PM Central Time)

## 2017-06-16 NOTE — PCM.PN
- General Info


Date of Service: 06/16/17


Admission Dx/Problem (Free Text): 


Septic Shock and UTI





Subjective Update: 


Follow Up





Functional Status: Reports: pain controlled, urinating.  Denies: new symptoms





- Review of Systems


General: Denies: Fever, Weakness, Fatigue, Malaise, Chills


HEENT: Reports: no symptoms


Pulmonary: Denies: shortness of breath


Cardiovascular: Denies: Chest Pain


Gastrointestinal: Denies: Abdominal pain, Nausea, Vomiting


Genitourinary: Reports: no symptoms


Musculoskeletal: Reports: no symptoms


Neurological: Reports: Difficulty Walking, Gait Disturbance.  Denies: Confusion


Psychiatric: Denies: depression, anxiety, agitation


Systems Review Comment:: 


No significant overnight issues. She is alert and awake this morning. She 

respond to simple questions. 








- Patient Data


Vitals - most recent: 


 Last Vital Signs











Temp  37.6 C   06/16/17 03:40


 


Pulse  92   06/16/17 06:53


 


Resp  20   06/16/17 06:53


 


BP  90/67   06/16/17 06:53


 


Pulse Ox  98   06/16/17 06:53











Weight - most recent: 72.212 kg


I&O - last 24 hours: 


 Intake & Output











 06/15/17 06/16/17 06/16/17





 22:59 06:59 14:59


 


Intake Total  1600 


 


Output Total 1930 1400 


 


Balance -1930 200 











Lab Results last 24 hrs: 


 Laboratory Results - last 24 hr











  06/15/17 06/15/17 06/15/17 Range/Units





  21:00 21:00 21:00 


 


WBC     (3.98-10.04)  K/mm3


 


RBC     (3.98-5.22)  M/mm3


 


Hgb  11.6    (11.2-15.7)  gm/L


 


Hct  35.8    (34.1-44.9)  %


 


MCV     (79.4-94.8)  fl


 


MCH     (25.6-32.2)  pg


 


MCHC     (32.2-35.5)  g/dl


 


RDW Std Deviation     (36.4-46.3)  fL


 


Plt Count     (182-369)  K/mm3


 


MPV     (9.4-12.3)  fl


 


Neut % (Auto)     (34.0-71.1)  %


 


Lymph % (Auto)     (19.3-51.7)  %


 


Mono % (Auto)     (4.7-12.5)  %


 


Eos % (Auto)     (0.7-5.8)  


 


Baso % (Auto)     (0.1-1.2)  %


 


Neut # (Auto)     (1.56-6.13)  K/mm3


 


Lymph # (Auto)     (1.18-3.74)  K/mm3


 


Mono # (Auto)     (0.24-0.36)  K/mm3


 


Eos # (Auto)     (0.04-0.36)  K/mm3


 


Baso # (Auto)     (0.01-0.08)  K/mm3


 


Manual Slide Review     


 


Sodium    141  (136-145)  mEq/L


 


Potassium    3.2 L  (3.5-5.1)  mEq/L


 


Chloride    103  ()  mEq/L


 


Carbon Dioxide    28  (21-32)  mEq/L


 


Anion Gap    13.2  (5-15)  


 


BUN    16  (7-18)  mg/dL


 


Creatinine    1.1 H  (0.55-1.02)  mg/dL


 


Est Cr Clr Drug Dosing    46.78  mL/min


 


Estimated GFR (MDRD)    52  (>60)  mL/min


 


BUN/Creatinine Ratio    14.5  (14-18)  


 


Glucose    129 H  ()  mg/dL


 


Lactic Acid     (0.4-2.0)  mmol/L


 


Calcium    9.1  (8.5-10.1)  mg/dL


 


Magnesium    1.3 L  (1.8-2.4)  mg/dl


 


C-Reactive Protein     (<1.0)  mg/dL


 


B-Natriuretic Peptide   71   (0-100)  pg/mL


 


MRSA (PCR)     














  06/15/17 06/16/17 06/16/17 Range/Units





  22:00 01:35 04:48 


 


WBC    19.86 H  (3.98-10.04)  K/mm3


 


RBC    3.84 L  (3.98-5.22)  M/mm3


 


Hgb    11.1 L  (11.2-15.7)  gm/L


 


Hct    34.5  (34.1-44.9)  %


 


MCV    89.8  (79.4-94.8)  fl


 


MCH    28.9  (25.6-32.2)  pg


 


MCHC    32.2  (32.2-35.5)  g/dl


 


RDW Std Deviation    55.1 H  (36.4-46.3)  fL


 


Plt Count    295  (182-369)  K/mm3


 


MPV    9.9  (9.4-12.3)  fl


 


Neut % (Auto)    91.3 H  (34.0-71.1)  %


 


Lymph % (Auto)    3.7 L  (19.3-51.7)  %


 


Mono % (Auto)    4.5 L  (4.7-12.5)  %


 


Eos % (Auto)    0.1 L  (0.7-5.8)  


 


Baso % (Auto)    0.1  (0.1-1.2)  %


 


Neut # (Auto)    18.17 H  (1.56-6.13)  K/mm3


 


Lymph # (Auto)    0.73 L  (1.18-3.74)  K/mm3


 


Mono # (Auto)    0.89 H  (0.24-0.36)  K/mm3


 


Eos # (Auto)    0.01 L  (0.04-0.36)  K/mm3


 


Baso # (Auto)    0.01  (0.01-0.08)  K/mm3


 


Manual Slide Review    Normal smear  


 


Sodium     (136-145)  mEq/L


 


Potassium     (3.5-5.1)  mEq/L


 


Chloride     ()  mEq/L


 


Carbon Dioxide     (21-32)  mEq/L


 


Anion Gap     (5-15)  


 


BUN     (7-18)  mg/dL


 


Creatinine     (0.55-1.02)  mg/dL


 


Est Cr Clr Drug Dosing     mL/min


 


Estimated GFR (MDRD)     (>60)  mL/min


 


BUN/Creatinine Ratio     (14-18)  


 


Glucose     ()  mg/dL


 


Lactic Acid  2.6 H    (0.4-2.0)  mmol/L


 


Calcium     (8.5-10.1)  mg/dL


 


Magnesium     (1.8-2.4)  mg/dl


 


C-Reactive Protein     (<1.0)  mg/dL


 


B-Natriuretic Peptide     (0-100)  pg/mL


 


MRSA (PCR)   Negative   














  06/16/17 Range/Units





  04:48 


 


WBC   (3.98-10.04)  K/mm3


 


RBC   (3.98-5.22)  M/mm3


 


Hgb   (11.2-15.7)  gm/L


 


Hct   (34.1-44.9)  %


 


MCV   (79.4-94.8)  fl


 


MCH   (25.6-32.2)  pg


 


MCHC   (32.2-35.5)  g/dl


 


RDW Std Deviation   (36.4-46.3)  fL


 


Plt Count   (182-369)  K/mm3


 


MPV   (9.4-12.3)  fl


 


Neut % (Auto)   (34.0-71.1)  %


 


Lymph % (Auto)   (19.3-51.7)  %


 


Mono % (Auto)   (4.7-12.5)  %


 


Eos % (Auto)   (0.7-5.8)  


 


Baso % (Auto)   (0.1-1.2)  %


 


Neut # (Auto)   (1.56-6.13)  K/mm3


 


Lymph # (Auto)   (1.18-3.74)  K/mm3


 


Mono # (Auto)   (0.24-0.36)  K/mm3


 


Eos # (Auto)   (0.04-0.36)  K/mm3


 


Baso # (Auto)   (0.01-0.08)  K/mm3


 


Manual Slide Review   


 


Sodium  140  (136-145)  mEq/L


 


Potassium  4.2  (3.5-5.1)  mEq/L


 


Chloride  104  ()  mEq/L


 


Carbon Dioxide  29  (21-32)  mEq/L


 


Anion Gap  11.2  (5-15)  


 


BUN  14  (7-18)  mg/dL


 


Creatinine  1.1 H  (0.55-1.02)  mg/dL


 


Est Cr Clr Drug Dosing  46.78  mL/min


 


Estimated GFR (MDRD)  52  (>60)  mL/min


 


BUN/Creatinine Ratio  12.7 L  (14-18)  


 


Glucose  211 H  ()  mg/dL


 


Lactic Acid   (0.4-2.0)  mmol/L


 


Calcium  9.4  (8.5-10.1)  mg/dL


 


Magnesium  2.8 H  (1.8-2.4)  mg/dl


 


C-Reactive Protein  21.9 H*  (<1.0)  mg/dL


 


B-Natriuretic Peptide   (0-100)  pg/mL


 


MRSA (PCR)   











Med Orders - Current: 


 Current Medications





Acetaminophen (Tylenol)  650 mg PO Q4H PRN


   PRN Reason: Pain (Mild 1-3)/fever


Hydrocodone Bitart/Acetaminophen (Norco 325-5 Mg)  1 tab PO Q4H PRN


   PRN Reason: Pain (moderate 4-6)


Albuterol/Ipratropium (Duoneb 3.0-0.5 Mg/3 Ml)  3 ml NEB Q4H PRN


   PRN Reason: Shortness Of Breath/wheezing


Bisacodyl (Dulcolax)  5 mg PO DAILY PRN


   PRN Reason: Constipation


Docusate Sodium (Colace)  100 mg PO BID PRN


   PRN Reason: Constipation


Enoxaparin Sodium (Lovenox)  40 mg SUBCUT DAILY YOLIS


Hydralazine HCl (Apresoline)  20 mg IVPUSH Q4H PRN


   PRN Reason: Hypertension


Hydrocortisone Sodium Succinate (Solu-Cortef)  100 mg IVPUSH Q6H YOLIS


   Last Admin: 06/16/17 05:35 Dose:  100 mg


Hydromorphone HCl (Dilaudid)  0.25 mg IVPUSH Q2H PRN


   PRN Reason: Pain (severe 7-10)


Sodium Chloride (Normal Saline)  1,000 mls @ 999 mls/hr IV ASDIRECTED [facility]


   Last Admin: 06/15/17 13:04 Dose:  999 mls/hr


Norepinephrine Bitartrate 4 mg (/ Dextrose/Water)  250 mls @ 15 mls/hr IV 

TITRATE YOLIS; 4 MCG/MIN


   PRN Reason: Protocol


   Last Titration: 06/16/17 05:46 Dose:  3 mcg/min, 11.25 mls/hr


Lactated Ringer's (Ringers, Lactated)  1,000 mls @ 50 mls/hr IV ASDIRECTED [facility]


   Last Infusion: 06/15/17 16:36 Dose:  25 mls/hr


Promethazine HCl 12.5 mg/ (Sodium Chloride)  50.5 mls @ 100 mls/hr IV Q6H PRN


   PRN Reason: Nausea/Vomiting


Piperacillin Sod/Tazobactam (Sod 4.5 gm/ Sodium Chloride)  100 mls @ 25 mls/hr 

IV Q8H [facility]


   Last Admin: 06/16/17 06:45 Dose:  25 mls/hr


Levofloxacin/Dextrose 500 mg/ (Premix)  100 mls @ 100 mls/hr IV Q24H YOLIS


Lorazepam (Ativan)  1 mg IV Q6H PRN


   PRN Reason: Anxiety


Magnesium Sulfate (Pharmacy To Dose - Magnesium Replacement)  1 dose .XX 

ASDIRECTED [facility]


Metoprolol Tartrate (Lopressor)  5 mg IVPUSH Q4H PRN


   PRN Reason: Tachycardia


   Last Admin: 06/15/17 22:04 Dose:  5 mg


Ondansetron HCl (Zofran)  4 mg IV Q6H PRN


   PRN Reason: Nausea/Vomiting


Polyethylene Glycol (Miralax)  17 gm PO DAILY PRN


   PRN Reason: Constipation


Potassium Chloride (Pharmacy To Dose - Potassium Replacement)  1 dose .XX 

ASDIRECTED YOLIS


Saccharomyces Boulardii (Florastor)  250 mg PO BID YOLIS


Senna/Docusate Sodium (Senna Plus)  1 tab PO BID PRN


   PRN Reason: Constipation


Temazepam (Restoril)  15 mg PO BEDTIME PRN


   PRN Reason: Sleep





Discontinued Medications





Baclofen (Lioresal)  30 mg GTUBE ONETIME ONE


   Stop: 06/15/17 17:23


   Last Admin: 06/15/17 21:55 Dose:  Not Given


Etomidate (Amidate) Confirm Administered Dose 40 mg IVPUSH .STK-MED ONE


   Stop: 06/15/17 14:17


   Last Admin: 06/15/17 15:58 Dose:  Not Given


Etomidate (Amidate)  15 mg IVPUSH ONETIME ONE


   Stop: 06/15/17 14:19


   Last Admin: 06/15/17 14:18 Dose:  15 mg


Etomidate (Amidate)  30 mg IVPUSH ONETIME ONE


   Stop: 06/15/17 15:01


   Last Admin: 06/15/17 15:00 Dose:  30 mg


Fentanyl (Sublimaze) Confirm Administered Dose 100 mcg .ROUTE .STK-MED ONE


   Stop: 06/15/17 14:33


   Last Admin: 06/15/17 15:59 Dose:  Not Given


Fentanyl (Sublimaze)  100 mcg IVPUSH ONETIME ONE


   Stop: 06/15/17 14:31


   Last Admin: 06/15/17 14:30 Dose:  100 mcg


Furosemide (Lasix)  40 mg IVPUSH NOW ONE


   Stop: 06/15/17 16:54


   Last Admin: 06/15/17 17:16 Dose:  Not Given


Furosemide (Lasix) Confirm Administered Dose 100 mg .ROUTE .STK-MED ONE


   Stop: 06/15/17 16:57


   Last Admin: 06/15/17 18:25 Dose:  Not Given


Sodium Chloride (Normal Saline) Confirm Administered Dose 1,000 mls @ as 

directed .ROUTE .STK-MED ONE


   Stop: 06/15/17 13:07


   Last Admin: 06/15/17 13:15 Dose:  Not Given


Lactated Ringer's (Ringers, Lactated)  1,000 mls @ 999 mls/hr IV .BOLUS ONE


   Stop: 06/15/17 14:11


   Last Admin: 06/15/17 13:13 Dose:  999 mls/hr


Levofloxacin/Dextrose 750 mg/ (Premix)  150 mls @ 100 mls/hr IV ONETIME ONE


   Stop: 06/15/17 14:57


   Last Admin: 06/15/17 13:45 Dose:  100 mls/hr


Vancomycin HCl 1 gm/ Sodium (Chloride)  250 mls @ 250 mls/hr IV ONETIME ONE


   Stop: 06/15/17 16:52


   Last Admin: 06/15/17 18:25 Dose:  Not Given


Vancomycin HCl 1 gm/ Sodium (Chloride)  250 mls @ 250 mls/hr IV ONETIME ONE


   Stop: 06/15/17 16:52


   Last Admin: 06/15/17 16:36 Dose:  100 mls/hr


Dextrose/Sodium Chloride (Dextrose 5%-Normal Saline)  1,000 mls @ 100 mls/hr IV 

ASDIRECTED [facility]


   Last Admin: 06/15/17 18:26 Dose:  100 mls/hr


Piperacillin Sod/Tazobactam (Sod 4.5 gm/ Sodium Chloride)  100 mls @ 200 mls/hr 

IV ONETIME ONE


   Stop: 06/15/17 17:29


   Last Admin: 06/15/17 18:26 Dose:  200 mls/hr


Magnesium Sulfate (Magnesium Sulfate 2 Gm In Water 50 Ml)  50 mls @ 50 mls/hr 

IV ONETIME ONE


   Stop: 06/15/17 22:29


   Last Admin: 06/15/17 23:25 Dose:  50 mls/hr


Potassium Chloride 10 meq/ (Premix)  100 mls @ 100 mls/hr IV Q1H [facility]


   Stop: 06/16/17 03:14


   Last Admin: 06/16/17 02:04 Dose:  100 mls/hr


Midazolam HCl (Versed 1 Mg/Ml) Confirm Administered Dose 2 mg .ROUTE .STK-MED 

ONE


   Stop: 06/15/17 14:11


   Last Admin: 06/15/17 15:59 Dose:  Not Given


Midazolam HCl (Versed 1 Mg/Ml)  2 mg IVPUSH ONETIME ONE


   Stop: 06/15/17 14:16


   Last Admin: 06/15/17 14:15 Dose:  2 mg


Midazolam HCl (Versed 1 Mg/Ml)  2 mg IVPUSH ONETIME ONE


   Stop: 06/15/17 14:32


   Last Admin: 06/15/17 14:31 Dose:  2 mg


Ondansetron HCl (Zofran)  4 mg IVPUSH ONETIME ONE


   Stop: 06/15/17 13:41


   Last Admin: 06/15/17 13:50 Dose:  4 mg


Saccharomyces Boulardii (Florastor)  250 mg PO TID YOLIS


   Last Admin: 06/15/17 22:22 Dose:  Not Given











- Exam


General: alert, no acute distress


HEENT: Pupils equal, Pupils reactive


Neck: no JVD


Lungs: Normal respiratory effort, Decreased breath sounds


Cardiovascular: Regular Rhythm, Tachycardia


Abdomen: bowel sounds present, soft, no tenderness, distension (mild), other (

PEG tube present)


 (Female) Exam: Deferred (indwelling mcpherson catheter)


Back Exam: Normal Inspection, Decreased Range of Motion


Extremities: no edema, normal pulses, no tenderness/swelling, no clubbing, no 

cyanosis, no calf tenderness


Peripheral Pulses: 2+: Dorsalis Pedis (L), Dorsalis Pedis (R)


Skin: warm, dry, intact


Neurological: no new focal deficit


Psy/Mental Status: alert, normal affect, normal mood





- Problem List Review


Problem List Initiated/Reviewed/Updated: Yes





- My Orders


Last 24 Hours: 


My Active Orders





06/15/17 14:22


CORTISOL [REF] Stat 





06/15/17 16:23


Resuscitation Status Routine 





06/15/17 16:25


RT Aerosol Therapy [RC] ASDIRECTED 


Consult to Case Management [CONS] Routine 


Consult to  [CONS] Routine 


Consult to Spiritual Care [CONS] Routine 


OT Evaluation and Treatment [CONS] Routine 


PT Evaluation and Treatment [CONS] Routine 


Respiratory Care Assess and Treatment [CONS] Routine 





06/15/17 17:00


Hydrocortisone Sod Succinate [Solu-CORTEF]   100 mg IVPUSH Q6H 





06/15/17 19:33


OR PCXR-No Charge-PICC/Central [CR] Routine 





06/15/17 19:54


OR PCXR-No Charge-PICC/Central [CR] Routine 





06/15/17 19:55


OR PCXR-No Charge-PICC/Central [CR] Routine 





06/15/17 21:00


Consult to Dietary [Consult to Dietician] [CONS] Routine 





06/15/17 21:05


Hemoccult [OCCULT BLOOD DIAGNOSTIC] [OP] Routine 





06/15/17 21:17


Metoprolol Tartrate [Lopressor]   5 mg IVPUSH Q4H PRN 


hydrALAZINE [Apresoline]   20 mg IVPUSH Q4H PRN 





06/15/17 21:30


Magnesium Rep Pharmacy to Dose [Pharmacy to Dose - Magnesium Replacement]   1 

dose .XX ASDIRECTED 


Potassium Rep Pharmacy to Dose [Pharmacy to Dose - Potassium Replacement]   1 

dose .XX ASDIRECTED 





06/15/17 23:00


Piperacillin/Tazobactam [Zosyn] 4.5 gm   Sodium Chloride 0.9% [Normal Saline] 

100 ml IV Q8H 





06/16/17 09:00


Levofloxacin/Dextrose 5%-Water [Levaquin in D5W 500 MG/100 ML] 500 mg   Premix 

Bag 1 bag IV Q24H 


Saccharomyces Boulardii [Florastor]   250 mg PO BID 





06/17/17 05:11


BASIC METABOLIC PANEL,BMP [CHEM] AM 


C-REACTIVE PROTEIN [CHEM] AM 


MAGNESIUM [CHEM] AM 





06/18/17 05:11


BASIC METABOLIC PANEL,BMP [CHEM] AM 


C-REACTIVE PROTEIN [CHEM] AM 


MAGNESIUM [CHEM] AM 





06/19/17 05:11


BASIC METABOLIC PANEL,BMP [CHEM] AM 


C-REACTIVE PROTEIN [CHEM] AM 


MAGNESIUM [CHEM] AM 














- Plan


Plan:: 


Assessment/Plan:


Acute:


Sepsis with Shock


   - Likely 2/2 Urosepsis +/- Adrenal Insufficiency given her hx/o MS form 

chronic steroid use


   - On levophed drip, titrate to maintain MAP at or > 65 mmHg, continue pressor


   - Cortisol level then 100 mg IV solucortef Q6


   - Blood Culture x 2: negative so far


   - Start IV Zosyn for pharmacy to dose and continue IV levaquin


   - Probiotic 250 mg po TID


   - Supportive Care





Urinary Tract Infection


   - Risk Factors: Incontinence, Immobility and Possible Neurogenic Bladder


   - UA pos GNR


   - Received Levaquin 750 mg IV x1 in ED


   - UA Cx/Sx





Anemia


   - Hgb is 8.5 on admission, now is 11


   - Baseline is 10-11


   - H/H at 2000 tonight: 11.6/35.8


   - Likely hemodilution





Leukocytosis


   - WBC 13.02---> 91.86 and CRP 10.10 ---> 21.19


   - Treat underlying cause above





Stage 2-3 Sacral Decubitus Ulcer


   - Continue PT for wound care





Need for central line access


   - Attempted in ED but w/o any any success    - 


   - S/p PICC line placement by CRNA


   - Stable





Chronic:


MS


Dysphagia


S/p PEG Tube Placement


Constipation


Recurrent UTI


OA


Chronic Back Pain


Osteoporosis


Azheimer's Disease


Hx/o CVA


Seizure


Hx/o VTEs/DVTs


Depression


Insomnia


Eosinophilia


Chronic Debility





Plan:


She is still hemodynamically unstable


Continue current treatment


Start tube feed as soon as possible


Routine AM Labs


Continue PT/OT 


Dietary consult for PEG tube feeds


SW/RENA for d/c planning


Code status: DNR

## 2017-06-17 RX ADMIN — NYSTATIN SCH APPLIC: 100000 CREAM TOPICAL at 08:39

## 2017-06-17 RX ADMIN — LORAZEPAM PRN MG: 2 INJECTION INTRAMUSCULAR; INTRAVENOUS at 16:22

## 2017-06-17 RX ADMIN — Medication SCH MG: at 08:39

## 2017-06-17 RX ADMIN — HYDROCORTISONE SODIUM SUCCINATE SCH MG: 100 INJECTION, POWDER, FOR SOLUTION INTRAMUSCULAR; INTRAVENOUS at 22:02

## 2017-06-17 RX ADMIN — HYDROCORTISONE SODIUM SUCCINATE SCH MG: 100 INJECTION, POWDER, FOR SOLUTION INTRAMUSCULAR; INTRAVENOUS at 11:30

## 2017-06-17 RX ADMIN — LEVOFLOXACIN SCH MLS/HR: 500 INJECTION, SOLUTION INTRAVENOUS at 08:37

## 2017-06-17 RX ADMIN — HYDROCORTISONE SODIUM SUCCINATE SCH MG: 100 INJECTION, POWDER, FOR SOLUTION INTRAMUSCULAR; INTRAVENOUS at 05:55

## 2017-06-17 RX ADMIN — Medication SCH MG: at 22:03

## 2017-06-17 RX ADMIN — VITAMIN D, TAB 1000IU (100/BT) SCH UNITS: 25 TAB at 08:37

## 2017-06-17 RX ADMIN — THERA TABS SCH EACH: TAB at 08:39

## 2017-06-17 RX ADMIN — HYDROCORTISONE SODIUM SUCCINATE SCH MG: 100 INJECTION, POWDER, FOR SOLUTION INTRAMUSCULAR; INTRAVENOUS at 16:22

## 2017-06-17 RX ADMIN — NYSTATIN SCH APPLIC: 100000 CREAM TOPICAL at 22:03

## 2017-06-17 NOTE — PCM.PN
- General Info


Date of Service: 06/17/17


Admission Dx/Problem (Free Text): 


Septic Shock and UTI





Subjective Update: 


Follow Up





Functional Status: Reports: pain controlled, tolerating diet, urinating.  Denies

: ambulating, new symptoms





- Review of Systems


General: Denies: Fever, Chills


HEENT: Reports: no symptoms


Pulmonary: Denies: shortness of breath


Cardiovascular: Denies: Chest Pain


Gastrointestinal: Denies: Abdominal pain, Nausea, Vomiting


Genitourinary: Reports: no symptoms


Musculoskeletal: Reports: no symptoms


Skin: Denies: cyanosis, jaundice, rash


Neurological: Reports: Difficulty Walking, Weakness, Gait Disturbance.  Denies: 

Confusion, Pre-Existing Deficit


Psychiatric: Denies: depression, anxiety, agitation, hallucinations


Systems Review Comment:: 


No significant overnight or acute issues. She is still on levophed at 1 mcg/hr. 

She slept swell. 








- Patient Data


Vitals - most recent: 


 Last Vital Signs











Temp  36.8 C   06/17/17 04:00


 


Pulse  58 L  06/17/17 05:00


 


Resp  17   06/17/17 05:00


 


BP  105/65   06/17/17 05:00


 


Pulse Ox  93 L  06/17/17 05:00











Weight - most recent: 74.661 kg


I&O - last 24 hours: 


 Intake & Output











 06/16/17 06/17/17 06/17/17





 22:59 06:59 14:59


 


Intake Total 1790 1950 


 


Output Total 425 600 


 


Balance 1365 1350 











Lab Results last 24 hrs: 


 Laboratory Results - last 24 hr











  06/16/17 06/17/17 Range/Units





  08:20 05:24 


 


WBC   13.62 H  (3.98-10.04)  K/mm3


 


RBC   3.55 L  (3.98-5.22)  M/mm3


 


Hgb   10.2 L  (11.2-15.7)  gm/L


 


Hct   31.9 L  (34.1-44.9)  %


 


MCV   89.9  (79.4-94.8)  fl


 


MCH   28.7  (25.6-32.2)  pg


 


MCHC   32.0 L  (32.2-35.5)  g/dl


 


RDW Std Deviation   54.4 H  (36.4-46.3)  fL


 


Plt Count   254  (182-369)  K/mm3


 


MPV   10.3  (9.4-12.3)  fl


 


Neut % (Auto)   83.7 H  (34.0-71.1)  %


 


Lymph % (Auto)   9.8 L  (19.3-51.7)  %


 


Mono % (Auto)   6.1  (4.7-12.5)  %


 


Eos % (Auto)   0 L  (0.7-5.8)  


 


Baso % (Auto)   0.1  (0.1-1.2)  %


 


Neut # (Auto)   11.40 H  (1.56-6.13)  K/mm3


 


Lymph # (Auto)   1.34  (1.18-3.74)  K/mm3


 


Mono # (Auto)   0.83 H  (0.24-0.36)  K/mm3


 


Eos # (Auto)   0.00 L  (0.04-0.36)  K/mm3


 


Baso # (Auto)   0.01  (0.01-0.08)  K/mm3


 


Lactic Acid  1.0   (0.4-2.0)  mmol/L











Med Orders - Current: 


 Current Medications





Acetaminophen (Tylenol)  650 mg PO Q4H PRN


   PRN Reason: Pain (Mild 1-3)/fever


Hydrocodone Bitart/Acetaminophen (Norco 325-5 Mg)  1 tab PO Q4H PRN


   PRN Reason: Pain (moderate 4-6)


Albuterol/Ipratropium (Duoneb 3.0-0.5 Mg/3 Ml)  3 ml NEB Q4H PRN


   PRN Reason: Shortness Of Breath/wheezing


Baclofen (Lioresal)  30 mg PO TID [facility]


   Last Admin: 06/16/17 20:56 Dose:  30 mg


Bisacodyl (Dulcolax)  5 mg PO DAILY PRN


   PRN Reason: Constipation


Cholecalciferol (Vitamin D3)  2,000 units PO DAILY [facility]


   Last Admin: 06/16/17 09:10 Dose:  2,000 units


Docusate Sodium (Colace)  100 mg PO BID PRN


   PRN Reason: Constipation


Enoxaparin Sodium (Lovenox)  40 mg SUBCUT DAILY [facility]


   Last Admin: 06/16/17 09:11 Dose:  40 mg


Fluoxetine HCl (Prozac)  10 mg PO DAILY [facility]


   Last Admin: 06/16/17 09:10 Dose:  10 mg


Hydralazine HCl (Apresoline)  20 mg IVPUSH Q4H PRN


   PRN Reason: Hypertension


Hydrocortisone Sodium Succinate (Solu-Cortef)  100 mg IVPUSH Q6H [facility]


   Last Admin: 06/17/17 05:55 Dose:  100 mg


Hydromorphone HCl (Dilaudid)  0.25 mg IVPUSH Q2H PRN


   PRN Reason: Pain (severe 7-10)


Sodium Chloride (Normal Saline)  1,000 mls @ 999 mls/hr IV ASDIRECTED [facility]


   Last Admin: 06/15/17 13:04 Dose:  999 mls/hr


Norepinephrine Bitartrate 4 mg (/ Dextrose/Water)  250 mls @ 15 mls/hr IV 

TITRATE YOLIS; 4 MCG/MIN


   PRN Reason: Protocol


   Last Titration: 06/17/17 06:05 Dose:  1 mcg/min, 3.75 mls/hr


Lactated Ringer's (Ringers, Lactated)  1,000 mls @ 50 mls/hr IV ASDIRECTED [facility]


   Last Admin: 06/16/17 17:06 Dose:  25 mls/hr


Promethazine HCl 12.5 mg/ (Sodium Chloride)  50.5 mls @ 100 mls/hr IV Q6H PRN


   PRN Reason: Nausea/Vomiting


Piperacillin Sod/Tazobactam (Sod 4.5 gm/ Sodium Chloride)  100 mls @ 25 mls/hr 

IV Q8H [facility]


   Last Admin: 06/17/17 06:10 Dose:  25 mls/hr


Levofloxacin/Dextrose 500 mg/ (Premix)  100 mls @ 100 mls/hr IV Q24H [facility]


   Last Admin: 06/16/17 09:12 Dose:  100 mls/hr


Lactulose (Cephulac)  10 gm PO DAILY PRN


   PRN Reason: CONSTIPATION


   Last Admin: 06/16/17 09:11 Dose:  10 gm


Levetiracetam (Keppra)  1,000 mg PO BID [facility]


   Last Admin: 06/16/17 20:56 Dose:  1,000 mg


Lorazepam (Ativan)  1 mg IV Q6H PRN


   PRN Reason: Anxiety


   Last Admin: 06/16/17 18:22 Dose:  1 mg


Lorazepam (Ativan)  0.25 mg PO BID [facility]


   Last Admin: 06/16/17 20:57 Dose:  0.25 mg


Magnesium Oxide (Magnesium Oxide)  400 mg PO QPM [facility]


   Last Admin: 06/16/17 17:51 Dose:  400 mg


Magnesium Sulfate (Pharmacy To Dose - Magnesium Replacement)  1 dose .XX 

ASDIRECTED [facility]


Metoprolol Tartrate (Lopressor)  5 mg IVPUSH Q4H PRN


   PRN Reason: Tachycardia


   Last Admin: 06/15/17 22:04 Dose:  5 mg


Multivitamins (Thera)  1 each PO DAILY [facility]


   Last Admin: 06/16/17 09:07 Dose:  1 each


Nystatin (Nystatin Crm)  0 gm TOP BID [facility]


   Last Admin: 06/16/17 20:58 Dose:  1 applic


Ondansetron HCl (Zofran)  4 mg IV Q6H PRN


   PRN Reason: Nausea/Vomiting


Polyethylene Glycol (Miralax)  17 gm PO DAILY PRN


   PRN Reason: Constipation


Potassium Chloride (Pharmacy To Dose - Potassium Replacement)  1 dose .XX 

ASDIRECTED [facility]


Saccharomyces Boulardii (Florastor)  250 mg PO BID [facility]


   Last Admin: 06/16/17 20:58 Dose:  250 mg


Senna/Docusate Sodium (Senna Plus)  2 tab PO BID [facility]


   Last Admin: 06/16/17 20:57 Dose:  2 tab


Temazepam (Restoril)  15 mg PO BEDTIME PRN


   PRN Reason: Sleep


Tizanidine HCl (Zanaflex)  2 mg PO TID [facility]


   Last Admin: 06/16/17 20:57 Dose:  2 mg





Discontinued Medications





Acetaminophen (Tylenol)   mg PO Q4HR PRN


   PRN Reason: Fever


Baclofen (Lioresal)  30 mg GTUBE ONETIME ONE


   Stop: 06/15/17 17:23


   Last Admin: 06/15/17 21:55 Dose:  Not Given


Etomidate (Amidate) Confirm Administered Dose 40 mg IVPUSH .STK-MED ONE


   Stop: 06/15/17 14:17


   Last Admin: 06/15/17 15:58 Dose:  Not Given


Etomidate (Amidate)  15 mg IVPUSH ONETIME ONE


   Stop: 06/15/17 14:19


   Last Admin: 06/15/17 14:18 Dose:  15 mg


Etomidate (Amidate)  30 mg IVPUSH ONETIME ONE


   Stop: 06/15/17 15:01


   Last Admin: 06/15/17 15:00 Dose:  30 mg


Fentanyl (Sublimaze) Confirm Administered Dose 100 mcg .ROUTE .STK-MED ONE


   Stop: 06/15/17 14:33


   Last Admin: 06/15/17 15:59 Dose:  Not Given


Fentanyl (Sublimaze)  100 mcg IVPUSH ONETIME ONE


   Stop: 06/15/17 14:31


   Last Admin: 06/15/17 14:30 Dose:  100 mcg


Furosemide (Lasix)  40 mg IVPUSH NOW ONE


   Stop: 06/15/17 16:54


   Last Admin: 06/15/17 17:16 Dose:  Not Given


Furosemide (Lasix) Confirm Administered Dose 100 mg .ROUTE .ST-MED ONE


   Stop: 06/15/17 16:57


   Last Admin: 06/15/17 18:25 Dose:  Not Given


Sodium Chloride (Normal Saline) Confirm Administered Dose 1,000 mls @ as 

directed .ROUTE .[facility]-MED ONE


   Stop: 06/15/17 13:07


   Last Admin: 06/15/17 13:15 Dose:  Not Given


Lactated Ringer's (Ringers, Lactated)  1,000 mls @ 999 mls/hr IV .BOLUS ONE


   Stop: 06/15/17 14:11


   Last Admin: 06/15/17 13:13 Dose:  999 mls/hr


Levofloxacin/Dextrose 750 mg/ (Premix)  150 mls @ 100 mls/hr IV ONETIME ONE


   Stop: 06/15/17 14:57


   Last Admin: 06/15/17 13:45 Dose:  100 mls/hr


Vancomycin HCl 1 gm/ Sodium (Chloride)  250 mls @ 250 mls/hr IV ONETIME ONE


   Stop: 06/15/17 16:52


   Last Admin: 06/15/17 18:25 Dose:  Not Given


Vancomycin HCl 1 gm/ Sodium (Chloride)  250 mls @ 250 mls/hr IV ONETIME ONE


   Stop: 06/15/17 16:52


   Last Admin: 06/15/17 16:36 Dose:  100 mls/hr


Dextrose/Sodium Chloride (Dextrose 5%-Normal Saline)  1,000 mls @ 100 mls/hr IV 

ASDIRECTED [facility]


   Last Admin: 06/15/17 18:26 Dose:  100 mls/hr


Piperacillin Sod/Tazobactam (Sod 4.5 gm/ Sodium Chloride)  100 mls @ 200 mls/hr 

IV ONETIME ONE


   Stop: 06/15/17 17:29


   Last Admin: 06/15/17 18:26 Dose:  200 mls/hr


Magnesium Sulfate (Magnesium Sulfate 2 Gm In Water 50 Ml)  50 mls @ 50 mls/hr 

IV ONETIME ONE


   Stop: 06/15/17 22:29


   Last Admin: 06/15/17 23:25 Dose:  50 mls/hr


Potassium Chloride 10 meq/ (Premix)  100 mls @ 100 mls/hr IV Q1H [facility]


   Stop: 06/16/17 03:14


   Last Admin: 06/16/17 02:04 Dose:  100 mls/hr


Midazolam HCl (Versed 1 Mg/Ml) Confirm Administered Dose 2 mg .ROUTE .STK-MED 

ONE


   Stop: 06/15/17 14:11


   Last Admin: 06/15/17 15:59 Dose:  Not Given


Midazolam HCl (Versed 1 Mg/Ml)  2 mg IVPUSH ONETIME ONE


   Stop: 06/15/17 14:16


   Last Admin: 06/15/17 14:15 Dose:  2 mg


Midazolam HCl (Versed 1 Mg/Ml)  2 mg IVPUSH ONETIME ONE


   Stop: 06/15/17 14:32


   Last Admin: 06/15/17 14:31 Dose:  2 mg


Midazolam HCl (Versed 1 Mg/Ml)  5 mg .ROUTE .STK-MED ONE


   Stop: 06/15/17 14:33


Ondansetron HCl (Zofran)  4 mg IVPUSH ONETIME ONE


   Stop: 06/15/17 13:41


   Last Admin: 06/15/17 13:50 Dose:  4 mg


Saccharomyces Boulardii (Florastor)  250 mg PO TID [facility]


   Last Admin: 06/15/17 22:22 Dose:  Not Given


Senna/Docusate Sodium (Senna Plus)  1 tab PO BID PRN


   PRN Reason: Constipation











- Exam


General: alert, no acute distress


HEENT: Pupils equal, Pupils reactive


Neck: supple, trachea midline, no JVD


Lungs: Normal respiratory effort, Decreased breath sounds


Cardiovascular: Regular Rate, Regular Rhythm


Abdomen: bowel sounds present, soft, distension, other (PEG tube).  No: rigidity

, tenderness


 (Female) Exam: Deferred


Back Exam: Normal Inspection, Decreased Range of Motion


Extremities: no edema, normal pulses, no tenderness/swelling, no clubbing, no 

cyanosis, no calf tenderness


Peripheral Pulses: 2+: Dorsalis Pedis (L), Dorsalis Pedis (R)


Skin: warm, dry, intact


Neurological: no new focal deficit


Psy/Mental Status: alert, normal affect, normal mood





- Problem List Review


Problem List Initiated/Reviewed/Updated: Yes





- My Orders


Last 24 Hours: 


My Active Orders





06/16/17 07:40


PT Evaluation and Treatment [CONS] Routine 





06/16/17 08:15


Lactulose [Cephulac]   10 gm PO DAILY PRN 





06/16/17 09:00


Baclofen [Lioresal]   30 mg PO TID 


Cholecalciferol (Vitamin D3) [Vitamin D3]   2,000 units PO DAILY 


Docusate Sodium/Sennosides [Senna Plus]   2 tab PO BID 


FLUoxetine [PROzac]   10 mg PO DAILY 


LORazepam [Ativan]   0.25 mg PO BID 


Levofloxacin/Dextrose 5%-Water [Levaquin in D5W 500 MG/100 ML] 500 mg   Premix 

Bag 1 bag IV Q24H 


Multivitamins,Therapeutic [Thera]   1 each PO DAILY 


Nystatin [Nystatin Crm]   0 gm TOP BID 


Saccharomyces Boulardii [Florastor]   250 mg PO BID 


levETIRAcetam [Keppra]   1,000 mg PO BID 


tiZANidine [Zanaflex]   2 mg PO TID 





06/16/17 18:00


Magnesium Oxide   400 mg PO QPM 





06/16/17 Dinner


Tube Feeding Adult Diet [DIET] 





06/17/17 05:24


BASIC METABOLIC PANEL,BMP [CHEM] AM 


C-REACTIVE PROTEIN [CHEM] AM 


MAGNESIUM [CHEM] AM 





06/18/17 05:11


BASIC METABOLIC PANEL,BMP [CHEM] AM 


C-REACTIVE PROTEIN [CHEM] AM 


MAGNESIUM [CHEM] AM 





06/19/17 05:11


BASIC METABOLIC PANEL,BMP [CHEM] AM 


C-REACTIVE PROTEIN [CHEM] AM 


MAGNESIUM [CHEM] AM 














- Plan


Plan:: 


Assessment/Plan:


Acute:


Sepsis with Shock, Improving


   - Likely 2/2 Urosepsis +/- Adrenal Insufficiency given her hx/o MS form 

chronic steroid use


   - On levophed drip, titrate to maintain MAP at or > 65 mmHg, continue pressor


   - Continue 100 mg IV solucortef Q6


   - Blood Culture x 2: negative so far


   - Start IV Zosyn for pharmacy to dose and continue IV levaquin


   - Probiotic 250 mg po TID


   - Supportive Care





Critical Illness Related Corticosteroids insufficiency


   - Cannot r/o AI given her hx/o MS (years of steroid dependence) 


   - Low cortisol level


   - Continue solucortef until pressures are better





Urinary Tract Infection


   - Risk Factors: Incontinence, Immobility and Possible Neurogenic Bladder


   - UA pos E. coli


   - Received Levaquin 750 mg IV x1 in ED


   - UA Cx/Sx: E. coli resistant to Levaquin


   - Will d/c Levaquin and start 1 gram Rocephin


   


Anemia


   - Hgb is 8.5 on admission, now is 10.2


   - Baseline is 10-11


   - H/H at 2000 tonight: 11.6/35.8


   - 2/2 hemodilution





Leukocytosis


   - WBC now 13.61 and CRP 17


   - Treat underlying cause above





Stage 2-3 Sacral Decubitus Ulcer


   - Continue PT for wound care





Need for central line access


   - Attempted in ED but w/o any any success    - 


   - S/p PICC line placement by CRNA


   - Stable





Chronic:


MS


Dysphagia


S/p PEG Tube Placement


Constipation


Recurrent UTI


OA


Chronic Back Pain


Osteoporosis


Azheimer's Disease


Hx/o CVA


Seizure


Hx/o VTEs/DVTs


Depression


Insomnia


Eosinophilia


Chronic Debility





Plan:


She is improving hemodynamically 


Continue current treatment


She is now on receiving nourishment via tube feeds


Routine AM Labs


Continue PT/OT 


SW/CM for d/c planning


Code status: DNR

## 2017-06-18 RX ADMIN — NYSTATIN SCH APPLIC: 100000 CREAM TOPICAL at 08:29

## 2017-06-18 RX ADMIN — VITAMIN D, TAB 1000IU (100/BT) SCH UNITS: 25 TAB at 08:35

## 2017-06-18 RX ADMIN — THERA TABS SCH EACH: TAB at 08:30

## 2017-06-18 RX ADMIN — POTASSIUM CHLORIDE SCH MEQ: 1.5 SOLUTION ORAL at 16:42

## 2017-06-18 RX ADMIN — Medication SCH MG: at 21:46

## 2017-06-18 RX ADMIN — NYSTATIN SCH APPLIC: 100000 CREAM TOPICAL at 21:48

## 2017-06-18 RX ADMIN — Medication SCH MG: at 08:36

## 2017-06-18 RX ADMIN — POTASSIUM CHLORIDE SCH MEQ: 1.5 SOLUTION ORAL at 14:21

## 2017-06-18 RX ADMIN — HYDROCORTISONE SODIUM SUCCINATE SCH MG: 100 INJECTION, POWDER, FOR SOLUTION INTRAMUSCULAR; INTRAVENOUS at 06:06

## 2017-06-18 RX ADMIN — POTASSIUM CHLORIDE SCH: 1.5 SOLUTION ORAL at 11:39

## 2017-06-18 NOTE — PCM.PN
- General Info


Date of Service: 06/18/17


Admission Dx/Problem (Free Text): 


Septic Shock and UTI





Subjective Update: 


Follow Up





Functional Status: Reports: pain controlled, tolerating diet, urinating.  Denies

: new symptoms





- Review of Systems


General: Denies: Fever, Chills


HEENT: Reports: no symptoms


Pulmonary: Denies: shortness of breath


Cardiovascular: Denies: Chest Pain, Palpitations


Gastrointestinal: Denies: Abdominal pain, Nausea, Vomiting


Genitourinary: Reports: no symptoms


Musculoskeletal: Reports: no symptoms


Skin: Reports: no symptoms


Neurological: Reports: Pre-Existing Deficit, Trouble Speaking, Difficulty 

Walking, Weakness, Gait Disturbance


Psychiatric: Denies: depression, anxiety, agitation


Systems Review Comment:: 


No significant overnight or acute issues. She is at baseline. She is alert and 

awake and able to answer basic questions.








- Patient Data


Vitals - most recent: 


 Last Vital Signs











Temp  36.7 C   06/18/17 04:00


 


Pulse  63   06/17/17 22:00


 


Resp  14   06/18/17 04:00


 


BP  107/57 L  06/18/17 04:00


 


Pulse Ox  95   06/18/17 04:00











Weight - most recent: 74.661 kg


I&O - last 24 hours: 


 Intake & Output











 06/17/17 06/17/17 06/18/17





 14:59 22:59 06:59


 


Intake Total 905 1361 1509


 


Output Total 420 650 450


 


Balance 











Lab Results last 24 hrs: 


 Laboratory Results - last 24 hr











  06/17/17 06/17/17 06/18/17 Range/Units





  05:24 05:24 04:45 


 


WBC  13.62 H   9.82  (3.98-10.04)  K/mm3


 


RBC  3.55 L   3.53 L  (3.98-5.22)  M/mm3


 


Hgb  10.2 L   10.2 L  (11.2-15.7)  gm/L


 


Hct  31.9 L   31.6 L  (34.1-44.9)  %


 


MCV  89.9   89.5  (79.4-94.8)  fl


 


MCH  28.7   28.9  (25.6-32.2)  pg


 


MCHC  32.0 L   32.3  (32.2-35.5)  g/dl


 


RDW Std Deviation  54.4 H   54.2 H  (36.4-46.3)  fL


 


Plt Count  254   259  (182-369)  K/mm3


 


MPV  10.3   10.5  (9.4-12.3)  fl


 


Neut % (Auto)  83.7 H   79.3 H  (34.0-71.1)  %


 


Lymph % (Auto)  9.8 L   13.6 L  (19.3-51.7)  %


 


Mono % (Auto)  6.1   5.4  (4.7-12.5)  %


 


Eos % (Auto)  0 L   0 L  (0.7-5.8)  


 


Baso % (Auto)  0.1   0.1  (0.1-1.2)  %


 


Neut # (Auto)  11.40 H   7.78 H  (1.56-6.13)  K/mm3


 


Lymph # (Auto)  1.34   1.34  (1.18-3.74)  K/mm3


 


Mono # (Auto)  0.83 H   0.53 H  (0.24-0.36)  K/mm3


 


Eos # (Auto)  0.00 L   0.00 L  (0.04-0.36)  K/mm3


 


Baso # (Auto)  0.01   0.01  (0.01-0.08)  K/mm3


 


Manual Slide Review  Abnormal smear   Abnormal smear  


 


Sodium   142   (136-145)  mEq/L


 


Potassium   3.4 L   (3.5-5.1)  mEq/L


 


Chloride   105   ()  mEq/L


 


Carbon Dioxide   28   (21-32)  mEq/L


 


Anion Gap   12.4   (5-15)  


 


BUN   15   (7-18)  mg/dL


 


Creatinine   0.7   (0.55-1.02)  mg/dL


 


Est Cr Clr Drug Dosing   73.51   mL/min


 


Estimated GFR (MDRD)   > 60   (>60)  mL/min


 


BUN/Creatinine Ratio   21.4 H   (14-18)  


 


Glucose   140 H   ()  mg/dL


 


Calcium   8.9   (8.5-10.1)  mg/dL


 


Magnesium   1.8   (1.8-2.4)  mg/dl


 


C-Reactive Protein   17.8 H*   (<1.0)  mg/dL











Med Orders - Current: 


 Current Medications





Acetaminophen (Tylenol)  650 mg PO Q4H PRN


   PRN Reason: Pain (Mild 1-3)/fever


Hydrocodone Bitart/Acetaminophen (Norco 325-5 Mg)  1 tab PO Q4H PRN


   PRN Reason: Pain (moderate 4-6)


   Last Admin: 06/17/17 08:38 Dose:  1 tab


Albuterol/Ipratropium (Duoneb 3.0-0.5 Mg/3 Ml)  3 ml NEB Q4H PRN


   PRN Reason: Shortness Of Breath/wheezing


Baclofen (Lioresal)  30 mg PO TID [facility]


   Last Admin: 06/17/17 22:02 Dose:  30 mg


Bisacodyl (Dulcolax)  5 mg PO DAILY PRN


   PRN Reason: Constipation


   Last Admin: 06/17/17 08:39 Dose:  5 mg


Cholecalciferol (Vitamin D3)  2,000 units PO DAILY [facility]


   Last Admin: 06/17/17 08:37 Dose:  2,000 units


Docusate Sodium (Colace)  100 mg PO BID PRN


   PRN Reason: Constipation


Enoxaparin Sodium (Lovenox)  40 mg SUBCUT DAILY [facility]


   Last Admin: 06/17/17 08:40 Dose:  40 mg


Fluoxetine HCl (Prozac)  10 mg PO DAILY [facility]


   Last Admin: 06/17/17 08:39 Dose:  10 mg


Hydralazine HCl (Apresoline)  20 mg IVPUSH Q4H PRN


   PRN Reason: Hypertension


Hydrocortisone Sodium Succinate (Solu-Cortef)  100 mg IVPUSH Q6H [facility]


   Last Admin: 06/18/17 06:06 Dose:  100 mg


Hydromorphone HCl (Dilaudid)  0.25 mg IVPUSH Q2H PRN


   PRN Reason: Pain (severe 7-10)


Sodium Chloride (Normal Saline)  1,000 mls @ 999 mls/hr IV ASDIRECTED [facility]


   Last Admin: 06/15/17 13:04 Dose:  999 mls/hr


Norepinephrine Bitartrate 4 mg (/ Dextrose/Water)  250 mls @ 15 mls/hr IV 

TITRATE [facility]; 4 MCG/MIN


   PRN Reason: Protocol


   Last Titration: 06/17/17 17:30 Dose:  0 mcg/min, 0 mls/hr


Lactated Ringer's (Ringers, Lactated)  1,000 mls @ 50 mls/hr IV ASDIRECTED [facility]


   Last Admin: 06/16/17 17:06 Dose:  25 mls/hr


Promethazine HCl 12.5 mg/ (Sodium Chloride)  50.5 mls @ 100 mls/hr IV Q6H PRN


   PRN Reason: Nausea/Vomiting


Ceftriaxone Sodium 1 gm/ (Sodium Chloride)  100 mls @ 200 mls/hr IV Q24H [facility]


   Last Admin: 06/17/17 16:22 Dose:  200 mls/hr


Lactulose (Cephulac)  10 gm PO DAILY PRN


   PRN Reason: CONSTIPATION


   Last Admin: 06/16/17 09:11 Dose:  10 gm


Levetiracetam (Keppra)  1,000 mg PO BID [facility]


   Last Admin: 06/17/17 22:03 Dose:  1,000 mg


Lorazepam (Ativan)  1 mg IV Q6H PRN


   PRN Reason: Anxiety


   Last Admin: 06/17/17 16:22 Dose:  1 mg


Lorazepam (Ativan)  0.25 mg PO BID [facility]


   Last Admin: 06/17/17 22:02 Dose:  0.25 mg


Magnesium Oxide (Magnesium Oxide)  400 mg PO QPM [facility]


   Last Admin: 06/17/17 17:06 Dose:  Not Given


Magnesium Sulfate (Pharmacy To Dose - Magnesium Replacement)  1 dose .XX 

ASDIRECTED [facility]


Metoprolol Tartrate (Lopressor)  5 mg IVPUSH Q4H PRN


   PRN Reason: Tachycardia


   Last Admin: 06/15/17 22:04 Dose:  5 mg


Multivitamins (Thera)  1 each PO DAILY [facility]


   Last Admin: 06/17/17 08:39 Dose:  1 each


Nystatin (Nystatin Crm)  0 gm TOP BID [facility]


   Last Admin: 06/17/17 22:03 Dose:  1 applic


Ondansetron HCl (Zofran)  4 mg IV Q6H PRN


   PRN Reason: Nausea/Vomiting


Polyethylene Glycol (Miralax)  17 gm PO DAILY PRN


   PRN Reason: Constipation


Potassium Chloride (Pharmacy To Dose - Potassium Replacement)  1 dose .XX 

ASDIRECTED [facility]


Saccharomyces Boulardii (Florastor)  250 mg PO BID [facility]


   Last Admin: 06/17/17 22:03 Dose:  250 mg


Senna/Docusate Sodium (Senna Plus)  2 tab PO BID [facility]


   Last Admin: 06/17/17 22:03 Dose:  2 tab


Temazepam (Restoril)  15 mg PO BEDTIME PRN


   PRN Reason: Sleep


Tizanidine HCl (Zanaflex)  2 mg PO TID [facility]


   Last Admin: 06/17/17 22:02 Dose:  2 mg





Discontinued Medications





Acetaminophen (Tylenol)   mg PO Q4HR PRN


   PRN Reason: Fever


Baclofen (Lioresal)  30 mg GTUBE ONETIME ONE


   Stop: 06/15/17 17:23


   Last Admin: 06/15/17 21:55 Dose:  Not Given


Etomidate (Amidate) Confirm Administered Dose 40 mg IVPUSH .STK-MED ONE


   Stop: 06/15/17 14:17


   Last Admin: 06/15/17 15:58 Dose:  Not Given


Etomidate (Amidate)  15 mg IVPUSH ONETIME ONE


   Stop: 06/15/17 14:19


   Last Admin: 06/15/17 14:18 Dose:  15 mg


Etomidate (Amidate)  30 mg IVPUSH ONETIME ONE


   Stop: 06/15/17 15:01


   Last Admin: 06/15/17 15:00 Dose:  30 mg


Fentanyl (Sublimaze) Confirm Administered Dose 100 mcg .ROUTE .STK-MED ONE


   Stop: 06/15/17 14:33


   Last Admin: 06/15/17 15:59 Dose:  Not Given


Fentanyl (Sublimaze)  100 mcg IVPUSH ONETIME ONE


   Stop: 06/15/17 14:31


   Last Admin: 06/15/17 14:30 Dose:  100 mcg


Furosemide (Lasix)  40 mg IVPUSH NOW ONE


   Stop: 06/15/17 16:54


   Last Admin: 06/15/17 17:16 Dose:  Not Given


Furosemide (Lasix) Confirm Administered Dose 100 mg .ROUTE .STK-MED ONE


   Stop: 06/15/17 16:57


   Last Admin: 06/15/17 18:25 Dose:  Not Given


Sodium Chloride (Normal Saline) Confirm Administered Dose 1,000 mls @ as 

directed .ROUTE .STK-MED ONE


   Stop: 06/15/17 13:07


   Last Admin: 06/15/17 13:15 Dose:  Not Given


Lactated Ringer's (Ringers, Lactated)  1,000 mls @ 999 mls/hr IV .BOLUS ONE


   Stop: 06/15/17 14:11


   Last Admin: 06/15/17 13:13 Dose:  999 mls/hr


Levofloxacin/Dextrose 750 mg/ (Premix)  150 mls @ 100 mls/hr IV ONETIME ONE


   Stop: 06/15/17 14:57


   Last Admin: 06/15/17 13:45 Dose:  100 mls/hr


Vancomycin HCl 1 gm/ Sodium (Chloride)  250 mls @ 250 mls/hr IV ONETIME ONE


   Stop: 06/15/17 16:52


   Last Admin: 06/15/17 18:25 Dose:  Not Given


Vancomycin HCl 1 gm/ Sodium (Chloride)  250 mls @ 250 mls/hr IV ONETIME ONE


   Stop: 06/15/17 16:52


   Last Admin: 06/15/17 16:36 Dose:  100 mls/hr


Piperacillin Sod/Tazobactam (Sod 4.5 gm/ Sodium Chloride)  100 mls @ 25 mls/hr 

IV Q8H [facility]


   Last Admin: 06/17/17 15:58 Dose:  Not Given


Dextrose/Sodium Chloride (Dextrose 5%-Normal Saline)  1,000 mls @ 100 mls/hr IV 

ASDIRECTED [facility]


   Last Admin: 06/15/17 18:26 Dose:  100 mls/hr


Piperacillin Sod/Tazobactam (Sod 4.5 gm/ Sodium Chloride)  100 mls @ 200 mls/hr 

IV ONETIME ONE


   Stop: 06/15/17 17:29


   Last Admin: 06/15/17 18:26 Dose:  200 mls/hr


Levofloxacin/Dextrose 500 mg/ (Premix)  100 mls @ 100 mls/hr IV Q24H [facility]


   Last Admin: 06/17/17 08:37 Dose:  100 mls/hr


Magnesium Sulfate (Magnesium Sulfate 2 Gm In Water 50 Ml)  50 mls @ 50 mls/hr 

IV ONETIME ONE


   Stop: 06/15/17 22:29


   Last Admin: 06/15/17 23:25 Dose:  50 mls/hr


Potassium Chloride 10 meq/ (Premix)  100 mls @ 100 mls/hr IV Q1H [facility]


   Stop: 06/16/17 03:14


   Last Admin: 06/16/17 02:04 Dose:  100 mls/hr


Midazolam HCl (Versed 1 Mg/Ml) Confirm Administered Dose 2 mg .ROUTE .STK-MED 

ONE


   Stop: 06/15/17 14:11


   Last Admin: 06/15/17 15:59 Dose:  Not Given


Midazolam HCl (Versed 1 Mg/Ml)  2 mg IVPUSH ONETIME ONE


   Stop: 06/15/17 14:16


   Last Admin: 06/15/17 14:15 Dose:  2 mg


Midazolam HCl (Versed 1 Mg/Ml)  2 mg IVPUSH ONETIME ONE


   Stop: 06/15/17 14:32


   Last Admin: 06/15/17 14:31 Dose:  2 mg


Midazolam HCl (Versed 1 Mg/Ml)  5 mg .ROUTE .STK-MED ONE


   Stop: 06/15/17 14:33


Ondansetron HCl (Zofran)  4 mg IVPUSH ONETIME ONE


   Stop: 06/15/17 13:41


   Last Admin: 06/15/17 13:50 Dose:  4 mg


Saccharomyces Boulardii (Florastor)  250 mg PO TID [facility]


   Last Admin: 06/15/17 22:22 Dose:  Not Given


Senna/Docusate Sodium (Senna Plus)  1 tab PO BID PRN


   PRN Reason: Constipation











- Exam


General: alert, cooperative, no acute distress


HEENT: Pupils equal, Pupils reactive, Mucous membr. moist/pink


Neck: supple, trachea midline, no JVD, no thyromegaly


Lungs: Normal respiratory effort, Decreased breath sounds


Cardiovascular: Regular Rate, Regular Rhythm


Abdomen: bowel sounds present, soft, no tenderness, distension, other (PEG tube)


 (Female) Exam: Other (indwelling foely catheter)


Back Exam: Normal Inspection, Decreased Range of Motion


Extremities: normal pulses, no tenderness/swelling, no clubbing, no cyanosis, 

no calf tenderness, edema


Peripheral Pulses: 2+: Dorsalis Pedis (L), Dorsalis Pedis (R)


Skin: warm, dry, intact


Neurological: no new focal deficit


Psy/Mental Status: alert, normal affect, normal mood





- Problem List Review


Problem List Initiated/Reviewed/Updated: Yes





- My Orders


Last 24 Hours: 


My Active Orders





06/17/17 16:00


cefTRIAXone [Rocephin] 1 gm   Sodium Chloride 0.9% [Normal Saline] 100 ml IV 

Q24H 





06/18/17 04:45


BASIC METABOLIC PANEL,BMP [CHEM] AM 


C-REACTIVE PROTEIN [CHEM] AM 


MAGNESIUM [CHEM] AM 





06/19/17 05:11


BASIC METABOLIC PANEL,BMP [CHEM] AM 


C-REACTIVE PROTEIN [CHEM] AM 


MAGNESIUM [CHEM] AM 














- Plan


Plan:: 


Assessment/Plan:


Acute:


Urinary Tract Infection


   - Risk Factors: Incontinence, Immobility and Possible Neurogenic Bladder


   - UA pos E. coli


   - Received Levaquin 750 mg IV x1 in ED


   - UA Cx/Sx: E. coli resistant to Levaquin


   - Continue 1 gram Rocephin IV daily


   


Stage 2-3 Sacral Decubitus Ulcer


   - Continue PT for wound care





Need for central line access


   - Attempted in ED but w/o any any success    - 


   - S/p PICC line placement by CRNA


   - Consider removing access line tomorrow





Resolved:


S/p Leukocytosis


   - WBC now 9.82 and CRP 6.3


   - Treat underlying cause above





S/p Sepsis with Shock, Improving


   - Likely 2/2 Urosepsis +/- Adrenal Insufficiency given her hx/o MS form 

chronic steroid use


   - On levophed drip, titrate to maintain MAP at or > 65 mmHg, continue pressor


   - Continue 100 mg IV solucortef Q6


   - Blood Culture x 2: negative so far


   - Start IV Zosyn for pharmacy to dose and continue IV levaquin


   - Probiotic 250 mg po TID


   - Supportive Care





Critical Illness Related Corticosteroids insufficiency


   - Cannot r/o AI given her hx/o MS (years of steroid dependence) 


   - Low cortisol level


   - Continue solucortef until pressures are better


   - Vitals have now improved





Anemia


   - Hgb is 8.5 on admission, now is 10.2


   - Baseline is 10-11


   - H/H at 2000 tonight: 11.6/35.8


   - 2/2 hemodilution





Chronic:


MS


Dysphagia


S/p PEG Tube Placement


Constipation


Recurrent UTI


OA


Chronic Back Pain


Osteoporosis


Azheimer's Disease


Hx/o CVA


Seizure


Hx/o VTEs/DVTs


Depression


Insomnia


Eosinophilia


Chronic Debility





Plan:


She is much improved clinically


Transfer to Med-Surg with Tele


Continue current treatment


May discontinue PICC line in AM


Routine AM Labs


Continue PT/OT 


SW/CM for d/c planning


Code status: DNR





LOS anticipate > 96 hrs due to complexity of presenting illness

## 2017-06-19 RX ADMIN — NYSTATIN SCH APPLIC: 100000 CREAM TOPICAL at 22:00

## 2017-06-19 RX ADMIN — Medication SCH MG: at 08:44

## 2017-06-19 RX ADMIN — CLOTRIMAZOLE AND BETAMETHASONE DIPROPIONATE SCH APPLIC: 10; .5 CREAM TOPICAL at 12:54

## 2017-06-19 RX ADMIN — Medication SCH MG: at 21:57

## 2017-06-19 RX ADMIN — VITAMIN D, TAB 1000IU (100/BT) SCH UNITS: 25 TAB at 08:47

## 2017-06-19 RX ADMIN — POTASSIUM CHLORIDE SCH MEQ: 1.5 SOLUTION ORAL at 08:54

## 2017-06-19 RX ADMIN — CLOTRIMAZOLE AND BETAMETHASONE DIPROPIONATE SCH APPLIC: 10; .5 CREAM TOPICAL at 21:59

## 2017-06-19 RX ADMIN — THERA TABS SCH EACH: TAB at 08:47

## 2017-06-19 RX ADMIN — NYSTATIN SCH APPLIC: 100000 CREAM TOPICAL at 09:14

## 2017-06-19 RX ADMIN — POTASSIUM CHLORIDE SCH MEQ: 1.5 SOLUTION ORAL at 21:57

## 2017-06-19 RX ADMIN — METOROPROLOL TARTRATE PRN MG: 5 INJECTION, SOLUTION INTRAVENOUS at 06:26

## 2017-06-19 NOTE — CR
Chest: Portable view of the chest was obtained.

 

Comparison: Previous chest x-ray of 06/15/17.

 

Heart size and mediastinum are within normal limits.  Lungs are clear.

  Bony structures are grossly intact.  Gastrostomy tube is seen within

 the upper abdomen.  Previous PICC line is again noted with tip lying 

at the right atrial and superior vena cava junction.

 

Impression:

1.  Incidental findings as noted above.  Nothing acute is appreciated 

on frontal chest x-ray.

 

Diagnostic code #2

## 2017-06-19 NOTE — PCM.PN
<Lorin Hart - Last Filed: 06/19/17 11:21>





- General Info


Date of Service: 06/19/17


Admission Dx/Problem (Free Text): 


 Admission Diagnosis/Problem





Admission Diagnosis/Problem      Sepsis due to urinary tract infection





Doing well today; had episode of tachycardia and abdominal cramping after am 

feeding today. Discussed with dietician during team rounding. 


Otherwise stable. Labs improved. 


Functional Status: Reports: pain controlled, urinating (mcpherson cath ).  Denies: 

ambulating (bedbound)





- Review of Systems


General: Reports: Weakness (chronic with contractures)


Pulmonary: Denies: shortness of breath, cough


Cardiovascular: Denies: Chest Pain


Gastrointestinal: Reports: Abdominal pain (cramping reported after last tube 

feeding this am. )


Genitourinary: Reports: retention (neurogenic bladder chronically)


Musculoskeletal: Reports: other (MS by hx; bedbound)


Skin: Reports: other (stage 2 ulcer to coccyx)


Neurological: Reports: Other (verbalizes yes or no questions otherwise 

essentially nonverbal)





- Patient Data


Vitals - most recent: 


 Last Vital Signs











Temp  97.9 F   06/19/17 05:22


 


Pulse  137 H  06/19/17 06:26


 


Resp  18   06/19/17 05:22


 


BP  128/76   06/19/17 06:26


 


Pulse Ox  95   06/19/17 05:22











Weight - most recent: 74.162 kg


I&O - last 24 hours: 


 Intake & Output











 06/18/17 06/19/17 06/19/17





 22:59 06:59 14:59


 


Intake Total 1640 1045 


 


Output Total 1600 925 


 


Balance 40 120 











Lab Results last 24 hrs: 


 Laboratory Results - last 24 hr











  06/19/17 Range/Units





  08:10 


 


WBC  11.23 H  (3.98-10.04)  K/mm3


 


RBC  3.89 L  (3.98-5.22)  M/mm3


 


Hgb  11.1 L  (11.2-15.7)  gm/L


 


Hct  34.5  (34.1-44.9)  %


 


MCV  88.7  (79.4-94.8)  fl


 


MCH  28.5  (25.6-32.2)  pg


 


MCHC  32.2  (32.2-35.5)  g/dl


 


RDW Std Deviation  53.7 H  (36.4-46.3)  fL


 


Plt Count  294  (182-369)  K/mm3


 


MPV  9.9  (9.4-12.3)  fl


 


Neut % (Auto)  59.2  (34.0-71.1)  %


 


Lymph % (Auto)  30.4  (19.3-51.7)  %


 


Mono % (Auto)  9.3  (4.7-12.5)  %


 


Eos % (Auto)  0 L  (0.7-5.8)  


 


Baso % (Auto)  0.2  (0.1-1.2)  %


 


Neut # (Auto)  6.66 H  (1.56-6.13)  K/mm3


 


Lymph # (Auto)  3.41  (1.18-3.74)  K/mm3


 


Mono # (Auto)  1.04 H  (0.24-0.36)  K/mm3


 


Eos # (Auto)  0.00 L  (0.04-0.36)  K/mm3


 


Baso # (Auto)  0.02  (0.01-0.08)  K/mm3











Abran Results last 24 hrs: 


 Microbiology











 06/18/17 16:30 Stool Occult Blood (ABRAN) - Final





 Stool / Feces - Stool, Formed    NEGATIVE OCCULT BLOOD











Med Orders - Current: 


 Current Medications





Acetaminophen (Tylenol)  650 mg PO Q4H PRN


   PRN Reason: Pain (Mild 1-3)/fever


Hydrocodone Bitart/Acetaminophen (Norco 325-5 Mg)  1 tab PO Q4H PRN


   PRN Reason: Pain (moderate 4-6)


   Last Admin: 06/17/17 08:38 Dose:  1 tab


Albuterol/Ipratropium (Duoneb 3.0-0.5 Mg/3 Ml)  3 ml NEB Q4H PRN


   PRN Reason: Shortness Of Breath/wheezing


Baclofen (Lioresal)  30 mg PO TID [facility]


   Last Admin: 06/18/17 21:46 Dose:  30 mg


Bisacodyl (Dulcolax)  5 mg PO DAILY PRN


   PRN Reason: Constipation


   Last Admin: 06/17/17 08:39 Dose:  5 mg


Cholecalciferol (Vitamin D3)  2,000 units PO DAILY [facility]


   Last Admin: 06/18/17 08:35 Dose:  2,000 units


Docusate Sodium (Colace)  100 mg PO BID PRN


   PRN Reason: Constipation


Enoxaparin Sodium (Lovenox)  40 mg SUBCUT DAILY [facility]


   Last Admin: 06/18/17 08:43 Dose:  40 mg


Famotidine (Pepcid)  20 mg PO BID [facility]


Fluoxetine HCl (Prozac)  10 mg PO DAILY [facility]


   Last Admin: 06/18/17 08:36 Dose:  10 mg


Hydralazine HCl (Apresoline)  20 mg IVPUSH Q4H PRN


   PRN Reason: Hypertension


   Last Admin: 06/19/17 05:43 Dose:  20 mg


Hydromorphone HCl (Dilaudid)  0.25 mg IVPUSH Q2H PRN


   PRN Reason: Pain (severe 7-10)


Lactated Ringer's (Ringers, Lactated)  1,000 mls @ 50 mls/hr IV ASDIRECTED [facility]


   Last Admin: 06/19/17 04:58 Dose:  50 mls/hr


Promethazine HCl 12.5 mg/ (Sodium Chloride)  50.5 mls @ 100 mls/hr IV Q6H PRN


   PRN Reason: Nausea/Vomiting


Ceftriaxone Sodium 1 gm/ (Sodium Chloride)  100 mls @ 200 mls/hr IV Q24H [facility]


   Last Admin: 06/18/17 16:42 Dose:  200 mls/hr


Lactulose (Cephulac)  10 gm PO DAILY PRN


   PRN Reason: CONSTIPATION


   Last Admin: 06/16/17 09:11 Dose:  10 gm


Levetiracetam (Keppra)  1,000 mg PO BID [facility]


   Last Admin: 06/18/17 21:46 Dose:  1,000 mg


Lorazepam (Ativan)  1 mg IV Q6H PRN


   PRN Reason: Anxiety


   Last Admin: 06/17/17 16:22 Dose:  1 mg


Lorazepam (Ativan)  0.25 mg PO BID [facility]


   Last Admin: 06/18/17 21:47 Dose:  0.25 mg


Magnesium Oxide (Magnesium Oxide)  400 mg PO BID [facility]


Metoprolol Tartrate (Lopressor)  5 mg IVPUSH Q4H PRN


   PRN Reason: Tachycardia


   Last Admin: 06/19/17 06:26 Dose:  5 mg


Multivitamins (Thera)  1 each PO DAILY [facility]


   Last Admin: 06/18/17 08:30 Dose:  1 each


Nystatin (Nystatin Crm)  0 gm TOP BID [facility]


   Last Admin: 06/18/17 21:48 Dose:  1 applic


Ondansetron HCl (Zofran)  4 mg IV Q6H PRN


   PRN Reason: Nausea/Vomiting


Polyethylene Glycol (Miralax)  17 gm PO DAILY PRN


   PRN Reason: Constipation


Potassium Chloride (Potassium Chloride Solution)  20 meq PO BID [facility]


Potassium Chloride (Potassium Chloride Solution)  20 meq PO ONETIME ONE


   Stop: 06/19/17 08:26


Saccharomyces Boulardii (Florastor)  250 mg PO BID [facility]


   Last Admin: 06/18/17 21:46 Dose:  250 mg


Senna/Docusate Sodium (Senna Plus)  2 tab PO BID [facility]


   Last Admin: 06/18/17 21:46 Dose:  2 tab


Temazepam (Restoril)  15 mg PO BEDTIME PRN


   PRN Reason: Sleep


Tizanidine HCl (Zanaflex)  2 mg PO TID [facility]


   Last Admin: 06/18/17 21:48 Dose:  2 mg





Discontinued Medications





Acetaminophen (Tylenol)   mg PO Q4HR PRN


   PRN Reason: Fever


Baclofen (Lioresal)  30 mg GTUBE ONETIME ONE


   Stop: 06/15/17 17:23


   Last Admin: 06/15/17 21:55 Dose:  Not Given


Etomidate (Amidate) Confirm Administered Dose 40 mg IVPUSH .STK-MED ONE


   Stop: 06/15/17 14:17


   Last Admin: 06/15/17 15:58 Dose:  Not Given


Etomidate (Amidate)  15 mg IVPUSH ONETIME ONE


   Stop: 06/15/17 14:19


   Last Admin: 06/15/17 14:18 Dose:  15 mg


Etomidate (Amidate)  30 mg IVPUSH ONETIME ONE


   Stop: 06/15/17 15:01


   Last Admin: 06/15/17 15:00 Dose:  30 mg


Fentanyl (Sublimaze) Confirm Administered Dose 100 mcg .ROUTE .STK-MED ONE


   Stop: 06/15/17 14:33


   Last Admin: 06/15/17 15:59 Dose:  Not Given


Fentanyl (Sublimaze)  100 mcg IVPUSH ONETIME ONE


   Stop: 06/15/17 14:31


   Last Admin: 06/15/17 14:30 Dose:  100 mcg


Furosemide (Lasix)  40 mg IVPUSH NOW ONE


   Stop: 06/15/17 16:54


   Last Admin: 06/15/17 17:16 Dose:  Not Given


Furosemide (Lasix) Confirm Administered Dose 100 mg .ROUTE .STK-MED ONE


   Stop: 06/15/17 16:57


   Last Admin: 06/15/17 18:25 Dose:  Not Given


Hydrocortisone Sodium Succinate (Solu-Cortef)  100 mg IVPUSH Q6H [facility]


   Last Admin: 06/18/17 06:06 Dose:  100 mg


Sodium Chloride (Normal Saline) Confirm Administered Dose 1,000 mls @ as 

directed .ROUTE .STK-MED ONE


   Stop: 06/15/17 13:07


   Last Admin: 06/15/17 13:15 Dose:  Not Given


Sodium Chloride (Normal Saline)  1,000 mls @ 999 mls/hr IV ASDIRECTED [facility]


   Last Admin: 06/15/17 13:04 Dose:  999 mls/hr


Lactated Ringer's (Ringers, Lactated)  1,000 mls @ 999 mls/hr IV .BOLUS ONE


   Stop: 06/15/17 14:11


   Last Admin: 06/15/17 13:13 Dose:  999 mls/hr


Levofloxacin/Dextrose 750 mg/ (Premix)  150 mls @ 100 mls/hr IV ONETIME ONE


   Stop: 06/15/17 14:57


   Last Admin: 06/15/17 13:45 Dose:  100 mls/hr


Norepinephrine Bitartrate 4 mg (/ Dextrose/Water)  250 mls @ 15 mls/hr IV 

TITRATE [facility]; 4 MCG/MIN


   PRN Reason: Protocol


   Last Titration: 06/17/17 17:30 Dose:  0 mcg/min, 0 mls/hr


Vancomycin HCl 1 gm/ Sodium (Chloride)  250 mls @ 250 mls/hr IV ONETIME ONE


   Stop: 06/15/17 16:52


   Last Admin: 06/15/17 18:25 Dose:  Not Given


Vancomycin HCl 1 gm/ Sodium (Chloride)  250 mls @ 250 mls/hr IV ONETIME ONE


   Stop: 06/15/17 16:52


   Last Admin: 06/15/17 16:36 Dose:  100 mls/hr


Piperacillin Sod/Tazobactam (Sod 4.5 gm/ Sodium Chloride)  100 mls @ 25 mls/hr 

IV Q8H [facility]


   Last Admin: 06/17/17 15:58 Dose:  Not Given


Dextrose/Sodium Chloride (Dextrose 5%-Normal Saline)  1,000 mls @ 100 mls/hr IV 

ASDIRECTED [facility]


   Last Admin: 06/15/17 18:26 Dose:  100 mls/hr


Piperacillin Sod/Tazobactam (Sod 4.5 gm/ Sodium Chloride)  100 mls @ 200 mls/hr 

IV ONETIME ONE


   Stop: 06/15/17 17:29


   Last Admin: 06/15/17 18:26 Dose:  200 mls/hr


Levofloxacin/Dextrose 500 mg/ (Premix)  100 mls @ 100 mls/hr IV Q24H [facility]


   Last Admin: 06/17/17 08:37 Dose:  100 mls/hr


Magnesium Sulfate (Magnesium Sulfate 2 Gm In Water 50 Ml)  50 mls @ 50 mls/hr 

IV ONETIME ONE


   Stop: 06/15/17 22:29


   Last Admin: 06/15/17 23:25 Dose:  50 mls/hr


Potassium Chloride 10 meq/ (Premix)  100 mls @ 100 mls/hr IV Q1H [facility]


   Stop: 06/16/17 03:14


   Last Admin: 06/16/17 02:04 Dose:  100 mls/hr


Magnesium Sulfate (Magnesium Sulfate 2 Gm In Water 50 Ml)  50 mls @ 50 mls/hr 

IV ONETIME ONE


   Stop: 06/18/17 10:29


   Last Admin: 06/18/17 10:20 Dose:  50 mls/hr


Magnesium Oxide (Magnesium Oxide)  400 mg PO QPM [facility]


   Last Admin: 06/18/17 17:06 Dose:  400 mg


Magnesium Sulfate (Pharmacy To Dose - Magnesium Replacement)  1 dose .XX 

ASDIRECTED [facility]


Midazolam HCl (Versed 1 Mg/Ml) Confirm Administered Dose 2 mg .ROUTE .STK-MED 

ONE


   Stop: 06/15/17 14:11


   Last Admin: 06/15/17 15:59 Dose:  Not Given


Midazolam HCl (Versed 1 Mg/Ml)  2 mg IVPUSH ONETIME ONE


   Stop: 06/15/17 14:16


   Last Admin: 06/15/17 14:15 Dose:  2 mg


Midazolam HCl (Versed 1 Mg/Ml)  2 mg IVPUSH ONETIME ONE


   Stop: 06/15/17 14:32


   Last Admin: 06/15/17 14:31 Dose:  2 mg


Midazolam HCl (Versed 1 Mg/Ml)  5 mg .ROUTE .STK-MED ONE


   Stop: 06/15/17 14:33


Ondansetron HCl (Zofran)  4 mg IVPUSH ONETIME ONE


   Stop: 06/15/17 13:41


   Last Admin: 06/15/17 13:50 Dose:  4 mg


Potassium Chloride (Pharmacy To Dose - Potassium Replacement)  1 dose .XX 

ASDIRECTED [facility]


Potassium Chloride (Klor-Con M20)  20 meq PO Q3H [facility]


   Stop: 06/18/17 16:01


   Last Admin: 06/18/17 10:20 Dose:  20 meq


Potassium Chloride (Potassium Chloride Solution)  20 meq PO Q3H [facility]


   Stop: 06/18/17 17:01


   Last Admin: 06/18/17 16:42 Dose:  20 meq


Saccharomyces Boulardii (Florastor)  250 mg PO TID [facility]


   Last Admin: 06/15/17 22:22 Dose:  Not Given


Senna/Docusate Sodium (Senna Plus)  1 tab PO BID PRN


   PRN Reason: Constipation











- Exam


Quality Assessment: urine catheter, DVT prophylaxis, skin breakdown (coccyx and 

buttock)


General: alert, no acute distress


HEENT: Pupils equal, Pupils reactive, EOMI


Neck: supple


Lungs: Clear to auscultation, Normal respiratory effort, Decreased breath 

sounds (bases)


Cardiovascular: Regular Rate, Regular Rhythm, No Murmurs


Abdomen: bowel sounds present, soft, no tenderness, no distension, other (peg 

tube)


 (Female) Exam: Deferred


Extremities: no edema


Skin: other (stage 2 ulcer to coccyx with erythema surrounding buttock, gluteal 

fold with satellite redness/pinpoint to border of erythema suspicious for yeast 

dermatitis)


Neurological: other (MS)


Psy/Mental Status: alert





- Problem List & Annotations


(1) UTI, Urinary tract infectious disease


SNOMED Code(s): 69480308


   Code(s): N39.0 - URINARY TRACT INFECTION, SITE NOT SPECIFIED   Status: Acute

   Priority: High   Current Visit: Yes   





(2) Septic shock


SNOMED Code(s): 37076357


   Code(s): A41.9 - SEPSIS, UNSPECIFIED ORGANISM; R65.21 - SEVERE SEPSIS WITH 

SEPTIC SHOCK   Status: Resolved   Priority: High   Current Visit: Yes   





(3) Nonverbal


SNOMED Code(s): 852254312


   Code(s): R47.01 - APHASIA   Status: Chronic   Priority: Medium   Current 

Visit: Yes   





(4) Dysphagia


SNOMED Code(s): 27658615, 493784871


   Code(s): R13.10 - DYSPHAGIA, UNSPECIFIED   Status: Chronic   Priority: 

Medium   Current Visit: No   





(5) Multiple sclerosis


SNOMED Code(s): 31215971


   Code(s): G35 - MULTIPLE SCLEROSIS   Status: Chronic   Priority: Medium   

Current Visit: No   





- Problem List Review


Problem List Initiated/Reviewed/Updated: Yes





- My Orders


Last 24 Hours: 


My Active Orders





06/19/17 08:25


Potassium Chloride [Potassium Chloride Solution]   20 meq PO ONETIME ONE 





06/19/17 09:00


Famotidine [Pepcid]   20 mg PO BID 


Magnesium Oxide   400 mg PO BID 


Potassium Chloride [Potassium Chloride Solution]   20 meq PO BID 














- Plan


Plan:: 


Assessment/Plan:


Acute:


Urinary Tract Infection


   - Risk Factors: Incontinence, Immobility, Neurogenic Bladder, MS, hx of 

recurrent UTI


   - UA pos E. coli


   - Received Levaquin 750 mg IV x1 in ED


   - UA Cx/Sx: E. coli resistant to Levaquin


   - Continue 1 gram Rocephin IV daily


   - Florastor


   -OSN Mcpherson today


   


Stage 2-3 Sacral Decubitus Ulcer


   - Continue PT for wound care   


   - Suspect component of yeast dermatitis; add lotrisone cream BID today


   - Frequent repositioning at least Q2hrs





Need for central line access


   - Attempted in ED but w/o any any success    - 


   - S/p PICC line placement by CRNA


   - Cont per protocol








Resolved:


S/p Leukocytosis


   - WBC now 9.82 and CRP 6.3


   - Treat underlying cause above





S/p Sepsis with Shock, Improving


   - Likely 2/2 Urosepsis +/- Adrenal Insufficiency given her hx/o MS form 

chronic steroid use


   - On levophed drip, titrate to maintain MAP at or > 65 mmHg, continue pressor


   - Continue 100 mg IV solucortef Q6


   - Blood Culture x 2: negative so far


   - Start IV Zosyn for pharmacy to dose and continue IV levaquin


   - Probiotic 250 mg po TID


   - Supportive Care





Critical Illness Related Corticosteroids insufficiency


   - Cannot r/o AI given her hx/o MS (years of steroid dependence) 


   - Low cortisol level


   - Continue solucortef until pressures are better


   - Vitals have now improved





Anemia


   - Hgb is 8.5 on admission, now is 10.2


   - Baseline is 10-11


   - H/H at 2000 tonight: 11.6/35.8


   - 2/2 hemodilution





Chronic:


MS


Dysphagia


S/p PEG Tube Placement---add reglan today TID


Constipation


Recurrent UTI


OA


Chronic Back Pain


Osteoporosis


Azheimer's Disease


Hx/o CVA


Seizure


Hx/o VTEs/DVTs


Depression


Insomnia


Eosinophilia


Chronic Debility





Plan:


She is much improved clinically


Transfer to [facility] with Tele


Continue current treatment


May discontinue mcpherson cath today


Routine AM Labs


GI/DVT prophylax


Continue PT/OT 


SW/CM for d/c planning


Code status: DNR





LOS anticipate > 96 hrs due to complexity of presenting illness--likely DC back 

to [facility] tomorrow. 


   








<Dayanara Pickett - Last Filed: 06/19/17 14:00>





- Patient Data


Vitals - most recent: 


 Last Vital Signs











Temp  36.6 C   06/19/17 12:40


 


Pulse  87   06/19/17 12:40


 


Resp  24 H  06/19/17 12:40


 


BP  144/62 H  06/19/17 12:40


 


Pulse Ox  96   06/19/17 12:40











I&O - last 24 hours: 


 Intake & Output











 06/18/17 06/19/17 06/19/17





 22:59 06:59 14:59


 


Intake Total 1640 1045 


 


Output Total 1600 925 350


 


Balance 40 120 -350











Lab Results last 24 hrs: 


 Laboratory Results - last 24 hr











  06/19/17 06/19/17 Range/Units





  08:10 08:10 


 


WBC  11.23 H   (3.98-10.04)  K/mm3


 


RBC  3.89 L   (3.98-5.22)  M/mm3


 


Hgb  11.1 L   (11.2-15.7)  gm/L


 


Hct  34.5   (34.1-44.9)  %


 


MCV  88.7   (79.4-94.8)  fl


 


MCH  28.5   (25.6-32.2)  pg


 


MCHC  32.2   (32.2-35.5)  g/dl


 


RDW Std Deviation  53.7 H   (36.4-46.3)  fL


 


Plt Count  294   (182-369)  K/mm3


 


MPV  9.9   (9.4-12.3)  fl


 


Neut % (Auto)  59.2   (34.0-71.1)  %


 


Lymph % (Auto)  30.4   (19.3-51.7)  %


 


Mono % (Auto)  9.3   (4.7-12.5)  %


 


Eos % (Auto)  0 L   (0.7-5.8)  


 


Baso % (Auto)  0.2   (0.1-1.2)  %


 


Neut # (Auto)  6.66 H   (1.56-6.13)  K/mm3


 


Lymph # (Auto)  3.41   (1.18-3.74)  K/mm3


 


Mono # (Auto)  1.04 H   (0.24-0.36)  K/mm3


 


Eos # (Auto)  0.00 L   (0.04-0.36)  K/mm3


 


Baso # (Auto)  0.02   (0.01-0.08)  K/mm3


 


Manual Slide Review  Abnormal smear   


 


Sodium   141  (136-145)  mEq/L


 


Potassium   2.9 L  (3.5-5.1)  mEq/L


 


Chloride   103  ()  mEq/L


 


Carbon Dioxide   30  (21-32)  mEq/L


 


Anion Gap   10.9  (5-15)  


 


BUN   17  (7-18)  mg/dL


 


Creatinine   0.7  (0.55-1.02)  mg/dL


 


Est Cr Clr Drug Dosing   73.51  mL/min


 


Estimated GFR (MDRD)   > 60  (>60)  mL/min


 


BUN/Creatinine Ratio   24.3 H  (14-18)  


 


Glucose   124 H  ()  mg/dL


 


Calcium   8.9  (8.5-10.1)  mg/dL


 


Magnesium   1.6 L  (1.8-2.4)  mg/dl


 


C-Reactive Protein   2.5 H*  (<1.0)  mg/dL











[facility] Results last 24 hrs: 


 Microbiology











 06/18/17 16:30 Stool Occult Blood ([facility]RAN) - Final





 Stool / Feces - Stool, Formed    NEGATIVE OCCULT BLOOD











Med Orders - Current: 


 Current Medications





Acetaminophen (Tylenol)  650 mg PO Q4H PRN


   PRN Reason: Pain (Mild 1-3)/fever


Hydrocodone Bitart/Acetaminophen (Norco 325-5 Mg)  1 tab PO Q4H PRN


   PRN Reason: Pain (moderate 4-6)


   Last Admin: 06/17/17 08:38 Dose:  1 tab


Albuterol/Ipratropium (Duoneb 3.0-0.5 Mg/3 Ml)  3 ml NEB Q4H PRN


   PRN Reason: Shortness Of Breath/wheezing


Baclofen (Lioresal)  30 mg PO TID [facility]


   Last Admin: 06/19/17 08:45 Dose:  30 mg


Betamethasone/Clotrimazole (Lotrisone)  0 gm TOP BID [facility]


   Last Admin: 06/19/17 12:54 Dose:  1 applic


Bisacodyl (Dulcolax)  5 mg PO DAILY PRN


   PRN Reason: Constipation


   Last Admin: 06/17/17 08:39 Dose:  5 mg


Cholecalciferol (Vitamin D3)  2,000 units PO DAILY [facility]


   Last Admin: 06/19/17 08:47 Dose:  2,000 units


Docusate Sodium (Colace)  100 mg PO BID PRN


   PRN Reason: Constipation


Enoxaparin Sodium (Lovenox)  40 mg SUBCUT DAILY [facility]


   Last Admin: 06/19/17 08:45 Dose:  40 mg


Famotidine (Pepcid)  20 mg PO BID [facility]


   Last Admin: 06/19/17 09:00 Dose:  20 mg


Fluoxetine HCl (Prozac)  10 mg PO DAILY [facility]


   Last Admin: 06/19/17 08:46 Dose:  10 mg


Hydralazine HCl (Apresoline)  20 mg IVPUSH Q4H PRN


   PRN Reason: Hypertension


   Last Admin: 06/19/17 05:43 Dose:  20 mg


Hydromorphone HCl (Dilaudid)  0.25 mg IVPUSH Q2H PRN


   PRN Reason: Pain (severe 7-10)


Promethazine HCl 12.5 mg/ (Sodium Chloride)  50.5 mls @ 100 mls/hr IV Q6H PRN


   PRN Reason: Nausea/Vomiting


Ceftriaxone Sodium 1 gm/ (Sodium Chloride)  100 mls @ 200 mls/hr IV Q24H [facility]


   Last Admin: 06/18/17 16:42 Dose:  200 mls/hr


Lactulose (Cephulac)  10 gm PO DAILY PRN


   PRN Reason: CONSTIPATION


   Last Admin: 06/16/17 09:11 Dose:  10 gm


Levetiracetam (Keppra)  1,000 mg PO BID [facility]


   Last Admin: 06/19/17 08:44 Dose:  1,000 mg


Lorazepam (Ativan)  1 mg IV Q6H PRN


   PRN Reason: Anxiety


   Last Admin: 06/17/17 16:22 Dose:  1 mg


Lorazepam (Ativan)  0.25 mg PO BID [facility]


   Last Admin: 06/19/17 08:43 Dose:  0.25 mg


Magnesium Oxide (Magnesium Oxide)  400 mg PO BID [facility]


   Last Admin: 06/19/17 08:45 Dose:  400 mg


Metoclopramide HCl (Reglan)  5 mg IVPUSH TID [facility]


Metoprolol Tartrate (Lopressor)  5 mg IVPUSH Q4H PRN


   PRN Reason: Tachycardia


   Last Admin: 06/19/17 06:26 Dose:  5 mg


Multivitamins (Thera)  1 each PO DAILY [facility]


   Last Admin: 06/19/17 08:47 Dose:  1 each


Nystatin (Nystatin Crm)  0 gm TOP BID [facility]


   Last Admin: 06/19/17 09:14 Dose:  1 applic


Ondansetron HCl (Zofran)  4 mg IV Q6H PRN


   PRN Reason: Nausea/Vomiting


Polyethylene Glycol (Miralax)  17 gm PO DAILY PRN


   PRN Reason: Constipation


Potassium Chloride (Potassium Chloride Solution)  20 meq PO BID [facility]


   Last Admin: 06/19/17 08:54 Dose:  20 meq


Saccharomyces Boulardii (Florastor)  250 mg PO BID [facility]


   Last Admin: 06/19/17 08:44 Dose:  250 mg


Senna/Docusate Sodium (Senna Plus)  2 tab PO BID [facility]


   Last Admin: 06/19/17 08:47 Dose:  Not Given


Temazepam (Restoril)  15 mg PO BEDTIME PRN


   PRN Reason: Sleep


Tizanidine HCl (Zanaflex)  2 mg PO TID [facility]


   Last Admin: 06/19/17 08:47 Dose:  2 mg





Discontinued Medications





Acetaminophen (Tylenol)   mg PO Q4HR PRN


   PRN Reason: Fever


Baclofen (Lioresal)  30 mg GTUBE ONETIME ONE


   Stop: 06/15/17 17:23


   Last Admin: 06/15/17 21:55 Dose:  Not Given


Etomidate (Amidate) Confirm Administered Dose 40 mg IVPUSH .STK-MED ONE


   Stop: 06/15/17 14:17


   Last Admin: 06/15/17 15:58 Dose:  Not Given


Etomidate (Amidate)  15 mg IVPUSH ONETIME ONE


   Stop: 06/15/17 14:19


   Last Admin: 06/15/17 14:18 Dose:  15 mg


Etomidate (Amidate)  30 mg IVPUSH ONETIME ONE


   Stop: 06/15/17 15:01


   Last Admin: 06/15/17 15:00 Dose:  30 mg


Fentanyl (Sublimaze) Confirm Administered Dose 100 mcg .ROUTE .STK-MED ONE


   Stop: 06/15/17 14:33


   Last Admin: 06/15/17 15:59 Dose:  Not Given


Fentanyl (Sublimaze)  100 mcg IVPUSH ONETIME ONE


   Stop: 06/15/17 14:31


   Last Admin: 06/15/17 14:30 Dose:  100 mcg


Furosemide (Lasix)  40 mg IVPUSH NOW ONE


   Stop: 06/15/17 16:54


   Last Admin: 06/15/17 17:16 Dose:  Not Given


Furosemide (Lasix) Confirm Administered Dose 100 mg .ROUTE .STK-MED ONE


   Stop: 06/15/17 16:57


   Last Admin: 06/15/17 18:25 Dose:  Not Given


Hydrocortisone Sodium Succinate (Solu-Cortef)  100 mg IVPUSH Q6H [facility]


   Last Admin: 06/18/17 06:06 Dose:  100 mg


Sodium Chloride (Normal Saline) Confirm Administered Dose 1,000 mls @ as 

directed .ROUTE .STK-MED ONE


   Stop: 06/15/17 13:07


   Last Admin: 06/15/17 13:15 Dose:  Not Given


Sodium Chloride (Normal Saline)  1,000 mls @ 999 mls/hr IV ASDIRECTED [facility]


   Last Admin: 06/15/17 13:04 Dose:  999 mls/hr


Lactated Ringer's (Ringers, Lactated)  1,000 mls @ 999 mls/hr IV .BOLUS ONE


   Stop: 06/15/17 14:11


   Last Admin: 06/15/17 13:13 Dose:  999 mls/hr


Levofloxacin/Dextrose 750 mg/ (Premix)  150 mls @ 100 mls/hr IV ONETIME ONE


   Stop: 06/15/17 14:57


   Last Admin: 06/15/17 13:45 Dose:  100 mls/hr


Norepinephrine Bitartrate 4 mg (/ Dextrose/Water)  250 mls @ 15 mls/hr IV 

TITRATE [facility]; 4 MCG/MIN


   PRN Reason: Protocol


   Last Titration: 06/17/17 17:30 Dose:  0 mcg/min, 0 mls/hr


Lactated Ringer's (Ringers, Lactated)  1,000 mls @ 50 mls/hr IV ASDIRECTED [facility]


   Last Admin: 06/19/17 04:58 Dose:  50 mls/hr


Vancomycin HCl 1 gm/ Sodium (Chloride)  250 mls @ 250 mls/hr IV ONETIME ONE


   Stop: 06/15/17 16:52


   Last Admin: 06/15/17 18:25 Dose:  Not Given


Vancomycin HCl 1 gm/ Sodium (Chloride)  250 mls @ 250 mls/hr IV ONETIME ONE


   Stop: 06/15/17 16:52


   Last Admin: 06/15/17 16:36 Dose:  100 mls/hr


Piperacillin Sod/Tazobactam (Sod 4.5 gm/ Sodium Chloride)  100 mls @ 25 mls/hr 

IV Q8H [facility]


   Last Admin: 06/17/17 15:58 Dose:  Not Given


Dextrose/Sodium Chloride (Dextrose 5%-Normal Saline)  1,000 mls @ 100 mls/hr IV 

ASDIRECTED [facility]


   Last Admin: 06/15/17 18:26 Dose:  100 mls/hr


Piperacillin Sod/Tazobactam (Sod 4.5 gm/ Sodium Chloride)  100 mls @ 200 mls/hr 

IV ONETIME ONE


   Stop: 06/15/17 17:29


   Last Admin: 06/15/17 18:26 Dose:  200 mls/hr


Levofloxacin/Dextrose 500 mg/ (Premix)  100 mls @ 100 mls/hr IV Q24H [facility]


   Last Admin: 06/17/17 08:37 Dose:  100 mls/hr


Magnesium Sulfate (Magnesium Sulfate 2 Gm In Water 50 Ml)  50 mls @ 50 mls/hr 

IV ONETIME ONE


   Stop: 06/15/17 22:29


   Last Admin: 06/15/17 23:25 Dose:  50 mls/hr


Potassium Chloride 10 meq/ (Premix)  100 mls @ 100 mls/hr IV Q1H [facility]


   Stop: 06/16/17 03:14


   Last Admin: 06/16/17 02:04 Dose:  100 mls/hr


Magnesium Sulfate (Magnesium Sulfate 2 Gm In Water 50 Ml)  50 mls @ 50 mls/hr 

IV ONETIME ONE


   Stop: 06/18/17 10:29


   Last Admin: 06/18/17 10:20 Dose:  50 mls/hr


Magnesium Oxide (Magnesium Oxide)  400 mg PO QPM [facility]


   Last Admin: 06/18/17 17:06 Dose:  400 mg


Magnesium Sulfate (Pharmacy To Dose - Magnesium Replacement)  1 dose .XX 

ASDIRECTED [facility]


Midazolam HCl (Versed 1 Mg/Ml) Confirm Administered Dose 2 mg .ROUTE .STK-MED 

ONE


   Stop: 06/15/17 14:11


   Last Admin: 06/15/17 15:59 Dose:  Not Given


Midazolam HCl (Versed 1 Mg/Ml)  2 mg IVPUSH ONETIME ONE


   Stop: 06/15/17 14:16


   Last Admin: 06/15/17 14:15 Dose:  2 mg


Midazolam HCl (Versed 1 Mg/Ml)  2 mg IVPUSH ONETIME ONE


   Stop: 06/15/17 14:32


   Last Admin: 06/15/17 14:31 Dose:  2 mg


Midazolam HCl (Versed 1 Mg/Ml)  5 mg .ROUTE .STK-MED ONE


   Stop: 06/15/17 14:33


Ondansetron HCl (Zofran)  4 mg IVPUSH ONETIME ONE


   Stop: 06/15/17 13:41


   Last Admin: 06/15/17 13:50 Dose:  4 mg


Potassium Chloride (Pharmacy To Dose - Potassium Replacement)  1 dose .XX 

ASDIRECTED [facility]


Potassium Chloride (Klor-Con M20)  20 meq PO Q3H [facility]


   Stop: 06/18/17 16:01


   Last Admin: 06/18/17 10:20 Dose:  20 meq


Potassium Chloride (Potassium Chloride Solution)  20 meq PO Q3H [facility]


   Stop: 06/18/17 17:01


   Last Admin: 06/18/17 16:42 Dose:  20 meq


Potassium Chloride (Potassium Chloride Solution)  20 meq PO ONETIME ONE


   Stop: 06/19/17 08:26


   Last Admin: 06/19/17 08:53 Dose:  20 meq


Saccharomyces Boulardii (Florastor)  250 mg PO TID [facility]


   Last Admin: 06/15/17 22:22 Dose:  Not Given


Senna/Docusate Sodium (Senna Plus)  1 tab PO BID PRN


   PRN Reason: Constipation











- Plan


Plan:: 


Continue current TF, may require adjustment of frequency.  Add Reglan if needed 

to improve stomach emptying.

## 2017-06-19 NOTE — CR
Abdomen: Portable supine view of the abdomen was obtained.

 

Comparison: Previous abdominal x-ray of 04/27/16.

 

Gastrostomy tube is seen.  Bowel gas pattern appears normal.  

Calcifications are seen within the pelvis which are felt compatible 

with phleboliths.  Slight degenerative change is noted within the 

spine.

 

Impression:

1.  Incidental findings.

 

Diagnostic code #2

## 2017-06-20 VITALS — SYSTOLIC BLOOD PRESSURE: 122 MMHG | DIASTOLIC BLOOD PRESSURE: 78 MMHG

## 2017-06-20 RX ADMIN — Medication SCH MG: at 10:37

## 2017-06-20 RX ADMIN — POTASSIUM CHLORIDE SCH MEQ: 1.5 SOLUTION ORAL at 10:40

## 2017-06-20 RX ADMIN — NYSTATIN SCH APPLIC: 100000 CREAM TOPICAL at 10:39

## 2017-06-20 RX ADMIN — CLOTRIMAZOLE AND BETAMETHASONE DIPROPIONATE SCH APPLIC: 10; .5 CREAM TOPICAL at 10:39

## 2017-06-20 RX ADMIN — VITAMIN D, TAB 1000IU (100/BT) SCH UNITS: 25 TAB at 10:38

## 2017-06-20 RX ADMIN — THERA TABS SCH EACH: TAB at 10:35

## 2017-06-20 NOTE — [id number]
<Lorin Hart - Last Filed: 06/20/17 08:29>





**Discharge Summary





- Hospital Course


Free Text/Narrative:: 





This is a 51-year-old white female with past medical history of MS, Dysphagia S/

p PEG tube placement, chronic urinary tract infection, urinary incontinence, 

chronic constipation, chronic back pain, nausea arthritis, osteoporosis, 

Alzheimer's disease, history of CVA, seizure disorder, MS, history of chronic 

thromboembolism, history of depression, insomnia, type 2 diabetes, chronic 

anemia, and eosinophilia who comes from a local nursing home due to acute 

febrile illness and declining overall health. On presentation to the emergency 

department patient was found to be severely hypotensive with blood pressure as 

low at 57/37 mmHg.





Her initial workup in the emergency department shows a CBC remarkable for WBC 

of 13.020, RBC of 2.9, hemoglobin of 8.5, hematocrit of 26.5, and neutrophils 

of 78%. PT of 10.3 and INR of 0.95. Her ABG shows a pH of 7.44, PCO2 of 34, PO2 

of 94, HCO3 of 22.8 and O2 sat of 98.8% on 5 L nasal cannula. Her chemistry is 

remarkable for glucose of 112, lactic acid of 1.8, magnesium of 1.7, CRP of 10 

and albumin of 2.2. CK-MB and troponin 1 were both negative. Her initial chest 

x-ray shows nothing acute seen.





Patient is currently sedated from meds she got in ED. Central line access was 

attempted in ED but w/o any success. Patient is being admitted for Urosepsis 

with Septic shock. She is DNR per ED medical documentation.








- Discharge Data


Discharge Date: 06/20/17 (admit date 6/15/17)


Discharge Disposition: DC/Tfer to [facility] [place] [id number]


Condition: Undetermined





- Discharge Diagnosis/Problem(s)


(1) UTI, Urinary tract infectious disease


SNOMED Code(s): 66521507


   ICD Code: N39.0 - URINARY TRACT INFECTION, SITE NOT SPECIFIED   Status: 

Acute   Priority: High   Current Visit: Yes   





(2) Septic shock


SNOMED Code(s): 79154413


   ICD Code: A41.9 - SEPSIS, UNSPECIFIED ORGANISM; R65.21 - SEVERE SEPSIS WITH 

SEPTIC SHOCK   Status: Resolved   Priority: High   Current Visit: Yes   





(3) Nonverbal


SNOMED Code(s): 126404767


   ICD Code: R47.01 - APHASIA   Status: Chronic   Priority: Medium   Current 

Visit: Yes   





(4) Dysphagia


SNOMED Code(s): 66376517, 994380279


   ICD Code: R13.10 - DYSPHAGIA, UNSPECIFIED   Status: Chronic   Priority: 

Medium   Current Visit: No   





(5) Multiple sclerosis


SNOMED Code(s): 74666613


   ICD Code: G35 - MULTIPLE SCLEROSIS   Status: Chronic   Priority: Medium   

Current Visit: No   





- Patient Summary/Data


Operative Procedure(s) Performed: None


Complications: None


Consults: 


 Consultations





06/15/17 16:25


Consult to Case Management [CONS] Routine 


Consult to  [CONS] Routine 


Consult to Spiritual Care [CONS] Routine 


OT Evaluation and Treatment [CONS] Routine 


PT Evaluation and Treatment [CONS] Routine 


Respiratory Care Assess and Treatment [CONS] Routine 





06/15/17 21:00


Consult to Dietary [Consult to Dietician] [CONS] Routine 





06/16/17 07:40


PT Evaluation and Treatment [CONS] Routine 











Labs Pending at D/C: 





None


Recommended Follow-up Testing/Procedures: 





Physical , Occupation therapies to eval & treat





Dietary to eval and treat feeding needs-- feedings changed to pump during 

hospital stay with better tolerability





Follow up with PCP, Dr. Ely or MARIBEL Georges within one week of discharge





Labs in one week; JOE and Mag


Planned Operative Procedure(s) after DC: None


Hospital Course: 





As above





- Patient Instructions


Diet, Other: Usual feeding tube feedings


Activity: As Tolerated


Showering/Bathing: May Shower


Notify Provider of: Fever, Increased Pain, Nausea and/or Vomiting





- Discharge Plan


Prescriptions/Med Rec: 


Betamethasone/Clotrimazole [Lotrisone] 0 gm TOP BID #1 tube


Cefuroxime [Ceftin] 500 mg PO BID #20 tablet


Magnesium Oxide 400 mg PO BID #60 tablet


Potassium Chloride [Potassium Chloride Solution] 20 meq PO BID #30 cup


Home Medications: 


 Home Meds





Baclofen 30 mg PO TID 01/12/15 [History]


FLUoxetine [PROzac] 10 mg PO DAILY 01/12/15 [History]


LORazepam [Ativan] 0.25 mg PO BID 01/12/15 [History]


tiZANidine HCl [Zanaflex] 2 mg PO TID 01/12/15 [History]


Acetaminophen [Tylenol] 2 tab PO Q4HR PRN 04/07/15 [History]


Cholecalciferol (Vitamin D3) [Vitamin D3] 2,000 unit PO DAILY 04/07/15 [History]


Docusate Sodium/Sennosides [Senna Plus] 2 tab PO BID 04/07/15 [History]


Multivitamin with Minerals [Multiple Vitamin] 1 tab PO DAILY 04/07/15 [History]


levETIRAcetam [Keppra] 1,000 mg PO BID 04/07/15 [History]


Lactulose [Chronulac] 15 ml PO DAILY PRN 05/13/15 [History]


Nystatin [Nystatin Crm] 1 applic TOP BID 06/15/17 [History]


Betamethasone/Clotrimazole [Lotrisone] 0 gm TOP BID #1 tube 06/20/17 [Rx]


Cefuroxime [Ceftin] 500 mg PO BID #20 tablet 06/20/17 [Rx]


Magnesium Oxide 400 mg PO BID #60 tablet 06/20/17 [Rx]


Potassium Chloride [Potassium Chloride Solution] 20 meq PO BID #30 cup 06/20/17 

[Rx]








Patient Handouts:  Urinary Tract Infection, Adult, Easy-to-Read, Septic Shock


Forms:  ED Department Discharge


Referrals: 


Vesna Alvarado NP [Ordering Only Provider] - 


Olaf Ely MD [Physician] - 





- Discharge Summary/Plan Comment


DC Time >30 min.: Yes (40 min)





- Patient Data


Vitals - Most Recent: 


 Last Vital Signs











Temp  98.1 F   06/19/17 23:35


 


Pulse  106 H  06/19/17 23:35


 


Resp  20   06/19/17 23:35


 


BP  106/55 L  06/19/17 23:35


 


Pulse Ox  95   06/19/17 23:35











Weight - Most Recent: 74.162 kg


I&O - Last 24 hours: 


 Intake & Output











 06/19/17 06/20/17 06/20/17





 22:59 06:59 14:59


 


Intake Total 1050  


 


Output Total 1375  


 


Balance -325  











Lab Results - Last 24 hrs: 


 Laboratory Results - last 24 hr











  06/19/17 06/19/17 06/20/17 Range/Units





  08:10 08:10 06:08 


 


WBC  11.23 H   9.93  (3.98-10.04)  K/mm3


 


RBC  3.89 L   3.89 L  (3.98-5.22)  M/mm3


 


Hgb  11.1 L   11.2  (11.2-15.7)  gm/L


 


Hct  34.5   34.6  (34.1-44.9)  %


 


MCV  88.7   88.9  (79.4-94.8)  fl


 


MCH  28.5   28.8  (25.6-32.2)  pg


 


MCHC  32.2   32.4  (32.2-35.5)  g/dl


 


RDW Std Deviation  53.7 H   55.0 H  (36.4-46.3)  fL


 


Plt Count  294   312  (182-369)  K/mm3


 


MPV  9.9   10.1  (9.4-12.3)  fl


 


Neut % (Auto)  59.2   51.9  (34.0-71.1)  %


 


Lymph % (Auto)  30.4   39.2  (19.3-51.7)  %


 


Mono % (Auto)  9.3   7.3  (4.7-12.5)  %


 


Eos % (Auto)  0 L   0.7  (0.7-5.8)  


 


Baso % (Auto)  0.2   0.2  (0.1-1.2)  %


 


Neut # (Auto)  6.66 H   5.16  (1.56-6.13)  K/mm3


 


Lymph # (Auto)  3.41   3.89 H  (1.18-3.74)  K/mm3


 


Mono # (Auto)  1.04 H   0.72 H  (0.24-0.36)  K/mm3


 


Eos # (Auto)  0.00 L   0.07  (0.04-0.36)  K/mm3


 


Baso # (Auto)  0.02   0.02  (0.01-0.08)  K/mm3


 


Manual Slide Review  Abnormal smear   Abnormal smear  


 


Sodium   141   (136-145)  mEq/L


 


Potassium   2.9 L   (3.5-5.1)  mEq/L


 


Chloride   103   ()  mEq/L


 


Carbon Dioxide   30   (21-32)  mEq/L


 


Anion Gap   10.9   (5-15)  


 


BUN   17   (7-18)  mg/dL


 


Creatinine   0.7   (0.55-1.02)  mg/dL


 


Est Cr Clr Drug Dosing   73.51   mL/min


 


Estimated GFR (MDRD)   > 60   (>60)  mL/min


 


BUN/Creatinine Ratio   24.3 H   (14-18)  


 


Glucose   124 H   ()  mg/dL


 


Calcium   8.9   (8.5-10.1)  mg/dL


 


Magnesium   1.6 L   (1.8-2.4)  mg/dl


 


C-Reactive Protein   2.5 H*   (<1.0)  mg/dL














  06/20/17 Range/Units





  06:08 


 


WBC   (3.98-10.04)  K/mm3


 


RBC   (3.98-5.22)  M/mm3


 


Hgb   (11.2-15.7)  gm/L


 


Hct   (34.1-44.9)  %


 


MCV   (79.4-94.8)  fl


 


MCH   (25.6-32.2)  pg


 


MCHC   (32.2-35.5)  g/dl


 


RDW Std Deviation   (36.4-46.3)  fL


 


Plt Count   (182-369)  K/mm3


 


MPV   (9.4-12.3)  fl


 


Neut % (Auto)   (34.0-71.1)  %


 


Lymph % (Auto)   (19.3-51.7)  %


 


Mono % (Auto)   (4.7-12.5)  %


 


Eos % (Auto)   (0.7-5.8)  


 


Baso % (Auto)   (0.1-1.2)  %


 


Neut # (Auto)   (1.56-6.13)  K/mm3


 


Lymph # (Auto)   (1.18-3.74)  K/mm3


 


Mono # (Auto)   (0.24-0.36)  K/mm3


 


Eos # (Auto)   (0.04-0.36)  K/mm3


 


Baso # (Auto)   (0.01-0.08)  K/mm3


 


Manual Slide Review   


 


Sodium  142  (136-145)  mEq/L


 


Potassium  3.5  (3.5-5.1)  mEq/L


 


Chloride  105  ()  mEq/L


 


Carbon Dioxide  25  (21-32)  mEq/L


 


Anion Gap  15.5 H  (5-15)  


 


BUN  17  (7-18)  mg/dL


 


Creatinine  0.7  (0.55-1.02)  mg/dL


 


Est Cr Clr Drug Dosing  73.51  mL/min


 


Estimated GFR (MDRD)  > 60  (>60)  mL/min


 


BUN/Creatinine Ratio  24.3 H  (14-18)  


 


Glucose  96  ()  mg/dL


 


Calcium  9.3  (8.5-10.1)  mg/dL


 


Magnesium  1.8  (1.8-2.4)  mg/dl


 


C-Reactive Protein   (<1.0)  mg/dL











Med Orders - Current: 


 Current Medications





Acetaminophen (Tylenol)  650 mg PO Q4H PRN


   PRN Reason: Pain (Mild 1-3)/fever


Hydrocodone Bitart/Acetaminophen (Norco 325-5 Mg)  1 tab PO Q4H PRN


   PRN Reason: Pain (moderate 4-6)


   Last Admin: 06/17/17 08:38 Dose:  1 tab


Albuterol/Ipratropium (Duoneb 3.0-0.5 Mg/3 Ml)  3 ml NEB Q4H PRN


   PRN Reason: Shortness Of Breath/wheezing


Baclofen (Lioresal)  30 mg PO TID [facility]


   Last Admin: 06/19/17 21:58 Dose:  30 mg


Betamethasone/Clotrimazole (Lotrisone)  0 gm TOP BID [facility]


   Last Admin: 06/19/17 21:59 Dose:  1 applic


Bisacodyl (Dulcolax)  5 mg PO DAILY PRN


   PRN Reason: Constipation


   Last Admin: 06/17/17 08:39 Dose:  5 mg


Cholecalciferol (Vitamin D3)  2,000 units PO DAILY [facility]


   Last Admin: 06/19/17 08:47 Dose:  2,000 units


Docusate Sodium (Colace)  100 mg PO BID PRN


   PRN Reason: Constipation


Enoxaparin Sodium (Lovenox)  40 mg SUBCUT DAILY [facility]


   Last Admin: 06/19/17 08:45 Dose:  40 mg


Famotidine (Pepcid)  20 mg PO BID [facility]


   Last Admin: 06/19/17 21:57 Dose:  20 mg


Fluoxetine HCl (Prozac)  10 mg PO DAILY [facility]


   Last Admin: 06/19/17 08:46 Dose:  10 mg


Hydralazine HCl (Apresoline)  20 mg IVPUSH Q4H PRN


   PRN Reason: Hypertension


   Last Admin: 06/19/17 05:43 Dose:  20 mg


Hydromorphone HCl (Dilaudid)  0.25 mg IVPUSH Q2H PRN


   PRN Reason: Pain (severe 7-10)


Promethazine HCl 12.5 mg/ (Sodium Chloride)  50.5 mls @ 100 mls/hr IV Q6H PRN


   PRN Reason: Nausea/Vomiting


Ceftriaxone Sodium 1 gm/ (Sodium Chloride)  100 mls @ 200 mls/hr IV Q24H [facility]


   Last Admin: 06/19/17 16:00 Dose:  200 mls/hr


Lactulose (Cephulac)  10 gm PO DAILY PRN


   PRN Reason: CONSTIPATION


   Last Admin: 06/16/17 09:11 Dose:  10 gm


Levetiracetam (Keppra)  1,000 mg PO BID [facility]


   Last Admin: 06/19/17 21:59 Dose:  1,000 mg


Lorazepam (Ativan)  1 mg IV Q6H PRN


   PRN Reason: Anxiety


   Last Admin: 06/17/17 16:22 Dose:  1 mg


Lorazepam (Ativan)  0.25 mg PO BID [facility]


   Last Admin: 06/19/17 21:57 Dose:  0.25 mg


Magnesium Oxide (Magnesium Oxide)  400 mg PO BID [facility]


   Last Admin: 06/19/17 21:57 Dose:  400 mg


Magnesium Oxide (Magnesium Oxide)  400 mg PO ONETIME ONE


   Stop: 06/20/17 08:01


Metoclopramide HCl (Reglan)  5 mg IVPUSH TID [facility]


   Last Admin: 06/19/17 21:57 Dose:  5 mg


Metoprolol Tartrate (Lopressor)  5 mg IVPUSH Q4H PRN


   PRN Reason: Tachycardia


   Last Admin: 06/19/17 06:26 Dose:  5 mg


Multivitamins (Thera)  1 each PO DAILY [facility]


   Last Admin: 06/19/17 08:47 Dose:  1 each


Nystatin (Nystatin Crm)  0 gm TOP BID [facility]


   Last Admin: 06/19/17 22:00 Dose:  1 applic


Ondansetron HCl (Zofran)  4 mg IV Q6H PRN


   PRN Reason: Nausea/Vomiting


Polyethylene Glycol (Miralax)  17 gm PO DAILY PRN


   PRN Reason: Constipation


Potassium Chloride (Potassium Chloride Solution)  20 meq PO BID [facility]


   Last Admin: 06/19/17 21:57 Dose:  20 meq


Saccharomyces Boulardii (Florastor)  250 mg PO BID [facility]


   Last Admin: 06/19/17 21:57 Dose:  250 mg


Senna/Docusate Sodium (Senna Plus)  2 tab PO BID [facility]


   Last Admin: 06/19/17 22:31 Dose:  Not Given


Temazepam (Restoril)  15 mg PO BEDTIME PRN


   PRN Reason: Sleep


Tizanidine HCl (Zanaflex)  2 mg PO TID [facility]


   Last Admin: 06/19/17 21:58 Dose:  2 mg





Discontinued Medications





Acetaminophen (Tylenol)   mg PO Q4HR PRN


   PRN Reason: Fever


Baclofen (Lioresal)  30 mg GTUBE ONETIME ONE


   Stop: 06/15/17 17:23


   Last Admin: 06/15/17 21:55 Dose:  Not Given


Etomidate (Amidate) Confirm Administered Dose 40 mg IVPUSH .STK-MED ONE


   Stop: 06/15/17 14:17


   Last Admin: 06/15/17 15:58 Dose:  Not Given


Etomidate (Amidate)  15 mg IVPUSH ONETIME ONE


   Stop: 06/15/17 14:19


   Last Admin: 06/15/17 14:18 Dose:  15 mg


Etomidate (Amidate)  30 mg IVPUSH ONETIME ONE


   Stop: 06/15/17 15:01


   Last Admin: 06/15/17 15:00 Dose:  30 mg


Fentanyl (Sublimaze) Confirm Administered Dose 100 mcg .ROUTE .STK-MED ONE


   Stop: 06/15/17 14:33


   Last Admin: 06/15/17 15:59 Dose:  Not Given


Fentanyl (Sublimaze)  100 mcg IVPUSH ONETIME ONE


   Stop: 06/15/17 14:31


   Last Admin: 06/15/17 14:30 Dose:  100 mcg


Furosemide (Lasix)  40 mg IVPUSH NOW ONE


   Stop: 06/15/17 16:54


   Last Admin: 06/15/17 17:16 Dose:  Not Given


Furosemide (Lasix) Confirm Administered Dose 100 mg .ROUTE .STK-MED ONE


   Stop: 06/15/17 16:57


   Last Admin: 06/15/17 18:25 Dose:  Not Given


Hydrocortisone Sodium Succinate (Solu-Cortef)  100 mg IVPUSH Q6H [facility]


   Last Admin: 06/18/17 06:06 Dose:  100 mg


Sodium Chloride (Normal Saline) Confirm Administered Dose 1,000 mls @ as 

directed .ROUTE .STK-MED ONE


   Stop: 06/15/17 13:07


   Last Admin: 06/15/17 13:15 Dose:  Not Given


Sodium Chloride (Normal Saline)  1,000 mls @ 999 mls/hr IV ASDIRECTED [facility]


   Last Admin: 06/15/17 13:04 Dose:  999 mls/hr


Lactated Ringer's (Ringers, Lactated)  1,000 mls @ 999 mls/hr IV .BOLUS ONE


   Stop: 06/15/17 14:11


   Last Admin: 06/15/17 13:13 Dose:  999 mls/hr


Levofloxacin/Dextrose 750 mg/ (Premix)  150 mls @ 100 mls/hr IV ONETIME ONE


   Stop: 06/15/17 14:57


   Last Admin: 06/15/17 13:45 Dose:  100 mls/hr


Norepinephrine Bitartrate 4 mg (/ Dextrose/Water)  250 mls @ 15 mls/hr IV 

TITRATE YOLIS; 4 MCG/MIN


   PRN Reason: Protocol


   Last Titration: 06/17/17 17:30 Dose:  0 mcg/min, 0 mls/hr


Lactated Ringer's (Ringers, Lactated)  1,000 mls @ 50 mls/hr IV ASDIRECTED [facility]


   Last Admin: 06/19/17 04:58 Dose:  50 mls/hr


Vancomycin HCl 1 gm/ Sodium (Chloride)  250 mls @ 250 mls/hr IV ONETIME ONE


   Stop: 06/15/17 16:52


   Last Admin: 06/15/17 18:25 Dose:  Not Given


Vancomycin HCl 1 gm/ Sodium (Chloride)  250 mls @ 250 mls/hr IV ONETIME ONE


   Stop: 06/15/17 16:52


   Last Admin: 06/15/17 16:36 Dose:  100 mls/hr


Piperacillin Sod/Tazobactam (Sod 4.5 gm/ Sodium Chloride)  100 mls @ 25 mls/hr 

IV Q8H [facility]


   Last Admin: 06/17/17 15:58 Dose:  Not Given


Dextrose/Sodium Chloride (Dextrose 5%-Normal Saline)  1,000 mls @ 100 mls/hr IV 

ASDIRECTED [facility]


   Last Admin: 06/15/17 18:26 Dose:  100 mls/hr


Piperacillin Sod/Tazobactam (Sod 4.5 gm/ Sodium Chloride)  100 mls @ 200 mls/hr 

IV ONETIME ONE


   Stop: 06/15/17 17:29


   Last Admin: 06/15/17 18:26 Dose:  200 mls/hr


Levofloxacin/Dextrose 500 mg/ (Premix)  100 mls @ 100 mls/hr IV Q24H [facility]


   Last Admin: 06/17/17 08:37 Dose:  100 mls/hr


Magnesium Sulfate (Magnesium Sulfate 2 Gm In Water 50 Ml)  50 mls @ 50 mls/hr 

IV ONETIME ONE


   Stop: 06/15/17 22:29


   Last Admin: 06/15/17 23:25 Dose:  50 mls/hr


Potassium Chloride 10 meq/ (Premix)  100 mls @ 100 mls/hr IV Q1H [facility]


   Stop: 06/16/17 03:14


   Last Admin: 06/16/17 02:04 Dose:  100 mls/hr


Magnesium Sulfate (Magnesium Sulfate 2 Gm In Water 50 Ml)  50 mls @ 50 mls/hr 

IV ONETIME ONE


   Stop: 06/18/17 10:29


   Last Admin: 06/18/17 10:20 Dose:  50 mls/hr


Magnesium Oxide (Magnesium Oxide)  400 mg PO QPM [facility]


   Last Admin: 06/18/17 17:06 Dose:  400 mg


Magnesium Sulfate (Pharmacy To Dose - Magnesium Replacement)  1 dose .XX 

ASDIRECTED [facility]


Midazolam HCl (Versed 1 Mg/Ml) Confirm Administered Dose 2 mg .ROUTE .STK-MED 

ONE


   Stop: 06/15/17 14:11


   Last Admin: 06/15/17 15:59 Dose:  Not Given


Midazolam HCl (Versed 1 Mg/Ml)  2 mg IVPUSH ONETIME ONE


   Stop: 06/15/17 14:16


   Last Admin: 06/15/17 14:15 Dose:  2 mg


Midazolam HCl (Versed 1 Mg/Ml)  2 mg IVPUSH ONETIME ONE


   Stop: 06/15/17 14:32


   Last Admin: 06/15/17 14:31 Dose:  2 mg


Midazolam HCl (Versed 1 Mg/Ml)  5 mg .ROUTE .STK-MED ONE


   Stop: 06/15/17 14:33


Ondansetron HCl (Zofran)  4 mg IVPUSH ONETIME ONE


   Stop: 06/15/17 13:41


   Last Admin: 06/15/17 13:50 Dose:  4 mg


Potassium Chloride (Pharmacy To Dose - Potassium Replacement)  1 dose .XX 

ASDIRECTED [facility]


Potassium Chloride (Klor-Con M20)  20 meq PO Q3H [facility]


   Stop: 06/18/17 16:01


   Last Admin: 06/18/17 10:20 Dose:  20 meq


Potassium Chloride (Potassium Chloride Solution)  20 meq PO Q3H [facility]


   Stop: 06/18/17 17:01


   Last Admin: 06/18/17 16:42 Dose:  20 meq


Potassium Chloride (Potassium Chloride Solution)  20 meq PO ONETIME ONE


   Stop: 06/19/17 08:26


   Last Admin: 06/19/17 08:53 Dose:  20 meq


Saccharomyces Boulardii (Florastor)  250 mg PO TID [facility]


   Last Admin: 06/15/17 22:22 Dose:  Not Given


Senna/Docusate Sodium (Senna Plus)  1 tab PO BID PRN


   PRN Reason: Constipation











*Q Meaningful Use (DIS)





- VTE *Q


VTE Criteria *Q: 








- Stroke *Q


Stroke Criteria *Q: 








- AMI *Q


AMI Criteria *Q: 








<Dayanara Pickett - Last Filed: 06/20/17 12:59>





**Discharge Summary





- Hospital Course


Free Text/Narrative:: 





Agree with summary, ready for DC.





- Patient Summary/Data


Consults: 


 Consultations





06/15/17 16:25


Consult to Case Management [CONS] Routine 


Consult to  [CONS] Routine 


Consult to Spiritual Care [CONS] Routine 


OT Evaluation and Treatment [CONS] Routine 


PT Evaluation and Treatment [CONS] Routine 


Respiratory Care Assess and Treatment [CONS] Routine 





06/15/17 21:00


Consult to Dietary [Consult to Dietician] [CONS] Routine 





06/16/17 07:40


PT Evaluation and Treatment [CONS] Routine 














- Patient Data


Vitals - Most Recent: 


 Last Vital Signs











Temp  36.2 C   06/20/17 08:40


 


Pulse  103 H  06/20/17 08:40


 


Resp  22 H  06/20/17 08:40


 


BP  122/78   06/20/17 08:40


 


Pulse Ox  96   06/20/17 08:40











I&O - Last 24 hours: 


 Intake & Output











 06/19/17 06/20/17 06/20/17





 22:59 06:59 14:59


 


Intake Total 1050 415 


 


Output Total 1375 850 


 


Balance -325 -435 











Lab Results - Last 24 hrs: 


 Laboratory Results - last 24 hr











  06/20/17 06/20/17 Range/Units





  06:08 06:08 


 


WBC  9.93   (3.98-10.04)  K/mm3


 


RBC  3.89 L   (3.98-5.22)  M/mm3


 


Hgb  11.2   (11.2-15.7)  gm/L


 


Hct  34.6   (34.1-44.9)  %


 


MCV  88.9   (79.4-94.8)  fl


 


MCH  28.8   (25.6-32.2)  pg


 


MCHC  32.4   (32.2-35.5)  g/dl


 


RDW Std Deviation  55.0 H   (36.4-46.3)  fL


 


Plt Count  312   (182-369)  K/mm3


 


MPV  10.1   (9.4-12.3)  fl


 


Neut % (Auto)  51.9   (34.0-71.1)  %


 


Lymph % (Auto)  39.2   (19.3-51.7)  %


 


Mono % (Auto)  7.3   (4.7-12.5)  %


 


Eos % (Auto)  0.7   (0.7-5.8)  


 


Baso % (Auto)  0.2   (0.1-1.2)  %


 


Neut # (Auto)  5.16   (1.56-6.13)  K/mm3


 


Lymph # (Auto)  3.89 H   (1.18-3.74)  K/mm3


 


Mono # (Auto)  0.72 H   (0.24-0.36)  K/mm3


 


Eos # (Auto)  0.07   (0.04-0.36)  K/mm3


 


Baso # (Auto)  0.02   (0.01-0.08)  K/mm3


 


Manual Slide Review  Abnormal smear   


 


Sodium   142  (136-145)  mEq/L


 


Potassium   3.5  (3.5-5.1)  mEq/L


 


Chloride   105  ()  mEq/L


 


Carbon Dioxide   25  (21-32)  mEq/L


 


Anion Gap   15.5 H  (5-15)  


 


BUN   17  (7-18)  mg/dL


 


Creatinine   0.7  (0.55-1.02)  mg/dL


 


Est Cr Clr Drug Dosing   73.51  mL/min


 


Estimated GFR (MDRD)   > 60  (>60)  mL/min


 


BUN/Creatinine Ratio   24.3 H  (14-18)  


 


Glucose   96  ()  mg/dL


 


Calcium   9.3  (8.5-10.1)  mg/dL


 


Magnesium   1.8  (1.8-2.4)  mg/dl











Med Orders - Current: 


 Current Medications





Acetaminophen (Tylenol)  650 mg PO Q4H PRN


   PRN Reason: Pain (Mild 1-3)/fever


Hydrocodone Bitart/Acetaminophen (Norco 325-5 Mg)  1 tab PO Q4H PRN


   PRN Reason: Pain (moderate 4-6)


   Last Admin: 06/17/17 08:38 Dose:  1 tab


Albuterol/Ipratropium (Duoneb 3.0-0.5 Mg/3 Ml)  3 ml NEB Q4H PRN


   PRN Reason: Shortness Of Breath/wheezing


Baclofen (Lioresal)  30 mg PO TID [facility]


   Last Admin: 06/20/17 10:36 Dose:  30 mg


Betamethasone/Clotrimazole (Lotrisone)  0 gm TOP BID [facility]


   Last Admin: 06/20/17 10:39 Dose:  1 applic


Bisacodyl (Dulcolax)  5 mg PO DAILY PRN


   PRN Reason: Constipation


   Last Admin: 06/17/17 08:39 Dose:  5 mg


Cholecalciferol (Vitamin D3)  2,000 units PO DAILY [facility]


   Last Admin: 06/20/17 10:38 Dose:  2,000 units


Docusate Sodium (Colace)  100 mg PO BID PRN


   PRN Reason: Constipation


Enoxaparin Sodium (Lovenox)  40 mg SUBCUT DAILY [facility]


   Last Admin: 06/20/17 10:38 Dose:  40 mg


Famotidine (Pepcid)  20 mg PO BID [facility]


   Last Admin: 06/20/17 10:38 Dose:  20 mg


Fluoxetine HCl (Prozac)  10 mg PO DAILY [facility]


   Last Admin: 06/20/17 10:38 Dose:  10 mg


Hydralazine HCl (Apresoline)  20 mg IVPUSH Q4H PRN


   PRN Reason: Hypertension


   Last Admin: 06/19/17 05:43 Dose:  20 mg


Hydromorphone HCl (Dilaudid)  0.25 mg IVPUSH Q2H PRN


   PRN Reason: Pain (severe 7-10)


Promethazine HCl 12.5 mg/ (Sodium Chloride)  50.5 mls @ 100 mls/hr IV Q6H PRN


   PRN Reason: Nausea/Vomiting


Ceftriaxone Sodium 1 gm/ (Sodium Chloride)  100 mls @ 200 mls/hr IV Q24H [facility]


   Last Admin: 06/19/17 16:00 Dose:  200 mls/hr


Lactulose (Cephulac)  10 gm PO DAILY PRN


   PRN Reason: CONSTIPATION


   Last Admin: 06/16/17 09:11 Dose:  10 gm


Levetiracetam (Keppra)  1,000 mg PO BID [facility]


   Last Admin: 06/20/17 10:37 Dose:  1,000 mg


Lorazepam (Ativan)  1 mg IV Q6H PRN


   PRN Reason: Anxiety


   Last Admin: 06/17/17 16:22 Dose:  1 mg


Lorazepam (Ativan)  0.25 mg PO BID [facility]


   Last Admin: 06/20/17 10:36 Dose:  0.25 mg


Magnesium Oxide (Magnesium Oxide)  400 mg PO BID [facility]


   Last Admin: 06/20/17 10:34 Dose:  400 mg


Metoclopramide HCl (Reglan)  5 mg IVPUSH TID [facility]


   Last Admin: 06/20/17 10:40 Dose:  5 mg


Metoprolol Tartrate (Lopressor)  5 mg IVPUSH Q4H PRN


   PRN Reason: Tachycardia


   Last Admin: 06/19/17 06:26 Dose:  5 mg


Multivitamins (Thera)  1 each PO DAILY [facility]


   Last Admin: 06/20/17 10:35 Dose:  1 each


Nystatin (Nystatin Crm)  0 gm TOP BID [facility]


   Last Admin: 06/20/17 10:39 Dose:  1 applic


Ondansetron HCl (Zofran)  4 mg IV Q6H PRN


   PRN Reason: Nausea/Vomiting


Polyethylene Glycol (Miralax)  17 gm PO DAILY PRN


   PRN Reason: Constipation


Potassium Chloride (Potassium Chloride Solution)  20 meq PO BID [facility]


   Last Admin: 06/20/17 10:40 Dose:  20 meq


Saccharomyces Boulardii (Florastor)  250 mg PO BID [facility]


   Last Admin: 06/20/17 10:37 Dose:  250 mg


Senna/Docusate Sodium (Senna Plus)  2 tab PO BID [facility]


   Last Admin: 06/20/17 10:36 Dose:  2 tab


Temazepam (Restoril)  15 mg PO BEDTIME PRN


   PRN Reason: Sleep


Tizanidine HCl (Zanaflex)  2 mg PO TID [facility]


   Last Admin: 06/20/17 10:37 Dose:  2 mg





Discontinued Medications





Acetaminophen (Tylenol)   mg PO Q4HR PRN


   PRN Reason: Fever


Baclofen (Lioresal)  30 mg GTUBE ONETIME ONE


   Stop: 06/15/17 17:23


   Last Admin: 06/15/17 21:55 Dose:  Not Given


Etomidate (Amidate) Confirm Administered Dose 40 mg IVPUSH .STK-MED ONE


   Stop: 06/15/17 14:17


   Last Admin: 06/15/17 15:58 Dose:  Not Given


Etomidate (Amidate)  15 mg IVPUSH ONETIME ONE


   Stop: 06/15/17 14:19


   Last Admin: 06/15/17 14:18 Dose:  15 mg


Etomidate (Amidate)  30 mg IVPUSH ONETIME ONE


   Stop: 06/15/17 15:01


   Last Admin: 06/15/17 15:00 Dose:  30 mg


Fentanyl (Sublimaze) Confirm Administered Dose 100 mcg .ROUTE .STK-MED ONE


   Stop: 06/15/17 14:33


   Last Admin: 06/15/17 15:59 Dose:  Not Given


Fentanyl (Sublimaze)  100 mcg IVPUSH ONETIME ONE


   Stop: 06/15/17 14:31


   Last Admin: 06/15/17 14:30 Dose:  100 mcg


Furosemide (Lasix)  40 mg IVPUSH NOW ONE


   Stop: 06/15/17 16:54


   Last Admin: 06/15/17 17:16 Dose:  Not Given


Furosemide (Lasix) Confirm Administered Dose 100 mg .ROUTE .STK-MED ONE


   Stop: 06/15/17 16:57


   Last Admin: 06/15/17 18:25 Dose:  Not Given


Hydrocortisone Sodium Succinate (Solu-Cortef)  100 mg IVPUSH Q6H [facility]


   Last Admin: 06/18/17 06:06 Dose:  100 mg


Sodium Chloride (Normal Saline) Confirm Administered Dose 1,000 mls @ as 

directed .ROUTE .STK-MED ONE


   Stop: 06/15/17 13:07


   Last Admin: 06/15/17 13:15 Dose:  Not Given


Sodium Chloride (Normal Saline)  1,000 mls @ 999 mls/hr IV ASDIRECTED [facility]


   Last Admin: 06/15/17 13:04 Dose:  999 mls/hr


Lactated Ringer's (Ringers, Lactated)  1,000 mls @ 999 mls/hr IV .BOLUS ONE


   Stop: 06/15/17 14:11


   Last Admin: 06/15/17 13:13 Dose:  999 mls/hr


Levofloxacin/Dextrose 750 mg/ (Premix)  150 mls @ 100 mls/hr IV ONETIME ONE


   Stop: 06/15/17 14:57


   Last Admin: 06/15/17 13:45 Dose:  100 mls/hr


Norepinephrine Bitartrate 4 mg (/ Dextrose/Water)  250 mls @ 15 mls/hr IV 

TITRATE [facility]; 4 MCG/MIN


   PRN Reason: Protocol


   Last Titration: 06/17/17 17:30 Dose:  0 mcg/min, 0 mls/hr


Lactated Ringer's (Ringers, Lactated)  1,000 mls @ 50 mls/hr IV ASDIRECTED [facility]


   Last Admin: 06/19/17 04:58 Dose:  50 mls/hr


Vancomycin HCl 1 gm/ Sodium (Chloride)  250 mls @ 250 mls/hr IV ONETIME ONE


   Stop: 06/15/17 16:52


   Last Admin: 06/15/17 18:25 Dose:  Not Given


Vancomycin HCl 1 gm/ Sodium (Chloride)  250 mls @ 250 mls/hr IV ONETIME ONE


   Stop: 06/15/17 16:52


   Last Admin: 06/15/17 16:36 Dose:  100 mls/hr


Piperacillin Sod/Tazobactam (Sod 4.5 gm/ Sodium Chloride)  100 mls @ 25 mls/hr 

IV Q8H [facility]


   Last Admin: 06/17/17 15:58 Dose:  Not Given


Dextrose/Sodium Chloride (Dextrose 5%-Normal Saline)  1,000 mls @ 100 mls/hr IV 

ASDIRECTED [facility]


   Last Admin: 06/15/17 18:26 Dose:  100 mls/hr


Piperacillin Sod/Tazobactam (Sod 4.5 gm/ Sodium Chloride)  100 mls @ 200 mls/hr 

IV ONETIME ONE


   Stop: 06/15/17 17:29


   Last Admin: 06/15/17 18:26 Dose:  200 mls/hr


Levofloxacin/Dextrose 500 mg/ (Premix)  100 mls @ 100 mls/hr IV Q24H [facility]


   Last Admin: 06/17/17 08:37 Dose:  100 mls/hr


Magnesium Sulfate (Magnesium Sulfate 2 Gm In Water 50 Ml)  50 mls @ 50 mls/hr 

IV ONETIME ONE


   Stop: 06/15/17 22:29


   Last Admin: 06/15/17 23:25 Dose:  50 mls/hr


Potassium Chloride 10 meq/ (Premix)  100 mls @ 100 mls/hr IV Q1H [facility]


   Stop: 06/16/17 03:14


   Last Admin: 06/16/17 02:04 Dose:  100 mls/hr


Magnesium Sulfate (Magnesium Sulfate 2 Gm In Water 50 Ml)  50 mls @ 50 mls/hr 

IV ONETIME ONE


   Stop: 06/18/17 10:29


   Last Admin: 06/18/17 10:20 Dose:  50 mls/hr


Magnesium Oxide (Magnesium Oxide)  400 mg PO QPM [facility]


   Last Admin: 06/18/17 17:06 Dose:  400 mg


Magnesium Oxide (Magnesium Oxide)  400 mg PO ONETIME ONE


   Stop: 06/20/17 08:01


   Last Admin: 06/20/17 10:34 Dose:  400 mg


Magnesium Sulfate (Pharmacy To Dose - Magnesium Replacement)  1 dose .XX 

ASDIRECTED [facility]


Midazolam HCl (Versed 1 Mg/Ml) Confirm Administered Dose 2 mg .ROUTE .STK-MED 

ONE


   Stop: 06/15/17 14:11


   Last Admin: 06/15/17 15:59 Dose:  Not Given


Midazolam HCl (Versed 1 Mg/Ml)  2 mg IVPUSH ONETIME ONE


   Stop: 06/15/17 14:16


   Last Admin: 06/15/17 14:15 Dose:  2 mg


Midazolam HCl (Versed 1 Mg/Ml)  2 mg IVPUSH ONETIME ONE


   Stop: 06/15/17 14:32


   Last Admin: 06/15/17 14:31 Dose:  2 mg


Midazolam HCl (Versed 1 Mg/Ml)  5 mg .ROUTE .STK-MED ONE


   Stop: 06/15/17 14:33


Ondansetron HCl (Zofran)  4 mg IVPUSH ONETIME ONE


   Stop: 06/15/17 13:41


   Last Admin: 06/15/17 13:50 Dose:  4 mg


Potassium Chloride (Pharmacy To Dose - Potassium Replacement)  1 dose .XX 

ASDIRECTED [facility]


Potassium Chloride (Klor-Con M20)  20 meq PO Q3H [facility]


   Stop: 06/18/17 16:01


   Last Admin: 06/18/17 10:20 Dose:  20 meq


Potassium Chloride (Potassium Chloride Solution)  20 meq PO Q3H [facility]


   Stop: 06/18/17 17:01


   Last Admin: 06/18/17 16:42 Dose:  20 meq


Potassium Chloride (Potassium Chloride Solution)  20 meq PO ONETIME ONE


   Stop: 06/19/17 08:26


   Last Admin: 06/19/17 08:53 Dose:  20 meq


Saccharomyces Boulardii (Florastor)  250 mg PO TID [facility]


   Last Admin: 06/15/17 22:22 Dose:  Not Given


Senna/Docusate Sodium (Senna Plus)  1 tab PO BID PRN


   PRN Reason: Constipation











*Q Meaningful Use (DIS)





- VTE *Q


VTE Criteria *Q: 








- Stroke *Q


Stroke Criteria *Q: 








- AMI *Q


AMI Criteria *Q:

## 2017-09-30 ENCOUNTER — HOSPITAL ENCOUNTER (EMERGENCY)
Dept: HOSPITAL 41 - JD.ED | Age: 52
Discharge: HOME | End: 2017-09-30
Payer: MEDICAID

## 2017-09-30 VITALS — DIASTOLIC BLOOD PRESSURE: 77 MMHG | SYSTOLIC BLOOD PRESSURE: 122 MMHG

## 2017-09-30 DIAGNOSIS — Z87.440: ICD-10-CM

## 2017-09-30 DIAGNOSIS — Z86.73: ICD-10-CM

## 2017-09-30 DIAGNOSIS — E11.9: ICD-10-CM

## 2017-09-30 DIAGNOSIS — R11.2: Primary | ICD-10-CM

## 2017-09-30 DIAGNOSIS — Z79.899: ICD-10-CM

## 2017-09-30 PROCEDURE — 85025 COMPLETE CBC W/AUTO DIFF WBC: CPT

## 2017-09-30 PROCEDURE — 99285 EMERGENCY DEPT VISIT HI MDM: CPT

## 2017-09-30 PROCEDURE — 71010: CPT

## 2017-09-30 PROCEDURE — 80053 COMPREHEN METABOLIC PANEL: CPT

## 2017-09-30 PROCEDURE — 36415 COLL VENOUS BLD VENIPUNCTURE: CPT

## 2017-09-30 NOTE — EDM.PDOC
ED HPI GENERAL MEDICAL PROBLEM





- General


Chief Complaint: Gastrointestinal Problem


Stated Complaint: [facility]


Time Seen by Provider: 09/30/17 09:18


Source of Information: Reports: Patient, EMS, EMS Notes Reviewed, Nursing Home 

Records, RN Notes Reviewed





- History of Present Illness


INITIAL COMMENTS - FREE TEXT/NARRATIVE: 





52 old lady with history of fairly advanced EMS for age has been transported 

here from nursing home with concern of possible aspiration pneumonia. He does 

get her feedings through a G-tube. Her usual feeding this morning. Then did 

vomit a short time after that. Vomiting she was short of breath for a period of 

time. Nursing staff heard rales down in her lower lung fields. On arrival to ED 

her shortness of breath has resolved. She has not been coughing much. She has 

not been running a fever. On arrival to ED she denies chest or abdominal pain. 

She denies feeling nauseated at this time.





- Related Data


 Allergies











Allergy/AdvReac Type Severity Reaction Status Date / Time


 


No Known Allergies Allergy   Verified 04/27/16 15:34











Home Meds: 


 Home Meds





Baclofen 30 mg GTUBE TID 01/12/15 [History]


FLUoxetine [PROzac] 10 mg GTUBE DAILY 01/12/15 [History]


LORazepam [Ativan] 0.25 mg GTUBE QAM 01/12/15 [History]


tiZANidine HCl [Zanaflex] 2 mg PEGTUBE TID 01/12/15 [History]


Acetaminophen [Tylenol] 2 tab GTUBE Q4HR PRN 04/07/15 [History]


Cholecalciferol (Vitamin D3) [Vitamin D3] 2,000 unit GTUBE DAILY 04/07/15 [

History]


Docusate Sodium/Sennosides [Senna Plus] 2 tab GTUBE BID 04/07/15 [History]


Multivitamin with Minerals [Multiple Vitamin] 1 tab GTUBE DAILY 04/07/15 [

History]


Clotrimazole/Betamethasone Dip [Lotrisone Cream] 1 applic TOP BID 09/30/17 [

History]


Clotrimazole/Betamethasone Dip [Lotrisone Cream] 1 applic TOP BID 09/30/17 [

History]


Lactose-Reduced Food/Fiber [Jevity 1.2 Antwan Liquid] 275 ml GTUBE 5XDAY 09/30/17 [

History]


Magnesium Oxide 400 mg GTUBE BID 09/30/17 [History]


Potassium Chloride [Potassium Chloride Solution] 20 meq GTUBE BID 09/30/17 [

History]


levETIRAcetam [Levetiracetam] 10 ml GTUBE BID 09/30/17 [History]











Past Medical History


HEENT History: Reports: Other (See Below)


Other HEENT History: dysphagia


Cardiovascular History: Reports: Other (See Below)


Other Cardiovascular History: hypomagnesemia


Gastrointestinal History: Reports: Chronic Constipation


Other Gastrointestinal History: NPO


Genitourinary History: Reports: UTI, Recurrent


Other Genitourinary History: sepsis secondary to e-coli, chronic kidney disease.


Musculoskeletal History: Reports: Back Pain, Chronic, Osteoarthritis, 

Osteoporosis, Other (See Below)


Other Musculoskeletal History: MS, sacral pressure ulcer.


Neurological History: Reports: Alzheimers Disease, CVA, MS, Seizure


Other Neuro History: chronic embolisms/thrombosis of veins, recurrent strokes, 

convulsions, encephalopathy.


Psychiatric History: Reports: Alzheimers Disease, Depression


Other Psychiatric History: insomnia


Endocrine/Metabolic History: Reports: Diabetes, Type II


Other Endocrine/Metabolic History: eosinophilia, follicular disorder, disorders 

of magnesium metabolismm anemia, hypercalcemia, hypokalemia


Hematologic History: Reports: Anemia, Other (See Below)


Other Hematologic History: eosinophilia


Other Dermatologic History: candidasis.





- Infectious Disease History


Infectious Disease History: Reports: Other (See Below)


Other Infectious Disease History: unknown pt unable to answer and it is not 

listed on nursing home paperwork





- Past Surgical History


Head Surgeries/Procedures: Reports: None


GI Surgical History: Reports: Other (See Below)


Other GI Surgeries/Procedures: G-tube





Social & Family History





- Family History


Family Medical History: Unobtainable





- Tobacco Use


Smoking Status *Q: Never Smoker


Second Hand Smoke Exposure: No





- Caffeine Use


Caffeine Use: Reports: None





- Alcohol Use


Days Per Week of Alcohol Use: 0





- Recreational Drug Use


Recreational Drug Use: No





ED ROS GENERAL





- Review of Systems


Review Of Systems: See Below


Constitutional: Denies: Fever, Chills, Diaphoresis


HEENT: Denies: Throat Pain


Respiratory: Reports: Shortness of Breath, Wheezing (gone), Cough (goneno gone)


Cardiovascular: Denies: Chest Pain


GI/Abdominal: Reports: Nausea, Vomiting (goneemesis 1 this morning, gone).  

Denies: Abdominal Pain


Skin: Reports: No Symptoms


Neurological: Reports: Weakness (chronic weakness associated with her MS)





ED EXAM, GENERAL





- Physical Exam


Exam: See Below


General Appearance: Alert, No Apparent Distress


Eye Exam: Bilateral Eye: PERRL


Throat/Mouth: Normal Inspection


Head: Atraumatic


Neck: Supple.  No: Lymphadenopathy (L), Lymphadenopathy (R)


Respiratory/Chest: No Respiratory Distress, Lungs Clear, Normal Breath Sounds, 

Rhonchi (I do hear a few rhonchi down at the right base).  No: Rales, Wheezing


Cardiovascular: Regular Rate, Rhythm


GI/Abdominal: Soft, Non-Tender, Other (G-tube present upper abdomen)


Extremities: Normal Inspection, Normal Range of Motion


Neurological: Alert, Other


Skin Exam: Warm (answers questions appropriately), Dry, Normal Color





Course





- Vital Signs


Last Recorded V/S: 


 Last Vital Signs











Temp  97.8 F   09/30/17 09:17


 


Pulse  88   09/30/17 09:17


 


Resp  16   09/30/17 09:17


 


BP  122/77   09/30/17 09:17


 


Pulse Ox  94 L  09/30/17 09:17














- Orders/Labs/Meds


Labs: 


 Laboratory Tests











  09/30/17 09/30/17 Range/Units





  09:35 10:00 


 


WBC  6.20   (3.98-10.04)  K/mm3


 


RBC  4.47   (3.98-5.22)  M/mm3


 


Hgb  13.1   (11.2-15.7)  gm/L


 


Hct  40.6   (34.1-44.9)  %


 


MCV  90.8   (79.4-94.8)  fl


 


MCH  29.3   (25.6-32.2)  pg


 


MCHC  32.3   (32.2-35.5)  g/dl


 


RDW Std Deviation  57.5 H   (36.4-46.3)  fL


 


Plt Count  205   (182-369)  K/mm3


 


MPV  10.3   (9.4-12.3)  fl


 


Neut % (Auto)  59.9   (34.0-71.1)  %


 


Lymph % (Auto)  30.2   (19.3-51.7)  %


 


Mono % (Auto)  8.5   (4.7-12.5)  %


 


Eos % (Auto)  1.0   (0.7-5.8)  


 


Baso % (Auto)  0.2   (0.1-1.2)  %


 


Neut # (Auto)  3.72   (1.56-6.13)  K/mm3


 


Lymph # (Auto)  1.87   (1.18-3.74)  K/mm3


 


Mono # (Auto)  0.53 H   (0.24-0.36)  K/mm3


 


Eos # (Auto)  0.06   (0.04-0.36)  K/mm3


 


Baso # (Auto)  0.01   (0.01-0.08)  K/mm3


 


Sodium   143  (136-145)  mEq/L


 


Potassium   4.2  (3.5-5.1)  mEq/L


 


Chloride   105  ()  mEq/L


 


Carbon Dioxide   27  (21-32)  mEq/L


 


Anion Gap   15.2 H  (5-15)  


 


BUN   21 H  (7-18)  mg/dL


 


Creatinine   0.8  (0.55-1.02)  mg/dL


 


Est Cr Clr Drug Dosing   72.34  mL/min


 


Estimated GFR (MDRD)   > 60  (>60)  mL/min


 


BUN/Creatinine Ratio   26.3 H  (14-18)  


 


Glucose   109 H  ()  mg/dL


 


Calcium   10.1  (8.5-10.1)  mg/dL


 


Total Bilirubin   0.3  (0.2-1.0)  mg/dL


 


AST   26  (15-37)  U/L


 


ALT   36  (14-59)  U/L


 


Alkaline Phosphatase   87  ()  U/L


 


Total Protein   8.4 H  (6.4-8.2)  g/dl


 


Albumin   3.1 L  (3.4-5.0)  g/dl


 


Globulin   5.3  gm/dL


 


Albumin/Globulin Ratio   0.6 L  (1-2)  











Meds: 


Medications














Discontinued Medications














Generic Name Dose Route Start Last Admin





  Trade Name Freq  PRN Reason Stop Dose Admin


 


Sodium Chloride  10 ml  09/30/17 09:26  09/30/17 10:31





  Saline Flush  FLUSH   10 ml





  ASDIRECTED PRN   Administration





  Keep Vein Open   














- Re-Assessments/Exams


Free Text/Narrative Re-Assessment/Exam: 





09/30/17 11:16


as noted on arrival patient was feeling much better. She denied current nausea 

or abdominal pain. She did not appear short of breath. She arrived on 2 L 

oxygen. When that was stopped her stats maintained in the 93-97 range on room 

air. She was not tachypnea. I did hear a few rhonchi down the right days. 

However chest x-ray was clear, no infiltrate and white blood count normal. We 

will let her go back at this time. They will need to monitor her for any signs 

or symptoms of developing pneumonia.











Departure





- Departure


Time of Disposition: 11:12


Disposition: Home, Self-Care 01


Clinical Impression: 


Vomiting


Qualifiers:


 Vomiting type: unspecified Vomiting Intractability: non-intractable Nausea 

presence: with nausea Qualified Code(s): R11.2 - Nausea with vomiting, 

unspecified








- Discharge Information


Instructions:  Nausea and Vomiting, Adult, Easy-to-Read


Referrals: 


Olaf Ely MD [Primary Care Provider] - 


Forms:  ED Department Discharge


Additional Instructions: 


continue present care, no evidence for pneumonia at this time, her chest x-ray 

was clear, white blood count normal. Continue to monitor for any signs of 

pneumonia and if she does develop persistent fever, productive cough she may 

need to come back for reevaluation.

## 2017-10-01 NOTE — CR
Chest: Portable view of the chest was obtained.

 

Comparison: Previous chest x-ray of 06/19/17.

 

Heart size and mediastinum are normal.  Lungs are clear.  Bony 

structures are osteopenic.

 

Impression:

1.  Nothing acute is identified on portable chest x-ray.

 

Diagnostic code #1

## 2018-01-15 ENCOUNTER — HOSPITAL ENCOUNTER (EMERGENCY)
Dept: HOSPITAL 41 - JD.ED | Age: 53
Discharge: NURSING, REHAB OR LONG-TERM CARE | End: 2018-01-15
Payer: MEDICAID

## 2018-01-15 VITALS — SYSTOLIC BLOOD PRESSURE: 152 MMHG | DIASTOLIC BLOOD PRESSURE: 77 MMHG

## 2018-01-15 DIAGNOSIS — N18.9: ICD-10-CM

## 2018-01-15 DIAGNOSIS — G30.9: ICD-10-CM

## 2018-01-15 DIAGNOSIS — E11.22: ICD-10-CM

## 2018-01-15 DIAGNOSIS — F32.9: ICD-10-CM

## 2018-01-15 DIAGNOSIS — Z79.899: ICD-10-CM

## 2018-01-15 DIAGNOSIS — N39.0: Primary | ICD-10-CM

## 2018-01-15 DIAGNOSIS — F02.80: ICD-10-CM

## 2018-01-15 DIAGNOSIS — J02.0: ICD-10-CM

## 2018-01-15 PROCEDURE — 71045 X-RAY EXAM CHEST 1 VIEW: CPT

## 2018-01-15 PROCEDURE — 96365 THER/PROPH/DIAG IV INF INIT: CPT

## 2018-01-15 PROCEDURE — 99285 EMERGENCY DEPT VISIT HI MDM: CPT

## 2018-01-15 PROCEDURE — 96361 HYDRATE IV INFUSION ADD-ON: CPT

## 2018-01-15 PROCEDURE — 36415 COLL VENOUS BLD VENIPUNCTURE: CPT

## 2018-01-15 PROCEDURE — 85730 THROMBOPLASTIN TIME PARTIAL: CPT

## 2018-01-15 PROCEDURE — 85025 COMPLETE CBC W/AUTO DIFF WBC: CPT

## 2018-01-15 PROCEDURE — 87040 BLOOD CULTURE FOR BACTERIA: CPT

## 2018-01-15 PROCEDURE — 80053 COMPREHEN METABOLIC PANEL: CPT

## 2018-01-15 PROCEDURE — 83605 ASSAY OF LACTIC ACID: CPT

## 2018-01-15 PROCEDURE — 87086 URINE CULTURE/COLONY COUNT: CPT

## 2018-01-15 PROCEDURE — 93005 ELECTROCARDIOGRAM TRACING: CPT

## 2018-01-15 PROCEDURE — P9612 CATHETERIZE FOR URINE SPEC: HCPCS

## 2018-01-15 PROCEDURE — 85610 PROTHROMBIN TIME: CPT

## 2018-01-15 PROCEDURE — 87430 STREP A AG IA: CPT

## 2018-01-15 PROCEDURE — 81001 URINALYSIS AUTO W/SCOPE: CPT

## 2018-01-15 PROCEDURE — 87804 INFLUENZA ASSAY W/OPTIC: CPT

## 2018-01-15 PROCEDURE — 86140 C-REACTIVE PROTEIN: CPT

## 2018-01-15 NOTE — EDM.PDOC
ED HPI GENERAL MEDICAL PROBLEM





- General


Chief Complaint: Fever


Stated Complaint: [facility]


Time Seen by Provider: 01/15/18 18:05


Source of Information: Reports: Nursing Home Records


History Limitations: Reports: Other (dementia)





- History of Present Illness


INITIAL COMMENTS - FREE TEXT/NARRATIVE: 


Patient is a 52-year-old female with a history of dementia, MS, and multiple 

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 Per [facility] nursing staff 

patient developed a fever with changes to her mentation. Patient is more 

lethargic, flushed in color, with foul odor to urine. Patient receives tube 

feedings via GI tube with concerns for aspiration. Patient has been congested 

with a nonproductive cough. She does have a history of pyelonephritis and 

multiple recurrences of UTI. She did receive Tylenol prior to admission to the 

ED. Temperature upon arrival is 98.5F. Temperature at the nursing home was 102

F. She is a DNR patient.


Treatments PTA: Reports: Acetaminophen, IV/IO, Oxygen, See EMS Report, Other (

see below)


Other Treatments PTA: IVF





- Related Data


 Allergies











Allergy/AdvReac Type Severity Reaction Status Date / Time


 


No Known Allergies Allergy   Verified 01/15/18 18:00











Home Meds: 


 Home Meds





Baclofen 30 mg GTUBE TID 01/12/15 [History]


FLUoxetine [PROzac] 10 mg GTUBE DAILY 01/12/15 [History]


LORazepam [Ativan] 0.25 mg GTUBE QAM 01/12/15 [History]


tiZANidine HCl [Zanaflex] 2 mg PEGTUBE TID 01/12/15 [History]


Acetaminophen [Tylenol] 2 tab GTUBE Q4HR PRN 04/07/15 [History]


Cholecalciferol (Vitamin D3) [Vitamin D3] 2,000 unit GTUBE DAILY 04/07/15 [

History]


Docusate Sodium/Sennosides [Senna Plus] 2 tab GTUBE BID 04/07/15 [History]


Multivitamin with Minerals [Multiple Vitamin] 1 tab GTUBE DAILY 04/07/15 [

History]


Clotrimazole/Betamethasone Dip [Lotrisone Cream] 1 applic TOP BID 09/30/17 [

History]


Lactose-Reduced Food/Fiber [Jevity 1.2 Antwan Liquid] 275 ml GTUBE 5XDAY 09/30/17 [

History]


Magnesium Oxide 400 mg GTUBE BID 09/30/17 [History]


Potassium Chloride [Potassium Chloride Solution] 20 meq GTUBE BID 09/30/17 [

History]


levETIRAcetam [Levetiracetam] 10 ml GTUBE BID 09/30/17 [History]


Barrier Cream 1 dose TOP BID 01/15/18 [History]


Lactulose 10 gm PO ASDIRECTED PRN 01/15/18 [History]


Ondansetron HCl [Zofran] 4 mg PEGTUBE Q6H PRN 01/15/18 [History]


Water 70 ml PEGTUBE ASDIRECTED 01/15/18 [History]


cefTRIAXone [Rocephin] 1 gm IM Q24H #6 adv 01/15/18 [Rx]











Past Medical History


HEENT History: Reports: Other (See Below)


Other HEENT History: dysphagia


Cardiovascular History: Reports: Other (See Below)


Other Cardiovascular History: hypomagnesemia, hypokalemia


Respiratory History: Reports: Pneumonia, Recurrent


Gastrointestinal History: Reports: Chronic Constipation


Other Gastrointestinal History: NPO


Genitourinary History: Reports: Neurogenic Bladder, Renal Disease, Retention, 

Urinary, UTI, Recurrent


Other Genitourinary History: sepsis secondary to e-coli, chronic kidney disease.


Musculoskeletal History: Reports: Back Pain, Chronic, Osteoarthritis, 

Osteoporosis, Other (See Below)


Other Musculoskeletal History: MS


Neurological History: Reports: Alzheimers Disease, CVA, MS, Seizure


Other Neuro History: chronic embolisms/thrombosis of veins, recurrent strokes, 

convulsions, encephalopathy.


Psychiatric History: Reports: Alzheimers Disease, Dementia, Depression


Other Psychiatric History: insomnia, behavioral disturbances


Endocrine/Metabolic History: Reports: Diabetes, Type II


Other Endocrine/Metabolic History: eosinophilia, follicular disorder, disorders 

of magnesium metabolismm anemia, hypercalcemia, hypokalemia


Hematologic History: Reports: Anemia, Other (See Below)


Other Hematologic History: eosinophilia


Other Dermatologic History: candidasis, follicular disorder





- Infectious Disease History


Infectious Disease History: Reports: Other (See Below)


Other Infectious Disease History: unknown pt unable to answer and it is not 

listed on nursing home paperwork





- Past Surgical History


Head Surgeries/Procedures: Reports: None


GI Surgical History: Reports: Other (See Below)


Other GI Surgeries/Procedures: G-tube





Social & Family History





- Family History


Family Medical History: Unobtainable





- Tobacco Use


Smoking Status *Q: Never Smoker


Second Hand Smoke Exposure: No





- Caffeine Use


Caffeine Use: Reports: None





- Alcohol Use


Days Per Week of Alcohol Use: 0





- Recreational Drug Use


Recreational Drug Use: No





ED ROS GENERAL





- Review of Systems


Review Of Systems: Unable To Obtain





ED EXAM, GENERAL





- Physical Exam


Exam: See Below


Exam Limited By: Other (dementia)


General Appearance: Alert, WD/WN, Lethargic


Eye Exam: Bilateral Eye: PERRL


Ears: Hearing Grossly Normal


Nose: Normal Inspection


Throat/Mouth: Normal Voice, No Airway Compromise, Other (Oral mucosa is dry 

with erythema noted to the posterior pharynx. Minimal exudates noted.)


Head: Atraumatic, Normocephalic


Neck: Normal Inspection, Supple, Non-Tender, Full Range of Motion.  No: 

Lymphadenopathy (L), Lymphadenopathy (R)


Respiratory/Chest: No Respiratory Distress, Lungs Clear, Normal Breath Sounds, 

No Accessory Muscle Use, Chest Non-Tender


Cardiovascular: Normal Peripheral Pulses, No Murmur (Obvious), Tachycardia


Peripheral Pulses: 4+: Radial (L), Radial (R)


GI/Abdominal: Normal Bowel Sounds, Soft, Non-Tender, No Organomegaly, No 

Distention


Extremities: Normal Inspection, Non-Tender, No Pedal Edema, Normal Capillary 

Refill


Neurological: Alert, CN II-XII Intact, Confused


Skin Exam: Warm, Dry, Other (Flushed appearance)





Course





- Vital Signs


Last Recorded V/S: 


 Last Vital Signs











Temp  100.4 F   01/15/18 18:52


 


Pulse  102 H  01/15/18 17:47


 


Resp  18   01/15/18 17:47


 


BP  152/77 H  01/15/18 17:47


 


Pulse Ox  97   01/15/18 17:47














- Orders/Labs/Meds


Orders: 


 Active Orders 24 hr











 Category Date Time Status


 


 EKG Documentation Completion [RC] STAT Care  01/15/18 18:02 Active


 


 Peripheral IV Care [RC] .AS DIRECTED Care  01/15/18 18:02 Active


 


 Chest 1V Frontal [CR] Stat Exams  01/15/18 18:02 Taken


 


 CULTURE BLOOD [BC] Stat Lab  01/15/18 18:20 Received


 


 CULTURE BLOOD [BC] Stat Lab  01/15/18 18:35 Received


 


 CULTURE URINE [RM] Stat Lab  01/15/18 18:45 Received


 


 Blood Culture x2 Reflex Set [OM.PC] Stat Oth  01/15/18 18:02 Ordered


 


 Peripheral IV Insertion Adult [OM.PC] Stat Oth  01/15/18 18:02 Ordered











Labs: 


 Laboratory Tests











  01/15/18 01/15/18 01/15/18 Range/Units





  18:20 18:20 18:20 


 


WBC  7.70    (3.98-10.04)  K/mm3


 


RBC  4.69    (3.98-5.22)  M/mm3


 


Hgb  13.8    (11.2-15.7)  gm/L


 


Hct  43.1    (34.1-44.9)  %


 


MCV  91.9    (79.4-94.8)  fl


 


MCH  29.4    (25.6-32.2)  pg


 


MCHC  32.0 L    (32.2-35.5)  g/dl


 


RDW Std Deviation  53.9 H    (36.4-46.3)  fL


 


Plt Count  309    (182-369)  K/mm3


 


MPV  10.3    (9.4-12.3)  fl


 


Neut % (Auto)  61.6    (34.0-71.1)  %


 


Lymph % (Auto)  31.8    (19.3-51.7)  %


 


Mono % (Auto)  6.4    (4.7-12.5)  %


 


Eos % (Auto)  0 L    (0.7-5.8)  


 


Baso % (Auto)  0.1    (0.1-1.2)  %


 


Neut # (Auto)  4.74    (1.56-6.13)  K/mm3


 


Lymph # (Auto)  2.45    (1.18-3.74)  K/mm3


 


Mono # (Auto)  0.49 H    (0.24-0.36)  K/mm3


 


Eos # (Auto)  0.00 L    (0.04-0.36)  K/mm3


 


Baso # (Auto)  0.01    (0.01-0.08)  K/mm3


 


PT   10.2   (8.0-13.0)  SECONDS


 


INR   0.94   


 


APTT   26   (22-36)  SECONDS


 


Sodium    140  (136-145)  mEq/L


 


Potassium    4.1  (3.5-5.1)  mEq/L


 


Chloride    103  ()  mEq/L


 


Carbon Dioxide    23  (21-32)  mEq/L


 


Anion Gap    18.1 H  (5-15)  


 


BUN    19 H  (7-18)  mg/dL


 


Creatinine    0.8  (0.55-1.02)  mg/dL


 


Est Cr Clr Drug Dosing    63.58  mL/min


 


Estimated GFR (MDRD)    > 60  (>60)  mL/min


 


BUN/Creatinine Ratio    23.8 H  (14-18)  


 


Glucose    115 H  ()  mg/dL


 


Lactic Acid     (0.4-2.0)  mmol/L


 


Calcium    9.4  (8.5-10.1)  mg/dL


 


Total Bilirubin    0.2  (0.2-1.0)  mg/dL


 


AST    22  (15-37)  U/L


 


ALT    35  (14-59)  U/L


 


Alkaline Phosphatase    79  ()  U/L


 


C-Reactive Protein    < 0.2  (<1.0)  mg/dL


 


Total Protein    8.3 H  (6.4-8.2)  g/dl


 


Albumin    3.0 L  (3.4-5.0)  g/dl


 


Globulin    5.3  gm/dL


 


Albumin/Globulin Ratio    0.6 L  (1-2)  


 


Urine Color     (Yellow)  


 


Urine Appearance     (Clear)  


 


Urine pH     (5.0-8.0)  


 


Ur Specific Gravity     (1.005-1.030)  


 


Urine Protein     (Negative)  


 


Urine Glucose (UA)     (Negative)  


 


Urine Ketones     (Negative)  


 


Urine Occult Blood     (Negative)  


 


Urine Nitrite     (Negative)  


 


Urine Bilirubin     (Negative)  


 


Urine Urobilinogen     (0.2-1.0)  


 


Ur Leukocyte Esterase     (Negative)  


 


Urine RBC     (0-5)  /hpf


 


Urine WBC     (0-5)  /hpf


 


Ur Epithelial Cells     (0-5)  /hpf


 


Urine Bacteria     (FEW)  /hpf


 


Urine Mucus     (FEW)  /hpf














  01/15/18 01/15/18 Range/Units





  18:20 18:45 


 


WBC    (3.98-10.04)  K/mm3


 


RBC    (3.98-5.22)  M/mm3


 


Hgb    (11.2-15.7)  gm/L


 


Hct    (34.1-44.9)  %


 


MCV    (79.4-94.8)  fl


 


MCH    (25.6-32.2)  pg


 


MCHC    (32.2-35.5)  g/dl


 


RDW Std Deviation    (36.4-46.3)  fL


 


Plt Count    (182-369)  K/mm3


 


MPV    (9.4-12.3)  fl


 


Neut % (Auto)    (34.0-71.1)  %


 


Lymph % (Auto)    (19.3-51.7)  %


 


Mono % (Auto)    (4.7-12.5)  %


 


Eos % (Auto)    (0.7-5.8)  


 


Baso % (Auto)    (0.1-1.2)  %


 


Neut # (Auto)    (1.56-6.13)  K/mm3


 


Lymph # (Auto)    (1.18-3.74)  K/mm3


 


Mono # (Auto)    (0.24-0.36)  K/mm3


 


Eos # (Auto)    (0.04-0.36)  K/mm3


 


Baso # (Auto)    (0.01-0.08)  K/mm3


 


PT    (8.0-13.0)  SECONDS


 


INR    


 


APTT    (22-36)  SECONDS


 


Sodium    (136-145)  mEq/L


 


Potassium    (3.5-5.1)  mEq/L


 


Chloride    ()  mEq/L


 


Carbon Dioxide    (21-32)  mEq/L


 


Anion Gap    (5-15)  


 


BUN    (7-18)  mg/dL


 


Creatinine    (0.55-1.02)  mg/dL


 


Est Cr Clr Drug Dosing    mL/min


 


Estimated GFR (MDRD)    (>60)  mL/min


 


BUN/Creatinine Ratio    (14-18)  


 


Glucose    ()  mg/dL


 


Lactic Acid  3.4 H   (0.4-2.0)  mmol/L


 


Calcium    (8.5-10.1)  mg/dL


 


Total Bilirubin    (0.2-1.0)  mg/dL


 


AST    (15-37)  U/L


 


ALT    (14-59)  U/L


 


Alkaline Phosphatase    ()  U/L


 


C-Reactive Protein    (<1.0)  mg/dL


 


Total Protein    (6.4-8.2)  g/dl


 


Albumin    (3.4-5.0)  g/dl


 


Globulin    gm/dL


 


Albumin/Globulin Ratio    (1-2)  


 


Urine Color   Yellow  (Yellow)  


 


Urine Appearance   Cloudy H  (Clear)  


 


Urine pH   7.5  (5.0-8.0)  


 


Ur Specific Gravity   1.020  (1.005-1.030)  


 


Urine Protein   1+ H  (Negative)  


 


Urine Glucose (UA)   Negative  (Negative)  


 


Urine Ketones   Negative  (Negative)  


 


Urine Occult Blood   2+ H  (Negative)  


 


Urine Nitrite   Negative  (Negative)  


 


Urine Bilirubin   Negative  (Negative)  


 


Urine Urobilinogen   0.2  (0.2-1.0)  


 


Ur Leukocyte Esterase   2+ H  (Negative)  


 


Urine RBC   10-20 H  (0-5)  /hpf


 


Urine WBC   20-30 H  (0-5)  /hpf


 


Ur Epithelial Cells   5-10 H  (0-5)  /hpf


 


Urine Bacteria   Many H  (FEW)  /hpf


 


Urine Mucus   Not seen  (FEW)  /hpf











Meds: 


Medications














Discontinued Medications














Generic Name Dose Route Start Last Admin





  Trade Name Freq  PRN Reason Stop Dose Admin


 


Sodium Chloride  1,000 mls @ 999 mls/hr  01/15/18 18:22  01/15/18 18:50





  Normal Saline  IV  01/15/18 19:22  999 mls/hr





  ONETIME ONE   Administration


 


Ceftriaxone Sodium 2 gm/  100 mls @ 200 mls/hr  01/15/18 19:24  01/15/18 19:37





  Sodium Chloride  IV  01/15/18 19:53  200 mls/hr





  ONETIME ONE   Administration


 


Penicillin G Benzathine  1.2 millunits  01/15/18 19:21  





  Bicillin L-A  IM  01/15/18 19:22  





  ONETIME ONE   


 


Sodium Chloride  10 ml  01/15/18 18:01  01/15/18 18:58





  Saline Flush  FLUSH   10 ml





  ASDIRECTED PRN   Administration





  Keep Vein Open   














- Re-Assessments/Exams


Free Text/Narrative Re-Assessment/Exam: 


Patient has a history dementia, MS, and multiple strokes leaving her disabled 

with contracture. She is a poor historian. She was administered Tylenol prior 

to arrival. Temperature currently is 98.5. With review of previous history no 

documentation for coronary disease or congestive heart failure. No recent 

echocardiogram completed.





Patient meets SIRS criteria: elevated heart rate and fever.  





Ordered NS 999mls/hr. 





Initial labs and studies include: CBC, CRP, chem 14, blood cultures 2, coag 

studies, lactic acid, influenza and strep screen, UA, chest x-ray one view, and 

EKG.





01/15/18 18:34 CXR reviewed with Dr. Woodward revealed no acute findings in 

comparison to CXR obtained 9/2017.  





EKG sinus tachycardia at a rate of 106 with left axis deviation. QTc 444. FL 

interval 133. No acute ST changes noted.





Labs reviewed: CBC was essentially normal. Sodium 140, potassium 4.1, AG 18.1, 

BUN 19, creatinine 0.8, glucose 1:15, lactic acid elevated at 3.4. UA cloudy 

protein one plus, occult blood 2+, leukocyte esterase 2+, urine rbc's 10-20, 

urine wbc's 20-30, epithelial cells 5-10, bacteria many.





Strep screen was positive. Influenza screen was negative. Ordered Rocephin 2 g 

IV. Previous urine culture 6/2017 grew out Escherichia coli susceptible to 

Rocephin. Urine culture has been ordered.





Nile Astorga RN on staff this evening. She works at [facility] and 

states patient's mentation is normal at this point. Upon reexamination patient 

is more alert from initial admission. They are able to administer Rocephin 

intramuscularly at the nursing home. Will get a hold of family and discuss 

discharging patient back to nursing home.





Vital signs. Blood pressure 104 over systolic. Heart rate 194. SPO2 98%. 

Patient continues to be afebrile.





01/15/18 19:47 Spoke to patients mother and provided update.  She is okay with 

sending patient back to the NH after IVF's and Antibiotic infusion. 





01/15/18 20:25 IVF's and antibiotic has completed.  Vital signs are stable. 

Patient remains afebrile. Will discharge home with instructions as documented.  








Departure





- Departure


Time of Disposition: 20:27


Disposition: DC/Tfer to [facility]


Condition: Good


Clinical Impression: 


 Strep sore throat





UTI (urinary tract infection)


Qualifiers:


 Urinary tract infection type: site unspecified Hematuria presence: with 

hematuria Qualified Code(s): N39.0 - Urinary tract infection, site not specified








- Discharge Information


Prescriptions: 


cefTRIAXone [Rocephin] 1 gm IM Q24H #6 adv


Instructions:  Fever, Adult, Easy-to-Read


Referrals: 


Olaf Ely MD [Primary Care Provider] - 


Forms:  ED Department Discharge


Additional Instructions: 


Patient was positive for strep throat. UA results concerning for UTI. Urine 

culture ordered.  She was administered rocephin 2 grams IV while in the E.D. 

She will receive rocephin 1 gram IM with lidocaine for the next 6 days.  

Followup with PCP the end of this week or first part of next week as needed. 

Return to the E.D. for any new or worsening symptoms. Continue to administer 

tylenol 650 mg every 4 to 6 hrs for fever. Ensure adequate hydration. Continue 

with all home medications as prescribed.  





- My Orders


Last 24 Hours: 


My Active Orders





01/15/18 18:02


EKG Documentation Completion [RC] STAT 


Peripheral IV Care [RC] .AS DIRECTED 


Chest 1V Frontal [CR] Stat 


Blood Culture x2 Reflex Set [OM.PC] Stat 


Peripheral IV Insertion Adult [OM.PC] Stat 





01/15/18 18:20


CULTURE BLOOD [BC] Stat 





01/15/18 18:35


CULTURE BLOOD [BC] Stat 





01/15/18 18:45


CULTURE URINE [RM] Stat 














- Assessment/Plan


Last 24 Hours: 


My Active Orders





01/15/18 18:02


EKG Documentation Completion [RC] STAT 


Peripheral IV Care [RC] .AS DIRECTED 


Chest 1V Frontal [CR] Stat 


Blood Culture x2 Reflex Set [OM.PC] Stat 


Peripheral IV Insertion Adult [OM.PC] Stat 





01/15/18 18:20


CULTURE BLOOD [BC] Stat 





01/15/18 18:35


CULTURE BLOOD [BC] Stat 





01/15/18 18:45


CULTURE URINE [RM] Stat

## 2018-01-16 NOTE — CR
Chest: Portable view of the chest was obtained.

 

Comparison: Prior chest x-ray of 09/30/17.

 

Heart size and mediastinum are normal.  Lungs are clear.  Bony 

structures are grossly intact.

 

Impression:

1.  Nothing acute is identified on portable chest x-ray.

 

Diagnostic code #1

## 2018-06-28 ENCOUNTER — HOSPITAL ENCOUNTER (EMERGENCY)
Dept: HOSPITAL 41 - JD.ED | Age: 53
Discharge: NURSING, REHAB OR LONG-TERM CARE | End: 2018-06-28
Payer: MEDICAID

## 2018-06-28 VITALS — SYSTOLIC BLOOD PRESSURE: 97 MMHG | DIASTOLIC BLOOD PRESSURE: 59 MMHG

## 2018-06-28 DIAGNOSIS — Z86.73: ICD-10-CM

## 2018-06-28 DIAGNOSIS — D64.9: ICD-10-CM

## 2018-06-28 DIAGNOSIS — N18.9: ICD-10-CM

## 2018-06-28 DIAGNOSIS — E11.22: ICD-10-CM

## 2018-06-28 DIAGNOSIS — K94.23: Primary | ICD-10-CM

## 2018-06-28 DIAGNOSIS — G30.9: ICD-10-CM

## 2018-06-28 DIAGNOSIS — G35: ICD-10-CM

## 2018-06-28 DIAGNOSIS — Z87.01: ICD-10-CM

## 2018-06-28 DIAGNOSIS — Z79.899: ICD-10-CM

## 2018-06-28 DIAGNOSIS — F02.80: ICD-10-CM

## 2018-06-28 NOTE — EDM.PDOC
ED HPI GENERAL MEDICAL PROBLEM





- General


Chief Complaint: Gastrointestinal Problem


Stated Complaint: TUBE PULLED OUT NEEDS TO BE REPLACED


Time Seen by Provider: 06/28/18 17:01


Source of Information: Reports: Patient


History Limitations: Reports: No Limitations





- History of Present Illness


INITIAL COMMENTS - FREE TEXT/NARRATIVE: 





52-year-old female from a local nursing home presents to the ED due to feeding 

tube dislodgment. It's unclear what happened but apparently the patient snagged 

tube and it came out. Is unclear whether the blood was intact or not. Looking 

at the records it appears that she had an 18-gauge Eric feeding tube in 

place. Patient has multiple sclerosis. She therefore cannot swallow any more 

and is fed primarily with her medications and nutrition through the feeding 

tube. We will not have a feeding tube like it in our department. Therefore the 

plan will be to replace the tube with a Aguirre catheter.


Onset: Today


Onset Date: 06/28/18


Onset Time: 15:45


Duration: Minutes:


Location: Reports: Abdomen (Feeding tube dislodgment.)


Quality: Reports: Other (In no pain or discomfort)


Improves with: Reports: None


Worsens with: Reports: None


Context: Reports: Other (Feeding tube dislodgment).  Denies: Activity, Exercise

, Lifting, Sick Contact, Trauma


Associated Symptoms: Reports: No Other Symptoms


Treatments PTA: Reports: Other (see below) (None.)





- Related Data


 Allergies











Allergy/AdvReac Type Severity Reaction Status Date / Time


 


No Known Allergies Allergy   Verified 06/28/18 16:35











Home Meds: 


 Home Meds





Baclofen 30 mg GTUBE TID 01/12/15 [History]


FLUoxetine [PROzac] 10 mg GTUBE DAILY 01/12/15 [History]


LORazepam [Ativan] 0.25 mg GTUBE QAM 01/12/15 [History]


tiZANidine HCl [Zanaflex] 2 mg PEGTUBE TID 01/12/15 [History]


Acetaminophen [Tylenol] 2 tab GTUBE Q4HR PRN 04/07/15 [History]


Cholecalciferol (Vitamin D3) [Vitamin D3] 2,000 unit GTUBE DAILY 04/07/15 [

History]


Docusate Sodium/Sennosides [Senna Plus] 2 tab GTUBE BID 04/07/15 [History]


Multivitamin with Minerals [Multiple Vitamin] 1 tab GTUBE DAILY 04/07/15 [

History]


Lactose-Reduced Food/Fiber [Jevity 1.2 Antwan Liquid] 225 ml GTUBE 5XDAY 09/30/17 [

History]


Magnesium Oxide 400 mg GTUBE BID 09/30/17 [History]


Potassium Chloride [Potassium Chloride Solution] 20 meq GTUBE BID 09/30/17 [

History]


levETIRAcetam [Levetiracetam] 10 ml GTUBE BID 09/30/17 [History]


Barrier Cream 1 dose TOP BID 01/15/18 [History]


Lactulose 10 gm PO ASDIRECTED PRN 01/15/18 [History]


Ondansetron HCl [Zofran] 8 mg PEGTUBE Q6H PRN 01/15/18 [History]


Water 70 ml PEGTUBE ASDIRECTED 01/15/18 [History]


Fructooligosaccharides/Polydex [Hyfiber with Fos Liquid] 12 gm PO ASDIRECTED 06/ 28/18 [History]











Past Medical History


HEENT History: Reports: Other (See Below)


Other HEENT History: dysphagia


Cardiovascular History: Reports: Other (See Below)


Other Cardiovascular History: hypomagnesemia, hypokalemia


Respiratory History: Reports: Pneumonia, Recurrent


Gastrointestinal History: Reports: Chronic Constipation


Other Gastrointestinal History: NPO


Genitourinary History: Reports: Neurogenic Bladder, Renal Disease, Retention, 

Urinary, UTI, Recurrent


Other Genitourinary History: sepsis secondary to e-coli, chronic kidney disease.


Musculoskeletal History: Reports: Back Pain, Chronic, Osteoarthritis, 

Osteoporosis, Other (See Below)


Other Musculoskeletal History: MS


Neurological History: Reports: Alzheimers Disease, CVA, MS, Seizure


Other Neuro History: chronic embolisms/thrombosis of veins, recurrent strokes, 

convulsions, encephalopathy.


Psychiatric History: Reports: Alzheimers Disease, Dementia, Depression


Other Psychiatric History: insomnia, behavioral disturbances


Endocrine/Metabolic History: Reports: Diabetes, Type II


Other Endocrine/Metabolic History: eosinophilia, follicular disorder, disorders 

of magnesium metabolismm anemia, hypercalcemia, hypokalemia


Hematologic History: Reports: Anemia, Other (See Below)


Other Hematologic History: eosinophilia


Other Dermatologic History: candidasis, follicular disorder





- Infectious Disease History


Infectious Disease History: Reports: Other (See Below)


Other Infectious Disease History: unknown pt unable to answer and it is not 

listed on nursing home paperwork





- Past Surgical History


Head Surgeries/Procedures: Reports: None


GI Surgical History: Reports: Other (See Below)


Other GI Surgeries/Procedures: G-tube





Social & Family History





- Family History


Family Medical History: Unobtainable





- Tobacco Use


Smoking Status *Q: Never Smoker





- Caffeine Use


Caffeine Use: Reports: None





- Recreational Drug Use


Recreational Drug Use: No





- Living Situation & Occupation


Living situation: Reports: Extended Care Facility


Occupation: Disabled





ED ROS GENERAL





- Review of Systems


Review Of Systems: See Below


Constitutional: Denies: Fever, Chills, Malaise, Weakness, Fatigue, Weight Loss


HEENT: Reports: No Symptoms


Respiratory: Reports: No Symptoms


Cardiovascular: Reports: No Symptoms


Endocrine: Reports: No Symptoms


GI/Abdominal: Reports: Other (Patient has an ostomy that is well-healed in the 

midline of her upper abdomen. No active bleeding from the wound.)





ED EXAM, GI/ABD





- Physical Exam


Exam: See Below


Exam Limited By: No Limitations


General Appearance: Alert, WD/WN, No Apparent Distress, Other (Patient has 

quite significant spasticity of her lower extremities and titubation to tremor 

of her head and neck from multiple sclerosis.)


Eyes: Bilateral: Normal Appearance


GI/Abdominal Exam: Other (Patient has a well-healed ostomy in her midline of 

her upper abdomen almost in the epigastrium. Was no active bleeding from the 

wound.)





Course





- Vital Signs


Last Recorded V/S: 


 Last Vital Signs











Temp  36.7 C   06/28/18 16:29


 


Pulse  78   06/28/18 16:29


 


Resp  20   06/28/18 16:29


 


BP  97/59 L  06/28/18 16:29


 


Pulse Ox  100   06/28/18 16:29














- Orders/Labs/Meds


Orders: 


 Active Orders 24 hr











 Category Date Time Status


 


 Communication Order [RC] STAT Care  06/28/18 17:21 Active











Meds: 


Medications














Discontinued Medications














Generic Name Dose Route Start Last Admin





  Trade Name Freq  PRN Reason Stop Dose Admin


 


Lidocaine HCl  10 ml  06/28/18 17:00  06/28/18 17:09





  Xylocaine 2% Jelly  MUCMEM  06/28/18 17:01  10 ml





  ONETIME ONE   Administration





     





     





     





     














- Radiology Interpretation


Free Text/Narrative:: 


52-year-old female with multiple sclerosis and his feeding tube dependent 

presents the ED after her feeding tube was dislodged from her upper abdomen. 

This unclear how the tube was dislodged. She has an 18-gauge Eric feeding 

tube by history. We do not have such a tube in the department. Plan I will 

replace the feeding tube with a Aguirre catheter. Plan will be to loop up the 

ostomy with lidocaine gel to help facilitate placement of the tube due to her 

spasticity.








- Re-Assessments/Exams


Free Text/Narrative Re-Assessment/Exam: 





06/28/18 17:20: 16-gauge Aguirre catheter was used to facilitate feeding. It was 

placed with mild degree of difficulty meeting obstruction and it took firm 

pressure to get the tube to pop back into place. I was able to irrigate the 

tube easily with 60 mils of tap water. Balloon was inflated. Patient will be 

returned to the nursing home where she can start feedings and medications as 

per her usual. Follow-up with surgeon who placed the tube is indicated and a 

when they have a suitable may keep tube to replace the Aguirre catheter.








Departure





- Departure


Time of Disposition: 17:20


Disposition: DC/Tfer to [facility]


Condition: Fair


Clinical Impression: 


 Gastrostomy malfunction








- Discharge Information


Instructions:  Gastrostomy Tube Replacement


Referrals: 


Olaf Ely MD [Primary Care Provider] - 


Forms:  ED Department Discharge


Additional Instructions: 


Evaluation the emergent today in regards to dislodgment of normal gastrostomy 

feeding tube. No feeding tube percent with the patient therefore we would be 

guessing as to what type of tube was in place prior to its loss. Therefore a 16-

gauge Aguirre catheter was placed through the ostomy into the stomach. It 

localized easily. You may start to use it for her normal medications and 

feeding as per usual. Once you follow-up with her normal care provider or 

surgeon and they have the appropriate tube the Aguirre can be replaced at that 

time.





- My Orders


Last 24 Hours: 


My Active Orders





06/28/18 17:21


Communication Order [RC] STAT 














- Assessment/Plan


Last 24 Hours: 


My Active Orders





06/28/18 17:21


Communication Order [RC] STAT

## 2019-02-21 ENCOUNTER — HOSPITAL ENCOUNTER (INPATIENT)
Dept: HOSPITAL 41 - JD.ED | Age: 54
LOS: 8 days | Discharge: NURSING, REHAB OR LONG-TERM CARE | DRG: 872 | End: 2019-03-01
Payer: MEDICAID

## 2019-02-21 DIAGNOSIS — Z51.5: ICD-10-CM

## 2019-02-21 DIAGNOSIS — N31.9: ICD-10-CM

## 2019-02-21 DIAGNOSIS — Z86.718: ICD-10-CM

## 2019-02-21 DIAGNOSIS — F32.9: ICD-10-CM

## 2019-02-21 DIAGNOSIS — E87.3: ICD-10-CM

## 2019-02-21 DIAGNOSIS — F41.9: ICD-10-CM

## 2019-02-21 DIAGNOSIS — G47.00: ICD-10-CM

## 2019-02-21 DIAGNOSIS — M54.9: ICD-10-CM

## 2019-02-21 DIAGNOSIS — M81.0: ICD-10-CM

## 2019-02-21 DIAGNOSIS — D63.8: ICD-10-CM

## 2019-02-21 DIAGNOSIS — R50.9: ICD-10-CM

## 2019-02-21 DIAGNOSIS — E83.42: ICD-10-CM

## 2019-02-21 DIAGNOSIS — E11.9: ICD-10-CM

## 2019-02-21 DIAGNOSIS — R47.01: ICD-10-CM

## 2019-02-21 DIAGNOSIS — N39.0: ICD-10-CM

## 2019-02-21 DIAGNOSIS — T50.2X5A: ICD-10-CM

## 2019-02-21 DIAGNOSIS — E88.09: ICD-10-CM

## 2019-02-21 DIAGNOSIS — F02.80: ICD-10-CM

## 2019-02-21 DIAGNOSIS — R00.0: ICD-10-CM

## 2019-02-21 DIAGNOSIS — G89.29: ICD-10-CM

## 2019-02-21 DIAGNOSIS — D64.9: ICD-10-CM

## 2019-02-21 DIAGNOSIS — Z74.01: ICD-10-CM

## 2019-02-21 DIAGNOSIS — R56.9: ICD-10-CM

## 2019-02-21 DIAGNOSIS — Z86.73: ICD-10-CM

## 2019-02-21 DIAGNOSIS — N18.9: ICD-10-CM

## 2019-02-21 DIAGNOSIS — A41.9: Primary | ICD-10-CM

## 2019-02-21 DIAGNOSIS — N13.6: ICD-10-CM

## 2019-02-21 DIAGNOSIS — I95.9: ICD-10-CM

## 2019-02-21 DIAGNOSIS — R41.82: ICD-10-CM

## 2019-02-21 DIAGNOSIS — E87.6: ICD-10-CM

## 2019-02-21 DIAGNOSIS — G30.9: ICD-10-CM

## 2019-02-21 DIAGNOSIS — G40.909: ICD-10-CM

## 2019-02-21 DIAGNOSIS — G35: ICD-10-CM

## 2019-02-21 DIAGNOSIS — E11.22: ICD-10-CM

## 2019-02-21 DIAGNOSIS — B95.2: ICD-10-CM

## 2019-02-21 DIAGNOSIS — Z86.14: ICD-10-CM

## 2019-02-21 DIAGNOSIS — Z93.1: ICD-10-CM

## 2019-02-21 DIAGNOSIS — M19.90: ICD-10-CM

## 2019-02-21 DIAGNOSIS — Z79.899: ICD-10-CM

## 2019-02-21 DIAGNOSIS — K56.7: ICD-10-CM

## 2019-02-21 DIAGNOSIS — D47.3: ICD-10-CM

## 2019-02-21 DIAGNOSIS — R33.9: ICD-10-CM

## 2019-02-21 DIAGNOSIS — Z87.01: ICD-10-CM

## 2019-02-21 DIAGNOSIS — R13.10: ICD-10-CM

## 2019-02-21 DIAGNOSIS — K59.00: ICD-10-CM

## 2019-02-21 DIAGNOSIS — Z66: ICD-10-CM

## 2019-02-21 PROCEDURE — 36600 WITHDRAWAL OF ARTERIAL BLOOD: CPT

## 2019-02-21 PROCEDURE — 85027 COMPLETE CBC AUTOMATED: CPT

## 2019-02-21 PROCEDURE — 96366 THER/PROPH/DIAG IV INF ADDON: CPT

## 2019-02-21 PROCEDURE — 83605 ASSAY OF LACTIC ACID: CPT

## 2019-02-21 PROCEDURE — 87186 SC STD MICRODIL/AGAR DIL: CPT

## 2019-02-21 PROCEDURE — 96365 THER/PROPH/DIAG IV INF INIT: CPT

## 2019-02-21 PROCEDURE — 93005 ELECTROCARDIOGRAM TRACING: CPT

## 2019-02-21 PROCEDURE — 82803 BLOOD GASES ANY COMBINATION: CPT

## 2019-02-21 PROCEDURE — 71045 X-RAY EXAM CHEST 1 VIEW: CPT

## 2019-02-21 PROCEDURE — 83735 ASSAY OF MAGNESIUM: CPT

## 2019-02-21 PROCEDURE — 87804 INFLUENZA ASSAY W/OPTIC: CPT

## 2019-02-21 PROCEDURE — 80053 COMPREHEN METABOLIC PANEL: CPT

## 2019-02-21 PROCEDURE — 99285 EMERGENCY DEPT VISIT HI MDM: CPT

## 2019-02-21 PROCEDURE — 87086 URINE CULTURE/COLONY COUNT: CPT

## 2019-02-21 PROCEDURE — 87040 BLOOD CULTURE FOR BACTERIA: CPT

## 2019-02-21 PROCEDURE — 85007 BL SMEAR W/DIFF WBC COUNT: CPT

## 2019-02-21 PROCEDURE — 87088 URINE BACTERIA CULTURE: CPT

## 2019-02-21 PROCEDURE — 36415 COLL VENOUS BLD VENIPUNCTURE: CPT

## 2019-02-21 PROCEDURE — 81001 URINALYSIS AUTO W/SCOPE: CPT

## 2019-02-21 PROCEDURE — P9016 RBC LEUKOCYTES REDUCED: HCPCS

## 2019-02-21 RX ADMIN — LEVETIRACETAM SCH MG: 100 SOLUTION ORAL at 20:53

## 2019-02-21 RX ADMIN — NYSTATIN SCH APPLIC: 100000 CREAM TOPICAL at 20:53

## 2019-02-21 RX ADMIN — METOROPROLOL TARTRATE PRN MG: 5 INJECTION, SOLUTION INTRAVENOUS at 19:49

## 2019-02-21 RX ADMIN — OXYCODONE HYDROCHLORIDE AND ACETAMINOPHEN PRN TAB: 5; 325 TABLET ORAL at 20:48

## 2019-02-21 RX ADMIN — LEVOFLOXACIN SCH MLS/HR: 500 INJECTION, SOLUTION INTRAVENOUS at 20:48

## 2019-02-21 RX ADMIN — THERA TABS SCH EACH: TAB at 20:48

## 2019-02-21 NOTE — EDM.PDOC
<Clifford Valles - Last Filed: 19 09:29>





ED HPI GENERAL MEDICAL PROBLEM





- General


Chief Complaint: General


Stated Complaint: [facility]


Time Seen by Provider: 19 06:25





- Related Data


 Allergies











Allergy/AdvReac Type Severity Reaction Status Date / Time


 


No Known Allergies Allergy   Verified 18 14:24











Home Meds: 


 Home Meds





tiZANidine HCl [Zanaflex] 2 mg GTUBE TID 01/12/15 [History]


levETIRAcetam [Levetiracetam] 10 ml GTUBE BID 17 [History]


Acetaminophen 650 mg GTUBE Q4H PRN 18 [History]


LORazepam [Ativan] 0.25 mg GTUBE DAILY 18 [History]


Acetaminophen/oxyCODONE [Percocet 325-5 MG] 1 tab PO Q4H PRN #45 tablet 12/15/

18 [Rx]


Atropine 1% [Atropine 1% Ophth Soln] 2 drop PO Q4HR PRN 19 [History]


Cholecalciferol (Vitamin D3) [Vitamin D3] 2,000 unit GTUBE DAILY 19 [

History]


FA/Lycopene/Lut/MV,Ca,Iron,Min [Centrum] 1 tab GTUBE BEDTIME 19 [History]


FLUoxetine [PROzac] 10 mg GTUBE DAILY 19 [History]


Lactose-Reduced Food/Fiber [Jevity 1.2 Antwan Liquid] 260 ml GTUBE 5XDAY 19 [

History]


Lactulose 15 ml GTUBE DAILY PRN 19 [History]


Magnesium Oxide [Magnesium] 400 mg GTUBE BID 19 [History]


Morphine [Morphine 10 MG/0.5 ML Oral Syringe] 2 mg GTUBE Q2HR PRN 19 [

History]


Nystatin 15 gm TP BID 19 [History]


Ondansetron [Zofran] 8 mg GTUBE Q6H PRN 19 [History]


Potassium Chloride [Potassium Chloride Solution] 15 ml PO BID 19 [History]


Sennosides/Docusate Sodium [Senna Plus Tablet] 2 each GTUBE BID 19 [

History]


Sulfamethoxazole/Trimethoprim [Sulfatrim 800-160 mg/20 ml Jennifer] 20 ml PO BID  [History]


Water 100 ml GTUBE TID 19 [History]











Course





- Vital Signs


Last Recorded V/S: 


 Last Vital Signs











Temp  36.2 C   19 12:00


 


Pulse  99   19 14:00


 


Resp  16   19 12:00


 


BP  105/50 L  19 14:00


 


Pulse Ox  93 L  19 13:44














- Orders/Labs/Meds


Orders: 


 Medication Orders





Acetaminophen (Tylenol)  650 mg GTUBE Q4H PRN


   PRN Reason: Pain/Fever


   Last Admin: 19 00:46  Dose: 650 mg


Atropine Sulfate (Atropine 1% Ophth Soln)  0 ml SL Q4H PRN


   PRN Reason: extra secretions


Cholecalciferol (Vitamin D3)  2,000 units GTUBE DAILY [facility]


   Last Admin: 19 08:30  Dose: 2,000 units


Enoxaparin Sodium (Lovenox)  40 mg SUBCUT DAILY [facility]


   Last Admin: 19 08:33  Dose: 40 mg


Famotidine (Pepcid)  20 mg PO BID [facility]


   Last Admin: 19 08:30  Dose: 20 mg


Fluoxetine HCl (Prozac)  10 mg GTUBE DAILY [facility]


   Last Admin: 19 08:32  Dose: 10 mg


Hydralazine HCl (Apresoline)  20 mg IVPUSH Q4H PRN


   PRN Reason: Hypertension


Hydrocortisone Sodium Succinate (Solu-Cortef)  100 mg IVPUSH Q6H [facility]


   Last Admin: 19 12:08  Dose: 100 mg


   Admin: 19 05:41  Dose: 100 mg


Sodium Chloride (Normal Saline)  1,000 mls @ 125 mls/hr IV ASDIRECTED [facility]


   Last Admin: 19 12:08  Dose: 125 mls/hr


   Infusion: 19 11:58  Dose: 125 mls/hr


   Admin: 19 03:58  Dose: 125 mls/hr


   Infusion: 19 03:58  Dose: 125 mls/hr


   Admin: 19 20:44  Dose: 125 mls/hr


Levofloxacin/Dextrose 500 mg/ (Premix)  100 mls @ 100 mls/hr IV Q24H [facility]


   Last Admin: 19 20:48  Dose: 100 mls/hr


Piperacillin Sod/Tazobactam (Sod 4.5 gm/ Sodium Chloride)  100 mls @ 25 mls/hr 

IV Q8H [facility]


   Last Admin: 19 12:08  Dose: 25 mls/hr


   Infusion: 19 08:23  Dose: 25 mls/hr


   Admin: 19 04:23  Dose: 25 mls/hr


   Infusion: 19 00:43  Dose: 25 mls/hr


   Admin: 19 20:43  Dose: 25 mls/hr


Vancomycin HCl 1 gm/ Sodium (Chloride)  250 mls @ 250 mls/hr IV Q12H [facility]


   Last Admin: 19 13:43  Dose: 250 mls/hr


   Infusion: 19 01:36  Dose: 250 mls/hr


   Admin: 19 00:36  Dose: 250 mls/hr


Norepinephrine Bitartrate 4 mg (/ Dextrose/Water)  250 mls @ 7.5 mls/hr IV 

TITRATE [facility]; Protocol


   Last Titration: 19 12:20  Dose: 2 mcg/min, 7.5 mls/hr


   Titration: 19 06:50  Dose: 6 mcg/min, 22.5 mls/hr


   Titration: 19 05:16  Dose: 5 mcg/min, 18.75 mls/hr


   Titration: 19 03:31  Dose: 4 mcg/min, 15 mls/hr


   Titration: 19 02:31  Dose: 3 mcg/min, 11.25 mls/hr


   Admin: 19 01:52  Dose: 2 mcg/min, 7.5 mls/hr


Lactulose (Cephulac)  10 gm GTUBE DAILY PRN


   PRN Reason: Other


Levetiracetam (Keppra)  1,000 mg GTUBE BID [facility]


   Last Admin: 19 08:29  Dose: 1,000 mg


   Admin: 19 20:53  Dose: 1,000 mg


Lorazepam (Ativan)  0.25 mg GTUBE DAILY [facility]


   Last Admin: 19 08:31  Dose: 0.25 mg


Lorazepam (Ativan)  2 mg IVPUSH Q4H PRN


   PRN Reason: Seizures


Magnesium Oxide (Magnesium Oxide)  400 mg GTUBE BID [facility]


   Last Admin: 19 08:30  Dose: 400 mg


   Admin: 19 20:48  Dose: 400 mg


Magnesium Sulfate (Pharmacy To Dose - Magnesium Replacement)  1 dose .XX 

ASDIRECTED [facility]


Metoprolol Tartrate (Lopressor)  5 mg IVPUSH Q4H PRN


   PRN Reason: Tachycardia


   Last Admin: 19 19:49  Dose: 5 mg


Midodrine (Midodrine)  5 mg PO TIDAC [facility]


   Stop: 19 07:01


   Last Admin: 19 12:08  Dose: 5 mg


   Admin: 19 08:30  Dose: 5 mg


Morphine Sulfate (Morphine 10 Mg/0.5 Ml Oral Syringe)  2 mg .XX Q2HR PRN


   PRN Reason: Pain


   Last Admin: 19 17:36  Dose: 2 mg


Multivitamins (Thera)  1 each .XX BEDTIME [facility]


   Last Admin: 19 20:48  Dose: 1 each


Nystatin (Nystatin Crm)  0 gm TOP BID [facility]


   Last Admin: 19 08:33  Dose: 1 applic


   Admin: 19 20:53  Dose: 1 applic


Ondansetron HCl (Zofran Odt)  8 mg GTUBE Q6H PRN


   PRN Reason: Nausea


Oxycodone/Acetaminophen (Percocet 325-5 Mg)  1 tab PO Q4H PRN


   PRN Reason: Pain (moderate 4-6)


   Last Admin: 19 20:48  Dose: 1 tab


Potassium Chloride (Pharmacy To Dose - Potassium Replacement)  1 dose .XX 

ASDIRECTED [facility]


Saccharomyces Boulardii (Florastor)  250 mg PO TID [facility]


   Last Admin: 19 08:30  Dose: 250 mg


Senna/Docusate Sodium (Senna Plus)  2 tab GTUBE BID [facility]


   Last Admin: 19 08:30  Dose: 2 tab


   Admin: 19 20:48  Dose: 2 tab


Tizanidine HCl (Zanaflex)  2 mg GTUBE TID [facility]


   Last Admin: 19 08:32  Dose: 2 mg


   Admin: 19 20:51  Dose: 2 mg








Labs: 


 Laboratory Tests











  19 Range/Units





  06:57 07:14 07:28 


 


WBC    14.22 H  (3.98-10.04)  K/mm3


 


RBC    3.55 L  (3.98-5.22)  M/mm3


 


Hgb    8.5 L  (11.2-15.7)  gm/L


 


Hct    29.1 L  (34.1-44.9)  %


 


MCV    82.0  (79.4-94.8)  fl


 


MCH    23.9 L  (25.6-32.2)  pg


 


MCHC    29.2 L  (32.2-35.5)  g/dl


 


RDW Std Deviation    51.9 H  (36.4-46.3)  fL


 


Plt Count    833 H  (182-369)  K/mm3


 


MPV    9.5  (9.4-12.3)  fl


 


Neutrophils % (Manual)    68 H  (40-60)  %


 


Band Neutrophils %    8  (0-10)  %


 


Lymphocytes % (Manual)    23  (20-40)  %


 


Atypical Lymphs %    0  %


 


Monocytes % (Manual)    1 L  (2-10)  %


 


Eosinophils % (Manual)    0 L  (0.7-5.8)  %


 


Basophils % (Manual)    0 L  (0.1-1.2)  


 


Blast Cells %    0  


 


Nucleated RBCs    1.0  %


 


Platelet Estimate    Marked inc  


 


Polychromasia    1+ slight  


 


Anisocytosis    Sl  


 


Tear Drop Cells    Few  


 


RBC Morph Comment    Abn  


 


Puncture Site   Rt radial   


 


ABG pH   7.61 H*   (7.35-7.45)  


 


ABG pCO2   20.5 L   (35.0-45.0)  mmHg


 


ABG pO2   64.0 L   (80.0-100.0)  mmHg


 


ABG HCO3   20.5 L   (22.0-26.0)  meq/L


 


ABG O2 Saturation   97.1 H   (96.0-97.0)  %


 


ABG Base Excess   -0.1   (-2-2.0)  


 


Angel Test   Positive   


 


A-a Gradient   44   mmHg


 


PEEP   Not Reportable   


 


Sodium     (136-145)  mEq/L


 


Potassium     (3.5-5.1)  mEq/L


 


Chloride     ()  mEq/L


 


Carbon Dioxide     (21-32)  mEq/L


 


Anion Gap     (5-15)  


 


BUN     (7-18)  mg/dL


 


Creatinine     (0.55-1.02)  mg/dL


 


Est Cr Clr Drug Dosing     


 


Estimated GFR (MDRD)     (>60)  mL/min


 


BUN/Creatinine Ratio     (14-18)  


 


Glucose     ()  mg/dL


 


Lactic Acid     (0.4-2.0)  mmol/L


 


Calcium     (8.5-10.1)  mg/dL


 


Magnesium     (1.8-2.4)  mg/dl


 


Total Bilirubin     (0.2-1.0)  mg/dL


 


AST     (15-37)  U/L


 


ALT     (14-59)  U/L


 


Alkaline Phosphatase     ()  U/L


 


Total Protein     (6.4-8.2)  g/dl


 


Albumin     (3.4-5.0)  g/dl


 


Globulin     gm/dL


 


Albumin/Globulin Ratio     (1-2)  


 


Urine Color  Yellow    (Yellow)  


 


Urine Appearance  Cloudy H    (Clear)  


 


Urine pH  8.5 H    (5.0-8.0)  


 


Ur Specific Gravity  1.015    (1.005-1.030)  


 


Urine Protein  Trace H    (Negative)  


 


Urine Glucose (UA)  Negative    (Negative)  


 


Urine Ketones  Negative    (Negative)  


 


Urine Occult Blood  Trace-intact H    (Negative)  


 


Urine Nitrite  Negative    (Negative)  


 


Urine Bilirubin  Negative    (Negative)  


 


Urine Urobilinogen  0.2    (0.2-1.0)  


 


Ur Leukocyte Esterase  3+ H    (Negative)  


 


Urine RBC  0-5    (0-5)  /hpf


 


Urine WBC   H    (0-5)  /hpf


 


Ur Epithelial Cells  10-20 H    (0-5)  /hpf


 


Ur Squamous Epith Cells  5-10 H    (0-5)  /hpf


 


Ur Renal Epithelial Cell  0-5    (0-5)  /hpf


 


Amorphous Sediment  Many H    (NOT SEEN)  /hpf


 


Urine Bacteria  Many H    (FEW)  /hpf


 


Urine Mucus  Not seen    (FEW)  /hpf














  19 Range/Units





  07:28 07:28 


 


WBC    (3.98-10.04)  K/mm3


 


RBC    (3.98-5.22)  M/mm3


 


Hgb    (11.2-15.7)  gm/L


 


Hct    (34.1-44.9)  %


 


MCV    (79.4-94.8)  fl


 


MCH    (25.6-32.2)  pg


 


MCHC    (32.2-35.5)  g/dl


 


RDW Std Deviation    (36.4-46.3)  fL


 


Plt Count    (182-369)  K/mm3


 


MPV    (9.4-12.3)  fl


 


Neutrophils % (Manual)    (40-60)  %


 


Band Neutrophils %    (0-10)  %


 


Lymphocytes % (Manual)    (20-40)  %


 


Atypical Lymphs %    %


 


Monocytes % (Manual)    (2-10)  %


 


Eosinophils % (Manual)    (0.7-5.8)  %


 


Basophils % (Manual)    (0.1-1.2)  


 


Blast Cells %    


 


Nucleated RBCs    %


 


Platelet Estimate    


 


Polychromasia    


 


Anisocytosis    


 


Tear Drop Cells    


 


RBC Morph Comment    


 


Puncture Site    


 


ABG pH    (7.35-7.45)  


 


ABG pCO2    (35.0-45.0)  mmHg


 


ABG pO2    (80.0-100.0)  mmHg


 


ABG HCO3    (22.0-26.0)  meq/L


 


ABG O2 Saturation    (96.0-97.0)  %


 


ABG Base Excess    (-2-2.0)  


 


Angel Test    


 


A-a Gradient    mmHg


 


PEEP    


 


Sodium  133 L   (136-145)  mEq/L


 


Potassium  5.0   (3.5-5.1)  mEq/L


 


Chloride  98   ()  mEq/L


 


Carbon Dioxide  20 L   (21-32)  mEq/L


 


Anion Gap  20.0 H   (5-15)  


 


BUN  29 H   (7-18)  mg/dL


 


Creatinine  1.3 H   (0.55-1.02)  mg/dL


 


Est Cr Clr Drug Dosing  TNP   


 


Estimated GFR (MDRD)  43   (>60)  mL/min


 


BUN/Creatinine Ratio  22.3 H   (14-18)  


 


Glucose  98   ()  mg/dL


 


Lactic Acid   6.2 H  (0.4-2.0)  mmol/L


 


Calcium  10.4 H   (8.5-10.1)  mg/dL


 


Magnesium  1.9   (1.8-2.4)  mg/dl


 


Total Bilirubin  0.6   (0.2-1.0)  mg/dL


 


AST  66 H   (15-37)  U/L


 


ALT  101 H   (14-59)  U/L


 


Alkaline Phosphatase  147 H   ()  U/L


 


Total Protein  8.3 H   (6.4-8.2)  g/dl


 


Albumin  1.2 L   (3.4-5.0)  g/dl


 


Globulin  7.1   gm/dL


 


Albumin/Globulin Ratio  0.2 L   (1-2)  


 


Urine Color    (Yellow)  


 


Urine Appearance    (Clear)  


 


Urine pH    (5.0-8.0)  


 


Ur Specific Gravity    (1.005-1.030)  


 


Urine Protein    (Negative)  


 


Urine Glucose (UA)    (Negative)  


 


Urine Ketones    (Negative)  


 


Urine Occult Blood    (Negative)  


 


Urine Nitrite    (Negative)  


 


Urine Bilirubin    (Negative)  


 


Urine Urobilinogen    (0.2-1.0)  


 


Ur Leukocyte Esterase    (Negative)  


 


Urine RBC    (0-5)  /hpf


 


Urine WBC    (0-5)  /hpf


 


Ur Epithelial Cells    (0-5)  /hpf


 


Ur Squamous Epith Cells    (0-5)  /hpf


 


Ur Renal Epithelial Cell    (0-5)  /hpf


 


Amorphous Sediment    (NOT SEEN)  /hpf


 


Urine Bacteria    (FEW)  /hpf


 


Urine Mucus    (FEW)  /hpf











Meds: 


Medications











Generic Name Dose Route Start Last Admin





  Trade Name Freq  PRN Reason Stop Dose Admin


 


Acetaminophen  650 mg  19 16:19  19 00:46





  Tylenol  GTUBE   650 mg





  Q4H PRN   Administration





  Pain/Fever   





     





     





     


 


Atropine Sulfate  0 ml  19 16:19  





  Atropine 1% Ophth Soln  SL   





  Q4H PRN   





  extra secretions   





     





     





     


 


Cholecalciferol  2,000 units  19 09:00  19 08:30





  Vitamin D3  GTUBE   2,000 units





  DAILY [facility]   Administration





     





     





     





     


 


Enoxaparin Sodium  40 mg  19 09:00  19 08:33





  Lovenox  SUBCUT   40 mg





  DAILY [facility]   Administration





     





     





     





     


 


Famotidine  20 mg  19 09:00  19 08:30





  Pepcid  PO   20 mg





  BID [facility]   Administration





     





     





     





     


 


Fluoxetine HCl  10 mg  19 09:00  19 08:32





  Prozac  GTUBE   10 mg





  DAILY [facility]   Administration





     





     





     





     


 


Hydralazine HCl  20 mg  19 19:30  





  Apresoline  IVPUSH   





  Q4H PRN   





  Hypertension   





     





     





     


 


Hydrocortisone Sodium Succinate  100 mg  19 06:00  19 12:08





  Solu-Cortef  IVPUSH   100 mg





  Q6H [facility]   Administration





     





     





     





     


 


Sodium Chloride  1,000 mls @ 125 mls/hr  19 19:45  19 12:08





  Normal Saline  IV   125 mls/hr





  ASDIRECTED [facility]   Administration





     





     





     





     


 


Levofloxacin/Dextrose 500 mg/  100 mls @ 100 mls/hr  19 20:00  19 20

:48





  Premix  IV   100 mls/hr





  Q24H [facility]   Administration





     





     





     





     


 


Piperacillin Sod/Tazobactam  100 mls @ 25 mls/hr  19 21:00  19 12:08





  Sod 4.5 gm/ Sodium Chloride  IV   25 mls/hr





  Q8H [facility]   Administration





     





     





     





     


 


Vancomycin HCl 1 gm/ Sodium  250 mls @ 250 mls/hr  19 01:00  19 13:

43





  Chloride  IV   250 mls/hr





  Q12H [facility]   Administration





     





     





     





     


 


Norepinephrine Bitartrate 4 mg  250 mls @ 7.5 mls/hr  19 01:30  19 

12:20





  / Dextrose/Water  IV   2 mcg/min





  TITRATE [facility]   7.5 mls/hr





     Titration





     





  Protocol   





  2 MCG/MIN   


 


Lactulose  10 gm  19 16:19  





  Cephulac  GTUBE   





  DAILY PRN   





  Other   





     





     





     


 


Levetiracetam  1,000 mg  19 21:00  19 08:29





  Keppra  GTUBE   1,000 mg





  BID [facility]   Administration





     





     





     





     


 


Lorazepam  0.25 mg  19 09:00  19 08:31





  Ativan  GTUBE   0.25 mg





  DAILY [facility]   Administration





     





     





     





     


 


Lorazepam  2 mg  19 19:30  





  Ativan  IVPUSH   





  Q4H PRN   





  Seizures   





     





     





     


 


Magnesium Oxide  400 mg  19 21:00  19 08:30





  Magnesium Oxide  GTUBE   400 mg





  BID [facility]   Administration





     





     





     





     


 


Magnesium Sulfate  1 dose  19 19:30  





  Pharmacy To Dose - Magnesium Replacement  .XX   





  ASDIRECTED [facility]   





     





     





     





     


 


Metoprolol Tartrate  5 mg  19 19:30  19 19:49





  Lopressor  IVPUSH   5 mg





  Q4H PRN   Administration





  Tachycardia   





     





     





     


 


Midodrine  5 mg  19 07:00  19 12:08





  Midodrine  PO  19 07:01  5 mg





  TIDAC [facility]   Administration





     





     





     





     


 


Morphine Sulfate  2 mg  19 16:19  19 17:36





  Morphine 10 Mg/0.5 Ml Oral Syringe  .XX   2 mg





  Q2HR PRN   Administration





  Pain   





     





     





     


 


Multivitamins  1 each  19 21:00  19 20:48





  Thera  .XX   1 each





  BEDTIME [facility]   Administration





     





     





     





     


 


Nystatin  0 gm  19 21:00  19 08:33





  Nystatin Crm  TOP   1 applic





  BID [facility]   Administration





     





     





     





     


 


Ondansetron HCl  8 mg  19 16:19  





  Zofran Odt  GTUBE   





  Q6H PRN   





  Nausea   





     





     





     


 


Oxycodone/Acetaminophen  1 tab  19 16:19  19 20:48





  Percocet 325-5 Mg  PO   1 tab





  Q4H PRN   Administration





  Pain (moderate 4-6)   





     





     





     


 


Potassium Chloride  1 dose  19 19:30  





  Pharmacy To Dose - Potassium Replacement  .XX   





  ASDIRECTED [facility]   





     





     





     





     


 


Saccharomyces Boulardii  250 mg  19 09:00  19 08:30





  Florastor  PO   250 mg





  TID [facility]   Administration





     





     





     





     


 


Senna/Docusate Sodium  2 tab  19 21:00  19 08:30





  Senna Plus  GTUBE   2 tab





  BID [facility]   Administration





     





     





     





     


 


Tizanidine HCl  2 mg  19 21:00  19 08:32





  Zanaflex  GTUBE   2 mg





  TID [facility]   Administration





     





     





     





     














Discontinued Medications














Generic Name Dose Route Start Last Admin





  Trade Name Freq  PRN Reason Stop Dose Admin


 


Enoxaparin Sodium  40 mg  19 23:30  19 00:14





  Lovenox  SUBCUT   Not Given





  BEDTIME [facility]   





     





     





     





     


 


Hydrocortisone Sodium Succinate  100 mg  19 23:00  19 00:14





  Solu-Cortef  IVPUSH   Not Given





  Q6H [facility]   





     





     





     





     


 


Piperacillin Sod/Tazobactam  100 mls @ 25 mls/hr  19 06:35  19 07:18





  Sod 4.5 gm/ Sodium Chloride  IV  19 10:34  25 mls/hr





  ONETIME STA   Administration





     





     





     





     


 


Sodium Chloride  1,000 mls @ 999 mls/hr  19 06:35  19 07:18





  Normal Saline  IV  19 07:35  999 mls/hr





  ONETIME ONE   Administration





     





     





     





     


 


Dextrose/Sodium Chloride  1,000 mls @ 75 mls/hr  19 09:30  19 09:44





  Dextrose 5%-Normal Saline  IV   75 mls/hr





  ASDIRECTED [facility]   Administration





     





     





     





     


 


Sodium Chloride  2,000 mls @ 999 mls/hr  19 16:23  19 17:16





  Normal Saline  IV  19 18:23  999 mls/hr





  ONETIME ONE   Administration





     





     





     





     


 


Sodium Chloride  Confirm  19 17:31  0221/19 17:40





  Normal Saline  Administered  19 17:32  Not Given





  Dose   





  1,000 mls @ as directed   





  .ROUTE   





  .STK-MED ONE   





     





     





     





     


 


Non-Formulary Medication  260 ml  19 18:00  19 11:31





  Lactose-Reduced Food/Fiber [Jevity 1.2 Antwan Liquid]  GTUBE   Not Given





  5XDAY [facility]   





     





     





     





     


 


Non-Formulary Medication  100 ml  19 21:00  19 10:25





  Water [Water]  GTUBE   Not Given





  TID [facility]   





     





     





     





     


 


Potassium Chloride  20 meq  19 21:00  19 20:52





  Potassium Chloride Solution  PO   20 meq





  BID [facility]   Administration





     





     





     





     














- Re-Assessments/Exams


Free Text/Narrative Re-Assessment/Exam: 





19 07:20  07 from Dr. Nguyen at change of shift. Blood gases are now 

available showing a pH of 7.61 i.e. metabolic alkalosis partially compensated. 

PCO2 is 20.5 with a PO2 of 64. 02 Sats are 97.1%.





19 07:53 portable chest x-ray reveals normal cardiac silhouette. Poor 

inspiratory effort. There is some haziness along the right hemidiaphragm and 

blunting of the right costophrenic angle. Appears to have mild diffuse vascular 

congestion. No definitive pneumonia identified





19 08:19 Labs reveal an elevated white count at 14.22 with 68% 

neutrophils and 8% bands. I.e. significant left shift. Hemoglobin is low at 8.5 

with hematocrit of 29.1. Platelet count is 833,000 severe essential 

thrombocytosis. Sodium is low at 133. Potassium is 5.0. Chloride is 98 with a 

bicarbonate of 20. Anion gap is 20 as well. BUNs 29 with a creatinine of 1.3. 

Estimated GFR is 43. BUN/creatinine ratio is 22.3. Glucose is 98 with a calcium 

slightly elevated at 10.4. Magnesium is 1.9. Bilirubin is 0.6 AST is 66 with an 

 alkaline phosphatase days 147. Total protein elevated at 8.3 and 

albumin at 1.2. Urine shows a pH of 8.5. Trace protein and trace occult blood 3

+ leukocyte esterase. White cell  per high-power field epithelial cells 10

-20 suggesting polynephritis. 5-10 squamous epithelial cells. Many bacteria 

seen urine culture ordered





19 09:30 blood pressure has fallen down to 79 systolic. She is completed 

liter of IV fluids. Second liter will be D5 normal saline 75 mils per hour for 

compassionate terminal care.I spoke with Dr Grullon and he has accepted care per 

med surgery on telemetry. 














Departure





- Departure


Time of Disposition: 09:33


Disposition: Admitted As Inpatient 64


Condition: Serious


Clinical Impression: 


 Upper urinary tract infection, Multiple sclerosis, Dementia





Sepsis


Qualifiers:


 Sepsis type: Escherichia coli Qualified Code(s): A41.51 - Sepsis due to 

Escherichia coli [E. coli]








- Discharge Information


*PRESCRIPTION DRUG MONITORING PROGRAM REVIEWED*: Not Applicable


*COPY OF PRESCRIPTION DRUG MONITORING REPORT IN PATIENT AILIN: Not Applicable





<Yousuf Nguyen - Last Filed: 19 14:43>





ED HPI GENERAL MEDICAL PROBLEM





- General


Source of Information: Reports: EMS


History Limitations: Reports: Altered Mental Status





- History of Present Illness


INITIAL COMMENTS - FREE TEXT/NARRATIVE: 





The patient is brought from [facility] with a report that she has 

had a fever for the past 2 days and decreased responsiveness for the past 1.5 

days. We are told that the patient has a history of MS, and that she is both 

DNR and DNI. We are told that it is expected that the patient will , but 

that the patient's mother did not want the patient to  at the nursing home

, and insisted that the patient be brought here.





Of note, we do not have any inpatient beds available at this time.











Past Medical History


Cardiovascular History: Reports: Blood Clots/VTE/DVT


Gastrointestinal History: Reports: Other (See Below) (Dysphagia)


Genitourinary History: Reports: Acute Renal Failure, Retention, Urinary (

neurogenic bladder)


Musculoskeletal History: Reports: Back Pain, Chronic, Osteoarthritis, 

Osteoporosis


Neurological History: Reports: Alzheimers Disease, CVA, MS, Seizure, TIA


Psychiatric History: Reports: Anxiety, Depression


Endocrine/Metabolic History: Reports: Diabetes, Type II


Hematologic History: Reports: Anemia





- Past Surgical History


GI Surgical History: Reports: Other (See Below) (PEG)





Social & Family History





- Family History


Family Medical History: Unobtainable





- Tobacco Use


Smoking Status *Q: Never Smoker





- Caffeine Use


Caffeine Use: Reports: None





- Alcohol Use


Alcohol Use History: No





- Recreational Drug Use


Recreational Drug Use: No





- Living Situation & Occupation


Living situation: Reports: Extended Care Facility ([facility] nursing home)


Occupation: Disabled





ED ROS GENERAL





- Review of Systems


Review Of Systems: Unable To Obtain





ED EXAM, SEPSIS





- Physical Exam


Exam: See Below


Exam Limited By: Physical Impairment


General Appearance: Obtunded


Ears: Normal External Exam


Nose: Normal Inspection


Throat/Mouth: Normal Inspection, Normal Lips, No Airway Compromise


Head: Atraumatic, Normocephalic


Neck: Normal Inspection


Respiratory/Chest: No Respiratory Distress, Lungs Clear, Normal Breath Sounds, 

No Accessory Muscle Use


Cardiovascular: Normal Peripheral Pulses, No Edema, No Gallop, No JVD, No Murmur

, No Rub, Tachycardia (regular)


Peripheral Pulses: 3+: Radial (L), Radial (R)


GI/Abdominal Exam: Normal Bowel Sounds, Soft, Non-Tender, No Organomegaly, No 

Distention, No Abnormal Bruit, No Mass, Other (PEG in upper abdomen C/D/I)


 (Female) Exam: Deferred


Rectal (Female) Exam: Deferred


Extremities: Other (Warm, but mottled BUEs, BLEs)


Neurological: Other (Obtunded)


Skin: Warm, Dry, Intact


Lymphatic: Bilateral: No Adenopathy





EKG INTERPRETATION


EKG Date: 19


Time: 06:45


Rhythm: Other (Sinus tachycardia)


Rate (Beats/Min): 166


Axis: Normal


P-Wave: Present


QRS: Normal


ST-T: Normal


QT: Normal


Comparison: No Change (2018)





Course





- Vital Signs


Last Recorded V/S: 


 Last Vital Signs











Temp  36.2 C   19 12:00


 


Pulse  99   19 14:00


 


Resp  16   19 12:00


 


BP  105/50 L  19 14:00


 


Pulse Ox  93 L  19 13:44














- Orders/Labs/Meds


Labs: 


 Laboratory Tests











  19 Range/Units





  06:57 07:14 07:28 


 


WBC    14.22 H  (3.98-10.04)  K/mm3


 


RBC    3.55 L  (3.98-5.22)  M/mm3


 


Hgb    8.5 L  (11.2-15.7)  gm/L


 


Hct    29.1 L  (34.1-44.9)  %


 


MCV    82.0  (79.4-94.8)  fl


 


MCH    23.9 L  (25.6-32.2)  pg


 


MCHC    29.2 L  (32.2-35.5)  g/dl


 


RDW Std Deviation    51.9 H  (36.4-46.3)  fL


 


Plt Count    833 H  (182-369)  K/mm3


 


MPV    9.5  (9.4-12.3)  fl


 


Neutrophils % (Manual)    68 H  (40-60)  %


 


Band Neutrophils %    8  (0-10)  %


 


Lymphocytes % (Manual)    23  (20-40)  %


 


Atypical Lymphs %    0  %


 


Monocytes % (Manual)    1 L  (2-10)  %


 


Eosinophils % (Manual)    0 L  (0.7-5.8)  %


 


Basophils % (Manual)    0 L  (0.1-1.2)  


 


Blast Cells %    0  


 


Nucleated RBCs    1.0  %


 


Platelet Estimate    Marked inc  


 


Polychromasia    1+ slight  


 


Anisocytosis    Sl  


 


Tear Drop Cells    Few  


 


RBC Morph Comment    Abn  


 


Puncture Site   Rt radial   


 


ABG pH   7.61 H*   (7.35-7.45)  


 


ABG pCO2   20.5 L   (35.0-45.0)  mmHg


 


ABG pO2   64.0 L   (80.0-100.0)  mmHg


 


ABG HCO3   20.5 L   (22.0-26.0)  meq/L


 


ABG O2 Saturation   97.1 H   (96.0-97.0)  %


 


ABG Base Excess   -0.1   (-2-2.0)  


 


Angel Test   Positive   


 


A-a Gradient   44   mmHg


 


PEEP   Not Reportable   


 


Sodium     (136-145)  mEq/L


 


Potassium     (3.5-5.1)  mEq/L


 


Chloride     ()  mEq/L


 


Carbon Dioxide     (21-32)  mEq/L


 


Anion Gap     (5-15)  


 


BUN     (7-18)  mg/dL


 


Creatinine     (0.55-1.02)  mg/dL


 


Est Cr Clr Drug Dosing     


 


Estimated GFR (MDRD)     (>60)  mL/min


 


BUN/Creatinine Ratio     (14-18)  


 


Glucose     ()  mg/dL


 


Lactic Acid     (0.4-2.0)  mmol/L


 


Calcium     (8.5-10.1)  mg/dL


 


Magnesium     (1.8-2.4)  mg/dl


 


Total Bilirubin     (0.2-1.0)  mg/dL


 


AST     (15-37)  U/L


 


ALT     (14-59)  U/L


 


Alkaline Phosphatase     ()  U/L


 


Total Protein     (6.4-8.2)  g/dl


 


Albumin     (3.4-5.0)  g/dl


 


Globulin     gm/dL


 


Albumin/Globulin Ratio     (1-2)  


 


Urine Color  Yellow    (Yellow)  


 


Urine Appearance  Cloudy H    (Clear)  


 


Urine pH  8.5 H    (5.0-8.0)  


 


Ur Specific Gravity  1.015    (1.005-1.030)  


 


Urine Protein  Trace H    (Negative)  


 


Urine Glucose (UA)  Negative    (Negative)  


 


Urine Ketones  Negative    (Negative)  


 


Urine Occult Blood  Trace-intact H    (Negative)  


 


Urine Nitrite  Negative    (Negative)  


 


Urine Bilirubin  Negative    (Negative)  


 


Urine Urobilinogen  0.2    (0.2-1.0)  


 


Ur Leukocyte Esterase  3+ H    (Negative)  


 


Urine RBC  0-5    (0-5)  /hpf


 


Urine WBC   H    (0-5)  /hpf


 


Ur Epithelial Cells  10-20 H    (0-5)  /hpf


 


Ur Squamous Epith Cells  5-10 H    (0-5)  /hpf


 


Ur Renal Epithelial Cell  0-5    (0-5)  /hpf


 


Amorphous Sediment  Many H    (NOT SEEN)  /hpf


 


Urine Bacteria  Many H    (FEW)  /hpf


 


Urine Mucus  Not seen    (FEW)  /hpf














  19 Range/Units





  07:28 07:28 


 


WBC    (3.98-10.04)  K/mm3


 


RBC    (3.98-5.22)  M/mm3


 


Hgb    (11.2-15.7)  gm/L


 


Hct    (34.1-44.9)  %


 


MCV    (79.4-94.8)  fl


 


MCH    (25.6-32.2)  pg


 


MCHC    (32.2-35.5)  g/dl


 


RDW Std Deviation    (36.4-46.3)  fL


 


Plt Count    (182-369)  K/mm3


 


MPV    (9.4-12.3)  fl


 


Neutrophils % (Manual)    (40-60)  %


 


Band Neutrophils %    (0-10)  %


 


Lymphocytes % (Manual)    (20-40)  %


 


Atypical Lymphs %    %


 


Monocytes % (Manual)    (2-10)  %


 


Eosinophils % (Manual)    (0.7-5.8)  %


 


Basophils % (Manual)    (0.1-1.2)  


 


Blast Cells %    


 


Nucleated RBCs    %


 


Platelet Estimate    


 


Polychromasia    


 


Anisocytosis    


 


Tear Drop Cells    


 


RBC Morph Comment    


 


Puncture Site    


 


ABG pH    (7.35-7.45)  


 


ABG pCO2    (35.0-45.0)  mmHg


 


ABG pO2    (80.0-100.0)  mmHg


 


ABG HCO3    (22.0-26.0)  meq/L


 


ABG O2 Saturation    (96.0-97.0)  %


 


ABG Base Excess    (-2-2.0)  


 


Angel Test    


 


A-a Gradient    mmHg


 


PEEP    


 


Sodium  133 L   (136-145)  mEq/L


 


Potassium  5.0   (3.5-5.1)  mEq/L


 


Chloride  98   ()  mEq/L


 


Carbon Dioxide  20 L   (21-32)  mEq/L


 


Anion Gap  20.0 H   (5-15)  


 


BUN  29 H   (7-18)  mg/dL


 


Creatinine  1.3 H   (0.55-1.02)  mg/dL


 


Est Cr Clr Drug Dosing  TNP   


 


Estimated GFR (MDRD)  43   (>60)  mL/min


 


BUN/Creatinine Ratio  22.3 H   (14-18)  


 


Glucose  98   ()  mg/dL


 


Lactic Acid   6.2 H  (0.4-2.0)  mmol/L


 


Calcium  10.4 H   (8.5-10.1)  mg/dL


 


Magnesium  1.9   (1.8-2.4)  mg/dl


 


Total Bilirubin  0.6   (0.2-1.0)  mg/dL


 


AST  66 H   (15-37)  U/L


 


ALT  101 H   (14-59)  U/L


 


Alkaline Phosphatase  147 H   ()  U/L


 


Total Protein  8.3 H   (6.4-8.2)  g/dl


 


Albumin  1.2 L   (3.4-5.0)  g/dl


 


Globulin  7.1   gm/dL


 


Albumin/Globulin Ratio  0.2 L   (1-2)  


 


Urine Color    (Yellow)  


 


Urine Appearance    (Clear)  


 


Urine pH    (5.0-8.0)  


 


Ur Specific Gravity    (1.005-1.030)  


 


Urine Protein    (Negative)  


 


Urine Glucose (UA)    (Negative)  


 


Urine Ketones    (Negative)  


 


Urine Occult Blood    (Negative)  


 


Urine Nitrite    (Negative)  


 


Urine Bilirubin    (Negative)  


 


Urine Urobilinogen    (0.2-1.0)  


 


Ur Leukocyte Esterase    (Negative)  


 


Urine RBC    (0-5)  /hpf


 


Urine WBC    (0-5)  /hpf


 


Ur Epithelial Cells    (0-5)  /hpf


 


Ur Squamous Epith Cells    (0-5)  /hpf


 


Ur Renal Epithelial Cell    (0-5)  /hpf


 


Amorphous Sediment    (NOT SEEN)  /hpf


 


Urine Bacteria    (FEW)  /hpf


 


Urine Mucus    (FEW)  /hpf











Meds: 


Medications











Generic Name Dose Route Start Last Admin





  Trade Name Freq  PRN Reason Stop Dose Admin


 


Acetaminophen  650 mg  19 16:19  19 00:46





  Tylenol  GTUBE   650 mg





  Q4H PRN   Administration





  Pain/Fever   





     





     





     


 


Atropine Sulfate  0 ml  19 16:19  





  Atropine 1% Ophth Soln  SL   





  Q4H PRN   





  extra secretions   





     





     





     


 


Cholecalciferol  2,000 units  19 09:00  19 08:30





  Vitamin D3  GTUBE   2,000 units





  DAILY YOLIS   Administration





     





     





     





     


 


Enoxaparin Sodium  40 mg  19 09:00  19 08:33





  Lovenox  SUBCUT   40 mg





  DAILY YOLIS   Administration





     





     





     





     


 


Famotidine  20 mg  19 09:00  19 08:30





  Pepcid  PO   20 mg





  BID YOLIS   Administration





     





     





     





     


 


Fluoxetine HCl  10 mg  19 09:00  19 08:32





  Prozac  GTUBE   10 mg





  DAILY YOLIS   Administration





     





     





     





     


 


Hydralazine HCl  20 mg  19 19:30  





  Apresoline  IVPUSH   





  Q4H PRN   





  Hypertension   





     





     





     


 


Hydrocortisone Sodium Succinate  100 mg  19 06:00  19 12:08





  Solu-Cortef  IVPUSH   100 mg





  Q6H YOLIS   Administration





     





     





     





     


 


Sodium Chloride  1,000 mls @ 125 mls/hr  19 19:45  19 12:08





  Normal Saline  IV   125 mls/hr





  ASDIRECTED YOLIS   Administration





     





     





     





     


 


Levofloxacin/Dextrose 500 mg/  100 mls @ 100 mls/hr  19 20:00  19 20

:48





  Premix  IV   100 mls/hr





  Q24H YOLIS   Administration





     





     





     





     


 


Piperacillin Sod/Tazobactam  100 mls @ 25 mls/hr  19 21:00  19 12:08





  Sod 4.5 gm/ Sodium Chloride  IV   25 mls/hr





  Q8H YOLIS   Administration





     





     





     





     


 


Vancomycin HCl 1 gm/ Sodium  250 mls @ 250 mls/hr  19 01:00  19 13:

43





  Chloride  IV   250 mls/hr





  Q12H YOLIS   Administration





     





     





     





     


 


Norepinephrine Bitartrate 4 mg  250 mls @ 7.5 mls/hr  19 01:30  19 

12:20





  / Dextrose/Water  IV   2 mcg/min





  TITRATE YOLIS   7.5 mls/hr





     Titration





     





  Protocol   





  2 MCG/MIN   


 


Lactulose  10 gm  19 16:19  





  Cephulac  GTUBE   





  DAILY PRN   





  Other   





     





     





     


 


Levetiracetam  1,000 mg  19 21:00  19 08:29





  Keppra  GTUBE   1,000 mg





  BID YOLIS   Administration





     





     





     





     


 


Lorazepam  0.25 mg  19 09:00  19 08:31





  Ativan  GTUBE   0.25 mg





  DAILY YOLIS   Administration





     





     





     





     


 


Lorazepam  2 mg  19 19:30  





  Ativan  IVPUSH   





  Q4H PRN   





  Seizures   





     





     





     


 


Magnesium Oxide  400 mg  19 21:00  19 08:30





  Magnesium Oxide  GTUBE   400 mg





  BID YOLIS   Administration





     





     





     





     


 


Magnesium Sulfate  1 dose  19 19:30  





  Pharmacy To Dose - Magnesium Replacement  .XX   





  ASDIRECTED [facility]   





     





     





     





     


 


Metoprolol Tartrate  5 mg  19 19:30  19 19:49





  Lopressor  IVPUSH   5 mg





  Q4H PRN   Administration





  Tachycardia   





     





     





     


 


Midodrine  5 mg  19 07:00  19 12:08





  Midodrine  PO  19 07:01  5 mg





  TIDAC YOLIS   Administration





     





     





     





     


 


Morphine Sulfate  2 mg  19 16:19  19 17:36





  Morphine 10 Mg/0.5 Ml Oral Syringe  .XX   2 mg





  Q2HR PRN   Administration





  Pain   





     





     





     


 


Multivitamins  1 each  19 21:00  19 20:48





  Thera  .XX   1 each





  BEDTIME YOLIS   Administration





     





     





     





     


 


Nystatin  0 gm  19 21:00  19 08:33





  Nystatin Crm  TOP   1 applic





  BID YOLIS   Administration





     





     





     





     


 


Ondansetron HCl  8 mg  19 16:19  





  Zofran Odt  GTUBE   





  Q6H PRN   





  Nausea   





     





     





     


 


Oxycodone/Acetaminophen  1 tab  19 16:19  19 20:48





  Percocet 325-5 Mg  PO   1 tab





  Q4H PRN   Administration





  Pain (moderate 4-6)   





     





     





     


 


Potassium Chloride  1 dose  19 19:30  





  Pharmacy To Dose - Potassium Replacement  .XX   





  ASDIRECTED YOLIS   





     





     





     





     


 


Saccharomyces Boulardii  250 mg  19 09:00  19 08:30





  Florastor  PO   250 mg





  TID YOLIS   Administration





     





     





     





     


 


Senna/Docusate Sodium  2 tab  19 21:00  19 08:30





  Senna Plus  GTUBE   2 tab





  BID YOLIS   Administration





     





     





     





     


 


Tizanidine HCl  2 mg  19 21:00  19 08:32





  Zanaflex  GTUBE   2 mg





  TID YOLIS   Administration





     





     





     





     














Discontinued Medications














Generic Name Dose Route Start Last Admin





  Trade Name Freq  PRN Reason Stop Dose Admin


 


Enoxaparin Sodium  40 mg  19 23:30  19 00:14





  Lovenox  SUBCUT   Not Given





  BEDTIME YOLIS   





     





     





     





     


 


Hydrocortisone Sodium Succinate  100 mg  19 23:00  19 00:14





  Solu-Cortef  IVPUSH   Not Given





  Q6H YOLIS   





     





     





     





     


 


Piperacillin Sod/Tazobactam  100 mls @ 25 mls/hr  19 06:35  19 07:18





  Sod 4.5 gm/ Sodium Chloride  IV  19 10:34  25 mls/hr





  ONETIME STA   Administration





     





     





     





     


 


Sodium Chloride  1,000 mls @ 999 mls/hr  19 06:35  19 07:18





  Normal Saline  IV  19 07:35  999 mls/hr





  ONETIME ONE   Administration





     





     





     





     


 


Dextrose/Sodium Chloride  1,000 mls @ 75 mls/hr  19 09:30  19 09:44





  Dextrose 5%-Normal Saline  IV   75 mls/hr





  ASDIRECTED YOLIS   Administration





     





     





     





     


 


Sodium Chloride  2,000 mls @ 999 mls/hr  19 16:23  19 17:16





  Normal Saline  IV  19 18:23  999 mls/hr





  ONETIME ONE   Administration





     





     





     





     


 


Sodium Chloride  Confirm  19 17:31  19 17:40





  Normal Saline  Administered  19 17:32  Not Given





  Dose   





  1,000 mls @ as directed   





  .ROUTE   





  .STK-MED ONE   





     





     





     





     


 


Non-Formulary Medication  260 ml  19 18:00  19 11:31





  Lactose-Reduced Food/Fiber [Jevity 1.2 Antwan Liquid]  GTUBE   Not Given





  5XDAY YOLIS   





     





     





     





     


 


Non-Formulary Medication  100 ml  19 21:00  19 10:25





  Water [Water]  GTUBE   Not Given





  TID YOLIS   





     





     





     





     


 


Potassium Chloride  20 meq  19 21:00  19 20:52





  Potassium Chloride Solution  PO   20 meq





  BID YOLIS   Administration





     





     





     





     














- Re-Assessments/Exams


Free Text/Narrative Re-Assessment/Exam: 





19 06:39


As per the physical examination, the patient is unresponsive with mottled skin. 

She is somewhat hypotensive and tachycardic. She appears to be septic. I have 

ordered a septic workup, along with IV fluid and empiric Zosyn.





At present, the patient's oxygen saturation is 94% on room air, and she does 

not appear to be struggling to breathe, however, if her respiratory status 

declines, there is not much that we can do other than placing supplemental 

oxygen, as she is not a candidate for BiPAP due to her decreased mental status, 

and she is not a candidate for endotracheal intubation, due to her DNI status.








19 07:00


Case discussed with Dr. Valles,a nd care of the patient turned over to him at 

this time, for change of shift.

## 2019-02-21 NOTE — PCM.SN
- Free Text/Narrative


Note: 


Briefly seen patient at bedside. No family members present. She is alert/awake 

and comfortable. She is able to talk but non sensible. She is able to move her 

left hand . Her right seems to be contracted. She has poor vision on her right 

eye but her left is good. Unclear if she is comfort measures at this point.

## 2019-02-21 NOTE — CR
Chest: Portable view of the chest was obtained.

 

Comparison: Previous chest x-ray of 12/11/18.

 

Slight parenchymal density is noted adjacent to the right 

hemidiaphragm.  Lungs otherwise are clear.  Heart size and mediastinum

 are normal.  Bony structures are grossly intact.

 

Impression:

1.  Slight density adjacent to the right hemidiaphragm most likely 

representing atelectasis.

2.  Nothing acute is otherwise seen on portable chest x-ray.

 

Diagnostic code #2

## 2019-02-22 RX ADMIN — LEVETIRACETAM SCH MG: 100 SOLUTION ORAL at 08:29

## 2019-02-22 RX ADMIN — NYSTATIN SCH APPLIC: 100000 CREAM TOPICAL at 08:33

## 2019-02-22 RX ADMIN — ALENDRONATE SODIUM SCH: 70 TABLET ORAL at 10:25

## 2019-02-22 RX ADMIN — HYDROCORTISONE SODIUM SUCCINATE SCH MG: 100 INJECTION, POWDER, FOR SOLUTION INTRAMUSCULAR; INTRAVENOUS at 18:46

## 2019-02-22 RX ADMIN — LEVOFLOXACIN SCH MLS/HR: 500 INJECTION, SOLUTION INTRAVENOUS at 21:14

## 2019-02-22 RX ADMIN — HYDROCORTISONE SODIUM SUCCINATE SCH MG: 100 INJECTION, POWDER, FOR SOLUTION INTRAMUSCULAR; INTRAVENOUS at 05:41

## 2019-02-22 RX ADMIN — Medication SCH MG: at 08:30

## 2019-02-22 RX ADMIN — VITAMIN D, TAB 1000IU (100/BT) SCH UNITS: 25 TAB at 08:30

## 2019-02-22 RX ADMIN — ALENDRONATE SODIUM SCH: 70 TABLET ORAL at 11:31

## 2019-02-22 RX ADMIN — LEVETIRACETAM SCH MG: 100 SOLUTION ORAL at 21:17

## 2019-02-22 RX ADMIN — NYSTATIN SCH APPLIC: 100000 CREAM TOPICAL at 21:18

## 2019-02-22 RX ADMIN — HYDROCORTISONE SODIUM SUCCINATE SCH MG: 100 INJECTION, POWDER, FOR SOLUTION INTRAMUSCULAR; INTRAVENOUS at 12:08

## 2019-02-22 RX ADMIN — Medication SCH MG: at 16:33

## 2019-02-22 RX ADMIN — THERA TABS SCH EACH: TAB at 21:17

## 2019-02-22 RX ADMIN — Medication SCH MG: at 21:18

## 2019-02-22 NOTE — CR
Chest: Portable view of the chest is obtained.

 

Comparison: Prior chest x-ray of 02/21/19.

 

Increased density within the right base is seen.  Left hemidiaphragm 

is also slightly silhouetted.  Upper lungs are clear.  Poor 

inspiratory study is noted.  Heart size and mediastinum are within 

normal limits for portable technique.  Bony structures are grossly 

intact.

 

Impression:

1.  Increased density within both lung bases.  Findings most likely 

due to atelectasis since this is a poor inspiratory study.

2.  Nothing acute is definitely seen.

 

Diagnostic code #3

## 2019-02-22 NOTE — PCM.PN
- General Info


Date of Service: 02/22/19


Admission Dx/Problem (Free Text): 


 Admission Diagnosis/Problem





Admission Diagnosis/Problem      Urosepsis








Subjective Update: 


Follow Up





Functional Status: Reports: Pain Controlled.  Denies: Tolerating Diet, 

Ambulating, Urinating





- Review of Systems


General: Reports: Other (bed-bound and not able to verbalize her needs or 

concerns as her baseline).  Denies: Fever, Chills


HEENT: Reports: No Symptoms


Pulmonary: Denies: Shortness of Breath


Gastrointestinal: Denies: Abdominal Pain, Nausea, Vomiting


Genitourinary: Reports: No Symptoms


Musculoskeletal: Reports: No Symptoms


Skin: Denies: Cyanosis, Mottled


Neurological: Reports: Difficulty Walking, Weakness, Gait Disturbance


Psychiatric: Reports: No Symptoms





- Patient Data


Vitals - Most Recent: 


 Last Vital Signs











Temp  37.0 C   02/22/19 04:00


 


Pulse  105 H  02/22/19 07:00


 


Resp  26 H  02/22/19 04:00


 


BP  83/72 L  02/22/19 07:00


 


Pulse Ox  93 L  02/22/19 04:00











Weight - Most Recent: 70.307 kg


I&O - Last 24 Hours: 


 Intake & Output











 02/21/19 02/22/19 02/22/19





 22:59 06:59 14:59


 


Intake Total 1030 1695 


 


Balance 1030 1695 











Lab Results Last 24 Hours: 


 Laboratory Results - last 24 hr











  02/21/19 02/21/19 02/21/19 Range/Units





  07:14 07:28 07:28 


 


WBC   14.22 H   (3.98-10.04)  K/mm3


 


RBC   3.55 L   (3.98-5.22)  M/mm3


 


Hgb   8.5 L   (11.2-15.7)  gm/L


 


Hct   29.1 L   (34.1-44.9)  %


 


MCV   82.0   (79.4-94.8)  fl


 


MCH   23.9 L   (25.6-32.2)  pg


 


MCHC   29.2 L   (32.2-35.5)  g/dl


 


RDW Std Deviation   51.9 H   (36.4-46.3)  fL


 


Plt Count   833 H   (182-369)  K/mm3


 


MPV   9.5   (9.4-12.3)  fl


 


Neutrophils % (Manual)   68 H   (40-60)  %


 


Band Neutrophils %   8   (0-10)  %


 


Lymphocytes % (Manual)   23   (20-40)  %


 


Atypical Lymphs %   0   %


 


Monocytes % (Manual)   1 L   (2-10)  %


 


Eosinophils % (Manual)   0 L   (0.7-5.8)  %


 


Basophils % (Manual)   0 L   (0.1-1.2)  


 


Blast Cells %   0   


 


Nucleated RBCs   1.0   %


 


Platelet Estimate   Marked inc   


 


Polychromasia   1+ slight   


 


Anisocytosis   Sl   


 


Tear Drop Cells   Few   


 


RBC Morph Comment   Abn   


 


PEEP  Not Reportable    


 


Sodium    133 L  (136-145)  mEq/L


 


Potassium    5.0  (3.5-5.1)  mEq/L


 


Chloride    98  ()  mEq/L


 


Carbon Dioxide    20 L  (21-32)  mEq/L


 


Anion Gap    20.0 H  (5-15)  


 


BUN    29 H  (7-18)  mg/dL


 


Creatinine    1.3 H  (0.55-1.02)  mg/dL


 


Est Cr Clr Drug Dosing    TNP  


 


Estimated GFR (MDRD)    43  (>60)  mL/min


 


BUN/Creatinine Ratio    22.3 H  (14-18)  


 


Glucose    98  ()  mg/dL


 


Lactic Acid     (0.4-2.0)  mmol/L


 


Calcium    10.4 H  (8.5-10.1)  mg/dL


 


Magnesium    1.9  (1.8-2.4)  mg/dl


 


Total Bilirubin    0.6  (0.2-1.0)  mg/dL


 


AST    66 H  (15-37)  U/L


 


ALT    101 H  (14-59)  U/L


 


Alkaline Phosphatase    147 H  ()  U/L


 


Total Protein    8.3 H  (6.4-8.2)  g/dl


 


Albumin    1.2 L  (3.4-5.0)  g/dl


 


Globulin    7.1  gm/dL


 


Albumin/Globulin Ratio    0.2 L  (1-2)  


 


MRSA (PCR)     














  02/21/19 02/21/19 02/21/19 Range/Units





  07:28 12:00 13:30 


 


WBC     (3.98-10.04)  K/mm3


 


RBC     (3.98-5.22)  M/mm3


 


Hgb     (11.2-15.7)  gm/L


 


Hct     (34.1-44.9)  %


 


MCV     (79.4-94.8)  fl


 


MCH     (25.6-32.2)  pg


 


MCHC     (32.2-35.5)  g/dl


 


RDW Std Deviation     (36.4-46.3)  fL


 


Plt Count     (182-369)  K/mm3


 


MPV     (9.4-12.3)  fl


 


Neutrophils % (Manual)     (40-60)  %


 


Band Neutrophils %     (0-10)  %


 


Lymphocytes % (Manual)     (20-40)  %


 


Atypical Lymphs %     %


 


Monocytes % (Manual)     (2-10)  %


 


Eosinophils % (Manual)     (0.7-5.8)  %


 


Basophils % (Manual)     (0.1-1.2)  


 


Blast Cells %     


 


Nucleated RBCs     %


 


Platelet Estimate     


 


Polychromasia     


 


Anisocytosis     


 


Tear Drop Cells     


 


RBC Morph Comment     


 


PEEP     


 


Sodium     (136-145)  mEq/L


 


Potassium     (3.5-5.1)  mEq/L


 


Chloride     ()  mEq/L


 


Carbon Dioxide     (21-32)  mEq/L


 


Anion Gap     (5-15)  


 


BUN     (7-18)  mg/dL


 


Creatinine     (0.55-1.02)  mg/dL


 


Est Cr Clr Drug Dosing     


 


Estimated GFR (MDRD)     (>60)  mL/min


 


BUN/Creatinine Ratio     (14-18)  


 


Glucose     ()  mg/dL


 


Lactic Acid  6.2 H   5.3 H  (0.4-2.0)  mmol/L


 


Calcium     (8.5-10.1)  mg/dL


 


Magnesium     (1.8-2.4)  mg/dl


 


Total Bilirubin     (0.2-1.0)  mg/dL


 


AST     (15-37)  U/L


 


ALT     (14-59)  U/L


 


Alkaline Phosphatase     ()  U/L


 


Total Protein     (6.4-8.2)  g/dl


 


Albumin     (3.4-5.0)  g/dl


 


Globulin     gm/dL


 


Albumin/Globulin Ratio     (1-2)  


 


MRSA (PCR)   Negative   














  02/21/19 Range/Units





  19:18 


 


WBC   (3.98-10.04)  K/mm3


 


RBC   (3.98-5.22)  M/mm3


 


Hgb   (11.2-15.7)  gm/L


 


Hct   (34.1-44.9)  %


 


MCV   (79.4-94.8)  fl


 


MCH   (25.6-32.2)  pg


 


MCHC   (32.2-35.5)  g/dl


 


RDW Std Deviation   (36.4-46.3)  fL


 


Plt Count   (182-369)  K/mm3


 


MPV   (9.4-12.3)  fl


 


Neutrophils % (Manual)   (40-60)  %


 


Band Neutrophils %   (0-10)  %


 


Lymphocytes % (Manual)   (20-40)  %


 


Atypical Lymphs %   %


 


Monocytes % (Manual)   (2-10)  %


 


Eosinophils % (Manual)   (0.7-5.8)  %


 


Basophils % (Manual)   (0.1-1.2)  


 


Blast Cells %   


 


Nucleated RBCs   %


 


Platelet Estimate   


 


Polychromasia   


 


Anisocytosis   


 


Tear Drop Cells   


 


RBC Morph Comment   


 


PEEP   


 


Sodium   (136-145)  mEq/L


 


Potassium   (3.5-5.1)  mEq/L


 


Chloride   ()  mEq/L


 


Carbon Dioxide   (21-32)  mEq/L


 


Anion Gap   (5-15)  


 


BUN   (7-18)  mg/dL


 


Creatinine   (0.55-1.02)  mg/dL


 


Est Cr Clr Drug Dosing   


 


Estimated GFR (MDRD)   (>60)  mL/min


 


BUN/Creatinine Ratio   (14-18)  


 


Glucose   ()  mg/dL


 


Lactic Acid  5.6 H  (0.4-2.0)  mmol/L


 


Calcium   (8.5-10.1)  mg/dL


 


Magnesium   (1.8-2.4)  mg/dl


 


Total Bilirubin   (0.2-1.0)  mg/dL


 


AST   (15-37)  U/L


 


ALT   (14-59)  U/L


 


Alkaline Phosphatase   ()  U/L


 


Total Protein   (6.4-8.2)  g/dl


 


Albumin   (3.4-5.0)  g/dl


 


Globulin   gm/dL


 


Albumin/Globulin Ratio   (1-2)  


 


MRSA (PCR)   











Abran Results Last 24 Hours: 


 Microbiology











 02/21/19 07:15 Aerobic Blood Culture - Preliminary





 Blood - Venous - Lab Draw    NO GROWTH AFTER 1 DAY





 Anaerobic Blood Culture - Preliminary





    NO GROWTH AFTER 1 DAY


 


 02/21/19 07:28 Aerobic Blood Culture - Preliminary





 Blood - Venous    NO GROWTH AFTER 1 DAY





 Anaerobic Blood Culture - Preliminary





    NO GROWTH AFTER 1 DAY


 


 02/21/19 07:02 Influenza Type A Antigen Screen - Final





 Nasopharyngeal Swab    NEGATIVE INFLUENZA A VIRUS AG





 Influenza Type B Antigen Screen - Final





    NEGATIVE INFLUENZA B VIRUS AG











Med Orders - Current: 


 Current Medications





Acetaminophen (Tylenol)  650 mg GTUBE Q4H PRN


   PRN Reason: Pain/Fever


   Last Admin: 02/22/19 00:46 Dose:  650 mg


Atropine Sulfate (Atropine 1% Ophth Soln)  0 ml SL Q4H PRN


   PRN Reason: extra secretions


Cholecalciferol (Vitamin D3)  2,000 units GTUBE DAILY [facility]


Enoxaparin Sodium (Lovenox)  40 mg SUBCUT DAILY [facility]


Famotidine (Pepcid)  20 mg PO BID [facility]


Fluoxetine HCl (Prozac)  10 mg GTUBE DAILY [facility]


Hydralazine HCl (Apresoline)  20 mg IVPUSH Q4H PRN


   PRN Reason: Hypertension


Hydrocortisone Sodium Succinate (Solu-Cortef)  100 mg IVPUSH Q6H [facility]


   Last Admin: 02/22/19 05:41 Dose:  100 mg


Sodium Chloride (Normal Saline)  1,000 mls @ 125 mls/hr IV ASDIRECTED [facility]


   Last Admin: 02/22/19 03:58 Dose:  125 mls/hr


Levofloxacin/Dextrose 500 mg/ (Premix)  100 mls @ 100 mls/hr IV Q24H [facility]


   Last Admin: 02/21/19 20:48 Dose:  100 mls/hr


Piperacillin Sod/Tazobactam (Sod 4.5 gm/ Sodium Chloride)  100 mls @ 25 mls/hr 

IV Q8H [facility]


   Last Admin: 02/22/19 04:23 Dose:  25 mls/hr


Vancomycin HCl 1 gm/ Sodium (Chloride)  250 mls @ 250 mls/hr IV Q12H [facility]


   Last Admin: 02/22/19 00:36 Dose:  250 mls/hr


Norepinephrine Bitartrate 4 mg (/ Dextrose/Water)  250 mls @ 7.5 mls/hr IV 

TITRATE [facility]; Protocol


   Last Titration: 02/22/19 06:50 Dose:  6 mcg/min, 22.5 mls/hr


Lactulose (Cephulac)  10 gm GTUBE DAILY PRN


   PRN Reason: Other


Levetiracetam (Keppra)  1,000 mg GTUBE BID [facility]


   Last Admin: 02/21/19 20:53 Dose:  1,000 mg


Lorazepam (Ativan)  0.25 mg GTUBE DAILY [facility]


Lorazepam (Ativan)  2 mg IVPUSH Q4H PRN


   PRN Reason: Seizures


Magnesium Oxide (Magnesium Oxide)  400 mg GTUBE BID [facility]


   Last Admin: 02/21/19 20:48 Dose:  400 mg


Magnesium Sulfate (Pharmacy To Dose - Magnesium Replacement)  1 dose .XX 

ASDIRECTED [facility]


Metoprolol Tartrate (Lopressor)  5 mg IVPUSH Q4H PRN


   PRN Reason: Tachycardia


   Last Admin: 02/21/19 19:49 Dose:  5 mg


Midodrine (Midodrine)  5 mg PO TIDAC [facility]


   Stop: 02/23/19 07:01


Morphine Sulfate (Morphine 10 Mg/0.5 Ml Oral Syringe)  2 mg .XX Q2HR PRN


   PRN Reason: Pain


   Last Admin: 02/21/19 17:36 Dose:  2 mg


Multivitamins (Thera)  1 each .XX BEDTIME [facility]


   Last Admin: 02/21/19 20:48 Dose:  1 each


Nystatin (Nystatin Crm)  0 gm TOP BID [facility]


   Last Admin: 02/21/19 20:53 Dose:  1 applic


Ondansetron HCl (Zofran Odt)  8 mg GTUBE Q6H PRN


   PRN Reason: Nausea


Oxycodone/Acetaminophen (Percocet 325-5 Mg)  1 tab PO Q4H PRN


   PRN Reason: Pain (moderate 4-6)


   Last Admin: 02/21/19 20:48 Dose:  1 tab


Potassium Chloride (Potassium Chloride Solution)  20 meq PO BID [facility]


   Last Admin: 02/21/19 20:52 Dose:  20 meq


Potassium Chloride (Pharmacy To Dose - Potassium Replacement)  1 dose .XX 

ASDIRECTED [facility]


Saccharomyces Boulardii (Florastor)  250 mg PO TID [facility]


Senna/Docusate Sodium (Senna Plus)  2 tab GTUBE BID [facility]


   Last Admin: 02/21/19 20:48 Dose:  2 tab


Tizanidine HCl (Zanaflex)  2 mg GTUBE TID [facility]


   Last Admin: 02/21/19 20:51 Dose:  2 mg





Discontinued Medications





Enoxaparin Sodium (Lovenox)  40 mg SUBCUT BEDTIME [facility]


   Last Admin: 02/22/19 00:14 Dose:  Not Given


Hydrocortisone Sodium Succinate (Solu-Cortef)  100 mg IVPUSH Q6H [facility]


   Last Admin: 02/22/19 00:14 Dose:  Not Given


Piperacillin Sod/Tazobactam (Sod 4.5 gm/ Sodium Chloride)  100 mls @ 25 mls/hr 

IV ONETIME STA


   Stop: 02/21/19 10:34


   Last Admin: 02/21/19 07:18 Dose:  25 mls/hr


Sodium Chloride (Normal Saline)  1,000 mls @ 999 mls/hr IV ONETIME ONE


   Stop: 02/21/19 07:35


   Last Admin: 02/21/19 07:18 Dose:  999 mls/hr


Dextrose/Sodium Chloride (Dextrose 5%-Normal Saline)  1,000 mls @ 75 mls/hr IV 

ASDIRECTED [facility]


   Last Admin: 02/21/19 09:44 Dose:  75 mls/hr


Sodium Chloride (Normal Saline)  2,000 mls @ 999 mls/hr IV ONETIME ONE


   Stop: 02/21/19 18:23


   Last Admin: 02/21/19 17:16 Dose:  999 mls/hr


Sodium Chloride (Normal Saline) Confirm Administered Dose 1,000 mls @ as 

directed .ROUTE .STK-MED ONE


   Stop: 02/21/19 17:32


   Last Admin: 02/21/19 17:40 Dose:  Not Given


Non-Formulary Medication (Lactose-Reduced Food/Fiber [Jevity 1.2 Antwan Liquid])  

260 ml GTUBE 5XDAY YOLIS


Non-Formulary Medication (Water [Water])  100 ml GTUBE TID YOLIS











- Exam


Quality Assessment: Supplemental Oxygen (0.5L)


General: No Acute Distress, Other (awake)


HEENT: Pupils Equal, Pupils Reactive, Mucous Membr. Moist/Pink


Neck: Supple, No Thyromegaly


Lungs: Normal Respiratory Effort, Decreased Breath Sounds, Crackles


Cardiovascular: Regular Rhythm, Tachycardia


GI/Abdominal Exam: Normal Bowel Sounds, Soft, Non-Tender, No Organomegaly, No 

Distention, No Abnormal Bruit, Other (G-tube)


 (Female) Exam: Deferred


Back Exam: Other (deferred)


Extremities: Non-Tender, Normal Capillary Refill, Pedal Edema, Limited Range of 

Motion


Peripheral Pulses: 2+: Dorsalis Pedis (L), Dorsalis Pedis (R)


Skin: Warm, Dry, Intact


Neurological: Other (deferred: she is unable to follow simple commands)


Psy/Mental Status: Other (awake).  No: Normal Affect





- Problem List Review


Problem List Initiated/Reviewed/Updated: Yes





- My Orders


Last 24 Hours: 


My Active Orders





02/21/19 15:16


Resuscitation Status Routine 





02/21/19 16:19


Acetaminophen [Tylenol]   650 mg GTUBE Q4H PRN 


Acetaminophen/oxyCODONE [Percocet 325-5 MG]   1 tab PO Q4H PRN 


Atropine 1% [Atropine 1% Ophth Soln]   0 ml SL Q4H PRN 


Lactulose [Cephulac]   10 gm GTUBE DAILY PRN 


Morphine [Morphine 10 MG/0.5 ML Oral Syringe]   2 mg .XX Q2HR PRN 


Ondansetron [Zofran ODT]   8 mg GTUBE Q6H PRN 





02/21/19 16:22


PROCALCITONIN [REF] Stat 





02/21/19 17:52


Antiembolic Devices [RC] BID 


SCD [Sequential Compression Device] [OM.PC] Routine 





02/21/19 19:30


LORazepam [Ativan]   2 mg IVPUSH Q4H PRN 


Metoprolol Tartrate [Lopressor]   5 mg IVPUSH Q4H PRN 


Pharmacy to Dose - Magnesium R [Pharmacy to Dose - Magnesium Replacement]   1 

dose .XX ASDIRECTED 


Pharmacy to Dose - Potassium R [Pharmacy to Dose - Potassium Replacement]   1 

dose .XX ASDIRECTED 


hydrALAZINE [Apresoline]   20 mg IVPUSH Q4H PRN 





02/21/19 19:45


Sodium Chloride 0.9% [Normal Saline] 1,000 ml IV ASDIRECTED 





02/21/19 20:00


Levofloxacin/Dextrose 5%-Water [Levaquin in D5W 500 MG/100 ML] 500 mg   Premix 

Bag 1 bag IV Q24H 





02/21/19 20:43


Enteral Feedings [RC] 06,10,14,18,22 





02/21/19 20:59


Communication Order [RC] DAILY 





02/21/19 21:00


Docusate Sodium/Sennosides [Senna Plus]   2 tab GTUBE BID 


Magnesium Oxide   400 mg GTUBE BID 


Multivitamins,Therapeutic [Thera]   1 each .XX BEDTIME 


Nystatin [Nystatin Crm]   0 gm TOP BID 


Piperacillin/Tazobactam [Piperacil-Tazobact] 4.5 gm   Sodium Chloride 0.9% [

Normal Saline] 100 ml IV Q8H 


Potassium Chloride [Potassium Chloride Solution]   20 meq PO BID 


levETIRAcetam [Keppra]   1,000 mg GTUBE BID 


tiZANidine [Zanaflex]   2 mg GTUBE TID 





02/21/19 22:55


OCCULT BLOOD SCREEN [OP] Routine 





02/21/19 22:58


CORTISOL [REF] Stat 





02/22/19 01:00


Vancomycin [Vancocin] 1 gm   Sodium Chloride 0.9% [Normal Saline] 250 ml IV 

Q12H 





02/22/19 01:26


Patient Status [ADT] Routine 





02/22/19 01:30


Norepinephrine [Levophed] 4 mg   Dextrose 5% in Water 246 ml IV TITRATE 





02/22/19 06:00


Hydrocortisone Sod Succinate [Solu-CORTEF]   100 mg IVPUSH Q6H 





02/22/19 06:39


Chest 1V Frontal [CR] Routine 





02/22/19 07:00


Midodrine   5 mg PO TIDAC 





02/22/19 07:09


FE, TIBC, TRANSFERRIN, FE SAT [CHEM] AM 


LACTIC ACID [CHEM] AM 





02/22/19 09:00


Cholecalciferol (Vitamin D3) [Vitamin D3]   2,000 units GTUBE DAILY 


Enoxaparin [Lovenox]   40 mg SUBCUT DAILY 


FLUoxetine [PROzac]   10 mg GTUBE DAILY 


Famotidine [Pepcid]   20 mg PO BID 


LORazepam [Ativan]   0.25 mg GTUBE DAILY 


Saccharomyces Boulardii [Florastor]   250 mg PO TID 














- Plan


Plan:: 


Assessment/Plan:


Acute:


Bacteremia w/ Hypotension


   - 2/2 UTI due to neurogenic bladder from MS 


   - Carries a hx/o Multiple Urine and Blood Infections in the past  


   - Risk Factor: Advanced MS, AMS, Recurrent UTI and Aspiration Syndrome 2/2 

Dysphagia


   - Fever of 39 C; WBC of 14.22, HR of 119-128, BP of 92/34, 100/48, 101/58 

mmHg, RR 24-33


   - LA 6.2 --> 5.3 --> 5.6--> 3.1; received a total of 3L of NS overnight plus 

1L NS at 125 cc/hr but remained hypotensive overnight


   - Procalcitonin 5.44 (high risk progression to severe sepsis) 


   - 1 set of bottle positive


   - Continue Solucortef 100 mg IV Q6H 


   - High pseudomonal risk due to recent multiple antibiotic use: Azithromycin 2 /12-16, Cipro 2/15-18, and Bactrim 2/18-25


   - Continue Triple antibiotic regimen: IV Levaquin, Zosyn and Vancomycin





UTI


   - 2/2 Urinary retention from neurogenic bladder 


   - UA is pos for infection w/ 3+ LE


   - IV Antibiotic for GPC coverage





Atelectasis/Increased Density Right Lung Base


   - Carries a hx/o Dysphagia


   - Cannot r/o Aspiration Syndrome


   - Continue IV Zosyn and Levaquin





Anemia


   - Acute on Chronic


   - Hgb of 8.5 and Hct 29; baseline is 11-12 (December 2018)


   - Stool occult study


   - Iron Panel: shows Anemia of Chronic Disease





End of Life Care


   - Again we spoke to mother and sister at bedside; they understand "comfort 

measures" but they want treatment of Maureen's infection





Chronic:


Dysphagia


Urinary Retention


Back Pain


OA


Spasms


Osteoporosis


CVA/TIA


MS


Anemia


Seizure Disorder


Hx/o DVT/VTE


Alzheimer's Disease


DM2


Anxiety


Depression





Plan:


She looks clinically better but still hemodynamically unstable


Routine AM Labs


Sepsis Protocol (my own-triple regimen)


Resume Home Meds except Bactrim


Aspiration/Seizure/Fall Precaution


DVT/GI PPx: Lovenox sub Q


SW/CM for D/c planning


DNR/DNI/Comfort Measures

## 2019-02-23 RX ADMIN — Medication SCH MG: at 20:03

## 2019-02-23 RX ADMIN — HYDROCORTISONE SODIUM SUCCINATE SCH MG: 100 INJECTION, POWDER, FOR SOLUTION INTRAMUSCULAR; INTRAVENOUS at 12:25

## 2019-02-23 RX ADMIN — LEVOFLOXACIN SCH MLS/HR: 500 INJECTION, SOLUTION INTRAVENOUS at 19:58

## 2019-02-23 RX ADMIN — Medication SCH MG: at 14:22

## 2019-02-23 RX ADMIN — HYDROCORTISONE SODIUM SUCCINATE SCH MG: 100 INJECTION, POWDER, FOR SOLUTION INTRAMUSCULAR; INTRAVENOUS at 06:00

## 2019-02-23 RX ADMIN — HYDROCORTISONE SODIUM SUCCINATE SCH MG: 100 INJECTION, POWDER, FOR SOLUTION INTRAMUSCULAR; INTRAVENOUS at 00:58

## 2019-02-23 RX ADMIN — POTASSIUM CHLORIDE SCH MEQ: 1.5 SOLUTION ORAL at 09:23

## 2019-02-23 RX ADMIN — Medication SCH MG: at 09:22

## 2019-02-23 RX ADMIN — BUMETANIDE SCH MG: 0.25 INJECTION INTRAMUSCULAR; INTRAVENOUS at 20:07

## 2019-02-23 RX ADMIN — VITAMIN D, TAB 1000IU (100/BT) SCH UNITS: 25 TAB at 09:24

## 2019-02-23 RX ADMIN — NYSTATIN SCH APPLIC: 100000 CREAM TOPICAL at 09:23

## 2019-02-23 RX ADMIN — POTASSIUM CHLORIDE SCH MEQ: 1.5 SOLUTION ORAL at 20:07

## 2019-02-23 RX ADMIN — THERA TABS SCH EACH: TAB at 20:06

## 2019-02-23 RX ADMIN — LEVETIRACETAM SCH MG: 100 SOLUTION ORAL at 09:22

## 2019-02-23 RX ADMIN — LEVETIRACETAM SCH MG: 100 SOLUTION ORAL at 20:07

## 2019-02-23 RX ADMIN — NYSTATIN SCH APPLIC: 100000 CREAM TOPICAL at 21:03

## 2019-02-23 NOTE — PCM.PN
- General Info


Date of Service: 02/23/19


Admission Dx/Problem (Free Text): 


 Admission Diagnosis/Problem





Admission Diagnosis/Problem      Urosepsis








Subjective Update: 


Follow Up





Functional Status: Reports: Pain Controlled, Urinating.  Denies: New Symptoms





- Review of Systems


General: Denies: Fever, Malaise


HEENT: Reports: No Symptoms


Pulmonary: Denies: Shortness of Breath, Cough, Sputum


Cardiovascular: Reports: Edema.  Denies: Chest Pain, Palpitations


Gastrointestinal: Denies: Abdominal Pain, Nausea, Vomiting


Genitourinary: Reports: No Symptoms


Musculoskeletal: Reports: No Symptoms


Skin: Denies: Cyanosis, Diaphoresis, Dryness


Neurological: Denies: Confusion


Psychiatric: Denies: Depression, Agitation, Hallucinations


Systems Review Comment:: 


No overnight issues. She was mostly awake throughout the night per night nurse. 

She is alert, awake and looks comfortable. She is able to answer yes or no 

questions by nodding or shaking her head. She can also respond by saying yes or 

no. She is afebrile but her WBC went up to 22K (She is on Solucortef) from 14K. 

Her Hgb this morning is 6.9, down from 8.5 on admission. No report of active 

bleeding or GI bleed.








- Patient Data


Vitals - Most Recent: 


 Last Vital Signs











Temp  36.7 C   02/23/19 03:58


 


Pulse  96   02/23/19 03:58


 


Resp  21 H  02/23/19 03:58


 


BP  108/81   02/23/19 03:58


 


Pulse Ox  92 L  02/23/19 03:58











Weight - Most Recent: 74.843 kg


I&O - Last 24 Hours: 


 Intake & Output











 02/22/19 02/23/19 02/23/19





 22:59 06:59 14:59


 


Intake Total 2715 2075 


 


Balance 2715 2075 











Lab Results Last 24 Hours: 


 Laboratory Results - last 24 hr











  02/21/19 02/22/19 02/22/19 Range/Units





  16:22 07:09 07:09 


 


WBC     (3.98-10.04)  K/mm3


 


RBC     (3.98-5.22)  M/mm3


 


Hgb     (11.2-15.7)  gm/L


 


Hct     (34.1-44.9)  %


 


MCV     (79.4-94.8)  fl


 


MCH     (25.6-32.2)  pg


 


MCHC     (32.2-35.5)  g/dl


 


RDW Std Deviation     (36.4-46.3)  fL


 


Plt Count     (182-369)  K/mm3


 


MPV     (9.4-12.3)  fl


 


Neut % (Auto)     (34.0-71.1)  %


 


Lymph % (Auto)     (19.3-51.7)  %


 


Mono % (Auto)     (4.7-12.5)  %


 


Eos % (Auto)     (0.7-5.8)  


 


Baso % (Auto)     (0.1-1.2)  %


 


Neut # (Auto)     (1.56-6.13)  K/mm3


 


Lymph # (Auto)     (1.18-3.74)  K/mm3


 


Mono # (Auto)     (0.24-0.36)  K/mm3


 


Eos # (Auto)     (0.04-0.36)  K/mm3


 


Baso # (Auto)     (0.01-0.08)  K/mm3


 


Sodium     (136-145)  mEq/L


 


Potassium     (3.5-5.1)  mEq/L


 


Chloride     ()  mEq/L


 


Carbon Dioxide     (21-32)  mEq/L


 


Anion Gap     (5-15)  


 


BUN     (7-18)  mg/dL


 


Creatinine     (0.55-1.02)  mg/dL


 


Est Cr Clr Drug Dosing     mL/min


 


Estimated GFR (MDRD)     (>60)  mL/min


 


BUN/Creatinine Ratio     (14-18)  


 


Glucose     ()  mg/dL


 


Lactic Acid    3.1 H  (0.4-2.0)  mmol/L


 


Calcium     (8.5-10.1)  mg/dL


 


Magnesium     (1.8-2.4)  mg/dl


 


Iron   10 L   ()  ug/dL


 


TIBC   111   (100-400)  ug/dL


 


% Saturation   9 L   (20-55)  %


 


Transferrin   89 L   (202-364)  mg/dL


 


C-Reactive Protein     (<1.0)  mg/dL


 


Procalcitonin  5.44 H    (<0.10)  ng/mL














  02/23/19 02/23/19 Range/Units





  05:20 05:20 


 


WBC  28.62 H   (3.98-10.04)  K/mm3


 


RBC  2.88 L   (3.98-5.22)  M/mm3


 


Hgb  6.9 L*   (11.2-15.7)  gm/L


 


Hct  23.9 L   (34.1-44.9)  %


 


MCV  83.0   (79.4-94.8)  fl


 


MCH  24.0 L   (25.6-32.2)  pg


 


MCHC  28.9 L   (32.2-35.5)  g/dl


 


RDW Std Deviation  51.6 H   (36.4-46.3)  fL


 


Plt Count  614 H   (182-369)  K/mm3


 


MPV  9.6   (9.4-12.3)  fl


 


Neut % (Auto)  93.1 H   (34.0-71.1)  %


 


Lymph % (Auto)  5.3 L   (19.3-51.7)  %


 


Mono % (Auto)  0.9 L   (4.7-12.5)  %


 


Eos % (Auto)  0 L   (0.7-5.8)  


 


Baso % (Auto)  0.1   (0.1-1.2)  %


 


Neut # (Auto)  26.63 H   (1.56-6.13)  K/mm3


 


Lymph # (Auto)  1.53   (1.18-3.74)  K/mm3


 


Mono # (Auto)  0.25   (0.24-0.36)  K/mm3


 


Eos # (Auto)  0.01 L   (0.04-0.36)  K/mm3


 


Baso # (Auto)  0.02   (0.01-0.08)  K/mm3


 


Sodium   138  (136-145)  mEq/L


 


Potassium   3.2 L  (3.5-5.1)  mEq/L


 


Chloride   106  ()  mEq/L


 


Carbon Dioxide   21  (21-32)  mEq/L


 


Anion Gap   14.2  (5-15)  


 


BUN   25 H  (7-18)  mg/dL


 


Creatinine   0.8  (0.55-1.02)  mg/dL


 


Est Cr Clr Drug Dosing   71.50  mL/min


 


Estimated GFR (MDRD)   > 60  (>60)  mL/min


 


BUN/Creatinine Ratio   31.3 H  (14-18)  


 


Glucose   106  ()  mg/dL


 


Lactic Acid    (0.4-2.0)  mmol/L


 


Calcium   9.4  (8.5-10.1)  mg/dL


 


Magnesium   1.9  (1.8-2.4)  mg/dl


 


Iron    ()  ug/dL


 


TIBC    (100-400)  ug/dL


 


% Saturation    (20-55)  %


 


Transferrin    (202-364)  mg/dL


 


C-Reactive Protein   29.7 H*  (<1.0)  mg/dL


 


Procalcitonin    (<0.10)  ng/mL











Abran Results Last 24 Hours: 


 Microbiology











 02/21/19 07:28 Aerobic Blood Culture - Preliminary





 Blood - Venous    NO GROWTH AFTER 1 DAY





 Anaerobic Blood Culture - Preliminary





    Gram Positive Cocci In Clustrs


 


 02/21/19 06:57 Urine Culture - Preliminary





 Urine, Catheterized    Gram Positive Cocci


 


 02/21/19 07:15 Aerobic Blood Culture - Preliminary





 Blood - Venous - Lab Draw    NO GROWTH AFTER 1 DAY





 Anaerobic Blood Culture - Final











Med Orders - Current: 


 Current Medications





Acetaminophen (Tylenol)  650 mg GTUBE Q4H PRN


   PRN Reason: Pain/Fever


   Last Admin: 02/22/19 00:46 Dose:  650 mg


Atropine Sulfate (Atropine 1% Ophth Soln)  0 ml SL Q4H PRN


   PRN Reason: extra secretions


Cholecalciferol (Vitamin D3)  2,000 units GTUBE DAILY [facility]


   Last Admin: 02/22/19 08:30 Dose:  2,000 units


Enoxaparin Sodium (Lovenox)  40 mg SUBCUT DAILY [facility]


   Last Admin: 02/22/19 08:33 Dose:  40 mg


Famotidine (Pepcid)  20 mg PO BID [facility]


   Last Admin: 02/22/19 21:17 Dose:  20 mg


Fluoxetine HCl (Prozac)  10 mg GTUBE DAILY [facility]


   Last Admin: 02/22/19 08:32 Dose:  10 mg


Hydralazine HCl (Apresoline)  20 mg IVPUSH Q4H PRN


   PRN Reason: Hypertension


Hydrocortisone Sodium Succinate (Solu-Cortef)  100 mg IVPUSH Q6H [facility]


   Last Admin: 02/23/19 06:00 Dose:  100 mg


Sodium Chloride (Normal Saline)  1,000 mls @ 125 mls/hr IV ASDIRECTED [facility]


   Last Admin: 02/23/19 03:50 Dose:  125 mls/hr


Levofloxacin/Dextrose 500 mg/ (Premix)  100 mls @ 100 mls/hr IV Q24H [facility]


   Last Admin: 02/22/19 21:14 Dose:  100 mls/hr


Piperacillin Sod/Tazobactam (Sod 4.5 gm/ Sodium Chloride)  100 mls @ 25 mls/hr 

IV Q8H [facility]


   Last Admin: 02/23/19 05:55 Dose:  25 mls/hr


Vancomycin HCl 1 gm/ Sodium (Chloride)  250 mls @ 250 mls/hr IV Q12H [facility]


   Last Admin: 02/23/19 01:08 Dose:  250 mls/hr


Norepinephrine Bitartrate 4 mg (/ Dextrose/Water)  250 mls @ 7.5 mls/hr IV 

TITRATE [facility]; Protocol


   Last Titration: 02/22/19 15:33 Dose:  0 mcg/min, 0 mls/hr


Lactulose (Cephulac)  10 gm GTUBE DAILY PRN


   PRN Reason: Other


Levetiracetam (Keppra)  1,000 mg GTUBE BID [facility]


   Last Admin: 02/22/19 21:17 Dose:  1,000 mg


Lorazepam (Ativan)  0.25 mg GTUBE DAILY [facility]


   Last Admin: 02/22/19 08:31 Dose:  0.25 mg


Lorazepam (Ativan)  2 mg IVPUSH Q4H PRN


   PRN Reason: Seizures


Magnesium Oxide (Magnesium Oxide)  400 mg GTUBE BID [facility]


   Last Admin: 02/22/19 21:18 Dose:  400 mg


Magnesium Sulfate (Pharmacy To Dose - Magnesium Replacement)  1 dose .XX 

ASDIRECTED [facility]


Metoprolol Tartrate (Lopressor)  5 mg IVPUSH Q4H PRN


   PRN Reason: Tachycardia


   Last Admin: 02/21/19 19:49 Dose:  5 mg


Morphine Sulfate (Morphine 10 Mg/0.5 Ml Oral Syringe)  2 mg .XX Q2HR PRN


   PRN Reason: Pain


   Last Admin: 02/21/19 17:36 Dose:  2 mg


Multivitamins (Thera)  1 each .XX BEDTIME [facility]


   Last Admin: 02/22/19 21:17 Dose:  1 each


Nystatin (Nystatin Crm)  0 gm TOP BID [facility]


   Last Admin: 02/22/19 21:18 Dose:  1 applic


Ondansetron HCl (Zofran Odt)  8 mg GTUBE Q6H PRN


   PRN Reason: Nausea


Oxycodone/Acetaminophen (Percocet 325-5 Mg)  1 tab PO Q4H PRN


   PRN Reason: Pain (moderate 4-6)


   Last Admin: 02/21/19 20:48 Dose:  1 tab


Potassium Chloride (Pharmacy To Dose - Potassium Replacement)  1 dose .XX 

ASDIRECTED [facility]


Saccharomyces Boulardii (Florastor)  250 mg PO TID [facility]


   Last Admin: 02/22/19 21:18 Dose:  250 mg


Senna/Docusate Sodium (Senna Plus)  2 tab GTUBE BID [facility]


   Last Admin: 02/22/19 21:18 Dose:  2 tab


Tizanidine HCl (Zanaflex)  2 mg GTUBE TID [facility]


   Last Admin: 02/22/19 21:18 Dose:  2 mg





Discontinued Medications





Enoxaparin Sodium (Lovenox)  40 mg SUBCUT BEDTIME [facility]


   Last Admin: 02/22/19 00:14 Dose:  Not Given


Hydrocortisone Sodium Succinate (Solu-Cortef)  100 mg IVPUSH Q6H [facility]


   Last Admin: 02/22/19 00:14 Dose:  Not Given


Piperacillin Sod/Tazobactam (Sod 4.5 gm/ Sodium Chloride)  100 mls @ 25 mls/hr 

IV ONETIME STA


   Stop: 02/21/19 10:34


   Last Admin: 02/21/19 07:18 Dose:  25 mls/hr


Sodium Chloride (Normal Saline)  1,000 mls @ 999 mls/hr IV ONETIME ONE


   Stop: 02/21/19 07:35


   Last Admin: 02/21/19 07:18 Dose:  999 mls/hr


Dextrose/Sodium Chloride (Dextrose 5%-Normal Saline)  1,000 mls @ 75 mls/hr IV 

ASDIRECTED [facility]


   Last Admin: 02/21/19 09:44 Dose:  75 mls/hr


Sodium Chloride (Normal Saline)  2,000 mls @ 999 mls/hr IV ONETIME ONE


   Stop: 02/21/19 18:23


   Last Admin: 02/21/19 17:16 Dose:  999 mls/hr


Sodium Chloride (Normal Saline) Confirm Administered Dose 1,000 mls @ as 

directed .ROUTE .STK-MED ONE


   Stop: 02/21/19 17:32


   Last Admin: 02/21/19 17:40 Dose:  Not Given


Midodrine (Midodrine)  5 mg PO TIDAC [facility]


   Stop: 02/23/19 07:01


   Last Admin: 02/23/19 06:34 Dose:  5 mg


Non-Formulary Medication (Lactose-Reduced Food/Fiber [Jevity 1.2 Antwan Liquid])  

260 ml GTUBE 5XDAY [facility]


   Last Admin: 02/22/19 11:31 Dose:  Not Given


Non-Formulary Medication (Water [Water])  100 ml GTUBE TID [facility]


   Last Admin: 02/22/19 10:25 Dose:  Not Given


Potassium Chloride (Potassium Chloride Solution)  20 meq PO BID [facility]


   Last Admin: 02/21/19 20:52 Dose:  20 meq











- Exam


General: Cooperative, No Acute Distress, Other (Awake)


HEENT: Pupils Reactive


Neck: Supple


Lungs: Normal Respiratory Effort, Decreased Breath Sounds


Cardiovascular: Regular Rate, Regular Rhythm


GI/Abdominal Exam: Soft, Non-Tender, No Abnormal Bruit, Distended, Abnormal 

Bowel Sounds, Other (gastric tube)


 (Female) Exam: Deferred


Back Exam: Other (deferred)


Extremities: Non-Tender, Pedal Edema (4+), Slow Capillary Refill


Peripheral Pulses: 1+: Dorsalis Pedis (L), Dorsalis Pedis (R)


Skin: Warm, Dry, Intact, Cool (on bilateral knee), Other (bilateral knee all 

the way up to the thighs)


Neurological: Other (deferred, she is bedbound)


Psy/Mental Status: Alert, Normal Mood.  No: Normal Affect





- Problem List Review


Problem List Initiated/Reviewed/Updated: Yes





- My Orders


Last 24 Hours: 


My Active Orders





02/22/19 09:00


Cholecalciferol (Vitamin D3) [Vitamin D3]   2,000 units GTUBE DAILY 


Enoxaparin [Lovenox]   40 mg SUBCUT DAILY 


FLUoxetine [PROzac]   10 mg GTUBE DAILY 


Famotidine [Pepcid]   20 mg PO BID 


LORazepam [Ativan]   0.25 mg GTUBE DAILY 


Saccharomyces Boulardii [Florastor]   250 mg PO TID 





02/22/19 22:07


Oxygen Therapy [RC] PRN 





02/22/19 Breakfast


Tube Feeding Adult Diet [DIET] 





02/23/19 05:20


CBC WITH AUTO DIFF [HEME] AM 





02/23/19 06:47


LACTIC ACID [CHEM] Routine 





02/23/19 06:50


KUB [Abdomen 1V Flat] [CR] Routine 





02/24/19 05:11


BMP [BASIC METABOLIC PANEL,BMP] [CHEM] AM 


CBC WITH AUTO DIFF [HEME] AM 


CRP [C-REACTIVE PROTEIN] [CHEM] AM 


MG [MAGNESIUM] [CHEM] AM 





02/25/19 05:11


BMP [BASIC METABOLIC PANEL,BMP] [CHEM] AM 


CBC WITH AUTO DIFF [HEME] AM 


CRP [C-REACTIVE PROTEIN] [CHEM] AM 


MG [MAGNESIUM] [CHEM] AM 





02/26/19 05:11


BMP [BASIC METABOLIC PANEL,BMP] [CHEM] AM 


CBC WITH AUTO DIFF [HEME] AM 


CRP [C-REACTIVE PROTEIN] [CHEM] AM 


MG [MAGNESIUM] [CHEM] AM 





02/27/19 05:11


CBC WITH AUTO DIFF [HEME] AM 














- Plan


Plan:: 


Assessment/Plan:


Acute:


Bacteremia w/ Hypotension


   - 2/2 UTI due to neurogenic bladder from MS 


   - Carries a hx/o Multiple Urine and Blood Infections in the past  


   - Risk Factor: Advanced MS, AMS, Recurrent UTI and Aspiration Syndrome 2/2 

Dysphagia


   - Fever of 39 C; WBC of 14.22, HR of 119-128, BP of 92/34, 100/48, 101/58 

mmHg, RR 24-33


   - LA 6.2 --> 5.3 --> 5.6--> 3.1-->2.2; received a total of 3L of NS 

overnight plus 1L NS at 125 cc/hr but remained hypotensive overnight


   - Procalcitonin 5.44 (high risk progression to severe sepsis) 


   - 1 set of bottle positive for Staph Coagulase Neg (possible contamination)


   - Discontinue Solucortef 100 mg IV Q6H; She is now off Levophed drip


   - High pseudomonal risk due to recent multiple antibiotic use: Azithromycin 2 /12-16, Cipro 2/15-18, and Bactrim 2/18-25


   - Continue Triple antibiotic regimen: IV Levaquin, Zosyn and Vancomycin


   - Midodrin 5 mg po TIDAC for Hypotension


   - Florinef 0.1 mg po BID for Orthostatic/Autonomic Dysfunction





UTI


   - 2/2 Urinary retention from neurogenic bladder 


   - UA is pos for E. Faecalis and Gram Pos Coccobacillus


   - IV Antibiotics as above for coverage





Atelectasis/Increased Density Right Lung Base


   - Carries a hx/o Dysphagia


   - Cannot r/o Aspiration Syndrome


   - Continue IV Zosyn and Levaquin; She is on H2B for aspiration prophylaxis





Anemia


   - Acute on Chronic


   - Hgb of 8.5 and Hct 29; baseline is 11-12 (December 2018)--> 6.9


   - Stool occult study-pending; she has not had a bowel movement 


   - Iron Panel: shows Anemia of Chronic Disease


   - Asked earlier if she wants blood transfusion; she said no. But changed her 

mind when her mom was at bedside.





Constipation/Ileus


   - Has not had a bowel since the 18th


   - She is on narcotics and plenty of anticholinergics


   - KUB r/o SBO


   - Hold tube feeding for now


   - Rectal suppository BID until bowel movement; consider lactulose or 

magnesium


   - Prokinetic agent since she is bedbound and immobile





End of Life Care


   - Again we spoke to mother and sister at bedside; they understand "comfort 

measures" but they want treatment of Maureen's infection





Hypoalbuminemia


   - Albumin 1.2


   - Reasons why she has significant peripheral edema


   - Dietary consult





Chronic:


Dysphagia


Urinary Retention


Back Pain


OA


Spasms


Osteoporosis


CVA/TIA


MS


Anemia of Chronic Disease


Seizure Disorder


Hx/o DVT/VTE


Alzheimer's Disease


DM2, BS controlled


Anxiety


Depression





Plan:


She looks clinically better but still hemodynamically unstable


Routine AM Labs


Sepsis Protocol; de-escalate IV antibiotics once we know more details about her 

organisms 


Blood Transfusions of 2 units of PRBC


Bumex 0.5 mg IVP BID


KVO maintenance fluid


Aspiration/Seizure/Fall Precaution


DVT/GI PPx: Lovenox SubQ/H2B


Additional orders as above


SW/CM for D/c planning


DNR/DNI/Comfort Measures





Updated mom at bedside about her recent lab results, clinical status, 

additional tests to order and treatment plan.

## 2019-02-23 NOTE — CR
Abdomen: Supine portable view of the abdomen was obtained.

 

Gas is noted within sigmoid colon as well as within portions of mildly

 prominent small bowel.  Bowel gas is central within the abdomen 

raising the possibility of ascites.  Calcifications are seen overlying

 the right kidney compatible with numerous renal calculi.  Bony 

structures are osteopenic.

 

Impression:

1.  Dilated sigmoid colon as well as small bowel.  Findings most 

suspicious for ileus.

2.  Central position of bowel raising the possibility of ascites.  

Ultrasound would be confirmatory if clinically needed.

3.  Multiple calcifications within the right kidney.

 

Diagnostic code #3

## 2019-02-24 RX ADMIN — POTASSIUM CHLORIDE SCH MEQ: 1.5 SOLUTION ORAL at 20:43

## 2019-02-24 RX ADMIN — VITAMIN D, TAB 1000IU (100/BT) SCH UNITS: 25 TAB at 09:16

## 2019-02-24 RX ADMIN — LEVETIRACETAM SCH MG: 100 SOLUTION ORAL at 20:43

## 2019-02-24 RX ADMIN — LEVOFLOXACIN SCH MLS/HR: 500 INJECTION, SOLUTION INTRAVENOUS at 20:40

## 2019-02-24 RX ADMIN — Medication SCH MG: at 20:43

## 2019-02-24 RX ADMIN — THERA TABS SCH EACH: TAB at 20:43

## 2019-02-24 RX ADMIN — NYSTATIN SCH APPLIC: 100000 CREAM TOPICAL at 09:16

## 2019-02-24 RX ADMIN — Medication SCH MG: at 09:15

## 2019-02-24 RX ADMIN — POTASSIUM CHLORIDE SCH MEQ: 1.5 SOLUTION ORAL at 09:00

## 2019-02-24 RX ADMIN — Medication SCH MG: at 14:38

## 2019-02-24 RX ADMIN — BUMETANIDE SCH MG: 0.25 INJECTION INTRAMUSCULAR; INTRAVENOUS at 20:16

## 2019-02-24 RX ADMIN — BUMETANIDE SCH MG: 0.25 INJECTION INTRAMUSCULAR; INTRAVENOUS at 09:16

## 2019-02-24 RX ADMIN — NYSTATIN SCH APPLIC: 100000 CREAM TOPICAL at 20:44

## 2019-02-24 RX ADMIN — LEVETIRACETAM SCH MG: 100 SOLUTION ORAL at 08:58

## 2019-02-24 NOTE — PCM.PN
- General Info


Date of Service: 02/24/19


Admission Dx/Problem (Free Text): 


 Admission Diagnosis/Problem





Admission Diagnosis/Problem      Urosepsis








Subjective Update: 


Follow Up





Functional Status: Reports: Pain Controlled.  Denies: New Symptoms





- Review of Systems


General: Denies: Fever, Chills


HEENT: Reports: No Symptoms


Pulmonary: Denies: Shortness of Breath


Cardiovascular: Denies: Chest Pain


Gastrointestinal: Denies: Abdominal Pain, Constipation, Nausea, Vomiting


Genitourinary: Reports: Retention (chronic)


Musculoskeletal: Denies: No Symptoms


Skin: Reports: Mottled


Neurological: Reports: Weakness, Gait Disturbance.  Denies: Confusion


Psychiatric: Denies: Depression, Mood Lability, Anxiety, Agitation, 

Hallucinations


Systems Review Comment:: 


No overnight issues. She had a bowel movement at least 3 times. Her vitals are 

stable. She is comfortable and in no acute distress. She still has significant 

peripheral edema. Her Hgb has improved to 10.8 this AM. Her K is slightly low 

at 3.








- Patient Data


Vitals - Most Recent: 


 Last Vital Signs











Temp  36.6 C   02/24/19 03:58


 


Pulse  92   02/24/19 03:58


 


Resp  21 H  02/24/19 03:58


 


BP  137/76   02/24/19 05:15


 


Pulse Ox  94 L  02/24/19 03:58











Weight - Most Recent: 76.294 kg


I&O - Last 24 Hours: 


 Intake & Output











 02/23/19 02/24/19 02/24/19





 22:59 06:59 14:59


 


Intake Total 1728 78 


 


Output Total 200 265 


 


Balance 1528 -187 











Lab Results Last 24 Hours: 


 Laboratory Results - last 24 hr











  02/23/19 02/23/19 02/23/19 Range/Units





  05:20 05:20 07:50 


 


WBC     (3.98-10.04)  K/mm3


 


RBC     (3.98-5.22)  M/mm3


 


Hgb     (11.2-15.7)  gm/L


 


Hct     (34.1-44.9)  %


 


MCV     (79.4-94.8)  fl


 


MCH     (25.6-32.2)  pg


 


MCHC     (32.2-35.5)  g/dl


 


RDW Std Deviation     (36.4-46.3)  fL


 


Plt Count     (182-369)  K/mm3


 


MPV     (9.4-12.3)  fl


 


Neut % (Auto)     (34.0-71.1)  %


 


Lymph % (Auto)     (19.3-51.7)  %


 


Mono % (Auto)     (4.7-12.5)  %


 


Eos % (Auto)     (0.7-5.8)  


 


Baso % (Auto)     (0.1-1.2)  %


 


Neut # (Auto)     (1.56-6.13)  K/mm3


 


Lymph # (Auto)     (1.18-3.74)  K/mm3


 


Mono # (Auto)     (0.24-0.36)  K/mm3


 


Eos # (Auto)     (0.04-0.36)  K/mm3


 


Baso # (Auto)     (0.01-0.08)  K/mm3


 


Manual Slide Review  Abnormal smear    


 


Sodium     (136-145)  mEq/L


 


Potassium     (3.5-5.1)  mEq/L


 


Chloride     ()  mEq/L


 


Carbon Dioxide     (21-32)  mEq/L


 


Anion Gap     (5-15)  


 


BUN     (7-18)  mg/dL


 


Creatinine     (0.55-1.02)  mg/dL


 


Est Cr Clr Drug Dosing     mL/min


 


Estimated GFR (MDRD)     (>60)  mL/min


 


BUN/Creatinine Ratio     (14-18)  


 


Glucose     ()  mg/dL


 


Lactic Acid    2.2 H  (0.4-2.0)  mmol/L


 


Calcium     (8.5-10.1)  mg/dL


 


Magnesium     (1.8-2.4)  mg/dl


 


C-Reactive Protein     (<1.0)  mg/dL


 


Blood Type   Cancelled   


 


Gel Antibody Screen   Cancelled   


 


Crossmatch     














  02/23/19 02/23/19 02/24/19 Range/Units





  07:50 23:30 05:35 


 


WBC    23.78 H  (3.98-10.04)  K/mm3


 


RBC    4.18  (3.98-5.22)  M/mm3


 


Hgb   11.0 L  10.8 L  (11.2-15.7)  gm/L


 


Hct    34.7  (34.1-44.9)  %


 


MCV    83.0  (79.4-94.8)  fl


 


MCH    25.8  (25.6-32.2)  pg


 


MCHC    31.1 L  (32.2-35.5)  g/dl


 


RDW Std Deviation    50.3 H  (36.4-46.3)  fL


 


Plt Count    530 H  (182-369)  K/mm3


 


MPV    9.5  (9.4-12.3)  fl


 


Neut % (Auto)    88.0 H  (34.0-71.1)  %


 


Lymph % (Auto)    9.8 L  (19.3-51.7)  %


 


Mono % (Auto)    1.2 L  (4.7-12.5)  %


 


Eos % (Auto)    0 L  (0.7-5.8)  


 


Baso % (Auto)    0.1  (0.1-1.2)  %


 


Neut # (Auto)    20.93 H  (1.56-6.13)  K/mm3


 


Lymph # (Auto)    2.33  (1.18-3.74)  K/mm3


 


Mono # (Auto)    0.28  (0.24-0.36)  K/mm3


 


Eos # (Auto)    0.00 L  (0.04-0.36)  K/mm3


 


Baso # (Auto)    0.02  (0.01-0.08)  K/mm3


 


Manual Slide Review    Abnormal smear  


 


Sodium     (136-145)  mEq/L


 


Potassium     (3.5-5.1)  mEq/L


 


Chloride     ()  mEq/L


 


Carbon Dioxide     (21-32)  mEq/L


 


Anion Gap     (5-15)  


 


BUN     (7-18)  mg/dL


 


Creatinine     (0.55-1.02)  mg/dL


 


Est Cr Clr Drug Dosing     mL/min


 


Estimated GFR (MDRD)     (>60)  mL/min


 


BUN/Creatinine Ratio     (14-18)  


 


Glucose     ()  mg/dL


 


Lactic Acid     (0.4-2.0)  mmol/L


 


Calcium     (8.5-10.1)  mg/dL


 


Magnesium     (1.8-2.4)  mg/dl


 


C-Reactive Protein     (<1.0)  mg/dL


 


Blood Type  A NEGATIVE    


 


Gel Antibody Screen  Negative    


 


Crossmatch  See Detail    














  02/24/19 Range/Units





  05:35 


 


WBC   (3.98-10.04)  K/mm3


 


RBC   (3.98-5.22)  M/mm3


 


Hgb   (11.2-15.7)  gm/L


 


Hct   (34.1-44.9)  %


 


MCV   (79.4-94.8)  fl


 


MCH   (25.6-32.2)  pg


 


MCHC   (32.2-35.5)  g/dl


 


RDW Std Deviation   (36.4-46.3)  fL


 


Plt Count   (182-369)  K/mm3


 


MPV   (9.4-12.3)  fl


 


Neut % (Auto)   (34.0-71.1)  %


 


Lymph % (Auto)   (19.3-51.7)  %


 


Mono % (Auto)   (4.7-12.5)  %


 


Eos % (Auto)   (0.7-5.8)  


 


Baso % (Auto)   (0.1-1.2)  %


 


Neut # (Auto)   (1.56-6.13)  K/mm3


 


Lymph # (Auto)   (1.18-3.74)  K/mm3


 


Mono # (Auto)   (0.24-0.36)  K/mm3


 


Eos # (Auto)   (0.04-0.36)  K/mm3


 


Baso # (Auto)   (0.01-0.08)  K/mm3


 


Manual Slide Review   


 


Sodium  141  (136-145)  mEq/L


 


Potassium  3.0 L  (3.5-5.1)  mEq/L


 


Chloride  107  ()  mEq/L


 


Carbon Dioxide  24  (21-32)  mEq/L


 


Anion Gap  13.0  (5-15)  


 


BUN  29 H  (7-18)  mg/dL


 


Creatinine  0.8  (0.55-1.02)  mg/dL


 


Est Cr Clr Drug Dosing  71.50  mL/min


 


Estimated GFR (MDRD)  > 60  (>60)  mL/min


 


BUN/Creatinine Ratio  36.3 H  (14-18)  


 


Glucose  90  ()  mg/dL


 


Lactic Acid   (0.4-2.0)  mmol/L


 


Calcium  10.1  (8.5-10.1)  mg/dL


 


Magnesium  1.9  (1.8-2.4)  mg/dl


 


C-Reactive Protein  31.4 H*  (<1.0)  mg/dL


 


Blood Type   


 


Gel Antibody Screen   


 


Crossmatch   











Abran Results Last 24 Hours: 


 Microbiology











 02/21/19 07:15 Aerobic Blood Culture - Preliminary





 Blood - Venous - Lab Draw    NO GROWTH AFTER 3 DAYS





 Anaerobic Blood Culture - Final


 


 02/21/19 07:28 Aerobic Blood Culture - Preliminary





 Blood - Venous    NO GROWTH AFTER 3 DAYS





 Anaerobic Blood Culture - Preliminary





    Staphylococcus Coagulase Neg


 


 02/23/19 22:00 Occult Blood - Preliminary





 Stool / Feces 


 


 02/21/19 06:57 Urine Culture - Preliminary





 Urine, Catheterized    Enterococcus Faecalis





    Gram Positive Coccobacillus











Med Orders - Current: 


 Current Medications





Acetaminophen (Tylenol)  650 mg GTUBE Q4H PRN


   PRN Reason: Pain/Fever


   Last Admin: 02/22/19 00:46 Dose:  650 mg


Atropine Sulfate (Atropine 1% Ophth Soln)  0 ml SL Q4H PRN


   PRN Reason: extra secretions


Bisacodyl (Dulcolax)  10 mg RECTAL BID PRN


   PRN Reason: Constipation


   Last Admin: 02/23/19 12:46 Dose:  10 mg


Bumetanide (Bumex)  0.5 mg IVPUSH BID [facility]


   Stop: 02/25/19 21:01


   Last Admin: 02/23/19 20:07 Dose:  0.5 mg


Cholecalciferol (Vitamin D3)  2,000 units GTUBE DAILY [facility]


   Last Admin: 02/23/19 09:24 Dose:  2,000 units


Enoxaparin Sodium (Lovenox)  40 mg SUBCUT DAILY [facility]


   Last Admin: 02/23/19 09:54 Dose:  40 mg


Famotidine (Pepcid)  20 mg PO BID [facility]


   Last Admin: 02/23/19 20:05 Dose:  20 mg


Fludrocortisone Acetate (Florinef)  0.1 mg PO BIDMEALS [facility]


   Last Admin: 02/24/19 06:04 Dose:  0.1 mg


Fluoxetine HCl (Prozac)  10 mg GTUBE DAILY [facility]


   Last Admin: 02/23/19 09:24 Dose:  10 mg


Hydralazine HCl (Apresoline)  20 mg IVPUSH Q4H PRN


   PRN Reason: Hypertension


Levofloxacin/Dextrose 500 mg/ (Premix)  100 mls @ 100 mls/hr IV Q24H [facility]


   Last Admin: 02/23/19 19:58 Dose:  100 mls/hr


Piperacillin Sod/Tazobactam (Sod 4.5 gm/ Sodium Chloride)  100 mls @ 25 mls/hr 

IV Q8H [facility]


   Last Admin: 02/24/19 05:04 Dose:  25 mls/hr


Vancomycin HCl 1 gm/ Sodium (Chloride)  250 mls @ 250 mls/hr IV Q12H [facility]


   Last Admin: 02/24/19 00:00 Dose:  250 mls/hr


Norepinephrine Bitartrate 4 mg (/ Dextrose/Water)  250 mls @ 7.5 mls/hr IV 

TITRATE [facility]; Protocol


   Last Titration: 02/22/19 15:33 Dose:  0 mcg/min, 0 mls/hr


Sodium Chloride (Normal Saline)  1,000 mls @ 15 mls/hr IV ASDIRECTED [facility]


   Last Admin: 02/23/19 22:31 Dose:  15 mls/hr


Lactulose (Cephulac)  10 gm GTUBE DAILY PRN


   PRN Reason: Other


Levetiracetam (Keppra)  1,000 mg GTUBE BID [facility]


   Last Admin: 02/23/19 20:07 Dose:  1,000 mg


Lorazepam (Ativan)  0.25 mg GTUBE DAILY [facility]


   Last Admin: 02/23/19 09:21 Dose:  0.25 mg


Lorazepam (Ativan)  2 mg IVPUSH Q4H PRN


   PRN Reason: Seizures


Magnesium Oxide (Magnesium Oxide)  400 mg GTUBE BID [facility]


   Last Admin: 02/23/19 20:05 Dose:  400 mg


Magnesium Sulfate (Pharmacy To Dose - Magnesium Replacement)  1 dose .XX 

ASDIRECTED [facility]


Metoprolol Tartrate (Lopressor)  5 mg IVPUSH Q4H PRN


   PRN Reason: Tachycardia


   Last Admin: 02/21/19 19:49 Dose:  5 mg


Midodrine (Midodrine)  5 mg PO TIDAC [facility]


   Last Admin: 02/24/19 06:04 Dose:  5 mg


Morphine Sulfate (Morphine 10 Mg/0.5 Ml Oral Syringe)  2 mg .XX Q2HR PRN


   PRN Reason: Pain


   Last Admin: 02/21/19 17:36 Dose:  2 mg


Multivitamins (Thera)  1 each .XX BEDTIME [facility]


   Last Admin: 02/23/19 20:06 Dose:  1 each


Nystatin (Nystatin Crm)  0 gm TOP BID [facility]


   Last Admin: 02/23/19 21:03 Dose:  1 applic


Ondansetron HCl (Zofran Odt)  8 mg GTUBE Q6H PRN


   PRN Reason: Nausea


Oxycodone/Acetaminophen (Percocet 325-5 Mg)  1 tab PO Q4H PRN


   PRN Reason: Pain (moderate 4-6)


   Last Admin: 02/21/19 20:48 Dose:  1 tab


Potassium Chloride (Pharmacy To Dose - Potassium Replacement)  1 dose .XX 

ASDIRECTED [facility]


Potassium Chloride (Potassium Chloride Solution)  20 meq PO BID [facility]


   Last Admin: 02/23/19 20:07 Dose:  20 meq


Saccharomyces Boulardii (Florastor)  250 mg PO TID [facility]


   Last Admin: 02/23/19 20:03 Dose:  250 mg


Senna/Docusate Sodium (Senna Plus)  2 tab GTUBE BID [facility]


   Last Admin: 02/23/19 20:05 Dose:  2 tab


Tizanidine HCl (Zanaflex)  2 mg GTUBE TID [facility]


   Last Admin: 02/23/19 20:05 Dose:  2 mg





Discontinued Medications





Bumetanide (Bumex)  0.5 mg IVPUSH ONETIME ONE


   Stop: 02/23/19 08:01


   Last Admin: 02/23/19 08:59 Dose:  0.5 mg


Enoxaparin Sodium (Lovenox)  40 mg SUBCUT BEDTIME [facility]


   Last Admin: 02/22/19 00:14 Dose:  Not Given


Hydrocortisone Sodium Succinate (Solu-Cortef)  100 mg IVPUSH Q6H [facility]


   Last Admin: 02/22/19 00:14 Dose:  Not Given


Hydrocortisone Sodium Succinate (Solu-Cortef)  100 mg IVPUSH Q6H [facility]


   Last Admin: 02/23/19 12:25 Dose:  100 mg


Piperacillin Sod/Tazobactam (Sod 4.5 gm/ Sodium Chloride)  100 mls @ 25 mls/hr 

IV ONETIME STA


   Stop: 02/21/19 10:34


   Last Admin: 02/21/19 07:18 Dose:  25 mls/hr


Sodium Chloride (Normal Saline)  1,000 mls @ 999 mls/hr IV ONETIME ONE


   Stop: 02/21/19 07:35


   Last Admin: 02/21/19 07:18 Dose:  999 mls/hr


Dextrose/Sodium Chloride (Dextrose 5%-Normal Saline)  1,000 mls @ 75 mls/hr IV 

ASDIRECTED [facility]


   Last Admin: 02/21/19 09:44 Dose:  75 mls/hr


Sodium Chloride (Normal Saline)  2,000 mls @ 999 mls/hr IV ONETIME ONE


   Stop: 02/21/19 18:23


   Last Admin: 02/21/19 17:16 Dose:  999 mls/hr


Sodium Chloride (Normal Saline) Confirm Administered Dose 1,000 mls @ as 

directed .ROUTE .STK-MED ONE


   Stop: 02/21/19 17:32


   Last Admin: 02/21/19 17:40 Dose:  Not Given


Sodium Chloride (Normal Saline)  1,000 mls @ 125 mls/hr IV ASDIRECTED [facility]


   Last Infusion: 02/23/19 07:00 Dose:  15 mls/hr


Sodium Chloride (Normal Saline) Confirm Administered Dose 250 mls @ as directed 

.ROUTE .STK-MED ONE


   Stop: 02/23/19 16:52


   Last Admin: 02/23/19 17:05 Dose:  200 mls/hr


Metoclopramide HCl (Reglan)  5 mg IVPUSH Q6H [facility]


   Last Admin: 02/23/19 12:25 Dose:  5 mg


Metoclopramide HCl (Reglan)  10 mg IVPUSH Q6H [facility]


   Last Admin: 02/24/19 05:05 Dose:  Not Given


Midodrine (Midodrine)  5 mg PO TIDAC [facility]


   Stop: 02/23/19 07:01


   Last Admin: 02/23/19 06:34 Dose:  5 mg


Non-Formulary Medication (Lactose-Reduced Food/Fiber [Jevity 1.2 Antwan Liquid])  

260 ml GTUBE 5XDAY [facility]


   Last Admin: 02/22/19 11:31 Dose:  Not Given


Non-Formulary Medication (Water [Water])  100 ml GTUBE TID [facility]


   Last Admin: 02/22/19 10:25 Dose:  Not Given


Potassium Chloride (Potassium Chloride Solution)  20 meq PO BID [facility]


   Last Admin: 02/21/19 20:52 Dose:  20 meq











- Exam


Quality Assessment: No: Supplemental Oxygen


General: Alert, No Acute Distress


HEENT: Pupils Equal, Pupils Reactive


Neck: Supple


Lungs: Normal Respiratory Effort, Decreased Breath Sounds


Cardiovascular: Regular Rate, Regular Rhythm


GI/Abdominal Exam: Normal Bowel Sounds, Soft, Non-Tender, No Organomegaly, No 

Distention, No Abnormal Bruit, Other (gastric tube)


 (Female) Exam: Other (indwelling mcpherson catheter)


Back Exam: Other (deferred)


Extremities: Normal Inspection, Non-Tender, Normal Capillary Refill, Pedal Edema

, Slow Capillary Refill, Mottled (on bilateral lower extremity), Other (right 

arm contracture)


Peripheral Pulses: 0: Dorsalis Pedis (L), Dorsalis Pedis (R)


Skin: Warm, Dry, Intact


Neurological: Other (deferred).  No: Normal Gait


Psy/Mental Status: Alert, Normal Affect


Physical Findings Comments:: 


She is mostly bedbound. She is able to speak but very minimal. She can answer 

yes or no questions. She confirms or denies by shaking or nodding her head.








- Problem List Review


Problem List Initiated/Reviewed/Updated: Yes





- My Orders


Last 24 Hours: 


My Active Orders





02/23/19 07:10


Consult to Dietary [Consult to Dietician] [CONS] Routine 





02/23/19 07:11


Bisacodyl [Dulcolax]   10 mg RECTAL BID PRN 





02/23/19 09:00


Potassium Chloride [Potassium Chloride Solution]   20 meq PO BID 





02/23/19 10:51


Sodium Chloride 0.9% [Normal Saline] 1,000 ml IV ASDIRECTED 





02/23/19 13:20


Transfuse PRBC [Transfuse Red Blood Cells] [COMM] Routine 





02/23/19 17:00


Midodrine   5 mg PO TIDAC 





02/23/19 18:00


Fludrocortisone [Florinef]   0.1 mg PO BIDMEALS 





02/23/19 20:02


Urinary Catheter Assessment [RC] ASDIRECTED 





02/23/19 20:15


Mcpherson Catheter Insertion [Insert Urinary Catheter] [OM.PC] Q24H 





02/23/19 21:00


Bumetanide [Bumex]   0.5 mg IVPUSH BID 





02/25/19 05:11


BMP [BASIC METABOLIC PANEL,BMP] [CHEM] AM 


CBC WITH AUTO DIFF [HEME] AM 


CRP [C-REACTIVE PROTEIN] [CHEM] AM 


MG [MAGNESIUM] [CHEM] AM 





02/26/19 05:11


BMP [BASIC METABOLIC PANEL,BMP] [CHEM] AM 


CBC WITH AUTO DIFF [HEME] AM 


CRP [C-REACTIVE PROTEIN] [CHEM] AM 


MG [MAGNESIUM] [CHEM] AM 





02/27/19 05:11


CBC WITH AUTO DIFF [HEME] AM 














- Plan


Plan:: 


Assessment/Plan:


Acute:


Bacteremia w/o Hypotension


   - 2/2 UTI due to neurogenic bladder from MS 


   - Carries a hx/o Multiple Urine and Blood Infections in the past  


   - Risk Factor: Advanced MS, AMS, Recurrent UTI and Aspiration Syndrome 2/2 

Dysphagia


   - Fever of 39 C; WBC of 14.22, HR of 119-128, BP of 92/34, 100/48, 101/58 

mmHg, RR 24-33


   - LA 6.2 --> 5.3 --> 5.6--> 3.1-->2.2; received a total of 3L of NS 

overnight plus 1L NS at 125 cc/hr but remained hypotensive overnight


   - Procalcitonin 5.44 (high risk progression to severe sepsis); WBC 28.6--> 

23.78; CRP 29.7-->31.4  


   - 1 set of bottle positive for Staph Coagulase Neg (possible contamination); 

repeat blood culture x 2 today 


   - Discontinue Solucortef 100 mg IV Q6H; She is now off Levophed drip


   - High pseudomonal risk due to recent multiple antibiotic use: Azithromycin 2 /12-16, Cipro 2/15-18, and Bactrim 2/18-25


   - Continue Triple antibiotic regimen: IV Levaquin, Zosyn and Vancomycin; 

hopefully we can de-escalate tomorrow


   - Midodrin 5 mg po TIDAC for Hypotension


   - Florinef 0.1 mg po BID for Orthostatic/Autonomic Dysfunction





UTI


   - 2/2 Urinary retention from neurogenic bladder 


   - UA is pos for E. Faecalis and Gram Pos Coccobacillus; pending 

susceptibility


   - IV Antibiotics as above for coverage





Atelectasis/Increased Density Right Lung Base (likely chronic)


   - Carries a hx/o Dysphagia


   - Cannot r/o Aspiration Syndrome


   - Continue IV Zosyn and Levaquin; She is on H2B for aspiration prophylaxis


   - She is unable to use IS





Hypoalbuminemia


   - Albumin 1.2


   - Reason why she has significant peripheral edema


   - Dietary consult; likely Monday





Hypokalemia


   - K 3.0


   - 2/2 diuretic use


   - Replete and monitor





End of Life Care


   - Again we spoke to mother and sister at bedside; they understand "comfort 

measures" but they want treatment of Maureen's infection





Resolved:


S/p Anemia


   - Acute on Chronic


   - Hgb of 8.5 and Hct 29; baseline is 11-12 (December 2018)--> 6.9--> 10.8 S/

p 2 units of PRBC transfusion


   - Stool occult study-pending; she has not had a bowel movement 


   - Iron Panel: shows Anemia of Chronic Disease


   - Asked earlier if she wants blood transfusion; she said no. But changed her 

mind when her mom was at bedside.





S/p Constipation/Ileus


   - Has not had a bowel since the 18th


   - She is on narcotics and plenty of anticholinergics


   - KUB r/o SBO


   - Hold tube feeding for now


   - Rectal suppository BID until bowel movement; consider lactulose or 

magnesium--> had 3 bowel movements over night


   - Prokinetic agent since she is bedbound and immobile; d/c prokinetic agent 

and resume tube feeding





Chronic:


Dysphagia


Urinary Retention


Back Pain


OA


Spasms


Osteoporosis


CVA/TIA


MS


Anemia of Chronic Disease


Seizure Disorder


Hx/o DVT/VTE


Alzheimer's Disease


DM2, BS controlled


Anxiety


Depression





Plan:


She looks clinically and hemodynamically stable


Routine AM Labs


De-escalate IV antibiotics once we know more details about her organisms; has hx

/o multiple organismal infection 


KVO maintenance fluid


Aspiration/Seizure/Fall Precaution


DVT/GI PPx: Lovenox SubQ/H2B


Additional orders as above


SW/CM for D/c planning


DNR/DNI/Comfort Measures


LOS anticipate > 96hrs due to longer requirement for treatment

## 2019-02-25 RX ADMIN — LEVOFLOXACIN SCH MLS/HR: 500 INJECTION, SOLUTION INTRAVENOUS at 20:17

## 2019-02-25 RX ADMIN — THERA TABS SCH EACH: TAB at 20:21

## 2019-02-25 RX ADMIN — LEVETIRACETAM SCH MG: 100 SOLUTION ORAL at 20:19

## 2019-02-25 RX ADMIN — Medication SCH MG: at 15:14

## 2019-02-25 RX ADMIN — BUMETANIDE SCH MG: 0.25 INJECTION INTRAMUSCULAR; INTRAVENOUS at 20:43

## 2019-02-25 RX ADMIN — Medication SCH MG: at 20:19

## 2019-02-25 RX ADMIN — NYSTATIN SCH APPLIC: 100000 CREAM TOPICAL at 20:28

## 2019-02-25 RX ADMIN — VITAMIN D, TAB 1000IU (100/BT) SCH UNITS: 25 TAB at 08:30

## 2019-02-25 RX ADMIN — LORAZEPAM PRN MG: 2 INJECTION INTRAMUSCULAR; INTRAVENOUS at 22:51

## 2019-02-25 RX ADMIN — POTASSIUM CHLORIDE SCH MEQ: 1.5 SOLUTION ORAL at 20:20

## 2019-02-25 RX ADMIN — LEVETIRACETAM SCH MG: 100 SOLUTION ORAL at 08:29

## 2019-02-25 RX ADMIN — NYSTATIN SCH APPLIC: 100000 CREAM TOPICAL at 08:31

## 2019-02-25 RX ADMIN — BUMETANIDE SCH MG: 0.25 INJECTION INTRAMUSCULAR; INTRAVENOUS at 08:31

## 2019-02-25 RX ADMIN — Medication SCH MG: at 08:31

## 2019-02-25 RX ADMIN — METOROPROLOL TARTRATE PRN MG: 5 INJECTION, SOLUTION INTRAVENOUS at 03:20

## 2019-02-25 RX ADMIN — OXYCODONE HYDROCHLORIDE AND ACETAMINOPHEN PRN TAB: 5; 325 TABLET ORAL at 05:42

## 2019-02-25 NOTE — PCM.PN
- General Info


Date of Service: 02/25/19


Admission Dx/Problem (Free Text): 


 Admission Diagnosis/Problem





Admission Diagnosis/Problem      Urosepsis








Subjective Update: 


Follow Up





Functional Status: Reports: Pain Controlled, Tolerating Diet, Urinating, New 

Symptoms (tachycardia)





- Review of Systems


General: Denies: Fever, Chills


HEENT: Reports: No Symptoms


Pulmonary: Denies: Shortness of Breath, Cough


Cardiovascular: Denies: Chest Pain, Palpitations


Gastrointestinal: Denies: Abdominal Pain, Nausea, Vomiting


Genitourinary: Reports: No Symptoms


Musculoskeletal: Reports: No Symptoms


Skin: Reports: Mottled, Diaphoresis.  Denies: Cyanosis


Neurological: Reports: Other (baseline immobility; bedbound)


Psychiatric: Denies: Depression, Anxiety, Agitation


Systems Review Comment:: 


She was restless and barely slept until shift change. She has been tachycardic 

all night w/ heart rate in the teens. She can't verbalize her needs or 

concerns. She does seem to be in pain or discomfort. She is afebrile and her 

CRP drops down to 24K. Her K and Mg are mildly low this morning.








- Patient Data


Vitals - Most Recent: 


 Last Vital Signs











Temp  36.6 C   02/25/19 08:00


 


Pulse  102 H  02/25/19 10:41


 


Resp  20   02/25/19 08:00


 


BP  156/89 H  02/25/19 08:00


 


Pulse Ox  97   02/25/19 08:00











Weight - Most Recent: 76.113 kg


I&O - Last 24 Hours: 


 Intake & Output











 02/24/19 02/25/19 02/25/19





 22:59 06:59 14:59


 


Intake Total 2040 1085 850


 


Output Total 645 950 210


 


Balance 1395 135 640











Lab Results Last 24 Hours: 


 Laboratory Results - last 24 hr











  02/25/19 02/25/19 02/25/19 Range/Units





  00:30 05:15 05:18 


 


WBC   27.74 H   (3.98-10.04)  K/mm3


 


RBC   4.51   (3.98-5.22)  M/mm3


 


Hgb   11.7   (11.2-15.7)  gm/L


 


Hct   37.3   (34.1-44.9)  %


 


MCV   82.7   (79.4-94.8)  fl


 


MCH   25.9   (25.6-32.2)  pg


 


MCHC   31.4 L   (32.2-35.5)  g/dl


 


RDW Std Deviation   52.1 H   (36.4-46.3)  fL


 


Plt Count   592 H   (182-369)  K/mm3


 


MPV   9.5   (9.4-12.3)  fl


 


Neut % (Auto)   87.1 H   (34.0-71.1)  %


 


Lymph % (Auto)   10.2 L   (19.3-51.7)  %


 


Mono % (Auto)   1.3 L   (4.7-12.5)  %


 


Eos % (Auto)   0.1 L   (0.7-5.8)  


 


Baso % (Auto)   0.2   (0.1-1.2)  %


 


Neut # (Auto)   24.16 H   (1.56-6.13)  K/mm3


 


Lymph # (Auto)   2.84   (1.18-3.74)  K/mm3


 


Mono # (Auto)   0.37 H   (0.24-0.36)  K/mm3


 


Eos # (Auto)   0.02 L   (0.04-0.36)  K/mm3


 


Baso # (Auto)   0.05   (0.01-0.08)  K/mm3


 


Manual Slide Review   Abnormal smear   


 


Sodium    143  (136-145)  mEq/L


 


Potassium    3.1 L  (3.5-5.1)  mEq/L


 


Chloride    107  ()  mEq/L


 


Carbon Dioxide    26  (21-32)  mEq/L


 


Anion Gap    13.1  (5-15)  


 


BUN    27 H  (7-18)  mg/dL


 


Creatinine    0.9  (0.55-1.02)  mg/dL


 


Est Cr Clr Drug Dosing    63.55  mL/min


 


Estimated GFR (MDRD)    > 60  (>60)  mL/min


 


BUN/Creatinine Ratio    30.0 H  (14-18)  


 


Glucose    176 H  ()  mg/dL


 


Calcium    10.7 H  (8.5-10.1)  mg/dL


 


Magnesium    1.7 L  (1.8-2.4)  mg/dl


 


C-Reactive Protein    24.6 H*  (<1.0)  mg/dL


 


Vancomycin Trough  35.5 H    (10.0-20.0)  











Abran Results Last 24 Hours: 


 Microbiology











 02/21/19 06:57 Urine Culture - Final





 Urine, Catheterized    Enterococcus Faecalis





    Diphtheroid, Not C Urealyticum


 


 02/21/19 07:15 Aerobic Blood Culture - Preliminary





 Blood - Venous - Lab Draw    NO GROWTH AFTER 4 DAYS





 Anaerobic Blood Culture - Final


 


 02/21/19 07:28 Aerobic Blood Culture - Preliminary





 Blood - Venous    NO GROWTH AFTER 4 DAYS





 Anaerobic Blood Culture - Preliminary





    Staphylococcus Coagulase Neg


 


 02/24/19 15:50 Anaerobic Blood Culture - Final





 Blood - Venous 


 


 02/23/19 22:00 Occult Blood - Preliminary





 Stool / Feces 











Med Orders - Current: 


 Current Medications





Acetaminophen (Tylenol)  650 mg GTUBE Q4H PRN


   PRN Reason: Pain/Fever


   Last Admin: 02/22/19 00:46 Dose:  650 mg


Atropine Sulfate (Atropine 1% Ophth Soln)  0 ml SL Q4H PRN


   PRN Reason: extra secretions


Bisacodyl (Dulcolax)  10 mg RECTAL BID PRN


   PRN Reason: Constipation


   Last Admin: 02/23/19 12:46 Dose:  10 mg


Bumetanide (Bumex)  0.5 mg IVPUSH BID [facility]


   Stop: 02/25/19 21:01


   Last Admin: 02/25/19 08:31 Dose:  0.5 mg


Cholecalciferol (Vitamin D3)  2,000 units GTUBE DAILY [facility]


   Last Admin: 02/25/19 08:30 Dose:  2,000 units


Enoxaparin Sodium (Lovenox)  40 mg SUBCUT DAILY [facility]


   Last Admin: 02/25/19 08:29 Dose:  40 mg


Famotidine (Pepcid)  20 mg PO BID [facility]


   Last Admin: 02/25/19 08:29 Dose:  20 mg


Fluoxetine HCl (Prozac)  10 mg GTUBE DAILY [facility]


   Last Admin: 02/25/19 08:30 Dose:  10 mg


Hydralazine HCl (Apresoline)  20 mg IVPUSH Q4H PRN


   PRN Reason: Hypertension


Levofloxacin/Dextrose 500 mg/ (Premix)  100 mls @ 100 mls/hr IV Q24H [facility]


   Last Admin: 02/24/19 20:40 Dose:  100 mls/hr


Norepinephrine Bitartrate 4 mg (/ Dextrose/Water)  250 mls @ 7.5 mls/hr IV 

TITRATE [facility]; Protocol


   Last Titration: 02/22/19 15:33 Dose:  0 mcg/min, 0 mls/hr


Sodium Chloride (Normal Saline)  1,000 mls @ 15 mls/hr IV ASDIRECTED [facility]


   Last Admin: 02/25/19 06:00 Dose:  15 mls/hr


Lactulose (Cephulac)  10 gm GTUBE DAILY PRN


   PRN Reason: Other


Levetiracetam (Keppra)  1,000 mg GTUBE BID [facility]


   Last Admin: 02/25/19 08:29 Dose:  1,000 mg


Lorazepam (Ativan)  0.25 mg GTUBE DAILY [facility]


   Last Admin: 02/25/19 08:30 Dose:  0.25 mg


Lorazepam (Ativan)  2 mg IVPUSH Q4H PRN


   PRN Reason: Seizures


Magnesium Oxide (Magnesium Oxide)  400 mg GTUBE BID [facility]


   Last Admin: 02/25/19 08:30 Dose:  400 mg


Magnesium Sulfate (Pharmacy To Dose - Magnesium Replacement)  1 dose .XX 

ASDIRECTED [facility]


Metoprolol Tartrate (Lopressor)  5 mg IVPUSH Q4H PRN


   PRN Reason: Tachycardia


   Last Admin: 02/25/19 03:20 Dose:  5 mg


Morphine Sulfate (Morphine 10 Mg/0.5 Ml Oral Syringe)  2 mg .XX Q2HR PRN


   PRN Reason: Pain


   Last Admin: 02/25/19 03:22 Dose:  2 mg


Multivitamins (Thera)  1 each .XX BEDTIME [facility]


   Last Admin: 02/24/19 20:43 Dose:  1 each


Nystatin (Nystatin Crm)  0 gm TOP BID [facility]


   Last Admin: 02/25/19 08:31 Dose:  1 applic


Ondansetron HCl (Zofran Odt)  8 mg GTUBE Q6H PRN


   PRN Reason: Nausea


Oxycodone/Acetaminophen (Percocet 325-5 Mg)  1 tab PO Q4H PRN


   PRN Reason: Pain (moderate 4-6)


   Last Admin: 02/25/19 05:42 Dose:  1 tab


Potassium Chloride (Pharmacy To Dose - Potassium Replacement)  1 dose .XX 

ASDIRECTED [facility]


Potassium Chloride (Potassium Chloride Solution)  40 meq PO BID [facility]


Saccharomyces Boulardii (Florastor)  250 mg PO TID [facility]


   Last Admin: 02/25/19 08:31 Dose:  250 mg


Senna/Docusate Sodium (Senna Plus)  2 tab GTUBE BID [facility]


   Last Admin: 02/25/19 08:30 Dose:  2 tab


Tizanidine HCl (Zanaflex)  2 mg GTUBE TID [facility]


   Last Admin: 02/25/19 08:30 Dose:  2 mg





Discontinued Medications





Bumetanide (Bumex)  0.5 mg IVPUSH ONETIME ONE


   Stop: 02/23/19 08:01


   Last Admin: 02/23/19 08:59 Dose:  0.5 mg


Enoxaparin Sodium (Lovenox)  40 mg SUBCUT BEDTIME [facility]


   Last Admin: 02/22/19 00:14 Dose:  Not Given


Fludrocortisone Acetate (Florinef)  0.1 mg PO BIDMEALS [facility]


   Last Admin: 02/25/19 06:00 Dose:  0.1 mg


Hydrocortisone Sodium Succinate (Solu-Cortef)  100 mg IVPUSH Q6H [facility]


   Last Admin: 02/22/19 00:14 Dose:  Not Given


Hydrocortisone Sodium Succinate (Solu-Cortef)  100 mg IVPUSH Q6H [facility]


   Last Admin: 02/23/19 12:25 Dose:  100 mg


Piperacillin Sod/Tazobactam (Sod 4.5 gm/ Sodium Chloride)  100 mls @ 25 mls/hr 

IV ONETIME STA


   Stop: 02/21/19 10:34


   Last Admin: 02/21/19 07:18 Dose:  25 mls/hr


Sodium Chloride (Normal Saline)  1,000 mls @ 999 mls/hr IV ONETIME ONE


   Stop: 02/21/19 07:35


   Last Admin: 02/21/19 07:18 Dose:  999 mls/hr


Dextrose/Sodium Chloride (Dextrose 5%-Normal Saline)  1,000 mls @ 75 mls/hr IV 

ASDIRECTED [facility]


   Last Admin: 02/21/19 09:44 Dose:  75 mls/hr


Sodium Chloride (Normal Saline)  2,000 mls @ 999 mls/hr IV ONETIME ONE


   Stop: 02/21/19 18:23


   Last Admin: 02/21/19 17:16 Dose:  999 mls/hr


Sodium Chloride (Normal Saline) Confirm Administered Dose 1,000 mls @ as 

directed .ROUTE .STK-MED ONE


   Stop: 02/21/19 17:32


   Last Admin: 02/21/19 17:40 Dose:  Not Given


Sodium Chloride (Normal Saline)  1,000 mls @ 125 mls/hr IV ASDIRECTED [facility]


   Last Infusion: 02/23/19 07:00 Dose:  15 mls/hr


Piperacillin Sod/Tazobactam (Sod 4.5 gm/ Sodium Chloride)  100 mls @ 25 mls/hr 

IV Q8H [facility]


   Last Admin: 02/25/19 04:03 Dose:  25 mls/hr


Vancomycin HCl 1 gm/ Sodium (Chloride)  250 mls @ 250 mls/hr IV Q12H [facility]


   Last Admin: 02/25/19 01:13 Dose:  Not Given


Sodium Chloride (Normal Saline) Confirm Administered Dose 250 mls @ as directed 

.ROUTE .STK-MED ONE


   Stop: 02/23/19 16:52


   Last Admin: 02/23/19 17:05 Dose:  200 mls/hr


Potassium Chloride 10 meq/ (Premix)  100 mls @ 100 mls/hr IV Q1H [facility]


   Stop: 02/24/19 14:59


   Last Admin: 02/24/19 13:49 Dose:  100 mls/hr


Lorazepam (Ativan)  1 mg IVPUSH ONETIME ONE


   Stop: 02/25/19 09:27


   Last Admin: 02/25/19 10:16 Dose:  1 mg


Magnesium Oxide (Magnesium Oxide)  800 mg GTUBE ONETIME ONE


   Stop: 02/25/19 12:01


   Last Admin: 02/25/19 11:13 Dose:  800 mg


Metoclopramide HCl (Reglan)  5 mg IVPUSH Q6H [facility]


   Last Admin: 02/23/19 12:25 Dose:  5 mg


Metoclopramide HCl (Reglan)  10 mg IVPUSH Q6H [facility]


   Last Admin: 02/24/19 05:05 Dose:  Not Given


Midodrine (Midodrine)  5 mg PO TIDAC [facility]


   Stop: 02/23/19 07:01


   Last Admin: 02/23/19 06:34 Dose:  5 mg


Midodrine (Midodrine)  5 mg PO TIDAC [facility]


   Last Admin: 02/24/19 17:09 Dose:  5 mg


Midodrine (Midodrine)  5 mg PO TIDAC [facility]


Non-Formulary Medication (Lactose-Reduced Food/Fiber [Jevity 1.2 Antwan Liquid])  

260 ml GTUBE 5XDAY [facility]


   Last Admin: 02/22/19 11:31 Dose:  Not Given


Non-Formulary Medication (Water [Water])  100 ml GTUBE TID [facility]


   Last Admin: 02/22/19 10:25 Dose:  Not Given


Potassium Chloride (Potassium Chloride Solution)  20 meq PO BID [facility]


   Last Admin: 02/21/19 20:52 Dose:  20 meq


Potassium Chloride (Potassium Chloride Solution)  20 meq PO BID [facility]


   Last Admin: 02/24/19 20:43 Dose:  20 meq


Potassium Chloride (Potassium Chloride Solution)  40 meq PO BID [facility]


Potassium Chloride (Klor-Con M20)  60 meq PO ONETIME ONE


   Stop: 02/25/19 09:01


   Last Admin: 02/25/19 08:57 Dose:  60 meq


Vancomycin HCl (Pharmacy To Dose - Vancomycin)  0 dose .XX ASDIRECTED PRN


   PRN Reason: RX TO DOSE VANCOMYCIN











- Exam


General: Alert, Cooperative, Mild Distress


HEENT: Pupils Equal, Pupils Reactive


Neck: Supple


Lungs: Normal Respiratory Effort, Decreased Breath Sounds


Cardiovascular: Regular Rhythm, Tachycardia


GI/Abdominal Exam: Soft, Non-Tender, No Organomegaly, No Distention, No 

Abnormal Bruit, Other (gastric tube).  No: Normal Bowel Sounds


 (Female) Exam: Other (indwelling mcpherson catheter)


Back Exam: Other (deferred)


Extremities: Non-Tender, Pedal Edema, Slow Capillary Refill, Limited Range of 

Motion, Mottled


Peripheral Pulses: 1+: Dorsalis Pedis (L), Dorsalis Pedis (R)


Skin: Warm, Dry, Intact


Neurological: Other (deferred)


Psy/Mental Status: Alert, Anxious





- Problem List Review


Problem List Initiated/Reviewed/Updated: Yes





- My Orders


Last 24 Hours: 


My Active Orders





02/24/19 15:13


Blood Culture x2 Reflex Set [OM.PC] Stat 





02/24/19 15:50


CULTURE BLOOD [BC] Stat 





02/25/19 10:32


Communication Order [RC] .PRN 





02/25/19 10:37


LACTIC ACID [CHEM] Routine 





02/25/19 21:00


Potassium Chloride [Potassium Chloride Solution]   40 meq PO BID 





02/26/19 05:11


BMP [BASIC METABOLIC PANEL,BMP] [CHEM] AM 


CBC WITH AUTO DIFF [HEME] AM 


CRP [C-REACTIVE PROTEIN] [CHEM] AM 


MG [MAGNESIUM] [CHEM] AM 





02/27/19 05:11


CBC WITH AUTO DIFF [HEME] AM 














- Plan


Plan:: 


Assessment/Plan:


Acute:


Bacteremia w/o Hypotension


   - 2/2 UTI due to neurogenic bladder from MS 


   - Carries a hx/o Multiple Urine and Blood Infections in the past  


   - Risk Factor: Advanced MS, AMS, Recurrent UTI and Aspiration Syndrome 2/2 

Dysphagia


   - Fever of 39 C; WBC of 14.22, HR of 119-128, BP of 92/34, 100/48, 101/58 

mmHg, RR 24-33


   - LA 6.2 --> 5.3 --> 5.6--> 3.1-->2.2; received a total of 3L of NS 

overnight plus 1L NS at 125 cc/hr but remained hypotensive overnight


   - Procalcitonin 5.44 (high risk progression to severe sepsis); WBC 28.6--> 

23.78; CRP 29.7-->31.4  


   - 1 set of bottle positive for Staph Coagulase Neg (possible contamination); 

repeat blood culture -pending  


   - Discontinue Solucortef 100 mg IV Q6H; She is now off Levophed drip


   - High pseudomonal risk due to recent multiple antibiotic use: Azithromycin 2 /12-16, Cipro 2/15-18, and Bactrim 2/18-25


   - Continue IV Levaquin and discontinue IV Zosyn and Vancomycin; hopefully we 

can de-escalate tomorrow


   - Discontinue Midodrine 5 mg po TIDAC and Florinef 0.1 mg po BID for 

Orthostatic/Autonomic Dysfunction





UTI


   - 2/2 Urinary retention from neurogenic bladder 


   - UA is pos for E. Faecalis-sensitive to Quinolone and Gram Pos 

Coccobacillus (normal skinny per microbiology) 


   - IV Antibiotics as above for coverage





Hypoalbuminemia


   - Albumin 1.2


   - Reason why she has significant peripheral edema


   - Dietary consult; likely Monday





Hypokalemia


   - K 3.0-->3.1


   - 2/2 diuretic use


   - Replete and monitor





Hypomagnesemia


   - MG 1.7


   - Replete and monitor





Tachycardia


   - HR in the teens


   - This could be her way of telling us she is in pain or in some kind of 

discomfort


   - PRN Ativan and Pain Medication


   - Metoprolol 25 mg po BID; first dose now


   


End of Life Care


   - Again we spoke to mother and sister at bedside; they understand "comfort 

measures" but they want treatment of Maureen's infection





Resolved:


S/p Anemia


   - Acute on Chronic


   - Hgb of 8.5 and Hct 29; baseline is 11-12 (December 2018)--> 6.9--> 10.8 S/

p 2 units of PRBC transfusion


   - Stool occult study-pending; she has not had a bowel movement 


   - Iron Panel: shows Anemia of Chronic Disease


   - Asked earlier if she wants blood transfusion; she said no. But changed her 

mind when her mom was at bedside.





S/p Constipation/Ileus


   - Has not had a bowel since the 18th


   - She is on narcotics and plenty of anticholinergics


   - KUB r/o SBO


   - Hold tube feeding for now


   - Rectal suppository BID until bowel movement; consider lactulose or 

magnesium--> had 3 bowel movements over night


   - Prokinetic agent since she is bedbound and immobile; d/c prokinetic agent 

and resume tube feeding





S/p Atelectasis/Increased Density Right Lung Base (likely chronic)


   - Carries a hx/o Dysphagia


   - Cannot r/o Aspiration Syndrome


   - Continue IV Zosyn and Levaquin; She is on H2B for aspiration prophylaxis


   - She is unable to use IS





Chronic:


Dysphagia


Urinary Retention


Back Pain


OA


Spasms


Osteoporosis


CVA/TIA


MS


Anemia of Chronic Disease


Seizure Disorder


Hx/o DVT/VTE


Alzheimer's Disease


DM2, BS controlled


Anxiety


Depression





Plan:


She looks anxious and mildly distress this AM


Routine AM Labs


KVO maintenance fluid


Aspiration/Seizure/Fall Precaution


DVT/GI PPx: Lovenox SubQ/H2B


Additional orders as above


SW/CM for D/c planning


DNR/DNI/Comfort Measures


LOS > 96hrs due to longer requirement for treatment and pending repeat blood 

culture

## 2019-02-26 RX ADMIN — THERA TABS SCH EACH: TAB at 21:20

## 2019-02-26 RX ADMIN — Medication SCH MG: at 15:24

## 2019-02-26 RX ADMIN — LEVETIRACETAM SCH MG: 100 SOLUTION ORAL at 09:22

## 2019-02-26 RX ADMIN — MEROPENEM AND SODIUM CHLORIDE SCH MLS/HR: 500 INJECTION, SOLUTION INTRAVENOUS at 21:23

## 2019-02-26 RX ADMIN — Medication SCH MG: at 09:22

## 2019-02-26 RX ADMIN — OXYCODONE HYDROCHLORIDE AND ACETAMINOPHEN PRN TAB: 5; 325 TABLET ORAL at 09:20

## 2019-02-26 RX ADMIN — POTASSIUM CHLORIDE SCH MEQ: 1.5 SOLUTION ORAL at 09:22

## 2019-02-26 RX ADMIN — LORAZEPAM PRN MG: 2 INJECTION INTRAMUSCULAR; INTRAVENOUS at 04:30

## 2019-02-26 RX ADMIN — LEVETIRACETAM SCH MG: 100 SOLUTION ORAL at 21:20

## 2019-02-26 RX ADMIN — DILTIAZEM HYDROCHLORIDE PRN MG: 5 INJECTION INTRAVENOUS at 05:40

## 2019-02-26 RX ADMIN — MEROPENEM AND SODIUM CHLORIDE SCH MLS/HR: 500 INJECTION, SOLUTION INTRAVENOUS at 09:18

## 2019-02-26 RX ADMIN — VITAMIN D, TAB 1000IU (100/BT) SCH UNITS: 25 TAB at 09:20

## 2019-02-26 RX ADMIN — NYSTATIN SCH APPLIC: 100000 CREAM TOPICAL at 21:21

## 2019-02-26 RX ADMIN — POTASSIUM CHLORIDE SCH: 1.5 SOLUTION ORAL at 21:59

## 2019-02-26 RX ADMIN — NYSTATIN SCH APPLIC: 100000 CREAM TOPICAL at 09:33

## 2019-02-26 RX ADMIN — DILTIAZEM HYDROCHLORIDE PRN MG: 5 INJECTION INTRAVENOUS at 00:41

## 2019-02-26 RX ADMIN — MEROPENEM AND SODIUM CHLORIDE SCH MLS/HR: 500 INJECTION, SOLUTION INTRAVENOUS at 13:24

## 2019-02-26 RX ADMIN — Medication SCH MG: at 21:20

## 2019-02-26 NOTE — PCM.PN
- General Info


Date of Service: 02/26/19


Admission Dx/Problem (Free Text): 


 Admission Diagnosis/Problem





Admission Diagnosis/Problem      Urosepsis








Subjective Update: 


Follow Up





Functional Status: Reports: Urinating.  Denies: New Symptoms





- Review of Systems


General: Denies: Fever, Chills


HEENT: Reports: No Symptoms


Pulmonary: Denies: Shortness of Breath, Cough, Sputum


Cardiovascular: Denies: Chest Pain, Dyspnea on Exertion, Lightheadedness


Gastrointestinal: Denies: Abdominal Pain, Nausea, Vomiting


Genitourinary: Reports: Retention, Other


Musculoskeletal: Reports: No Symptoms


Skin: Reports: Mottled.  Denies: Cyanosis


Neurological: Reports: Difficulty Walking, Weakness, Gait Disturbance.  Denies: 

Confusion


Psychiatric: Denies: Depression, Anxiety, Agitation


Systems Review Comment:: 


She was tachycardic last night but received IV PRN Cardizem. No other 

significant overnight or acute issues. However her urine output seems to be 

slowing down. She is a little responsive this morning compared to yesterday. 

She is afebrile but her WBC is trending up. Her CRP is about the same as 

yesterday.  








- Patient Data


Vitals - Most Recent: 


 Last Vital Signs











Temp  36.6 C   02/26/19 04:00


 


Pulse  110 H  02/26/19 04:00


 


Resp  22 H  02/26/19 04:00


 


BP  139/73   02/26/19 04:00


 


Pulse Ox  92 L  02/26/19 04:00











Weight - Most Recent: 75.251 kg


I&O - Last 24 Hours: 


 Intake & Output











 02/25/19 02/26/19 02/26/19





 22:59 06:59 14:59


 


Intake Total 1310 484 


 


Output Total 400 300 30


 


Balance 910 184 -30











Lab Results Last 24 Hours: 


 Laboratory Results - last 24 hr











  02/25/19 02/26/19 02/26/19 Range/Units





  13:58 06:50 06:50 


 


WBC   29.30 H   (3.98-10.04)  K/mm3


 


RBC   4.50   (3.98-5.22)  M/mm3


 


Hgb   11.6   (11.2-15.7)  gm/L


 


Hct   37.5   (34.1-44.9)  %


 


MCV   83.3   (79.4-94.8)  fl


 


MCH   25.8   (25.6-32.2)  pg


 


MCHC   30.9 L   (32.2-35.5)  g/dl


 


RDW Std Deviation   54.3 H   (36.4-46.3)  fL


 


Plt Count   499 H   (182-369)  K/mm3


 


MPV   9.3 L   (9.4-12.3)  fl


 


Neut % (Auto)   81.3 H   (34.0-71.1)  %


 


Lymph % (Auto)   10.6 L   (19.3-51.7)  %


 


Mono % (Auto)   3.3 L   (4.7-12.5)  %


 


Eos % (Auto)   0.2 L   (0.7-5.8)  


 


Baso % (Auto)   0.2   (0.1-1.2)  %


 


Neut # (Auto)   23.80 H   (1.56-6.13)  K/mm3


 


Lymph # (Auto)   3.12   (1.18-3.74)  K/mm3


 


Mono # (Auto)   0.97 H   (0.24-0.36)  K/mm3


 


Eos # (Auto)   0.07   (0.04-0.36)  K/mm3


 


Baso # (Auto)   0.06   (0.01-0.08)  K/mm3


 


Manual Slide Review   Abnormal smear   


 


Sodium    142  (136-145)  mEq/L


 


Potassium    3.8  (3.5-5.1)  mEq/L


 


Chloride    108 H  ()  mEq/L


 


Carbon Dioxide    24  (21-32)  mEq/L


 


Anion Gap    13.8  (5-15)  


 


BUN    27 H  (7-18)  mg/dL


 


Creatinine    0.7  (0.55-1.02)  mg/dL


 


Est Cr Clr Drug Dosing    81.71  mL/min


 


Estimated GFR (MDRD)    > 60  (>60)  mL/min


 


BUN/Creatinine Ratio    38.6 H  (14-18)  


 


Glucose    125 H  ()  mg/dL


 


Lactic Acid  3.0 H    (0.4-2.0)  mmol/L


 


Calcium    10.6 H  (8.5-10.1)  mg/dL


 


Magnesium    1.7 L  (1.8-2.4)  mg/dl


 


C-Reactive Protein    24.7 H*  (<1.0)  mg/dL


 


Free T4     (0.76-1.46)  ng/dL


 


TSH 3rd Generation     (0.358-3.74)  uIU/mL














  02/26/19 02/26/19 Range/Units





  06:50 06:50 


 


WBC    (3.98-10.04)  K/mm3


 


RBC    (3.98-5.22)  M/mm3


 


Hgb    (11.2-15.7)  gm/L


 


Hct    (34.1-44.9)  %


 


MCV    (79.4-94.8)  fl


 


MCH    (25.6-32.2)  pg


 


MCHC    (32.2-35.5)  g/dl


 


RDW Std Deviation    (36.4-46.3)  fL


 


Plt Count    (182-369)  K/mm3


 


MPV    (9.4-12.3)  fl


 


Neut % (Auto)    (34.0-71.1)  %


 


Lymph % (Auto)    (19.3-51.7)  %


 


Mono % (Auto)    (4.7-12.5)  %


 


Eos % (Auto)    (0.7-5.8)  


 


Baso % (Auto)    (0.1-1.2)  %


 


Neut # (Auto)    (1.56-6.13)  K/mm3


 


Lymph # (Auto)    (1.18-3.74)  K/mm3


 


Mono # (Auto)    (0.24-0.36)  K/mm3


 


Eos # (Auto)    (0.04-0.36)  K/mm3


 


Baso # (Auto)    (0.01-0.08)  K/mm3


 


Manual Slide Review    


 


Sodium    (136-145)  mEq/L


 


Potassium    (3.5-5.1)  mEq/L


 


Chloride    ()  mEq/L


 


Carbon Dioxide    (21-32)  mEq/L


 


Anion Gap    (5-15)  


 


BUN    (7-18)  mg/dL


 


Creatinine    (0.55-1.02)  mg/dL


 


Est Cr Clr Drug Dosing    mL/min


 


Estimated GFR (MDRD)    (>60)  mL/min


 


BUN/Creatinine Ratio    (14-18)  


 


Glucose    ()  mg/dL


 


Lactic Acid  2.5 H   (0.4-2.0)  mmol/L


 


Calcium    (8.5-10.1)  mg/dL


 


Magnesium    (1.8-2.4)  mg/dl


 


C-Reactive Protein    (<1.0)  mg/dL


 


Free T4   1.17  (0.76-1.46)  ng/dL


 


TSH 3rd Generation   4.714 H  (0.358-3.74)  uIU/mL











Abran Results Last 24 Hours: 


 Microbiology











 02/21/19 07:15 Aerobic Blood Culture - Preliminary





 Blood - Venous - Lab Draw    NO GROWTH AFTER 5 DAYS





 Anaerobic Blood Culture - Final


 


 02/21/19 07:28 Aerobic Blood Culture - Preliminary





 Blood - Venous    NO GROWTH AFTER 5 DAYS





 Anaerobic Blood Culture - Final





    Staphylococcus Coagulase Neg


 


 02/23/19 22:00 Occult Blood - Final





 Stool / Feces 


 


 02/24/19 06:03 Aerobic Blood Culture - Preliminary





 Blood - Venous - Lab Draw    NO GROWTH AFTER 1 DAY





 Anaerobic Blood Culture - Preliminary





    NO GROWTH AFTER 1 DAY


 


 02/24/19 15:50 Aerobic Blood Culture - Preliminary





 Blood - Venous    NO GROWTH AFTER 1 DAY





 Anaerobic Blood Culture - Final


 


 02/21/19 06:57 Urine Culture - Final





 Urine, Catheterized    Enterococcus Faecalis





    Diphtheroid, Not C Urealyticum











Med Orders - Current: 


 Current Medications





Acetaminophen (Tylenol)  650 mg GTUBE Q4H PRN


   PRN Reason: Pain/Fever


   Last Admin: 02/22/19 00:46 Dose:  650 mg


Atropine Sulfate (Atropine 1% Ophth Soln)  0 ml SL Q4H PRN


   PRN Reason: extra secretions


Bisacodyl (Dulcolax)  10 mg RECTAL BID PRN


   PRN Reason: Constipation


   Last Admin: 02/23/19 12:46 Dose:  10 mg


Cholecalciferol (Vitamin D3)  2,000 units GTUBE DAILY [facility]


   Last Admin: 02/26/19 09:20 Dose:  2,000 units


Diltiazem HCl (Cardizem)  10 mg IVPUSH Q4HR PRN


   PRN Reason: Tachycardia


   Last Admin: 02/26/19 05:40 Dose:  10 mg


Diltiazem HCl (Cardizem Cd)  180 mg PO DAILY [facility]


   Last Admin: 02/26/19 09:22 Dose:  180 mg


Enoxaparin Sodium (Lovenox)  40 mg SUBCUT DAILY [facility]


   Last Admin: 02/26/19 09:23 Dose:  40 mg


Famotidine (Pepcid)  20 mg PO BID [facility]


   Last Admin: 02/26/19 09:20 Dose:  20 mg


Fluoxetine HCl (Prozac)  10 mg GTUBE DAILY [facility]


   Last Admin: 02/26/19 09:22 Dose:  10 mg


Hydralazine HCl (Apresoline)  20 mg IVPUSH Q4H PRN


   PRN Reason: Hypertension


Norepinephrine Bitartrate 4 mg (/ Dextrose/Water)  250 mls @ 7.5 mls/hr IV 

TITRATE [facility]; Protocol


   Last Titration: 02/22/19 15:33 Dose:  0 mcg/min, 0 mls/hr


Magnesium Sulfate 2 gm/ Premix  50 mls @ 25 mls/hr IV ONETIME ONE


   Stop: 02/26/19 09:59


Sodium Chloride (Normal Saline)  1,000 mls @ 75 mls/hr IV ASDIRECTED [facility]


Meropenem/Sodium Chloride 500 (mg/ Premix)  50 mls @ 100 mls/hr IV Q6H [facility]


   Last Admin: 02/26/19 09:18 Dose:  100 mls/hr


Lactulose (Cephulac)  10 gm GTUBE DAILY PRN


   PRN Reason: Other


Levetiracetam (Keppra)  1,000 mg GTUBE BID [facility]


   Last Admin: 02/26/19 09:22 Dose:  1,000 mg


Lorazepam (Ativan)  0.25 mg GTUBE DAILY [facility]


   Last Admin: 02/26/19 09:22 Dose:  0.25 mg


Lorazepam (Ativan)  2 mg IVPUSH Q4H PRN


   PRN Reason: Seizures


Lorazepam (Ativan)  0.5 mg IVPUSH Q4H PRN; Protocol


   PRN Reason: Anxiety


   Last Admin: 02/26/19 04:30 Dose:  0.5 mg


Magnesium Oxide (Magnesium Oxide)  400 mg GTUBE BID [facility]


   Last Admin: 02/26/19 09:21 Dose:  400 mg


Magnesium Sulfate (Pharmacy To Dose - Magnesium Replacement)  1 dose .XX 

ASDIRECTED [facility]


Metoclopramide HCl (Reglan)  10 mg IVPUSH Q6H [facility]


   Last Admin: 02/26/19 04:30 Dose:  10 mg


Morphine Sulfate (Morphine 10 Mg/0.5 Ml Oral Syringe)  2 mg .XX Q2HR PRN


   PRN Reason: Pain


   Last Admin: 02/26/19 06:20 Dose:  2 mg


Multivitamins (Thera)  1 each .XX BEDTIME [facility]


   Last Admin: 02/25/19 20:21 Dose:  1 each


Nystatin (Nystatin Crm)  0 gm TOP BID [facility]


   Last Admin: 02/26/19 09:33 Dose:  1 applic


Ondansetron HCl (Zofran Odt)  8 mg GTUBE Q6H PRN


   PRN Reason: Nausea


Oxycodone/Acetaminophen (Percocet 325-5 Mg)  1 tab PO Q4H PRN


   PRN Reason: Pain (moderate 4-6)


   Last Admin: 02/26/19 09:20 Dose:  1 tab


Potassium Chloride (Pharmacy To Dose - Potassium Replacement)  1 dose .XX 

ASDIRECTED [facility]


Potassium Chloride (Potassium Chloride Solution)  40 meq PO BID [facility]


   Last Admin: 02/26/19 09:22 Dose:  40 meq


Saccharomyces Boulardii (Florastor)  250 mg PO TID [facility]


   Last Admin: 02/26/19 09:22 Dose:  250 mg


Senna/Docusate Sodium (Senna Plus)  2 tab GTUBE BID [facility]


   Last Admin: 02/26/19 09:21 Dose:  2 tab


Tizanidine HCl (Zanaflex)  2 mg GTUBE TID [facility]


   Last Admin: 02/26/19 09:23 Dose:  2 mg





Discontinued Medications





Bumetanide (Bumex)  0.5 mg IVPUSH ONETIME ONE


   Stop: 02/23/19 08:01


   Last Admin: 02/23/19 08:59 Dose:  0.5 mg


Bumetanide (Bumex)  0.5 mg IVPUSH BID [facility]


   Stop: 02/25/19 21:01


   Last Admin: 02/25/19 20:43 Dose:  0.5 mg


Diltiazem HCl (Cardizem)  10 mg IVPUSH ONETIME ONE


   Stop: 02/25/19 15:30


   Last Admin: 02/25/19 15:47 Dose:  10 mg


Enoxaparin Sodium (Lovenox)  40 mg SUBCUT BEDTIME [facility]


   Last Admin: 02/22/19 00:14 Dose:  Not Given


Fludrocortisone Acetate (Florinef)  0.1 mg PO BIDMEALS [facility]


   Last Admin: 02/25/19 06:00 Dose:  0.1 mg


Hydrocortisone Sodium Succinate (Solu-Cortef)  100 mg IVPUSH Q6H [facility]


   Last Admin: 02/22/19 00:14 Dose:  Not Given


Hydrocortisone Sodium Succinate (Solu-Cortef)  100 mg IVPUSH Q6H [facility]


   Last Admin: 02/23/19 12:25 Dose:  100 mg


Piperacillin Sod/Tazobactam (Sod 4.5 gm/ Sodium Chloride)  100 mls @ 25 mls/hr 

IV ONETIME STA


   Stop: 02/21/19 10:34


   Last Admin: 02/21/19 07:18 Dose:  25 mls/hr


Sodium Chloride (Normal Saline)  1,000 mls @ 999 mls/hr IV ONETIME ONE


   Stop: 02/21/19 07:35


   Last Admin: 02/21/19 07:18 Dose:  999 mls/hr


Dextrose/Sodium Chloride (Dextrose 5%-Normal Saline)  1,000 mls @ 75 mls/hr IV 

ASDIRECTED [facility]


   Last Admin: 02/21/19 09:44 Dose:  75 mls/hr


Sodium Chloride (Normal Saline)  2,000 mls @ 999 mls/hr IV ONETIME ONE


   Stop: 02/21/19 18:23


   Last Admin: 02/21/19 17:16 Dose:  999 mls/hr


Sodium Chloride (Normal Saline) Confirm Administered Dose 1,000 mls @ as 

directed .ROUTE .STK-MED ONE


   Stop: 02/21/19 17:32


   Last Admin: 02/21/19 17:40 Dose:  Not Given


Sodium Chloride (Normal Saline)  1,000 mls @ 125 mls/hr IV ASDIRECTED [facility]


   Last Infusion: 02/23/19 07:00 Dose:  15 mls/hr


Levofloxacin/Dextrose 500 mg/ (Premix)  100 mls @ 100 mls/hr IV Q24H [facility]


   Last Admin: 02/25/19 20:17 Dose:  100 mls/hr


Piperacillin Sod/Tazobactam (Sod 4.5 gm/ Sodium Chloride)  100 mls @ 25 mls/hr 

IV Q8H [facility]


   Last Admin: 02/25/19 04:03 Dose:  25 mls/hr


Vancomycin HCl 1 gm/ Sodium (Chloride)  250 mls @ 250 mls/hr IV Q12H [facility]


   Last Admin: 02/25/19 01:13 Dose:  Not Given


Sodium Chloride (Normal Saline)  1,000 mls @ 15 mls/hr IV ASDIRECTED [facility]


   Last Admin: 02/25/19 21:25 Dose:  15 mls/hr


Sodium Chloride (Normal Saline) Confirm Administered Dose 250 mls @ as directed 

.ROUTE .STK-MED ONE


   Stop: 02/23/19 16:52


   Last Admin: 02/23/19 17:05 Dose:  200 mls/hr


Potassium Chloride 10 meq/ (Premix)  100 mls @ 100 mls/hr IV Q1H [facility]


   Stop: 02/24/19 14:59


   Last Admin: 02/24/19 13:49 Dose:  100 mls/hr


Lorazepam (Ativan)  1 mg IVPUSH ONETIME ONE


   Stop: 02/25/19 09:27


   Last Admin: 02/25/19 10:16 Dose:  1 mg


Magnesium Oxide (Magnesium Oxide)  800 mg GTUBE ONETIME ONE


   Stop: 02/25/19 12:01


   Last Admin: 02/25/19 11:13 Dose:  800 mg


Metoclopramide HCl (Reglan)  5 mg IVPUSH Q6H [facility]


   Last Admin: 02/23/19 12:25 Dose:  5 mg


Metoclopramide HCl (Reglan)  10 mg IVPUSH Q6H [facility]


   Last Admin: 02/24/19 05:05 Dose:  Not Given


Metoprolol Tartrate (Lopressor)  5 mg IVPUSH Q4H PRN


   PRN Reason: Tachycardia


   Last Admin: 02/25/19 03:20 Dose:  5 mg


Metoprolol Tartrate (Lopressor)  25 mg PO Q12H [facility]


   Last Admin: 02/25/19 20:20 Dose:  25 mg


Metoprolol Tartrate (Lopressor)  25 mg PO ONETIME ONE


   Stop: 02/25/19 14:06


   Last Admin: 02/25/19 14:13 Dose:  25 mg


Midodrine (Midodrine)  5 mg PO TIDAC [facility]


   Stop: 02/23/19 07:01


   Last Admin: 02/23/19 06:34 Dose:  5 mg


Midodrine (Midodrine)  5 mg PO TIDAC [facility]


   Last Admin: 02/24/19 17:09 Dose:  5 mg


Midodrine (Midodrine)  5 mg PO TIDAC [facility]


Non-Formulary Medication (Lactose-Reduced Food/Fiber [Jevity 1.2 Antwan Liquid])  

260 ml GTUBE 5XDAY [facility]


   Last Admin: 02/22/19 11:31 Dose:  Not Given


Non-Formulary Medication (Water [Water])  100 ml GTUBE TID [facility]


   Last Admin: 02/22/19 10:25 Dose:  Not Given


Potassium Chloride (Potassium Chloride Solution)  20 meq PO BID [facility]


   Last Admin: 02/21/19 20:52 Dose:  20 meq


Potassium Chloride (Potassium Chloride Solution)  20 meq PO BID [facility]


   Last Admin: 02/24/19 20:43 Dose:  20 meq


Potassium Chloride (Potassium Chloride Solution)  40 meq PO BID [facility]


Potassium Chloride (Klor-Con M20)  60 meq PO ONETIME ONE


   Stop: 02/25/19 09:01


   Last Admin: 02/25/19 08:57 Dose:  60 meq


Vancomycin HCl (Pharmacy To Dose - Vancomycin)  0 dose .XX ASDIRECTED PRN


   PRN Reason: RX TO DOSE VANCOMYCIN











- Exam


General: No Acute Distress (Comfortbale), Other


HEENT: Pupils Equal, Pupils Reactive


Neck: Supple


Lungs: Normal Respiratory Effort, Decreased Breath Sounds


Cardiovascular: Tachycardia


GI/Abdominal Exam: Soft, Non-Tender, No Organomegaly, No Distention, No 

Abnormal Bruit, Other (gastric tube)


 (Female) Exam: Other (indwelling mcpherson catheter)


Back Exam: Other (deferred)


Extremities: Pedal Edema, Limited Range of Motion


Peripheral Pulses: 0: Dorsalis Pedis (L), Dorsalis Pedis (R)


Skin: Warm, Dry, Intact


Neurological: Other (unable to perform)


Psy/Mental Status: Other (deferred)





- Problem List Review


Problem List Initiated/Reviewed/Updated: Yes





- My Orders


Last 24 Hours: 


My Active Orders





02/25/19 10:32


Communication Order [RC] .PRN 





02/25/19 14:06


LORazepam [Ativan]   0.5 mg IVPUSH Q4H PRN 





02/25/19 16:31


Diltiazem [Cardizem]   10 mg IVPUSH Q4HR PRN 





02/25/19 21:00


Potassium Chloride [Potassium Chloride Solution]   40 meq PO BID 





02/25/19 22:45


Metoclopramide [Reglan]   10 mg IVPUSH Q6H 





02/26/19 08:00


Magnesium Sulfate/Water [Magnesium Sulfate 2 GM in Water 50 ML] 2 gm   Premix 

Bag 1 bag IV ONETIME 





02/26/19 08:30


Sodium Chloride 0.9% [Normal Saline] 1,000 ml IV ASDIRECTED 





02/26/19 08:46


Gastric Residual Measurement [OM.PC] Routine 





02/26/19 09:00


Diltiazem [Cardizem CD]   180 mg PO DAILY 


Meropenem Premix [Meropenem] 500 mg   Premix Bag 1 bag IV Q6H 





02/27/19 05:11


CBC WITH AUTO DIFF [HEME] AM 














- Plan


Plan:: 


Assessment/Plan:


Acute:


UTI


   - 2/2 Urinary retention from neurogenic bladder 


   - UA is pos for E. Faecalis-sensitive to Quinolone and Gram Pos 

Coccobacillus (normal skinny per microbiology); Added Meropenem due to 

increasing WBC and persistently elevated CRP  


   - IV Antibiotics as above for coverage





Hypoalbuminemia


   - Albumin 1.2


   - Reason why she has significant peripheral edema


   - Dietary consult; receiving more protein 





Hypomagnesemia


   - MG 1.7--> 1.7


   - Replete and monitor





Sinus Tachycardia, Improved


   - HR in the teens


   - This could be her way of telling us she is in pain or in some kind of 

discomfort


   - PRN Ativan and Pain Medication


   - Responded better with CCB; Now on Cardizem 


   - Thyroid Panel l FT4 yordan; TSH slightly elevated


   


End of Life Care


   - Again we spoke to mother and sister at bedside; they understand "comfort 

measures" but they want treatment of Maureen's infection





Resolved:


S/p Anemia


   - Acute on Chronic


   - Hgb of 8.5 and Hct 29; baseline is 11-12 (December 2018)--> 6.9--> 10.8 S/

p 2 units of PRBC transfusion


   - Stool occult study-pending; she has not had a bowel movement 


   - Iron Panel: shows Anemia of Chronic Disease


   - Asked earlier if she wants blood transfusion; she said no. But changed her 

mind when her mom was at bedside.





S/p Constipation/Ileus


   - Has not had a bowel since the 18th


   - She is on narcotics and plenty of anticholinergics


   - KUB r/o SBO


   - Hold tube feeding for now


   - Rectal suppository BID until bowel movement; consider lactulose or 

magnesium--> had 3 bowel movements over night


   - Prokinetic agent since she is bedbound and immobile; d/c prokinetic agent 

and resume tube feeding





S/p Atelectasis/Increased Density Right Lung Base (likely chronic)


   - Carries a hx/o Dysphagia


   - Cannot r/o Aspiration Syndrome


   - Continue IV Zosyn and Levaquin; She is on H2B for aspiration prophylaxis


   - She is unable to use IS





S/p Bacteremia w/o Hypotension


   - 2/2 UTI due to neurogenic bladder from MS 


   - Carries a hx/o Multiple Urine and Blood Infections in the past  


   - Risk Factor: Advanced MS, AMS, Recurrent UTI and Aspiration Syndrome 2/2 

Dysphagia


   - Fever of 39 C; WBC of 14.22, HR of 119-128, BP of 92/34, 100/48, 101/58 

mmHg, RR 24-33


   - LA 6.2 --> 5.3 --> 5.6--> 3.1-->2.2; received a total of 3L of NS 

overnight plus 1L NS at 125 cc/hr but remained hypotensive overnight


   - Procalcitonin 5.44 (high risk progression to severe sepsis); WBC 28.6--> 

23.78; CRP 29.7-->31.4  


   - 1 set of bottle positive for Staph Coagulase Neg (possible contamination); 

repeat blood culture -pending  


   - Discontinue Solucortef 100 mg IV Q6H; She is now off Levophed drip


   - High pseudomonal risk due to recent multiple antibiotic use: Azithromycin 2 /12-16, Cipro 2/15-18, and Bactrim 2/18-25


   - Continue IV Levaquin and discontinue IV Zosyn and Vancomycin; hopefully we 

can de-escalate tomorrow


   - Discontinue Midodrine 5 mg po TIDAC and Florinef 0.1 mg po BID for 

Orthostatic/Autonomic Dysfunction


   - Repeat blood culture is negative





S/p Hypokalemia


   - K 3.0-->3.1--> 3.8


   - 2/2 diuretic use


   - Replete and monitor





Chronic:


Dysphagia


Urinary Retention


Back Pain


OA


Spasms


Osteoporosis


CVA/TIA


MS


Anemia of Chronic Disease


Seizure Disorder


Hx/o DVT/VTE


Alzheimer's Disease


DM2, BS controlled


Anxiety


Depression





Plan:


She looks more comfortable today. Educated staff to make sure she is 

comfortable. Anxiety, Discomfort and Pain could be her way to communicate via 

rapid heart rate or fast breathing.  


Routine AM Labs


Increase IVF rate due to low urine output 


Aspiration/Seizure/Fall Precaution


DVT/GI PPx: Lovenox SubQ/H2B


Additional orders as above


SW/CM for D/c planning


DNR/DNI/Comfort Measures


LOS > 96hrs due to longer requirement for treatment; WBC going up, persistent 

tachycardia and CRP level.

## 2019-02-27 RX ADMIN — THERA TABS SCH EACH: TAB at 20:19

## 2019-02-27 RX ADMIN — POTASSIUM CHLORIDE SCH MEQ: 1.5 SOLUTION ORAL at 20:21

## 2019-02-27 RX ADMIN — OXYCODONE HYDROCHLORIDE AND ACETAMINOPHEN PRN TAB: 5; 325 TABLET ORAL at 14:36

## 2019-02-27 RX ADMIN — LEVETIRACETAM SCH MG: 100 SOLUTION ORAL at 08:44

## 2019-02-27 RX ADMIN — LEVETIRACETAM SCH MG: 100 SOLUTION ORAL at 20:21

## 2019-02-27 RX ADMIN — MEROPENEM AND SODIUM CHLORIDE SCH MLS/HR: 500 INJECTION, SOLUTION INTRAVENOUS at 08:41

## 2019-02-27 RX ADMIN — Medication SCH MG: at 20:19

## 2019-02-27 RX ADMIN — Medication SCH MG: at 08:45

## 2019-02-27 RX ADMIN — VITAMIN D, TAB 1000IU (100/BT) SCH UNITS: 25 TAB at 08:45

## 2019-02-27 RX ADMIN — DILTIAZEM HYDROCHLORIDE PRN MG: 5 INJECTION INTRAVENOUS at 03:35

## 2019-02-27 RX ADMIN — HYDRALAZINE HYDROCHLORIDE PRN MG: 20 INJECTION INTRAMUSCULAR; INTRAVENOUS at 02:11

## 2019-02-27 RX ADMIN — MEROPENEM AND SODIUM CHLORIDE SCH MLS/HR: 500 INJECTION, SOLUTION INTRAVENOUS at 02:12

## 2019-02-27 RX ADMIN — MEROPENEM AND SODIUM CHLORIDE SCH MLS/HR: 500 INJECTION, SOLUTION INTRAVENOUS at 20:41

## 2019-02-27 RX ADMIN — MEROPENEM AND SODIUM CHLORIDE SCH MLS/HR: 500 INJECTION, SOLUTION INTRAVENOUS at 14:29

## 2019-02-27 RX ADMIN — OXYCODONE HYDROCHLORIDE AND ACETAMINOPHEN PRN TAB: 5; 325 TABLET ORAL at 02:47

## 2019-02-27 RX ADMIN — POTASSIUM CHLORIDE SCH MEQ: 1.5 SOLUTION ORAL at 08:44

## 2019-02-27 RX ADMIN — Medication SCH MG: at 14:35

## 2019-02-27 RX ADMIN — NYSTATIN SCH APPLIC: 100000 CREAM TOPICAL at 20:41

## 2019-02-27 RX ADMIN — NYSTATIN SCH APPLIC: 100000 CREAM TOPICAL at 09:13

## 2019-02-27 RX ADMIN — DILTIAZEM HYDROCHLORIDE SCH MG: 240 CAPSULE, EXTENDED RELEASE ORAL at 08:46

## 2019-02-27 NOTE — PCM.PN
- General Info


Date of Service: 02/27/19


Subjective Update: 











Nonverbal, less interactive;  code status:  DNR/DNI, comfort care.


Functional Status: Reports: Pain Controlled, Tolerating Diet (via PEG), 

Urinating





- Review of Systems


General: Reports: Weakness


HEENT: Reports: No Symptoms


Pulmonary: Reports: No Symptoms


Cardiovascular: Reports: No Symptoms


Gastrointestinal: Reports: No Symptoms


Genitourinary: Reports: No Symptoms


Musculoskeletal: Reports: No Symptoms


Skin: Reports: No Symptoms


Neurological: Reports: No Symptoms


Psychiatric: Reports: No Symptoms





- Patient Data


Vitals - Most Recent: 


 Last Vital Signs











Temp  36.2 C   02/27/19 16:00


 


Pulse  110 H  02/27/19 12:00


 


Resp  20   02/27/19 16:00


 


BP  120/74   02/27/19 16:00


 


Pulse Ox  92 L  02/27/19 16:00











Weight - Most Recent: 78.608 kg


I&O - Last 24 Hours: 


 Intake & Output











 02/27/19 02/27/19 02/27/19





 06:59 14:59 22:59


 


Intake Total 1421 660 808


 


Output Total 1000 945 410


 


Balance 421 -285 398











Lab Results Last 24 Hours: 


 Laboratory Results - last 24 hr











  02/27/19 02/27/19 02/27/19 Range/Units





  06:00 06:00 10:50 


 


WBC  22.41 H    (3.98-10.04)  K/mm3


 


RBC  4.66    (3.98-5.22)  M/mm3


 


Hgb  11.6    (11.2-15.7)  gm/L


 


Hct  39.6    (34.1-44.9)  %


 


MCV  85.0    (79.4-94.8)  fl


 


MCH  24.9 L    (25.6-32.2)  pg


 


MCHC  29.3 L    (32.2-35.5)  g/dl


 


RDW Std Deviation  58.6 H    (36.4-46.3)  fL


 


Plt Count  590 H    (182-369)  K/mm3


 


MPV  9.9    (9.4-12.3)  fl


 


Neut % (Auto)  76.8 H    (34.0-71.1)  %


 


Lymph % (Auto)  12.9 L    (19.3-51.7)  %


 


Mono % (Auto)  4.9    (4.7-12.5)  %


 


Eos % (Auto)  0.2 L    (0.7-5.8)  


 


Baso % (Auto)  0.2    (0.1-1.2)  %


 


Neut # (Auto)  17.22 H    (1.56-6.13)  K/mm3


 


Lymph # (Auto)  2.88    (1.18-3.74)  K/mm3


 


Mono # (Auto)  1.09 H    (0.24-0.36)  K/mm3


 


Eos # (Auto)  0.05    (0.04-0.36)  K/mm3


 


Baso # (Auto)  0.05    (0.01-0.08)  K/mm3


 


Manual Slide Review  Abnormal smear    


 


Sodium   141   (136-145)  mEq/L


 


Potassium   4.2   (3.5-5.1)  mEq/L


 


Chloride   107   ()  mEq/L


 


Carbon Dioxide   24   (21-32)  mEq/L


 


Anion Gap   14.2   (5-15)  


 


BUN   33 H   (7-18)  mg/dL


 


Creatinine   0.8   (0.55-1.02)  mg/dL


 


Est Cr Clr Drug Dosing   71.50   mL/min


 


Estimated GFR (MDRD)   > 60   (>60)  mL/min


 


BUN/Creatinine Ratio   41.3 H   (14-18)  


 


Glucose   176 H   ()  mg/dL


 


Lactic Acid    2.5 H  (0.4-2.0)  mmol/L


 


Calcium   10.9 H   (8.5-10.1)  mg/dL


 


Magnesium   2.1   (1.8-2.4)  mg/dl


 


C-Reactive Protein   19.9 H*   (<1.0)  mg/dL


 


Mycoplasma pneumon IgM     (NEGATIVE)  














  02/27/19 Range/Units





  10:50 


 


WBC   (3.98-10.04)  K/mm3


 


RBC   (3.98-5.22)  M/mm3


 


Hgb   (11.2-15.7)  gm/L


 


Hct   (34.1-44.9)  %


 


MCV   (79.4-94.8)  fl


 


MCH   (25.6-32.2)  pg


 


MCHC   (32.2-35.5)  g/dl


 


RDW Std Deviation   (36.4-46.3)  fL


 


Plt Count   (182-369)  K/mm3


 


MPV   (9.4-12.3)  fl


 


Neut % (Auto)   (34.0-71.1)  %


 


Lymph % (Auto)   (19.3-51.7)  %


 


Mono % (Auto)   (4.7-12.5)  %


 


Eos % (Auto)   (0.7-5.8)  


 


Baso % (Auto)   (0.1-1.2)  %


 


Neut # (Auto)   (1.56-6.13)  K/mm3


 


Lymph # (Auto)   (1.18-3.74)  K/mm3


 


Mono # (Auto)   (0.24-0.36)  K/mm3


 


Eos # (Auto)   (0.04-0.36)  K/mm3


 


Baso # (Auto)   (0.01-0.08)  K/mm3


 


Manual Slide Review   


 


Sodium   (136-145)  mEq/L


 


Potassium   (3.5-5.1)  mEq/L


 


Chloride   ()  mEq/L


 


Carbon Dioxide   (21-32)  mEq/L


 


Anion Gap   (5-15)  


 


BUN   (7-18)  mg/dL


 


Creatinine   (0.55-1.02)  mg/dL


 


Est Cr Clr Drug Dosing   mL/min


 


Estimated GFR (MDRD)   (>60)  mL/min


 


BUN/Creatinine Ratio   (14-18)  


 


Glucose   ()  mg/dL


 


Lactic Acid   (0.4-2.0)  mmol/L


 


Calcium   (8.5-10.1)  mg/dL


 


Magnesium   (1.8-2.4)  mg/dl


 


C-Reactive Protein   (<1.0)  mg/dL


 


Mycoplasma pneumon IgM  Negative  (NEGATIVE)  











Abran Results Last 24 Hours: 


 Microbiology











 02/24/19 06:03 Aerobic Blood Culture - Preliminary





 Blood - Venous - Lab Draw    NO GROWTH AFTER 3 DAYS





 Anaerobic Blood Culture - Preliminary





    NO GROWTH AFTER 3 DAYS


 


 02/24/19 15:50 Aerobic Blood Culture - Preliminary





 Blood - Venous    NO GROWTH AFTER 3 DAYS





 Anaerobic Blood Culture - Final


 


 02/21/19 07:15 Aerobic Blood Culture - Preliminary





 Blood - Venous - Lab Draw    NO GROWTH AFTER 6 DAYS





 Anaerobic Blood Culture - Final


 


 02/21/19 07:28 Aerobic Blood Culture - Preliminary





 Blood - Venous    NO GROWTH AFTER 6 DAYS





 Anaerobic Blood Culture - Final





    Staphylococcus Coagulase Neg











Med Orders - Current: 


 Current Medications





Acetaminophen (Tylenol)  650 mg GTUBE Q4H PRN


   PRN Reason: Pain/Fever


   Last Admin: 02/22/19 00:46 Dose:  650 mg


Atropine Sulfate (Atropine 1% Ophth Soln)  0 ml SL Q4H PRN


   PRN Reason: extra secretions


Bisacodyl (Dulcolax)  10 mg RECTAL BID PRN


   PRN Reason: Constipation


   Last Admin: 02/27/19 15:50 Dose:  10 mg


Cholecalciferol (Vitamin D3)  2,000 units GTUBE DAILY [facility]


   Last Admin: 02/27/19 08:45 Dose:  2,000 units


Diltiazem HCl (Cardizem)  10 mg IVPUSH Q4HR PRN


   PRN Reason: Tachycardia


   Last Admin: 02/27/19 03:35 Dose:  10 mg


Diltiazem HCl (Dilacor Xr)  240 mg PO DAILY [facility]


   Last Admin: 02/27/19 08:46 Dose:  240 mg


Enoxaparin Sodium (Lovenox)  40 mg SUBCUT DAILY [facility]


   Last Admin: 02/27/19 08:43 Dose:  40 mg


Famotidine (Pepcid)  20 mg PO BID [facility]


   Last Admin: 02/27/19 08:45 Dose:  20 mg


Fluoxetine HCl (Prozac)  10 mg GTUBE DAILY [facility]


   Last Admin: 02/27/19 08:45 Dose:  10 mg


Furosemide (Lasix)  20 mg IVPUSH BIDDIURETIC [facility]


   Stop: 02/28/19 14:01


   Last Admin: 02/27/19 14:38 Dose:  20 mg


Hydralazine HCl (Apresoline)  20 mg IVPUSH Q4H PRN


   PRN Reason: Hypertension


   Last Admin: 02/27/19 02:11 Dose:  20 mg


Hydrochlorothiazide (Hydrochlorothiazide)  12.5 mg PO BIDDIURETIC [facility]


   Stop: 02/28/19 21:00


   Last Admin: 02/27/19 14:35 Dose:  12.5 mg


Norepinephrine Bitartrate 4 mg (/ Dextrose/Water)  250 mls @ 7.5 mls/hr IV 

TITRATE [facility]; Protocol


   Last Titration: 02/22/19 15:33 Dose:  0 mcg/min, 0 mls/hr


Meropenem/Sodium Chloride 500 (mg/ Premix)  50 mls @ 100 mls/hr IV Q6H [facility]


   Last Admin: 02/27/19 14:29 Dose:  100 mls/hr


Sodium Chloride (Normal Saline)  1,000 mls @ 25 mls/hr IV ASDIRECTED [facility]


Lactated Ringer's (Ringers, Lactated)  1,000 mls @ 50 mls/hr IV ASDIRECTED [facility]


Lactulose (Cephulac)  10 gm GTUBE DAILY PRN


   PRN Reason: Other


Levetiracetam (Keppra)  1,000 mg GTUBE BID [facility]


   Last Admin: 02/27/19 08:44 Dose:  1,000 mg


Lorazepam (Ativan)  0.25 mg GTUBE DAILY [facility]


   Last Admin: 02/27/19 08:44 Dose:  0.25 mg


Lorazepam (Ativan)  2 mg IVPUSH Q4H PRN


   PRN Reason: Seizures


Lorazepam (Ativan)  0.5 mg IVPUSH Q4H PRN; Protocol


   PRN Reason: Anxiety


   Last Admin: 02/26/19 04:30 Dose:  0.5 mg


Magnesium Oxide (Magnesium Oxide)  400 mg GTUBE BID [facility]


   Last Admin: 02/27/19 08:45 Dose:  400 mg


Metoclopramide HCl (Reglan)  10 mg IVPUSH Q6H [facility]


   Last Admin: 02/27/19 15:50 Dose:  10 mg


Morphine Sulfate (Morphine 10 Mg/0.5 Ml Oral Syringe)  2 mg .XX Q2HR PRN


   PRN Reason: Pain


   Last Admin: 02/26/19 17:33 Dose:  2 mg


Multivitamins (Thera)  1 each .XX BEDTIME [facility]


   Last Admin: 02/26/19 21:20 Dose:  1 each


Nystatin (Nystatin Crm)  0 gm TOP BID [facility]


   Last Admin: 02/27/19 09:13 Dose:  1 applic


Ondansetron HCl (Zofran Odt)  8 mg GTUBE Q6H PRN


   PRN Reason: Nausea


Oxycodone/Acetaminophen (Percocet 325-5 Mg)  1 tab PO Q4H PRN


   PRN Reason: Pain (moderate 4-6)


   Last Admin: 02/27/19 14:36 Dose:  1 tab


Potassium Chloride (Potassium Chloride Solution)  60 meq PO BID [facility]


   Last Admin: 02/27/19 08:44 Dose:  60 meq


Saccharomyces Boulardii (Florastor)  250 mg PO TID [facility]


   Last Admin: 02/27/19 14:35 Dose:  250 mg


Senna/Docusate Sodium (Senna Plus)  2 tab GTUBE BID [facility]


   Last Admin: 02/27/19 08:45 Dose:  2 tab


Tizanidine HCl (Zanaflex)  2 mg GTUBE TID [facility]


   Last Admin: 02/27/19 14:35 Dose:  2 mg





Discontinued Medications





Bumetanide (Bumex)  0.5 mg IVPUSH ONETIME ONE


   Stop: 02/23/19 08:01


   Last Admin: 02/23/19 08:59 Dose:  0.5 mg


Bumetanide (Bumex)  0.5 mg IVPUSH BID [facility]


   Stop: 02/25/19 21:01


   Last Admin: 02/25/19 20:43 Dose:  0.5 mg


Diltiazem HCl (Cardizem)  10 mg IVPUSH ONETIME ONE


   Stop: 02/25/19 15:30


   Last Admin: 02/25/19 15:47 Dose:  10 mg


Diltiazem HCl (Cardizem Cd)  180 mg PO DAILY [facility]


   Last Admin: 02/26/19 09:22 Dose:  180 mg


Diltiazem HCl (Cardizem Cd)  120 mg PO ONETIME ONE


   Stop: 02/26/19 21:01


   Last Admin: 02/26/19 21:20 Dose:  120 mg


Enoxaparin Sodium (Lovenox)  40 mg SUBCUT BEDTIME [facility]


   Last Admin: 02/22/19 00:14 Dose:  Not Given


Fludrocortisone Acetate (Florinef)  0.1 mg PO BIDMEALS [facility]


   Last Admin: 02/25/19 06:00 Dose:  0.1 mg


Furosemide (Lasix)  40 mg IVPUSH NOW ONE


   Stop: 02/26/19 21:14


   Last Admin: 02/26/19 21:23 Dose:  40 mg


Hydrochlorothiazide (Hydrochlorothiazide)  25 mg PO ONETIME ONE


   Stop: 02/26/19 21:15


   Last Admin: 02/26/19 21:23 Dose:  25 mg


Hydrocortisone Sodium Succinate (Solu-Cortef)  100 mg IVPUSH Q6H [facility]


   Last Admin: 02/22/19 00:14 Dose:  Not Given


Hydrocortisone Sodium Succinate (Solu-Cortef)  100 mg IVPUSH Q6H [facility]


   Last Admin: 02/23/19 12:25 Dose:  100 mg


Piperacillin Sod/Tazobactam (Sod 4.5 gm/ Sodium Chloride)  100 mls @ 25 mls/hr 

IV ONETIME STA


   Stop: 02/21/19 10:34


   Last Admin: 02/21/19 07:18 Dose:  25 mls/hr


Sodium Chloride (Normal Saline)  1,000 mls @ 999 mls/hr IV ONETIME ONE


   Stop: 02/21/19 07:35


   Last Admin: 02/21/19 07:18 Dose:  999 mls/hr


Dextrose/Sodium Chloride (Dextrose 5%-Normal Saline)  1,000 mls @ 75 mls/hr IV 

ASDIRECTED [facility]


   Last Admin: 02/21/19 09:44 Dose:  75 mls/hr


Sodium Chloride (Normal Saline)  2,000 mls @ 999 mls/hr IV ONETIME ONE


   Stop: 02/21/19 18:23


   Last Admin: 02/21/19 17:16 Dose:  999 mls/hr


Sodium Chloride (Normal Saline) Confirm Administered Dose 1,000 mls @ as 

directed .ROUTE .[facility] ONE


   Stop: 02/21/19 17:32


   Last Admin: 02/21/19 17:40 Dose:  Not Given


Sodium Chloride (Normal Saline)  1,000 mls @ 125 mls/hr IV ASDIRECTED [facility]


   Last Infusion: 02/23/19 07:00 Dose:  15 mls/hr


Levofloxacin/Dextrose 500 mg/ (Premix)  100 mls @ 100 mls/hr IV Q24H [facility]


   Last Admin: 02/25/19 20:17 Dose:  100 mls/hr


Piperacillin Sod/Tazobactam (Sod 4.5 gm/ Sodium Chloride)  100 mls @ 25 mls/hr 

IV Q8H [facility]


   Last Admin: 02/25/19 04:03 Dose:  25 mls/hr


Vancomycin HCl 1 gm/ Sodium (Chloride)  250 mls @ 250 mls/hr IV Q12H [facility]


   Last Admin: 02/25/19 01:13 Dose:  Not Given


Sodium Chloride (Normal Saline)  1,000 mls @ 15 mls/hr IV ASDIRECTED [facility]


   Last Admin: 02/25/19 21:25 Dose:  15 mls/hr


Sodium Chloride (Normal Saline) Confirm Administered Dose 250 mls @ as directed 

.ROUTE .[facility] ONE


   Stop: 02/23/19 16:52


   Last Admin: 02/23/19 17:05 Dose:  200 mls/hr


Potassium Chloride 10 meq/ (Premix)  100 mls @ 100 mls/hr IV Q1H [facility]


   Stop: 02/24/19 14:59


   Last Admin: 02/24/19 13:49 Dose:  100 mls/hr


Magnesium Sulfate 2 gm/ Premix  50 mls @ 25 mls/hr IV ONETIME ONE


   Stop: 02/26/19 09:59


   Last Admin: 02/26/19 09:54 Dose:  25 mls/hr


Sodium Chloride (Normal Saline)  1,000 mls @ 75 mls/hr IV ASDIRECTED [facility]


   Last Infusion: 02/26/19 21:00 Dose:  25 mls/hr


Lactated Ringer's (Ringers, Lactated)  1,000 mls @ 100 mls/hr IV ASDIRECTED [facility]


   Stop: 02/27/19 17:00


   Last Admin: 02/27/19 11:26 Dose:  100 mls/hr


Iopamidol (Isovue-300 (61%))  100 ml IVPUSH ONETIME ONE


   Stop: 02/27/19 15:26


   Last Admin: 02/27/19 15:41 Dose:  100 ml


Lorazepam (Ativan)  1 mg IVPUSH ONETIME ONE


   Stop: 02/25/19 09:27


   Last Admin: 02/25/19 10:16 Dose:  1 mg


Magnesium Oxide (Magnesium Oxide)  800 mg GTUBE ONETIME ONE


   Stop: 02/25/19 12:01


   Last Admin: 02/25/19 11:13 Dose:  800 mg


Magnesium Sulfate (Pharmacy To Dose - Magnesium Replacement)  1 dose .XX 

ASDIRECTED [facility]


Metoclopramide HCl (Reglan)  5 mg IVPUSH Q6H [facility]


   Last Admin: 02/23/19 12:25 Dose:  5 mg


Metoclopramide HCl (Reglan)  10 mg IVPUSH Q6H [facility]


   Last Admin: 02/24/19 05:05 Dose:  Not Given


Metoprolol Tartrate (Lopressor)  5 mg IVPUSH Q4H PRN


   PRN Reason: Tachycardia


   Last Admin: 02/25/19 03:20 Dose:  5 mg


Metoprolol Tartrate (Lopressor)  25 mg PO Q12H [facility]


   Last Admin: 02/25/19 20:20 Dose:  25 mg


Metoprolol Tartrate (Lopressor)  25 mg PO ONETIME ONE


   Stop: 02/25/19 14:06


   Last Admin: 02/25/19 14:13 Dose:  25 mg


Midodrine (Midodrine)  5 mg PO TIDAC [facility]


   Stop: 02/23/19 07:01


   Last Admin: 02/23/19 06:34 Dose:  5 mg


Midodrine (Midodrine)  5 mg PO TIDAC [facility]


   Last Admin: 02/24/19 17:09 Dose:  5 mg


Midodrine (Midodrine)  5 mg PO TIDAC [facility]


Non-Formulary Medication (Lactose-Reduced Food/Fiber [Jevity 1.2 Antwan Liquid])  

260 ml GTUBE 5XDAY [facility]


   Last Admin: 02/22/19 11:31 Dose:  Not Given


Non-Formulary Medication (Water [Water])  100 ml GTUBE TID [facility]


   Last Admin: 02/22/19 10:25 Dose:  Not Given


Potassium Chloride (Potassium Chloride Solution)  20 meq PO BID [facility]


   Last Admin: 02/21/19 20:52 Dose:  20 meq


Potassium Chloride (Pharmacy To Dose - Potassium Replacement)  1 dose .XX 

ASDIRECTED [facility]


Potassium Chloride (Potassium Chloride Solution)  20 meq PO BID [facility]


   Last Admin: 02/24/19 20:43 Dose:  20 meq


Potassium Chloride (Potassium Chloride Solution)  40 meq PO BID [facility]


Potassium Chloride (Klor-Con M20)  60 meq PO ONETIME ONE


   Stop: 02/25/19 09:01


   Last Admin: 02/25/19 08:57 Dose:  60 meq


Potassium Chloride (Potassium Chloride Solution)  40 meq PO BID [facility]


   Last Admin: 02/26/19 21:59 Dose:  Not Given


Vancomycin HCl (Pharmacy To Dose - Vancomycin)  0 dose .XX ASDIRECTED PRN


   PRN Reason: RX TO DOSE VANCOMYCIN











- Exam


Quality Assessment: Urine Catheter, DVT Prophylaxis


General: No Acute Distress


HEENT: Pupils Equal, Pupils Reactive


Neck: Trachea Midline, No JVD


Lungs: Normal Respiratory Effort, Decreased Breath Sounds, Rhonchi


Cardiovascular: Regular Rate


GI/Abdominal Exam: Normal Bowel Sounds, Soft, Non-Tender, No Organomegaly, No 

Distention


 (Female) Exam: Deferred


Back Exam: Normal Inspection


Extremities: Normal Inspection, Non-Tender, Normal Capillary Refill


Skin: Warm


Neurological: No New Focal Deficit


Psy/Mental Status: Alert, Other





- Problem List Review


Problem List Initiated/Reviewed/Updated: Yes





- My Orders


Last 24 Hours: 


My Active Orders





02/27/19 17:00


Lactated Ringers [Ringers, Lactated] 1,000 ml IV ASDIRECTED 





02/28/19 05:00


CBC WITH AUTO DIFF [HEME] DAILY 


LACTIC ACID [CHEM] DAILY 





03/01/19 05:00


CBC WITH AUTO DIFF [HEME] DAILY 


LACTIC ACID [CHEM] DAILY 





03/02/19 05:00


CBC WITH AUTO DIFF [HEME] DAILY 


LACTIC ACID [CHEM] DAILY 





03/03/19 05:00


CBC WITH AUTO DIFF [HEME] DAILY 


LACTIC ACID [CHEM] DAILY 














- Plan


Plan:: 


Assessment/Plan:


Acute:


UTI


   - 2/2 Urinary retention from neurogenic bladder 


   - UA is pos for E. Faecalis-sensitive to Quinolone and Gram Pos 

Coccobacillus (normal skinny per microbiology); Added Meropenem due to 

increasing WBC and persistently elevated CRP  


   - IV Antibiotics as above for coverage





Hypoalbuminemia


   - Albumin 1.2


   - Reason why she has significant peripheral edema


   - Dietary consult; receiving more protein 





Hypomagnesemia


   - MG 1.7--> 1.7


   - Replete and monitor





Sinus Tachycardia, Improved


   - HR in the teens


   - This could be her way of telling us she is in pain or in some kind of 

discomfort


   - PRN Ativan and Pain Medication


   - Responded better with CCB; Now on Cardizem 


   - Thyroid Panel l FT4 yordan; TSH slightly elevated


   


End of Life Care


   - Again we spoke to mother and sister at bedside; they understand "comfort 

measures" but they want treatment of Maureen's infection





Resolved:


S/p Anemia


   - Acute on Chronic


   - Hgb of 8.5 and Hct 29; baseline is 11-12 (December 2018)--> 6.9--> 10.8 S/

p 2 units of PRBC transfusion


   - Stool occult study-pending; she has not had a bowel movement 


   - Iron Panel: shows Anemia of Chronic Disease


   - Asked earlier if she wants blood transfusion; she said no. But changed her 

mind when her mom was at bedside.





S/p Constipation/Ileus


   - Has not had a bowel since the 18th


   - She is on narcotics and plenty of anticholinergics


   - KUB r/o SBO


   - Hold tube feeding for now


   - Rectal suppository BID until bowel movement; consider lactulose or 

magnesium--> had 3 bowel movements over night


   - Prokinetic agent since she is bedbound and immobile; d/c prokinetic agent 

and resume tube feeding





S/p Atelectasis/Increased Density Right Lung Base (likely chronic)


   - Carries a hx/o Dysphagia


   - Cannot r/o Aspiration Syndrome


   - Continue IV Zosyn and Levaquin; She is on H2B for aspiration prophylaxis


   - She is unable to use IS





S/p Bacteremia w/o Hypotension


   - 2/2 UTI due to neurogenic bladder from MS 


   - Carries a hx/o Multiple Urine and Blood Infections in the past  


   - Risk Factor: Advanced MS, AMS, Recurrent UTI and Aspiration Syndrome 2/2 

Dysphagia


   - Fever of 39 C; WBC of 14.22, HR of 119-128, BP of 92/34, 100/48, 101/58 

mmHg, RR 24-33


   - LA 6.2 --> 5.3 --> 5.6--> 3.1-->2.2; received a total of 3L of NS 

overnight plus 1L NS at 125 cc/hr but remained hypotensive overnight


   - Procalcitonin 5.44 (high risk progression to severe sepsis); WBC 28.6--> 

23.78; CRP 29.7-->31.4  


   - 1 set of bottle positive for Staph Coagulase Neg (possible contamination); 

repeat blood culture -pending  


   - Discontinue Solucortef 100 mg IV Q6H; She is now off Levophed drip


   - High pseudomonal risk due to recent multiple antibiotic use: Azithromycin 2 /12-16, Cipro 2/15-18, and Bactrim 2/18-25


   - Continue IV Levaquin and discontinue IV Zosyn and Vancomycin; hopefully we 

can de-escalate tomorrow


   - Discontinue Midodrine 5 mg po TIDAC and Florinef 0.1 mg po BID for 

Orthostatic/Autonomic Dysfunction


   - Repeat blood culture is negative





S/p Hypokalemia


   - K 3.0-->3.1--> 3.8


   - 2/2 diuretic use


   - Replete and monitor





Chronic:


Dysphagia


Urinary Retention


Back Pain


OA


Spasms


Osteoporosis


CVA/TIA


MS


Anemia of Chronic Disease


Seizure Disorder


Hx/o DVT/VTE


Alzheimer's Disease


DM2, BS controlled


Anxiety


Depression





Plan:


She looks more comfortable today. Educated staff to make sure she is 

comfortable. Anxiety, Discomfort and Pain could be her way to communicate via 

rapid heart rate or fast breathing.  


Routine AM Labs


Increase IVF rate due to low urine output 


Aspiration/Seizure/Fall Precaution


DVT/GI PPx: Lovenox SubQ/H2B


Additional orders as above


SW/CM for D/c planning


DNR/DNI/Comfort Measures


LOS > 96hrs due to longer requirement for treatment; WBC going up, persistent 

tachycardia and CRP level.

## 2019-02-27 NOTE — CR
Chest: Portable view of the chest was obtained.

 

Comparison: Prior chest x-ray of 02/22/19.

 

Considerable density is noted within the right lung base.  Difficult 

to exclude superimposed pleural effusion.  Slight atelectasis is 

believed to be present within the left base.  Heart size is normal.  

Upper mediastinum is normal.  Bony structures are grossly intact.

 

Impression:

1.  Considerable density within the right lung base most likely 

representing pneumonia or atelectasis or change from aspiration.

2.  Difficult to exclude right sided pleural effusion.

3.  Mild left basilar atelectasis is seen.

 

Diagnostic code #3

## 2019-02-27 NOTE — CT
CT chest

 

Technique: Multiple axial sections were obtained from above the lung 

apices inferiorly through the lung bases.  Intravenous contrast was 

utilized.

 

Comparison: Prior chest x-ray performed earlier on the same day (10:20

 AM)

 

Moderate sized right sided pleural effusion is seen.  Consolidation is

 noted within portions of the right lower lung.  Mild atelectasis is 

seen within the left base.  Mild areas of increased density within the

 right upper lung are seen.  Small 4 mm subpleural nodule is noted 

within the right middle lobe.

 

Prominent atherosclerotic plaque is seen within the descending aorta. 

 No aneurysm is seen.  No pericardial thickening is noted.  Liver 

appears slightly small as visualized.  Several small low density 

findings are seen which are too small to characterize by Hounsfield 

unit measurements but most likely representing cysts.

 

Mediastinum and hilar regions show no adenopathy or mass.  No axillary

 adenopathy is seen.  No pericardial effusion is seen.

 

Bone window settings shows no acute osseous abnormality.

 

Impression:

1.  Moderate sized right sided pleural effusion with consolidation 

within the right lung base.  Mild areas of increased density within 

the right upper lung are seen.

2.  Mild left basilar atelectasis.

3.  Small subpleural nodule is noted within the right middle lobe 

measuring approximately 4 mm.  Follow-up chest CT could be obtained in

 9 months to confirm stability.  This follow-up chest CT would occur 

in November, 2019.

4.  Other incidental findings as noted above.

 

Diagnostic code #3

## 2019-02-28 RX ADMIN — MEROPENEM AND SODIUM CHLORIDE SCH MLS/HR: 500 INJECTION, SOLUTION INTRAVENOUS at 08:02

## 2019-02-28 RX ADMIN — METRONIDAZOLE SCH MLS/HR: 500 SOLUTION INTRAVENOUS at 21:15

## 2019-02-28 RX ADMIN — NYSTATIN SCH APPLIC: 100000 CREAM TOPICAL at 21:19

## 2019-02-28 RX ADMIN — VITAMIN D, TAB 1000IU (100/BT) SCH UNITS: 25 TAB at 08:06

## 2019-02-28 RX ADMIN — LEVETIRACETAM SCH MG: 100 SOLUTION ORAL at 08:08

## 2019-02-28 RX ADMIN — MEROPENEM AND SODIUM CHLORIDE SCH MLS/HR: 500 INJECTION, SOLUTION INTRAVENOUS at 03:13

## 2019-02-28 RX ADMIN — Medication SCH: at 14:28

## 2019-02-28 RX ADMIN — LEVETIRACETAM SCH MG: 100 SOLUTION ORAL at 21:14

## 2019-02-28 RX ADMIN — DILTIAZEM HYDROCHLORIDE SCH MG: 240 CAPSULE, EXTENDED RELEASE ORAL at 08:07

## 2019-02-28 RX ADMIN — DILTIAZEM HYDROCHLORIDE PRN MG: 5 INJECTION INTRAVENOUS at 05:37

## 2019-02-28 RX ADMIN — MEROPENEM AND SODIUM CHLORIDE SCH MLS/HR: 500 INJECTION, SOLUTION INTRAVENOUS at 14:19

## 2019-02-28 RX ADMIN — NYSTATIN SCH APPLIC: 100000 CREAM TOPICAL at 08:09

## 2019-02-28 RX ADMIN — POTASSIUM CHLORIDE SCH MEQ: 1.5 SOLUTION ORAL at 08:08

## 2019-02-28 RX ADMIN — POTASSIUM CHLORIDE SCH MEQ: 1.5 SOLUTION ORAL at 21:14

## 2019-02-28 RX ADMIN — LORAZEPAM PRN MG: 2 INJECTION INTRAMUSCULAR; INTRAVENOUS at 06:22

## 2019-02-28 RX ADMIN — Medication SCH MG: at 21:16

## 2019-02-28 RX ADMIN — THERA TABS SCH EACH: TAB at 21:16

## 2019-02-28 RX ADMIN — HYDRALAZINE HYDROCHLORIDE PRN MG: 20 INJECTION INTRAMUSCULAR; INTRAVENOUS at 04:56

## 2019-02-28 RX ADMIN — Medication SCH MG: at 08:06

## 2019-02-28 RX ADMIN — OXYCODONE HYDROCHLORIDE AND ACETAMINOPHEN PRN TAB: 5; 325 TABLET ORAL at 03:58

## 2019-02-28 NOTE — PCM.CONS
H&P History of Present Illness





- General


Date of Service: 02/28/19


Admit Problem/Dx: 


 Admission Diagnosis/Problem





Admission Diagnosis/Problem      Urosepsis








Source of Information: Old Records, Provider


History Limitations: Reports: Altered Mental Status





- History of Present Illness


Initial Comments - Free Text/Narative: 





54 yo female admitted on 2/21/2019, ICU day #8, h/o advanced MS, with 

urosepsis. Patient has a history of dysphagia with inability to take anything 

per oral. She has a G-tube through which she is receiving tube feeds. Had 

residuals from tube feeds in the 400's yesterday, so tube feeds have been held. 

No vomiting. 


Last BM was yesterday.





CT scan was obtained this morning, which was concerning for ileus. I was 

consulted to evaluate the patient's ileus.





- Related Data


Allergies/Adverse Reactions: 


 Allergies











Allergy/AdvReac Type Severity Reaction Status Date / Time


 


No Known Allergies Allergy   Verified 12/11/18 14:24











Home Medications: 


 Home Meds





tiZANidine HCl [Zanaflex] 2 mg GTUBE TID 01/12/15 [History]


levETIRAcetam [Levetiracetam] 10 ml GTUBE BID 09/30/17 [History]


Acetaminophen 650 mg GTUBE Q4H PRN 12/11/18 [History]


LORazepam [Ativan] 0.25 mg GTUBE DAILY 12/11/18 [History]


Acetaminophen/oxyCODONE [Percocet 325-5 MG] 1 tab PO Q4H PRN #45 tablet 12/15/

18 [Rx]


Atropine 1% [Atropine 1% Ophth Soln] 2 drop PO Q4HR PRN 02/21/19 [History]


Cholecalciferol (Vitamin D3) [Vitamin D3] 2,000 unit GTUBE DAILY 02/21/19 [

History]


FA/Lycopene/Lut/MV,Ca,Iron,Min [Centrum] 1 tab GTUBE BEDTIME 02/21/19 [History]


FLUoxetine [PROzac] 10 mg GTUBE DAILY 02/21/19 [History]


Lactose-Reduced Food/Fiber [Jevity 1.2 Antwan Liquid] 260 ml GTUBE 5XDAY 02/21/19 [

History]


Lactulose 15 ml GTUBE DAILY PRN 02/21/19 [History]


Magnesium Oxide [Magnesium] 400 mg GTUBE BID 02/21/19 [History]


Morphine [Morphine 10 MG/0.5 ML Oral Syringe] 2 mg GTUBE Q2HR PRN 02/21/19 [

History]


Nystatin 15 gm TP BID 02/21/19 [History]


Ondansetron [Zofran] 8 mg GTUBE Q6H PRN 02/21/19 [History]


Potassium Chloride [Potassium Chloride Solution] 15 ml PO BID 02/21/19 [History]


Sennosides/Docusate Sodium [Senna Plus Tablet] 2 each GTUBE BID 02/21/19 [

History]


Sulfamethoxazole/Trimethoprim [Sulfatrim 800-160 mg/20 ml Jennifer] 20 ml PO BID 02/ 21/19 [History]


Water 100 ml GTUBE TID 02/21/19 [History]











Past Medical History


HEENT History: Reports: Other (See Below)


Other HEENT History: dysphagia,aphasia


Cardiovascular History: Reports: Blood Clots/VTE/DVT


Other Cardiovascular History: hypomagnesemia, hypokalemia


Respiratory History: Reports: Pneumonia, Recurrent


Gastrointestinal History: Reports: Other (See Below)


Other Gastrointestinal History: martinez-key tube in place


Genitourinary History: Reports: Acute Renal Failure, Retention, Urinary, UTI, 

Recurrent


Other Genitourinary History: chronic kidney disease


Musculoskeletal History: Reports: Back Pain, Chronic, Osteoarthritis, 

Osteoporosis


Other Musculoskeletal History: MS


Neurological History: Reports: Alzheimers Disease, CVA, MS, Seizure, TIA


Other Neuro History: encephalopathy, insomnia


Psychiatric History: Reports: Anxiety, Depression


Other Psychiatric History: insomnia,


Endocrine/Metabolic History: Reports: Diabetes, Type II


Other Endocrine/Metabolic History: disorders of magnesium metabolism, anemia, 

hypercalcemia, hypokalemia


Hematologic History: Reports: Anemia


Other Hematologic History: hyponmagnesemia, septic shock


Other Dermatologic History: candidasis, follicular disorder





- Infectious Disease History


Infectious Disease History: Reports: MRSA


Other Infectious Disease History: not on NH paperwork, patient unable to answer





- Past Surgical History


Head Surgeries/Procedures: Reports: None


Other Musculoskeletal Surgeries/Procedures:: contractures of muscles





Social & Family History





- Family History


Family Medical History: Unobtainable





- Tobacco Use


Smoking Status *Q: Unknown Ever Smoked





- Caffeine Use


Caffeine Use: Reports: None





- Recreational Drug Use


Recreational Drug Use: No





- Living Situation & Occupation


Living situation: Reports: Extended Care Facility ([facility])


Occupation: Disabled





H&P Review of Systems





- Review of Systems:


Review Of Systems: Unable To Obtain





Exam





- Exam


Exam: See Below





- Vital Signs


Vital Signs: 


 Last Vital Signs











Temp  36.3 C   02/28/19 12:00


 


Pulse  85   02/28/19 12:00


 


Resp  20   02/28/19 12:00


 


BP  122/71   02/28/19 12:00


 


Pulse Ox  96   02/28/19 15:26











Weight: 78.154 kg





- Exam


GI/Abdominal Exam: Non-Tender, Distended, Abnormal Bowel Sounds (hypoactive)





- Patient Data


Lab Results Last 24 hrs: 


 Laboratory Results - last 24 hr











  02/28/19 02/28/19 02/28/19 Range/Units





  06:33 07:15 07:35 


 


WBC  21.82 H    (3.98-10.04)  K/mm3


 


RBC  4.90    (3.98-5.22)  M/mm3


 


Hgb  12.5    (11.2-15.7)  gm/L


 


Hct  41.8    (34.1-44.9)  %


 


MCV  85.3    (79.4-94.8)  fl


 


MCH  25.5 L    (25.6-32.2)  pg


 


MCHC  29.9 L    (32.2-35.5)  g/dl


 


RDW Std Deviation  59.7 H    (36.4-46.3)  fL


 


Plt Count  460 H    (182-369)  K/mm3


 


MPV  10.1    (9.4-12.3)  fl


 


Neut % (Auto)  74.8 H    (34.0-71.1)  %


 


Lymph % (Auto)  15.2 L    (19.3-51.7)  %


 


Mono % (Auto)  6.2    (4.7-12.5)  %


 


Eos % (Auto)  1.5    (0.7-5.8)  


 


Baso % (Auto)  0.2    (0.1-1.2)  %


 


Neut # (Auto)  16.32 H    (1.56-6.13)  K/mm3


 


Lymph # (Auto)  3.32    (1.18-3.74)  K/mm3


 


Mono # (Auto)  1.35 H    (0.24-0.36)  K/mm3


 


Eos # (Auto)  0.32    (0.04-0.36)  K/mm3


 


Baso # (Auto)  0.05    (0.01-0.08)  K/mm3


 


Manual Slide Review  Abnormal smear    


 


Puncture Site     


 


ABG pH     (7.35-7.45)  


 


ABG pCO2     (35.0-45.0)  mmHg


 


ABG pO2     (80.0-100.0)  mmHg


 


ABG HCO3     (22.0-26.0)  meq/L


 


ABG O2 Saturation     (96.0-97.0)  %


 


ABG Base Excess     (-2-2.0)  


 


Angel Test     


 


A-a Gradient     mmHg


 


O2 Delivery Device     


 


Oxygen Flow Rate     


 


FiO2     (21..00)  %


 


Sodium   143   (136-145)  mEq/L


 


Potassium   4.4   (3.5-5.1)  mEq/L


 


Chloride   106   ()  mEq/L


 


Carbon Dioxide   27   (21-32)  mEq/L


 


Anion Gap   14.4   (5-15)  


 


BUN   32 H   (7-18)  mg/dL


 


Creatinine   0.9   (0.55-1.02)  mg/dL


 


Est Cr Clr Drug Dosing   63.55   mL/min


 


Estimated GFR (MDRD)   > 60   (>60)  mL/min


 


BUN/Creatinine Ratio   35.6 H   (14-18)  


 


Glucose   122 H   ()  mg/dL


 


Lactic Acid    3.0 H  (0.4-2.0)  mmol/L


 


Calcium   11.6 H   (8.5-10.1)  mg/dL


 


Magnesium   2.1   (1.8-2.4)  mg/dl


 


Total Bilirubin     (0.2-1.0)  mg/dL


 


C-Reactive Protein   11.7 H*   (<1.0)  mg/dL


 


Amylase     ()  U/L


 


Lipase     ()  U/L














  02/28/19 02/28/19 Range/Units





  07:35 09:58 


 


WBC    (3.98-10.04)  K/mm3


 


RBC    (3.98-5.22)  M/mm3


 


Hgb    (11.2-15.7)  gm/L


 


Hct    (34.1-44.9)  %


 


MCV    (79.4-94.8)  fl


 


MCH    (25.6-32.2)  pg


 


MCHC    (32.2-35.5)  g/dl


 


RDW Std Deviation    (36.4-46.3)  fL


 


Plt Count    (182-369)  K/mm3


 


MPV    (9.4-12.3)  fl


 


Neut % (Auto)    (34.0-71.1)  %


 


Lymph % (Auto)    (19.3-51.7)  %


 


Mono % (Auto)    (4.7-12.5)  %


 


Eos % (Auto)    (0.7-5.8)  


 


Baso % (Auto)    (0.1-1.2)  %


 


Neut # (Auto)    (1.56-6.13)  K/mm3


 


Lymph # (Auto)    (1.18-3.74)  K/mm3


 


Mono # (Auto)    (0.24-0.36)  K/mm3


 


Eos # (Auto)    (0.04-0.36)  K/mm3


 


Baso # (Auto)    (0.01-0.08)  K/mm3


 


Manual Slide Review    


 


Puncture Site   Lt radial  


 


ABG pH   7.47 H  (7.35-7.45)  


 


ABG pCO2   40.0  (35.0-45.0)  mmHg


 


ABG pO2   63.0 L  (80.0-100.0)  mmHg


 


ABG HCO3   29.0 H  (22.0-26.0)  meq/L


 


ABG O2 Saturation   92.8 L  (96.0-97.0)  %


 


ABG Base Excess   5.3 H  (-2-2.0)  


 


Angel Test   Positive  


 


A-a Gradient   41  mmHg


 


O2 Delivery Device   Nasal cannula  


 


Oxygen Flow Rate   2.0  


 


FiO2   24.00  (21..00)  %


 


Sodium    (136-145)  mEq/L


 


Potassium    (3.5-5.1)  mEq/L


 


Chloride    ()  mEq/L


 


Carbon Dioxide    (21-32)  mEq/L


 


Anion Gap    (5-15)  


 


BUN    (7-18)  mg/dL


 


Creatinine    (0.55-1.02)  mg/dL


 


Est Cr Clr Drug Dosing    mL/min


 


Estimated GFR (MDRD)    (>60)  mL/min


 


BUN/Creatinine Ratio    (14-18)  


 


Glucose    ()  mg/dL


 


Lactic Acid    (0.4-2.0)  mmol/L


 


Calcium    (8.5-10.1)  mg/dL


 


Magnesium    (1.8-2.4)  mg/dl


 


Total Bilirubin  0.4   (0.2-1.0)  mg/dL


 


C-Reactive Protein    (<1.0)  mg/dL


 


Amylase  41   ()  U/L


 


Lipase  137   ()  U/L











Result Diagrams: 


 02/28/19 06:33





 02/28/19 07:15


Martinez Results Last 24 hrs: 


 Microbiology











 02/24/19 06:03 Aerobic Blood Culture - Preliminary





 Blood - Venous - Lab Draw    NO GROWTH AFTER 4 DAYS





 Anaerobic Blood Culture - Preliminary





    NO GROWTH AFTER 4 DAYS


 


 02/24/19 15:50 Aerobic Blood Culture - Preliminary





 Blood - Venous    NO GROWTH AFTER 4 DAYS





 Anaerobic Blood Culture - Final


 


 02/21/19 07:15 Aerobic Blood Culture - Final





 Blood - Venous - Lab Draw    NO GROWTH AFTER 7 DAYS





 Anaerobic Blood Culture - Final


 


 02/21/19 07:28 Aerobic Blood Culture - Final





 Blood - Venous    NO GROWTH AFTER 7 DAYS





 Anaerobic Blood Culture - Final





    Staphylococcus Coagulase Neg











Imaging Impressions Last 24 hrs: 





CT abdomen and pelvis 


 


 Technique: Multiple axial sections were obtained from above the dome 


 of the diaphragm inferiorly through the pubic symphysis.  Intravenous 


 contrast was utilized.  No oral contrast is seen. 


 


 Findings: Right sided pleural effusion is seen.  Ascites is identified 


  within the abdomen.  Focal fluid is around the lateral margin of the 


 right sided liver, this fluid may be loculated.  Liver appears 


 somewhat compressed by this fluid. 


 


 Multiple abnormalities are seen within the gallbladder most likely due 


  to multiple gallstones. 


 


 Right kidney shows severe hydronephrosis.  Fluid collections are seen 


 loculated off the right kidney presumably due to forniceal rupture. 


 Multiple calcifications are seen within the kidney and outside the 


 kidney.  Right ureter is markedly dilated which is caused by a distal 


 large obstructing stone measuring 1.3 cm.  This occurs proximal to the 


  UVJ.  Aguirre catheter is noted within the bladder. 


 


 Diffuse body wall edema is noted.  Mildly prominent small bowel loops 


 are seen containing fluid which is most likely due to mild ileus or 


 due to obstipation.  There is large amount of stool being seen within 


 the right colon and transverse colon with lesser stool within the 


 descending colon. 


 


 Left kidney shows no hydronephrosis or mass.  Adrenal glands are 


 unremarkable.  Pancreas is normal.  Aorta shows atherosclerotic change 


  without aneurysm.  No retroperitoneal adenopathy or mesenteric 


 abnormalities are seen. 


 


 Delayed images show contrast within the left ureter.  No contrast 


 excretion into the right ureter is seen.  Gastrostomy tube is noted. 


 


 Impression: 


 1.  Ascites within the abdomen and pelvis.  Possible loculated ascites 


  lateral to the right side of the liver appearing to cause some 


 compression of the liver. 


 2.  Numerous gallstones are felt to be present within the gallbladder. 


 3.  Very abnormal right kidney with multiple loculated areas of fluid 


 around the kidney as well as multiple calcifications within and around 


  the right kidney.  Markedly dilated right ureter is seen with large 


 distal obstructing stone measuring 1.3 cm.  Findings highly suggestive 


  of forniceal rupture within the kidney from this obstructing stone 


 causing the fluid around the kidney and possibly the ascites. 


 4.  Diffuse body wall edema. 


 5.  Mildly prominent fluid-filled loops of small bowel either due to 


 ileus or possibly obstipation as there is increased stool within the 


 right and transverse colon. 


 


 Diagnostic code #3 


 


 


  Dictated by:                                              Main Finn MD 


                                                   02/28/19 at 1051 








Consult PN Assessment/Plan


Procedures: 


Procedures





AIRWAY INHALATION TREATMENT (03/24/17)


ASSAY OF BLOOD OSMOLALITY (05/13/15)


ASSAY OF CK (CPK) (01/12/15)


ASSAY OF LACTIC ACID (12/11/18)


ASSAY OF MAGNESIUM (12/11/18)


ASSAY OF NATRIURETIC PEPTIDE (12/11/18)


ASSAY OF PHOSPHORUS (05/13/15)


ASSAY OF TROPONIN QUANT (06/15/17)


ASSAY OF VANCOMYCIN (12/11/18)


BL SMEAR W/DIFF WBC COUNT (12/11/18)


BLOOD CULTURE FOR BACTERIA (12/11/18)


BLOOD GASES ANY COMBINATION (12/10/18)


C-REACTIVE PROTEIN (12/11/18)


CHANGE GASTROSTOMY TUBE (06/28/18)


CHEST X-RAY 1 VIEW FRONTAL (09/30/17)


CINE/VID X-RAY THROAT/ESOPH (05/25/17)


COMPLETE CBC AUTOMATED (12/11/18)


COMPLETE CBC W/AUTO DIFF WBC (12/11/18)


COMPREHEN METABOLIC PANEL (12/11/18)


CREATINE MB FRACTION (06/15/17)


CT ANGIOGRAPHY CHEST (12/10/18)


CT HEAD/BRAIN W/O DYE (03/24/17)


CULTURE AEROBIC IDENTIFY (12/10/18)


CULTURE ANAEROBE IDENT EACH (12/11/18)


DANIEL SUBQ TISSUE 20 SQ CM/< (12/12/14)


DRUG SCRN NICA LEVETIRACETAM (03/24/17)


ELECTROCARDIOGRAM TRACING (12/11/18)


EMERGENCY DEPT VISIT (12/11/18)


EMERGENCY DEPT VISIT (06/28/18)


EMERGENCY DEPT VISIT (01/15/18)


EMERGENCY DEPT VISIT (05/13/15)


EMERGENCY DEPT VISIT (04/07/15)


EMERGENCY DEPT VISIT (12/18/14)


EVALUATE PT USE OF INHALER (03/24/17)


EVALUATE SWALLOWING FUNCTION (03/24/17)


FIBRIN DEGRADATION QUANT (12/10/18)


GLUCOSE BLOOD TEST (12/11/18)


GLYCOSYLATED HEMOGLOBIN TEST (12/11/18)


HEMATOCRIT (06/15/17)


HEMOGLOBIN (06/15/17)


HYDRATE IV INFUSION ADD-ON (12/10/18)


HYDRATION IV INFUSION INIT (12/18/14)


INFLUENZA A/B AG IA (12/18/14)


INFLUENZA ASSAY W/OPTIC (12/10/18)


INSERT BLADDER CATHETER (12/18/14)


INSERT NON-TUNNEL CV CATH (06/15/17)


INSERT TEMP BLADDER CATH (06/15/17)


METABOLIC PANEL TOTAL CA (12/11/18)


MICROBE SUSCEPTIBLE DIFFUSE (12/11/18)


MICROBE SUSCEPTIBLE DISK (12/10/18)


MICROBE SUSCEPTIBLE MARTINEZ (12/10/18)


MOD SED SAME PHYS/QHP 5/>YRS (06/15/17)


MOD SED SAME PHYS/QHP EA (06/15/17)


MOTION FLUOROSCOPY/SWALLOW (05/25/17)


MR-STAPH DNA AMP PROBE (12/11/18)


OCCULT BLD FECES 1-3 TESTS (06/15/17)


ORAL FUNCTION THERAPY (03/24/17)


OT EVAL HIGH COMPLEX 60 MIN (12/11/18)


OT EVALUATION (04/27/16)


PROTHROMBIN TIME (12/11/18)


PT EVAL LOW COMPLEX 20 MIN (12/11/18)


PT EVALUATION (04/27/16)


RBC SED RATE AUTOMATED (12/11/18)


ROUTINE VENIPUNCTURE (12/11/18)


STREP A AG IA (01/15/18)


THER/DIAG CONCURRENT INF (06/15/17)


THER/PROPH/DIAG INJ IV PUSH (03/24/17)


THER/PROPH/DIAG IV INF ADDON (12/11/18)


THER/PROPH/DIAG IV INF INIT (12/11/18)


THROMBOPLASTIN TIME PARTIAL (12/11/18)


TOTAL CORTISOL (06/15/17)


TX/PRO/DX INJ NEW DRUG ADDON (06/15/17)


TX/PRO/DX INJ SAME DRUG ADON (06/15/17)


TX/PROPH/DG ADDL SEQ IV INF (06/15/17)


URINALYSIS AUTO W/SCOPE (01/14/19)


URINE BACTERIA CULTURE (12/10/18)


URINE CULTURE/COLONY COUNT (01/14/19)


WITHDRAWAL OF ARTERIAL BLOOD (12/10/18)


X-RAY EXAM CHEST 1 VIEW (12/11/18)


X-RAY EXAM OF ABDOMEN (06/15/17)








Problem List Initiated/Reviewed/Updated: Yes


Plan: 





54 yo female, h/o multiple medical problems, including dysphagia, urinary 

retention, advanced MS, anemia, anxiety, depression, ICU day #8 admitted with 

urosepsis. I was consulted to evaluate for ileus and high residuals through G-

tube. CT scan images and radiology report were reviewed. Mild ileus noted, but 

more importantly, the patient has a 1.3 cm ureteral stone in the distal ureter 

near the UVJ, with ureteral dilation and fluid around the kidney, concerning 

for forniceal rupture. Ascites also noted. Obstructing ureteral stone is 

associated with UTI. Hemodynamically stable.


- Recommend urology evaluation.


- Case d/w Dr. Hayden, Urologist at [facility] in [place], who recommends 

IR placement of nephrostomy tube.


- Further management will require transfer to higher level of care.


- Situation was discussed with the patient's daughter, who agrees to transfer.


- If patient has any vomiting, may consider venting the G-tube (open to gravity)

.


- General Surgery will sign off at this time. Please call Dr. Swan tomcarol 

morning if needed.





Case was d/w Dr. Pickett, hospitalist, who plans to arrange transfer to [place] 

tomorrow AM.








Jc Hurd M.D (Siri)., F.A.C.S.


General Surgery

## 2019-02-28 NOTE — PCM.SN
- Free Text/Narrative


Note: 


Start 0730


End 0740





Called to ICU regarding a patient with difficult venous accessibility. The 

patients left forearm cleansed with chlorahexadine.  1% buffered lido used to 

make a skin wheel.  A 20ga 1.88" IV catheter placed in the forearm under 

ultrasound guidance x1 attempt.  Good blood return.  10ml of blood aspirated 

from IV catheter for the lab.  Flushes well.  Secured with sterile tegaderm and 

tape. Patient tolerated procedure well.  





Yoel Lucio

## 2019-02-28 NOTE — PCM.PN
- General Info


Date of Service: 02/28/19


Subjective Update: 








Clarified ATBs, CXR daily for PNA.  Hold TF has had high residuals, assessment 

for Ileus with CT of abd/pelvis;  gen surg consult after imaging is complete.


Functional Status: Reports: Tolerating Diet (TFs held), Ambulating (non 

ambulatory)





- Review of Systems


General: Reports: No Symptoms


HEENT: Reports: No Symptoms


Pulmonary: Reports: No Symptoms


Cardiovascular: Reports: No Symptoms


Gastrointestinal: Reports: No Symptoms


Genitourinary: Reports: No Symptoms


Musculoskeletal: Reports: No Symptoms


Skin: Reports: No Symptoms


Neurological: Reports: No Symptoms


Psychiatric: Reports: No Symptoms





- Patient Data


Vitals - Most Recent: 


 Last Vital Signs











Temp  36.3 C   02/28/19 12:00


 


Pulse  85   02/28/19 12:00


 


Resp  20   02/28/19 12:00


 


BP  122/71   02/28/19 12:00


 


Pulse Ox  96   02/28/19 15:26











Weight - Most Recent: 78.154 kg


I&O - Last 24 Hours: 


 Intake & Output











 02/28/19 02/28/19 02/28/19





 06:59 14:59 22:59


 


Intake Total 797 160 925


 


Output Total 470 955 350


 


Balance 327 -795 575











Lab Results Last 24 Hours: 


 Laboratory Results - last 24 hr











  02/28/19 02/28/19 02/28/19 Range/Units





  06:33 07:15 07:35 


 


WBC  21.82 H    (3.98-10.04)  K/mm3


 


RBC  4.90    (3.98-5.22)  M/mm3


 


Hgb  12.5    (11.2-15.7)  gm/L


 


Hct  41.8    (34.1-44.9)  %


 


MCV  85.3    (79.4-94.8)  fl


 


MCH  25.5 L    (25.6-32.2)  pg


 


MCHC  29.9 L    (32.2-35.5)  g/dl


 


RDW Std Deviation  59.7 H    (36.4-46.3)  fL


 


Plt Count  460 H    (182-369)  K/mm3


 


MPV  10.1    (9.4-12.3)  fl


 


Neut % (Auto)  74.8 H    (34.0-71.1)  %


 


Lymph % (Auto)  15.2 L    (19.3-51.7)  %


 


Mono % (Auto)  6.2    (4.7-12.5)  %


 


Eos % (Auto)  1.5    (0.7-5.8)  


 


Baso % (Auto)  0.2    (0.1-1.2)  %


 


Neut # (Auto)  16.32 H    (1.56-6.13)  K/mm3


 


Lymph # (Auto)  3.32    (1.18-3.74)  K/mm3


 


Mono # (Auto)  1.35 H    (0.24-0.36)  K/mm3


 


Eos # (Auto)  0.32    (0.04-0.36)  K/mm3


 


Baso # (Auto)  0.05    (0.01-0.08)  K/mm3


 


Manual Slide Review  Abnormal smear    


 


Puncture Site     


 


ABG pH     (7.35-7.45)  


 


ABG pCO2     (35.0-45.0)  mmHg


 


ABG pO2     (80.0-100.0)  mmHg


 


ABG HCO3     (22.0-26.0)  meq/L


 


ABG O2 Saturation     (96.0-97.0)  %


 


ABG Base Excess     (-2-2.0)  


 


Angel Test     


 


A-a Gradient     mmHg


 


O2 Delivery Device     


 


Oxygen Flow Rate     


 


FiO2     (21..00)  %


 


Sodium   143   (136-145)  mEq/L


 


Potassium   4.4   (3.5-5.1)  mEq/L


 


Chloride   106   ()  mEq/L


 


Carbon Dioxide   27   (21-32)  mEq/L


 


Anion Gap   14.4   (5-15)  


 


BUN   32 H   (7-18)  mg/dL


 


Creatinine   0.9   (0.55-1.02)  mg/dL


 


Est Cr Clr Drug Dosing   63.55   mL/min


 


Estimated GFR (MDRD)   > 60   (>60)  mL/min


 


BUN/Creatinine Ratio   35.6 H   (14-18)  


 


Glucose   122 H   ()  mg/dL


 


Lactic Acid    3.0 H  (0.4-2.0)  mmol/L


 


Calcium   11.6 H   (8.5-10.1)  mg/dL


 


Magnesium   2.1   (1.8-2.4)  mg/dl


 


Total Bilirubin     (0.2-1.0)  mg/dL


 


C-Reactive Protein   11.7 H*   (<1.0)  mg/dL


 


Amylase     ()  U/L


 


Lipase     ()  U/L














  02/28/19 02/28/19 Range/Units





  07:35 09:58 


 


WBC    (3.98-10.04)  K/mm3


 


RBC    (3.98-5.22)  M/mm3


 


Hgb    (11.2-15.7)  gm/L


 


Hct    (34.1-44.9)  %


 


MCV    (79.4-94.8)  fl


 


MCH    (25.6-32.2)  pg


 


MCHC    (32.2-35.5)  g/dl


 


RDW Std Deviation    (36.4-46.3)  fL


 


Plt Count    (182-369)  K/mm3


 


MPV    (9.4-12.3)  fl


 


Neut % (Auto)    (34.0-71.1)  %


 


Lymph % (Auto)    (19.3-51.7)  %


 


Mono % (Auto)    (4.7-12.5)  %


 


Eos % (Auto)    (0.7-5.8)  


 


Baso % (Auto)    (0.1-1.2)  %


 


Neut # (Auto)    (1.56-6.13)  K/mm3


 


Lymph # (Auto)    (1.18-3.74)  K/mm3


 


Mono # (Auto)    (0.24-0.36)  K/mm3


 


Eos # (Auto)    (0.04-0.36)  K/mm3


 


Baso # (Auto)    (0.01-0.08)  K/mm3


 


Manual Slide Review    


 


Puncture Site   Lt radial  


 


ABG pH   7.47 H  (7.35-7.45)  


 


ABG pCO2   40.0  (35.0-45.0)  mmHg


 


ABG pO2   63.0 L  (80.0-100.0)  mmHg


 


ABG HCO3   29.0 H  (22.0-26.0)  meq/L


 


ABG O2 Saturation   92.8 L  (96.0-97.0)  %


 


ABG Base Excess   5.3 H  (-2-2.0)  


 


Angel Test   Positive  


 


A-a Gradient   41  mmHg


 


O2 Delivery Device   Nasal cannula  


 


Oxygen Flow Rate   2.0  


 


FiO2   24.00  (21..00)  %


 


Sodium    (136-145)  mEq/L


 


Potassium    (3.5-5.1)  mEq/L


 


Chloride    ()  mEq/L


 


Carbon Dioxide    (21-32)  mEq/L


 


Anion Gap    (5-15)  


 


BUN    (7-18)  mg/dL


 


Creatinine    (0.55-1.02)  mg/dL


 


Est Cr Clr Drug Dosing    mL/min


 


Estimated GFR (MDRD)    (>60)  mL/min


 


BUN/Creatinine Ratio    (14-18)  


 


Glucose    ()  mg/dL


 


Lactic Acid    (0.4-2.0)  mmol/L


 


Calcium    (8.5-10.1)  mg/dL


 


Magnesium    (1.8-2.4)  mg/dl


 


Total Bilirubin  0.4   (0.2-1.0)  mg/dL


 


C-Reactive Protein    (<1.0)  mg/dL


 


Amylase  41   ()  U/L


 


Lipase  137   ()  U/L











Abran Results Last 24 Hours: 


 Microbiology











 02/24/19 06:03 Aerobic Blood Culture - Preliminary





 Blood - Venous - Lab Draw    NO GROWTH AFTER 4 DAYS





 Anaerobic Blood Culture - Preliminary





    NO GROWTH AFTER 4 DAYS


 


 02/24/19 15:50 Aerobic Blood Culture - Preliminary





 Blood - Venous    NO GROWTH AFTER 4 DAYS





 Anaerobic Blood Culture - Final


 


 02/21/19 07:15 Aerobic Blood Culture - Final





 Blood - Venous - Lab Draw    NO GROWTH AFTER 7 DAYS





 Anaerobic Blood Culture - Final


 


 02/21/19 07:28 Aerobic Blood Culture - Final





 Blood - Venous    NO GROWTH AFTER 7 DAYS





 Anaerobic Blood Culture - Final





    Staphylococcus Coagulase Neg











Med Orders - Current: 


 Current Medications





Acetaminophen (Tylenol)  650 mg GTUBE Q4H PRN


   PRN Reason: Pain/Fever


   Last Admin: 02/22/19 00:46 Dose:  650 mg


Atropine Sulfate (Atropine 1% Ophth Soln)  0 ml SL Q4H PRN


   PRN Reason: extra secretions


Bisacodyl (Dulcolax)  10 mg RECTAL BID PRN


   PRN Reason: Constipation


   Last Admin: 02/27/19 15:50 Dose:  10 mg


Cholecalciferol (Vitamin D3)  2,000 units GTUBE DAILY [facility]


   Last Admin: 02/28/19 08:06 Dose:  2,000 units


Diltiazem HCl (Cardizem)  10 mg IVPUSH Q4HR PRN


   PRN Reason: Tachycardia


   Last Admin: 02/28/19 05:37 Dose:  10 mg


Diltiazem HCl (Dilacor Xr)  240 mg PO DAILY [facility]


   Last Admin: 02/28/19 08:07 Dose:  240 mg


Enoxaparin Sodium (Lovenox)  40 mg SUBCUT DAILY [facility]


   Last Admin: 02/28/19 08:08 Dose:  40 mg


Famotidine (Pepcid)  20 mg PO BID [facility]


   Last Admin: 02/28/19 08:06 Dose:  20 mg


Fluoxetine HCl (Prozac)  10 mg GTUBE DAILY [facility]


   Last Admin: 02/28/19 08:07 Dose:  10 mg


Hydralazine HCl (Apresoline)  20 mg IVPUSH Q4H PRN


   PRN Reason: Hypertension


   Last Admin: 02/28/19 04:56 Dose:  20 mg


Norepinephrine Bitartrate 4 mg (/ Dextrose/Water)  250 mls @ 7.5 mls/hr IV 

TITRATE [facility]; Protocol


   Last Titration: 02/22/19 15:33 Dose:  0 mcg/min, 0 mls/hr


Meropenem/Sodium Chloride 500 (mg/ Premix)  50 mls @ 100 mls/hr IV Q6H [facility]


   Last Admin: 02/28/19 14:19 Dose:  100 mls/hr


Lactated Ringer's (Ringers, Lactated)  1,000 mls @ 50 mls/hr IV ASDIRECTED [facility]


   Last Admin: 02/28/19 03:13 Dose:  50 mls/hr


Piperacillin Sod/Tazobactam (Sod 4.5 gm/ Sodium Chloride)  100 mls @ 25 mls/hr 

IV Q8H [facility]


   Last Admin: 02/28/19 11:35 Dose:  25 mls/hr


Lactulose (Cephulac)  10 gm GTUBE DAILY PRN


   PRN Reason: Other


Levalbuterol HCl (Xopenex)  1.25 mg NEB QIDRT [facility]


   Last Admin: 02/28/19 15:26 Dose:  1.25 mg


Levetiracetam (Keppra)  1,000 mg GTUBE BID [facility]


   Last Admin: 02/28/19 08:08 Dose:  1,000 mg


Lorazepam (Ativan)  0.25 mg GTUBE DAILY [facility]


   Last Admin: 02/28/19 08:07 Dose:  0.25 mg


Lorazepam (Ativan)  2 mg IVPUSH Q4H PRN


   PRN Reason: Seizures


Lorazepam (Ativan)  0.5 mg IVPUSH Q4H PRN; Protocol


   PRN Reason: Anxiety


   Last Admin: 02/28/19 06:22 Dose:  0.5 mg


Magnesium Oxide (Magnesium Oxide)  400 mg GTUBE BID [facility]


   Last Admin: 02/28/19 08:06 Dose:  400 mg


Metoclopramide HCl (Reglan)  10 mg IVPUSH Q6H [facility]


   Last Admin: 02/28/19 11:34 Dose:  10 mg


Morphine Sulfate (Morphine 10 Mg/0.5 Ml Oral Syringe)  2 mg .XX Q2HR PRN


   PRN Reason: Pain


   Last Admin: 02/26/19 17:33 Dose:  2 mg


Multivitamins (Thera)  1 each .XX BEDTIME [facility]


   Last Admin: 02/27/19 20:19 Dose:  1 each


Nystatin (Nystatin Crm)  0 gm TOP BID [facility]


   Last Admin: 02/28/19 08:09 Dose:  1 applic


Ondansetron HCl (Zofran Odt)  8 mg GTUBE Q6H PRN


   PRN Reason: Nausea


Oxycodone/Acetaminophen (Percocet 325-5 Mg)  1 tab PO Q4H PRN


   PRN Reason: Pain (moderate 4-6)


   Last Admin: 02/28/19 03:58 Dose:  1 tab


Potassium Chloride (Potassium Chloride Solution)  60 meq PO BID [facility]


   Last Admin: 02/28/19 08:08 Dose:  60 meq


Saccharomyces Boulardii (Florastor)  250 mg PO TID [facility]


   Last Admin: 02/28/19 14:28 Dose:  Not Given


Senna/Docusate Sodium (Senna Plus)  2 tab GTUBE BID [facility]


   Last Admin: 02/28/19 08:06 Dose:  2 tab


Tizanidine HCl (Zanaflex)  2 mg GTUBE TID [facility]


   Last Admin: 02/28/19 14:29 Dose:  Not Given





Discontinued Medications





Bumetanide (Bumex)  0.5 mg IVPUSH ONETIME ONE


   Stop: 02/23/19 08:01


   Last Admin: 02/23/19 08:59 Dose:  0.5 mg


Bumetanide (Bumex)  0.5 mg IVPUSH BID [facility]


   Stop: 02/25/19 21:01


   Last Admin: 02/25/19 20:43 Dose:  0.5 mg


Diltiazem HCl (Cardizem)  10 mg IVPUSH ONETIME ONE


   Stop: 02/25/19 15:30


   Last Admin: 02/25/19 15:47 Dose:  10 mg


Diltiazem HCl (Cardizem Cd)  180 mg PO DAILY [facility]


   Last Admin: 02/26/19 09:22 Dose:  180 mg


Diltiazem HCl (Cardizem Cd)  120 mg PO ONETIME ONE


   Stop: 02/26/19 21:01


   Last Admin: 02/26/19 21:20 Dose:  120 mg


Enoxaparin Sodium (Lovenox)  40 mg SUBCUT BEDTIME [facility]


   Last Admin: 02/22/19 00:14 Dose:  Not Given


Fludrocortisone Acetate (Florinef)  0.1 mg PO BIDMEALS [facility]


   Last Admin: 02/25/19 06:00 Dose:  0.1 mg


Furosemide (Lasix)  40 mg IVPUSH NOW ONE


   Stop: 02/26/19 21:14


   Last Admin: 02/26/19 21:23 Dose:  40 mg


Furosemide (Lasix)  20 mg IVPUSH BIDDIURETIC [facility]


   Stop: 02/28/19 14:01


   Last Admin: 02/28/19 14:19 Dose:  20 mg


Hydrochlorothiazide (Hydrochlorothiazide)  25 mg PO ONETIME ONE


   Stop: 02/26/19 21:15


   Last Admin: 02/26/19 21:23 Dose:  25 mg


Hydrochlorothiazide (Hydrochlorothiazide)  12.5 mg PO BIDDIURETIC [facility]


   Stop: 02/28/19 21:00


   Last Admin: 02/28/19 05:07 Dose:  12.5 mg


Hydrocortisone Sodium Succinate (Solu-Cortef)  100 mg IVPUSH Q6H [facility]


   Last Admin: 02/22/19 00:14 Dose:  Not Given


Hydrocortisone Sodium Succinate (Solu-Cortef)  100 mg IVPUSH Q6H [facility]


   Last Admin: 02/23/19 12:25 Dose:  100 mg


Piperacillin Sod/Tazobactam (Sod 4.5 gm/ Sodium Chloride)  100 mls @ 25 mls/hr 

IV ONETIME STA


   Stop: 02/21/19 10:34


   Last Admin: 02/21/19 07:18 Dose:  25 mls/hr


Sodium Chloride (Normal Saline)  1,000 mls @ 999 mls/hr IV ONETIME ONE


   Stop: 02/21/19 07:35


   Last Admin: 02/21/19 07:18 Dose:  999 mls/hr


Dextrose/Sodium Chloride (Dextrose 5%-Normal Saline)  1,000 mls @ 75 mls/hr IV 

ASDIRECTED [facility]


   Last Admin: 02/21/19 09:44 Dose:  75 mls/hr


Sodium Chloride (Normal Saline)  2,000 mls @ 999 mls/hr IV ONETIME ONE


   Stop: 02/21/19 18:23


   Last Admin: 02/21/19 17:16 Dose:  999 mls/hr


Sodium Chloride (Normal Saline) Confirm Administered Dose 1,000 mls @ as 

directed .ROUTE .[facility] ONE


   Stop: 02/21/19 17:32


   Last Admin: 02/21/19 17:40 Dose:  Not Given


Sodium Chloride (Normal Saline)  1,000 mls @ 125 mls/hr IV ASDIRECTED [facility]


   Last Infusion: 02/23/19 07:00 Dose:  15 mls/hr


Levofloxacin/Dextrose 500 mg/ (Premix)  100 mls @ 100 mls/hr IV Q24H [facility]


   Last Admin: 02/25/19 20:17 Dose:  100 mls/hr


Piperacillin Sod/Tazobactam (Sod 4.5 gm/ Sodium Chloride)  100 mls @ 25 mls/hr 

IV Q8H [facility]


   Last Admin: 02/25/19 04:03 Dose:  25 mls/hr


Vancomycin HCl 1 gm/ Sodium (Chloride)  250 mls @ 250 mls/hr IV Q12H [facility]


   Last Admin: 02/25/19 01:13 Dose:  Not Given


Sodium Chloride (Normal Saline)  1,000 mls @ 15 mls/hr IV ASDIRECTED [facility]


   Last Admin: 02/25/19 21:25 Dose:  15 mls/hr


Sodium Chloride (Normal Saline) Confirm Administered Dose 250 mls @ as directed 

.ROUTE .[facility] ONE


   Stop: 02/23/19 16:52


   Last Admin: 02/23/19 17:05 Dose:  200 mls/hr


Potassium Chloride 10 meq/ (Premix)  100 mls @ 100 mls/hr IV Q1H [facility]


   Stop: 02/24/19 14:59


   Last Admin: 02/24/19 13:49 Dose:  100 mls/hr


Magnesium Sulfate 2 gm/ Premix  50 mls @ 25 mls/hr IV ONETIME ONE


   Stop: 02/26/19 09:59


   Last Admin: 02/26/19 09:54 Dose:  25 mls/hr


Sodium Chloride (Normal Saline)  1,000 mls @ 75 mls/hr IV ASDIRECTED [facility]


   Last Infusion: 02/26/19 21:00 Dose:  25 mls/hr


Sodium Chloride (Normal Saline)  1,000 mls @ 25 mls/hr IV ASDIRECTED [facility]


Lactated Ringer's (Ringers, Lactated)  1,000 mls @ 100 mls/hr IV ASDIRECTED [facility]


   Stop: 02/27/19 17:00


   Last Admin: 02/27/19 11:26 Dose:  100 mls/hr


Piperacillin Sod/Tazobactam (Sod 4.5 gm/ Sodium Chloride)  100 mls @ 200 mls/hr 

IV ONETIME ONE


   Stop: 02/27/19 20:29


   Last Admin: 02/27/19 20:19 Dose:  200 mls/hr


Iopamidol (Isovue-300 (61%))  100 ml IVPUSH ONETIME ONE


   Stop: 02/27/19 15:26


   Last Admin: 02/27/19 15:41 Dose:  100 ml


Iopamidol (Isovue-300 (61%))  100 ml IVPUSH ONETIME ONE


   Stop: 02/28/19 09:42


   Last Admin: 02/28/19 09:50 Dose:  100 ml


Levalbuterol HCl (Xopenex)  1.25 mg NEB QID [facility]


Lorazepam (Ativan)  1 mg IVPUSH ONETIME ONE


   Stop: 02/25/19 09:27


   Last Admin: 02/25/19 10:16 Dose:  1 mg


Magnesium Oxide (Magnesium Oxide)  800 mg GTUBE ONETIME ONE


   Stop: 02/25/19 12:01


   Last Admin: 02/25/19 11:13 Dose:  800 mg


Magnesium Sulfate (Pharmacy To Dose - Magnesium Replacement)  1 dose .XX 

ASDIRECTED [facility]


Metoclopramide HCl (Reglan)  5 mg IVPUSH Q6H [facility]


   Last Admin: 02/23/19 12:25 Dose:  5 mg


Metoclopramide HCl (Reglan)  10 mg IVPUSH Q6H [facility]


   Last Admin: 02/24/19 05:05 Dose:  Not Given


Metoprolol Tartrate (Lopressor)  5 mg IVPUSH Q4H PRN


   PRN Reason: Tachycardia


   Last Admin: 02/25/19 03:20 Dose:  5 mg


Metoprolol Tartrate (Lopressor)  25 mg PO Q12H [facility]


   Last Admin: 02/25/19 20:20 Dose:  25 mg


Metoprolol Tartrate (Lopressor)  25 mg PO ONETIME ONE


   Stop: 02/25/19 14:06


   Last Admin: 02/25/19 14:13 Dose:  25 mg


Midodrine (Midodrine)  5 mg PO TIDAC [facility]


   Stop: 02/23/19 07:01


   Last Admin: 02/23/19 06:34 Dose:  5 mg


Midodrine (Midodrine)  5 mg PO TIDAC [facility]


   Last Admin: 02/24/19 17:09 Dose:  5 mg


Midodrine (Midodrine)  5 mg PO TIDAC [facility]


Non-Formulary Medication (Lactose-Reduced Food/Fiber [Jevity 1.2 Antwan Liquid])  

260 ml GTUBE 5XDAY [facility]


   Last Admin: 02/22/19 11:31 Dose:  Not Given


Non-Formulary Medication (Water [Water])  100 ml GTUBE TID [facility]


   Last Admin: 02/22/19 10:25 Dose:  Not Given


Potassium Chloride (Potassium Chloride Solution)  20 meq PO BID [facility]


   Last Admin: 02/21/19 20:52 Dose:  20 meq


Potassium Chloride (Pharmacy To Dose - Potassium Replacement)  1 dose .XX 

ASDIRECTED [facility]


Potassium Chloride (Potassium Chloride Solution)  20 meq PO BID [facility]


   Last Admin: 02/24/19 20:43 Dose:  20 meq


Potassium Chloride (Potassium Chloride Solution)  40 meq PO BID [facility]


Potassium Chloride (Klor-Con M20)  60 meq PO ONETIME ONE


   Stop: 02/25/19 09:01


   Last Admin: 02/25/19 08:57 Dose:  60 meq


Potassium Chloride (Potassium Chloride Solution)  40 meq PO BID [facility]


   Last Admin: 02/26/19 21:59 Dose:  Not Given


Sodium Chloride (Saline Flush)  10 ml FLUSH ONETIME PRN


   PRN Reason: IV FLUSH


   Stop: 02/28/19 12:00


Vancomycin HCl (Pharmacy To Dose - Vancomycin)  0 dose .XX ASDIRECTED PRN


   PRN Reason: RX TO DOSE VANCOMYCIN











- Exam


Quality Assessment: Supplemental Oxygen, DVT Prophylaxis


General: No Acute Distress


HEENT: Pupils Equal, Pupils Reactive


Neck: No JVD


Lungs: Normal Respiratory Effort, Decreased Breath Sounds, Rhonchi


Cardiovascular: Regular Rate, Tachycardia


GI/Abdominal Exam: Soft, Non-Tender, No Distention, Abnormal Bowel Sounds


 (Female) Exam: Deferred


Back Exam: Normal Inspection


Extremities: Normal Inspection, Slow Capillary Refill


Skin: Warm


Neurological: No New Focal Deficit


Psy/Mental Status: Agitated





- Problem List Review


Problem List Initiated/Reviewed/Updated: Yes





- My Orders


Last 24 Hours: 


My Active Orders





02/27/19 17:00


Lactated Ringers [Ringers, Lactated] 1,000 ml IV ASDIRECTED 





02/28/19 04:00


Piperacillin/Tazobactam [Piperacil-Tazobact] 4.5 gm   Sodium Chloride 0.9% [

Normal Saline] 100 ml IV Q8H 





02/28/19 09:24


RT Aerosol Therapy [RC] ASDIRECTED 





02/28/19 14:29


Consult to Physician [CONS] Urgent 





02/28/19 14:31


Notify Provider Consults [RC] ASDIRECTED 





02/28/19 16:00


Levalbuterol HCl [Xopenex]   1.25 mg NEB QIDRT 





03/01/19 05:00


CBC WITH AUTO DIFF [HEME] DAILY 


LACTIC ACID [CHEM] DAILY 





03/01/19 08:00


CXR [Chest 1V Frontal] [CR] DAILY 





03/02/19 05:00


CBC WITH AUTO DIFF [HEME] DAILY 


LACTIC ACID [CHEM] DAILY 





03/02/19 08:00


CXR [Chest 1V Frontal] [CR] DAILY 





03/03/19 05:00


CBC WITH AUTO DIFF [HEME] DAILY 


LACTIC ACID [CHEM] DAILY 





03/03/19 08:00


CXR [Chest 1V Frontal] [CR] DAILY 





03/04/19 08:00


CXR [Chest 1V Frontal] [CR] DAILY 





03/05/19 08:00


CXR [Chest 1V Frontal] [CR] DAILY 














- Plan


Plan:: 


Assessment/Plan:


Acute:


UTI


   - 2/2 Urinary retention from neurogenic bladder 


   - UA is pos for E. Faecalis-sensitive to Quinolone and Gram Pos 

Coccobacillus (normal skinny per microbiology); Added Meropenem due to 

increasing WBC and persistently elevated CRP  


   - IV Antibiotics as above for coverage





Hypoalbuminemia


   - Albumin 1.2


   - Reason why she has significant peripheral edema


   - Dietary consult; receiving more protein 





Hypomagnesemia


   - MG 1.7--> 1.7


   - Replete and monitor





Sinus Tachycardia, Improved


   - HR in the teens


   - This could be her way of telling us she is in pain or in some kind of 

discomfort


   - PRN Ativan and Pain Medication


   - Responded better with CCB; Now on Cardizem 


   - Thyroid Panel l FT4 yordan; TSH slightly elevated


   


End of Life Care


   - Again we spoke to mother and sister at bedside; they understand "comfort 

measures" but they want treatment of Maureen's infection





Resolved:


S/p Anemia


   - Acute on Chronic


   - Hgb of 8.5 and Hct 29; baseline is 11-12 (December 2018)--> 6.9--> 10.8 S/

p 2 units of PRBC transfusion


   - Stool occult study-pending; she has not had a bowel movement 


   - Iron Panel: shows Anemia of Chronic Disease


   - Asked earlier if she wants blood transfusion; she said no. But changed her 

mind when her mom was at bedside.





S/p Constipation/Ileus


   - Has not had a bowel since the 18th


   - She is on narcotics and plenty of anticholinergics


   - KUB r/o SBO


   - Hold tube feeding for now


   - Rectal suppository BID until bowel movement; consider lactulose or 

magnesium--> had 3 bowel movements over night


   - Prokinetic agent since she is bedbound and immobile; d/c prokinetic agent 

and resume tube feeding





S/p Atelectasis/Increased Density Right Lung Base (likely chronic)


   - Carries a hx/o Dysphagia


   - Cannot r/o Aspiration Syndrome


   - Continue IV Zosyn and Levaquin; She is on H2B for aspiration prophylaxis


   - She is unable to use IS





S/p Bacteremia w/o Hypotension


   - 2/2 UTI due to neurogenic bladder from MS 


   - Carries a hx/o Multiple Urine and Blood Infections in the past  


   - Risk Factor: Advanced MS, AMS, Recurrent UTI and Aspiration Syndrome 2/2 

Dysphagia


   - Fever of 39 C; WBC of 14.22, HR of 119-128, BP of 92/34, 100/48, 101/58 

mmHg, RR 24-33


   - LA 6.2 --> 5.3 --> 5.6--> 3.1-->2.2; received a total of 3L of NS 

overnight plus 1L NS at 125 cc/hr but remained hypotensive overnight


   - Procalcitonin 5.44 (high risk progression to severe sepsis); WBC 28.6--> 

23.78; CRP 29.7-->31.4  


   - 1 set of bottle positive for Staph Coagulase Neg (possible contamination); 

repeat blood culture -pending  


   - Discontinue Solucortef 100 mg IV Q6H; She is now off Levophed drip


   - High pseudomonal risk due to recent multiple antibiotic use: Azithromycin 2 /12-16, Cipro 2/15-18, and Bactrim 2/18-25


   - Continue IV Levaquin and discontinue IV Zosyn and Vancomycin; hopefully we 

can de-escalate tomorrow


   - Discontinue Midodrine 5 mg po TIDAC and Florinef 0.1 mg po BID for 

Orthostatic/Autonomic Dysfunction


   - Repeat blood culture is negative





S/p Hypokalemia


   - K 3.0-->3.1--> 3.8


   - 2/2 diuretic use


   - Replete and monitor





Chronic:


Dysphagia


Urinary Retention


Back Pain


OA


Spasms


Osteoporosis


CVA/TIA


MS


Anemia of Chronic Disease


Seizure Disorder


Hx/o DVT/VTE


Alzheimer's Disease


DM2, BS controlled


Anxiety


Depression





Plan:


TF held, will likely resume at much lower volume keeping the same frequency. 


For example, 30cc at 60 cc/hr, give at :  0600, 1000, 1400, 1800, 2200 hour;  

Free H2O flushes pre/post TFs, 45 cc each totally 90 cc (total H2O).


Routine AM Labs


Continue IVF, maintaining UO 75-100cc/hr


Aspiration/Seizure/Fall Precaution


DVT/GI PPx: Lovenox SubQ/H2B


Additional orders as above


SW/CM for D/c planning


DNR/DNI/Comfort Measures





***LOS > 96hrs due to longer requirement for treatment; WBC going up, 

persistent tachycardia and CRP level. 


                                                                


                                                                                

********************


CT of thorax with contrast RUL/RLL infiltrate with pleural effusion; will 

restart Zosyn and assess ATBs accordingly.  Gen surg for assessment--reviewed 

images, see full consult. Patient has severe right sided hydronephrosis;  case 

discussed by Dr Tay with Dr Singer, urology, will need transfer to [facility] 3/1/

19 for probable nephrostomy tube.  Likely will be placed by IR.


                                                                                

*******************


AM Family meeting


Family meetings held in the am with patient's mother, Serene.  Duration was 15 

minutes.  Discussed radiology studies, change PE, need to address Ileus.


Patient's Mother wants continued care, wishes to proceed with general surgery 

consult as suggested.





PM Family meeting (duration per Dr Tay).


Dr Sprague with Serene, discussed recommendation for urology ie Ileus was mild.  

However right kidney needs to be decompressed, he subsequently called [facility], and 

spoke with Dr Singer as mentioned.  Plan of care discussed was IR procedure after 

transfer for nephrostomy tube.  Urology consult with Dr Singer, primary service 

would be the hospitalist service.  Patient is stable, will therefore transfer 

on Friday, 3/1/19.  ATBs Zosyn/Flagyl to be continued at transfer.

## 2019-02-28 NOTE — CT
CT abdomen and pelvis

 

Technique: Multiple axial sections were obtained from above the dome 

of the diaphragm inferiorly through the pubic symphysis.  Intravenous 

contrast was utilized.  No oral contrast is seen.

 

Findings: Right sided pleural effusion is seen.  Ascites is identified

 within the abdomen.  Focal fluid is around the lateral margin of the 

right sided liver, this fluid may be loculated.  Liver appears 

somewhat compressed by this fluid.

 

Multiple abnormalities are seen within the gallbladder most likely due

 to multiple gallstones.

 

Right kidney shows severe hydronephrosis.  Fluid collections are seen 

loculated off the right kidney presumably due to forniceal rupture.  

Multiple calcifications are seen within the kidney and outside the 

kidney.  Right ureter is markedly dilated which is caused by a distal 

large obstructing stone measuring 1.3 cm.  This occurs proximal to the

 UVJ.  Aguirre catheter is noted within the bladder.

 

Diffuse body wall edema is noted.  Mildly prominent small bowel loops 

are seen containing fluid which is most likely due to mild ileus or 

due to obstipation.  There is large amount of stool being seen within 

the right colon and transverse colon with lesser stool within the 

descending colon.

 

Left kidney shows no hydronephrosis or mass.  Adrenal glands are 

unremarkable.  Pancreas is normal.  Aorta shows atherosclerotic change

 without aneurysm.  No retroperitoneal adenopathy or mesenteric 

abnormalities are seen.

 

Delayed images show contrast within the left ureter.  No contrast 

excretion into the right ureter is seen.  Gastrostomy tube is noted.

 

Impression:

1.  Ascites within the abdomen and pelvis.  Possible loculated ascites

 lateral to the right side of the liver appearing to cause some 

compression of the liver.

2.  Numerous gallstones are felt to be present within the gallbladder.

3.  Very abnormal right kidney with multiple loculated areas of fluid 

around the kidney as well as multiple calcifications within and around

 the right kidney.  Markedly dilated right ureter is seen with large 

distal obstructing stone measuring 1.3 cm.  Findings highly suggestive

 of forniceal rupture within the kidney from this obstructing stone 

causing the fluid around the kidney and possibly the ascites.

4.  Diffuse body wall edema.

5.  Mildly prominent fluid-filled loops of small bowel either due to 

ileus or possibly obstipation as there is increased stool within the 

right and transverse colon.

 

Diagnostic code #3

## 2019-03-01 VITALS — SYSTOLIC BLOOD PRESSURE: 118 MMHG | DIASTOLIC BLOOD PRESSURE: 66 MMHG

## 2019-03-01 RX ADMIN — POTASSIUM CHLORIDE SCH MEQ: 1.5 SOLUTION ORAL at 08:07

## 2019-03-01 RX ADMIN — METRONIDAZOLE SCH MLS/HR: 500 SOLUTION INTRAVENOUS at 04:18

## 2019-03-01 RX ADMIN — METRONIDAZOLE SCH MLS/HR: 500 SOLUTION INTRAVENOUS at 12:23

## 2019-03-01 RX ADMIN — VITAMIN D, TAB 1000IU (100/BT) SCH UNITS: 25 TAB at 08:08

## 2019-03-01 RX ADMIN — LEVETIRACETAM SCH MG: 100 SOLUTION ORAL at 08:07

## 2019-03-01 RX ADMIN — Medication SCH MG: at 14:15

## 2019-03-01 RX ADMIN — DILTIAZEM HYDROCHLORIDE SCH MG: 240 CAPSULE, EXTENDED RELEASE ORAL at 08:08

## 2019-03-01 RX ADMIN — NYSTATIN SCH APPLIC: 100000 CREAM TOPICAL at 08:14

## 2019-03-01 RX ADMIN — Medication SCH MG: at 08:08

## 2019-03-01 NOTE — CR
Chest: Portable view of the chest was obtained.

 

Comparison: Prior chest x-ray of 02/27/19.

 

Right-sided pleural effusion is seen with right-sided parenchymal 

density.  Minimal atelectasis within the left base is seen.  Heart 

size is normal.

 

Impression: 

1.  Continued right-sided pleural effusion with right lower lobe lung 

consolidation.

2.  Small amount of atelectasis within the left base.

3.  No significant change from previous study is seen.

 

Diagnostic code #3

## 2019-03-01 NOTE — [id number]
**Discharge Summary





- Hospital Course


HPI Initial Comments: 





his is a 54 yo white female with past medical hx/o Dysphagia, Urinary Retention

, Back Pain, OA, Spasms, Osteoporosis, CVA/TIA, Advanced MS, Anemia, CKD, 

Seizure Disorder, Hx/o DVT/VTE, Alzheimer's Disease, DM2, Anxiety, and 

Depression who was brought in from [facility] due to febrile illness associated 

with decreased responsiveness in the past a couple of days. Per ED notes, ED 

staff were told that it is expected she will  but her mother did not want 

patient to die in the nursing home and would rather be brought here in the 

hospital. Dr. Valles also informed me that this patient is comfort measures.





Her initial work up in ED shows CBC remarkable for WBC of 14.22, RBC of 3.55, 

Hgb of 8.5, Hct of 29.1, MCH of 23.9, MCHC of 29.2, RDW of 51.9, Platelet of 833

, Neutrophils of 68% and Monocytes of 1%. Her ABG shows a pH of 7.61, pCO2 of 

20.5, pO2 of 64, HCO3 of 20.5 and O2 Sat of 97.1%. Her chemistry is significant 

for Sodium of 133, CO2 of 20, AG of 20, BUN of 29, Cr of 1.3, LA of 6.2 and Ca 

of 10.4, AST of 66, ALT of 101, Alk Phos of 147, Total Protein of 8.3, and 

Albumin of 1.2. Her UA is strongly suggestive of UTI. Her CXR shows atelectasis 

on the bases. Her BPs have been significantly low, with RR in the 20-30s, HR as 

high as 160 and a Temp of 39 C on presentation to ED.





Patient is being admitted for Sepsis secondary to UTI. She is DNR/DNI/Comfort 

Measures.





  


Diagnosis: Stroke: No





- Discharge Data


Discharge Date: 19


Discharge Disposition: DC/Tfer to Acute Hospital 02


Condition: Good





- Patient Summary/Data


Consults: 


 Consultations





19 07:10


Consult to Dietary [Consult to Dietician] [CONS] Routine 





19 14:29


Consult to Physician [CONS] Urgent 














- Patient Instructions


Diet: NPO


Diet, Other: TF as listed in A/P section


Activity: Bedrest


Driving: Do Not Drive


Showering/Bathing: No Showering


Notify Provider of: Fever, Increased Pain, Nausea and/or Vomiting





- Discharge Plan


*PRESCRIPTION DRUG MONITORING PROGRAM REVIEWED*: Not Applicable


*COPY OF PRESCRIPTION DRUG MONITORING REPORT IN PATIENT AILIN: Not Applicable


Prescriptions/Med Rec: 


Bisacodyl [Dulcolax] 10 mg RECTAL BID PRN #14 supp


 PRN Reason: Constipation


Home Medications: 


 Home Meds





tiZANidine HCl [Zanaflex] 2 mg GTUBE TID 01/12/15 [History]


levETIRAcetam [Levetiracetam] 10 ml GTUBE BID 17 [History]


Acetaminophen 650 mg GTUBE Q4H PRN 18 [History]


LORazepam [Ativan] 0.25 mg GTUBE DAILY 18 [History]


Atropine 1% [Atropine 1% Ophth Soln] 2 drop PO Q4HR PRN 19 [History]


Cholecalciferol (Vitamin D3) [Vitamin D3] 2,000 unit GTUBE DAILY 19 [

History]


FA/Lycopene/Lut/MV,Ca,Iron,Min [Centrum] 1 tab GTUBE BEDTIME 19 [History]


FLUoxetine [PROzac] 10 mg GTUBE DAILY 19 [History]


Lactulose 15 ml GTUBE DAILY PRN 19 [History]


Magnesium Oxide [Magnesium] 400 mg GTUBE BID 19 [History]


Morphine [Morphine 10 MG/0.5 ML Oral Syringe] 2 mg GTUBE Q2HR PRN 19 [

History]


Nystatin 15 gm TP BID 19 [History]


Ondansetron [Zofran] 8 mg GTUBE Q6H PRN 19 [History]


Potassium Chloride [Potassium Chloride Solution] 15 ml PO BID 19 [History]


Sennosides/Docusate Sodium [Senna Plus Tablet] 2 each GTUBE BID 19 [

History]


Bisacodyl [Dulcolax] 10 mg RECTAL BID PRN #14 supp 19 [Rx]


Diltiazem [Cardizem] 10 mg IVPUSH Q4HR PRN  sdv 19 [Rx]


Diltiazem [Dilacor XR] 240 mg PO DAILY  cap.er 19 [Rx]


Enoxaparin [Lovenox] 40 mg SUBCUT DAILY  syringe 19 [Rx]


LORazepam [Ativan] 0.5 mg IVPUSH Q4H PRN  vial 19 [Rx]


LORazepam [Ativan] 2 mg IVPUSH Q4H PRN  vial 19 [Rx]


Lactated Ringers [Ringers, Lactated] 50 ml IV ASDIRECTED #1000 bag 19 [Rx]


Lactose-Reduced Food/Fiber [Jevity 1.2 Antwan Liquid] 260 ml GTUBE 5XDAY #300  [Rx]


Levalbuterol HCl [Xopenex] 1.25 mg NEB QIDRT  neb 19 [Rx]


Metoclopramide [Reglan] 10 mg IVPUSH Q6H  sdv 19 [Rx]


Piperacillin/Tazobactam [Piperacil-Tazobact] 4.5 gm IV Q8H  adv 19 [Rx]


Saccharomyces Boulardii [Florastor] 250 mg PO TID  cap 19 [Rx]


Water 100 ml GTUBE TID #0 19 [Rx]


hydrALAZINE [Apresoline] 20 mg IVPUSH Q4H PRN  sdv 19 [Rx]


metroNIDAZOLE/Normal Saline [Flagyl 500 MG in  ML] 500 mg IV Q8H  bag  [Rx]








Oxygen Therapy Mode: Room Air


Patient Handouts:  Urosepsis, Percutaneous Nephrostomy


Referrals: 


Olaf Ely MD [Primary Care Provider] - 





- Discharge Summary/Plan Comment


DC Time >30 min.: Yes


Discharge Summary/Plan Comment: 





Plan:: 


Assessment/Plan:


Acute:


UTI


   - 2/2 Urinary retention from neurogenic bladder 


   - UA is pos for E. Faecalis-sensitive to Quinolone and Gram Pos 

Coccobacillus (normal skinny per microbiology); Added Meropenem due to 

increasing WBC and persistently elevated CRP  


   - IV Antibiotics as above for coverage





Hypoalbuminemia


   - Albumin 1.2


   - Reason why she has significant peripheral edema


   - Dietary consult; receiving more protein 





Hypomagnesemia


   - MG 1.7--> 1.7


   - Replete and monitor





Sinus Tachycardia, Improved


   - HR in the teens


   - This could be her way of telling us she is in pain or in some kind of 

discomfort


   - PRN Ativan and Pain Medication


   - Responded better with CCB; Now on Cardizem 


   - Thyroid Panel l FT4 yordan; TSH slightly elevated


   


End of Life Care


   - Again we spoke to mother and sister at bedside; they understand "comfort 

measures" but they want treatment of Maureen's infection





Resolved:


S/p Anemia


   - Acute on Chronic


   - Hgb of 8.5 and Hct 29; baseline is 11- (2018)--> 6.9--> 10.8 S/

p 2 units of PRBC transfusion


   - Stool occult study-pending; she has not had a bowel movement 


   - Iron Panel: shows Anemia of Chronic Disease


   - Asked earlier if she wants blood transfusion; she said no. But changed her 

mind when her mom was at bedside.





S/p Constipation/Ileus


   - Has not had a bowel since the 


   - She is on narcotics and plenty of anticholinergics


   - KUB r/o SBO


   - Hold tube feeding for now


   - Rectal suppository BID until bowel movement; consider lactulose or 

magnesium--> had 3 bowel movements over night


   - Prokinetic agent since she is bedbound and immobile; d/c prokinetic agent 

and resume tube feeding





S/p Atelectasis/Increased Density Right Lung Base (likely chronic)


   - Carries a hx/o Dysphagia


   - Cannot r/o Aspiration Syndrome


   - Continue IV Zosyn and Levaquin; She is on H2B for aspiration prophylaxis


   - She is unable to use IS





S/p Bacteremia w/o Hypotension


   - 2/2 UTI due to neurogenic bladder from MS 


   - Carries a hx/o Multiple Urine and Blood Infections in the past  


   - Risk Factor: Advanced MS, AMS, Recurrent UTI and Aspiration Syndrome 2/2 

Dysphagia


   - Fever of 39 C; WBC of 14.22, HR of 119-128, BP of 92/34, 100/48, 101/58 

mmHg, RR 24-33


   - LA 6.2 --> 5.3 --> 5.6--> 3.1-->2.2; received a total of 3L of NS 

overnight plus 1L NS at 125 cc/hr but remained hypotensive overnight


   - Procalcitonin 5.44 (high risk progression to severe sepsis); WBC 28.6--> 

23.78; CRP 29.7-->31.4  


   - 1 set of bottle positive for Staph Coagulase Neg (possible contamination); 

repeat blood culture -pending  


   - Discontinue Solucortef 100 mg IV Q6H; She is now off Levophed drip


   - High pseudomonal risk due to recent multiple antibiotic use: Azithromycin -, Cipro 2/15-, and Bactrim -


   - Continue IV Levaquin and discontinue IV Zosyn and Vancomycin; hopefully we 

can de-escalate tomorrow


   - Discontinue Midodrine 5 mg po TIDAC and Florinef 0.1 mg po BID for 

Orthostatic/Autonomic Dysfunction


   - Repeat blood culture is negative





S/p Hypokalemia


   - K 3.0-->3.1--> 3.8


   -  diuretic use


   - Replete and monitor





Chronic:


Dysphagia


Urinary Retention


Back Pain


OA


Spasms


Osteoporosis


CVA/TIA


MS


Anemia of Chronic Disease


Seizure Disorder


Hx/o DVT/VTE


Alzheimer's Disease


DM2, BS controlled


Anxiety


Depression





Plan:


TF held, will likely resume at much lower volume keeping the same frequency. 


For example, 30cc at 60 cc/hr, give at :  0600, 1000, 1400, 1800, 2200 hour;  

Free H2O flushes pre/post TFs, 45 cc each totally 90 cc (total H2O).


Routine AM Labs


Continue IVF, maintaining UO 75-100cc/hr


Aspiration/Seizure/Fall Precaution


DVT/GI PPx: Lovenox SubQ/H2B


Additional orders as above


SW/CM for D/c planning


DNR/DNI/Comfort Measures





***LOS > 96hrs due to longer requirement for treatment; WBC going up, 

persistent tachycardia and CRP level. 


                                                                


                                                                                

********************


CT of thorax with contrast RUL/RLL infiltrate with pleural effusion; will 

restart Zosyn and assess ATBs accordingly.  Gen surg for assessment--reviewed 

images, see full consult. Patient has severe right sided hydronephrosis;  case 

discussed by Dr Tay with Dr Singer, urology, will need transfer to [facility] 3/1/

19 for probable nephrostomy tube.  Likely will be placed by IR.


                                                                                

*******************


AM Family meeting


Family meetings held in the am with patient's mother, Serene.  Duration was 15 

minutes.  Discussed radiology studies, change PE, need to address Ileus.


Patient's Mother wants continued care, wishes to proceed with general surgery 

consult as suggested.





PM Family meeting (duration per Dr Tay).


Dr Sprague with Serene, discussed recommendation for urology ie Ileus was mild.  

However right kidney needs to be decompressed, he subsequently called [facility], and 

spoke with Dr Singer as mentioned.  Plan of care discussed was IR procedure after 

transfer for nephrostomy tube.  Urology consult with Dr Singer, primary service 

would be the hospitalist service.  Patient is stable, will therefore transfer 

on Friday, 3/1/19.  ATBs Zosyn/Flagyl to be continued at transfer.




















- General Info


Date of Service: 19





- Patient Data


Vitals - Most Recent: 


 Last Vital Signs











Temp  36.4 C   19 00:00


 


Pulse  109 H  19 04:00


 


Resp  15   19 04:00


 


BP  116/82   19 04:00


 


Pulse Ox  96   19 04:00











Weight - Most Recent: 77.7 kg


I&O - Last 24 hours: 


 Intake & Output











 19





 22:59 06:59 14:59


 


Intake Total 985 893 


 


Output Total 950 300 


 


Balance 35 593 











Lab Results - Last 24 hrs: 


 Laboratory Results - last 24 hr











  19 Range/Units





  06:33 07:15 07:35 


 


WBC  21.82 H    (3.98-10.04)  K/mm3


 


RBC  4.90    (3.98-5.22)  M/mm3


 


Hgb  12.5    (11.2-15.7)  gm/L


 


Hct  41.8    (34.1-44.9)  %


 


MCV  85.3    (79.4-94.8)  fl


 


MCH  25.5 L    (25.6-32.2)  pg


 


MCHC  29.9 L    (32.2-35.5)  g/dl


 


RDW Std Deviation  59.7 H    (36.4-46.3)  fL


 


Plt Count  460 H    (182-369)  K/mm3


 


MPV  10.1    (9.4-12.3)  fl


 


Neut % (Auto)  74.8 H    (34.0-71.1)  %


 


Lymph % (Auto)  15.2 L    (19.3-51.7)  %


 


Mono % (Auto)  6.2    (4.7-12.5)  %


 


Eos % (Auto)  1.5    (0.7-5.8)  


 


Baso % (Auto)  0.2    (0.1-1.2)  %


 


Neut # (Auto)  16.32 H    (1.56-6.13)  K/mm3


 


Lymph # (Auto)  3.32    (1.18-3.74)  K/mm3


 


Mono # (Auto)  1.35 H    (0.24-0.36)  K/mm3


 


Eos # (Auto)  0.32    (0.04-0.36)  K/mm3


 


Baso # (Auto)  0.05    (0.01-0.08)  K/mm3


 


Manual Slide Review  Abnormal smear    


 


Puncture Site     


 


ABG pH     (7.35-7.45)  


 


ABG pCO2     (35.0-45.0)  mmHg


 


ABG pO2     (80.0-100.0)  mmHg


 


ABG HCO3     (22.0-26.0)  meq/L


 


ABG O2 Saturation     (96.0-97.0)  %


 


ABG Base Excess     (-2-2.0)  


 


Angel Test     


 


A-a Gradient     mmHg


 


O2 Delivery Device     


 


Oxygen Flow Rate     


 


FiO2     (21..00)  %


 


Sodium   143   (136-145)  mEq/L


 


Potassium   4.4   (3.5-5.1)  mEq/L


 


Chloride   106   ()  mEq/L


 


Carbon Dioxide   27   (21-32)  mEq/L


 


Anion Gap   14.4   (5-15)  


 


BUN   32 H   (7-18)  mg/dL


 


Creatinine   0.9   (0.55-1.02)  mg/dL


 


Est Cr Clr Drug Dosing   63.55   mL/min


 


Estimated GFR (MDRD)   > 60   (>60)  mL/min


 


BUN/Creatinine Ratio   35.6 H   (14-18)  


 


Glucose   122 H   ()  mg/dL


 


Lactic Acid    3.0 H  (0.4-2.0)  mmol/L


 


Calcium   11.6 H   (8.5-10.1)  mg/dL


 


Magnesium   2.1   (1.8-2.4)  mg/dl


 


Total Bilirubin     (0.2-1.0)  mg/dL


 


C-Reactive Protein   11.7 H*   (<1.0)  mg/dL


 


Amylase     ()  U/L


 


Lipase     ()  U/L














  19 Range/Units





  07:35 09:58 06:10 


 


WBC    16.13 H  (3.98-10.04)  K/mm3


 


RBC    4.09  (3.98-5.22)  M/mm3


 


Hgb    10.5 L  (11.2-15.7)  gm/L


 


Hct    35.8  (34.1-44.9)  %


 


MCV    87.5  (79.4-94.8)  fl


 


MCH    25.7  (25.6-32.2)  pg


 


MCHC    29.3 L  (32.2-35.5)  g/dl


 


RDW Std Deviation    60.7 H  (36.4-46.3)  fL


 


Plt Count    546 H  (182-369)  K/mm3


 


MPV    9.4  (9.4-12.3)  fl


 


Neut % (Auto)    75.9 H  (34.0-71.1)  %


 


Lymph % (Auto)    15.2 L  (19.3-51.7)  %


 


Mono % (Auto)    7.2  (4.7-12.5)  %


 


Eos % (Auto)    0.3 L  (0.7-5.8)  


 


Baso % (Auto)    0.2  (0.1-1.2)  %


 


Neut # (Auto)    12.25 H  (1.56-6.13)  K/mm3


 


Lymph # (Auto)    2.45  (1.18-3.74)  K/mm3


 


Mono # (Auto)    1.16 H  (0.24-0.36)  K/mm3


 


Eos # (Auto)    0.05  (0.04-0.36)  K/mm3


 


Baso # (Auto)    0.03  (0.01-0.08)  K/mm3


 


Manual Slide Review     


 


Puncture Site   Lt radial   


 


ABG pH   7.47 H   (7.35-7.45)  


 


ABG pCO2   40.0   (35.0-45.0)  mmHg


 


ABG pO2   63.0 L   (80.0-100.0)  mmHg


 


ABG HCO3   29.0 H   (22.0-26.0)  meq/L


 


ABG O2 Saturation   92.8 L   (96.0-97.0)  %


 


ABG Base Excess   5.3 H   (-2-2.0)  


 


Angel Test   Positive   


 


A-a Gradient   41   mmHg


 


O2 Delivery Device   Nasal cannula   


 


Oxygen Flow Rate   2.0   


 


FiO2   24.00   (21..00)  %


 


Sodium     (136-145)  mEq/L


 


Potassium     (3.5-5.1)  mEq/L


 


Chloride     ()  mEq/L


 


Carbon Dioxide     (21-32)  mEq/L


 


Anion Gap     (5-15)  


 


BUN     (7-18)  mg/dL


 


Creatinine     (0.55-1.02)  mg/dL


 


Est Cr Clr Drug Dosing     mL/min


 


Estimated GFR (MDRD)     (>60)  mL/min


 


BUN/Creatinine Ratio     (14-18)  


 


Glucose     ()  mg/dL


 


Lactic Acid     (0.4-2.0)  mmol/L


 


Calcium     (8.5-10.1)  mg/dL


 


Magnesium     (1.8-2.4)  mg/dl


 


Total Bilirubin  0.4    (0.2-1.0)  mg/dL


 


C-Reactive Protein     (<1.0)  mg/dL


 


Amylase  41    ()  U/L


 


Lipase  137    ()  U/L














  19 Range/Units





  06:10 


 


WBC   (3.98-10.04)  K/mm3


 


RBC   (3.98-5.22)  M/mm3


 


Hgb   (11.2-15.7)  gm/L


 


Hct   (34.1-44.9)  %


 


MCV   (79.4-94.8)  fl


 


MCH   (25.6-32.2)  pg


 


MCHC   (32.2-35.5)  g/dl


 


RDW Std Deviation   (36.4-46.3)  fL


 


Plt Count   (182-369)  K/mm3


 


MPV   (9.4-12.3)  fl


 


Neut % (Auto)   (34.0-71.1)  %


 


Lymph % (Auto)   (19.3-51.7)  %


 


Mono % (Auto)   (4.7-12.5)  %


 


Eos % (Auto)   (0.7-5.8)  


 


Baso % (Auto)   (0.1-1.2)  %


 


Neut # (Auto)   (1.56-6.13)  K/mm3


 


Lymph # (Auto)   (1.18-3.74)  K/mm3


 


Mono # (Auto)   (0.24-0.36)  K/mm3


 


Eos # (Auto)   (0.04-0.36)  K/mm3


 


Baso # (Auto)   (0.01-0.08)  K/mm3


 


Manual Slide Review   


 


Puncture Site   


 


ABG pH   (7.35-7.45)  


 


ABG pCO2   (35.0-45.0)  mmHg


 


ABG pO2   (80.0-100.0)  mmHg


 


ABG HCO3   (22.0-26.0)  meq/L


 


ABG O2 Saturation   (96.0-97.0)  %


 


ABG Base Excess   (-2-2.0)  


 


Angel Test   


 


A-a Gradient   mmHg


 


O2 Delivery Device   


 


Oxygen Flow Rate   


 


FiO2   (21..00)  %


 


Sodium   (136-145)  mEq/L


 


Potassium   (3.5-5.1)  mEq/L


 


Chloride   ()  mEq/L


 


Carbon Dioxide   (21-32)  mEq/L


 


Anion Gap   (5-15)  


 


BUN   (7-18)  mg/dL


 


Creatinine   (0.55-1.02)  mg/dL


 


Est Cr Clr Drug Dosing   mL/min


 


Estimated GFR (MDRD)   (>60)  mL/min


 


BUN/Creatinine Ratio   (14-18)  


 


Glucose   ()  mg/dL


 


Lactic Acid  2.4 H  (0.4-2.0)  mmol/L


 


Calcium   (8.5-10.1)  mg/dL


 


Magnesium   (1.8-2.4)  mg/dl


 


Total Bilirubin   (0.2-1.0)  mg/dL


 


C-Reactive Protein   (<1.0)  mg/dL


 


Amylase   ()  U/L


 


Lipase   ()  U/L











SURAJ Results - Last 24 hrs: 


 Microbiology











 19 06:03 Aerobic Blood Culture - Preliminary





 Blood - Venous - Lab Draw    NO GROWTH AFTER 4 DAYS





 Anaerobic Blood Culture - Preliminary





    NO GROWTH AFTER 4 DAYS


 


 19 15:50 Aerobic Blood Culture - Preliminary





 Blood - Venous    NO GROWTH AFTER 4 DAYS





 Anaerobic Blood Culture - Final


 


 19 07:15 Aerobic Blood Culture - Final





 Blood - Venous - Lab Draw    NO GROWTH AFTER 7 DAYS





 Anaerobic Blood Culture - Final


 


 19 07:28 Aerobic Blood Culture - Final





 Blood - Venous    NO GROWTH AFTER 7 DAYS





 Anaerobic Blood Culture - Final





    Staphylococcus Coagulase Neg











Med Orders - Current: 


 Current Medications





Acetaminophen (Tylenol)  650 mg GTUBE Q4H PRN


   PRN Reason: Pain/Fever


   Last Admin: 19 00:46 Dose:  650 mg


Atropine Sulfate (Atropine 1% Ophth Soln)  0 ml SL Q4H PRN


   PRN Reason: extra secretions


Bisacodyl (Dulcolax)  10 mg RECTAL BID PRN


   PRN Reason: Constipation


   Last Admin: 19 15:50 Dose:  10 mg


Cholecalciferol (Vitamin D3)  2,000 units GTUBE DAILY [facility]


   Last Admin: 19 08:06 Dose:  2,000 units


Diltiazem HCl (Cardizem)  10 mg IVPUSH Q4HR PRN


   PRN Reason: Tachycardia


   Last Admin: 19 05:37 Dose:  10 mg


Diltiazem HCl (Dilacor Xr)  240 mg PO DAILY [facility]


   Last Admin: 19 08:07 Dose:  240 mg


Enoxaparin Sodium (Lovenox)  40 mg SUBCUT DAILY [facility]


   Last Admin: 19 08:08 Dose:  40 mg


Fluoxetine HCl (Prozac)  10 mg GTUBE DAILY [facility]


   Last Admin: 19 08:07 Dose:  10 mg


Hydralazine HCl (Apresoline)  20 mg IVPUSH Q4H PRN


   PRN Reason: Hypertension


   Last Admin: 19 04:56 Dose:  20 mg


Norepinephrine Bitartrate 4 mg (/ Dextrose/Water)  250 mls @ 7.5 mls/hr IV 

TITRATE [facility]; Protocol


   Last Titration: 19 15:33 Dose:  0 mcg/min, 0 mls/hr


Lactated Ringer's (Ringers, Lactated)  1,000 mls @ 50 mls/hr IV ASDIRECTED [facility]


   Last Admin: 19 01:02 Dose:  50 mls/hr


Piperacillin Sod/Tazobactam (Sod 4.5 gm/ Sodium Chloride)  100 mls @ 25 mls/hr 

IV Q8H [facility]


   Last Admin: 19 04:19 Dose:  25 mls/hr


Metronidazole 500 mg/ Premix  100 mls @ 100 mls/hr IV Q8H [facility]


   Last Admin: 19 04:18 Dose:  100 mls/hr


Lactulose (Cephulac)  10 gm GTUBE DAILY PRN


   PRN Reason: Other


Levalbuterol HCl (Xopenex)  1.25 mg NEB QIDRT [facility]


   Last Admin: 19 06:52 Dose:  1.25 mg


Levetiracetam (Keppra)  1,000 mg GTUBE BID [facility]


   Last Admin: 19 21:14 Dose:  1,000 mg


Lorazepam (Ativan)  0.25 mg GTUBE DAILY [facility]


   Last Admin: 19 08:07 Dose:  0.25 mg


Lorazepam (Ativan)  2 mg IVPUSH Q4H PRN


   PRN Reason: Seizures


Lorazepam (Ativan)  0.5 mg IVPUSH Q4H PRN; Protocol


   PRN Reason: Anxiety


   Last Admin: 19 06:22 Dose:  0.5 mg


Magnesium Oxide (Magnesium Oxide)  400 mg GTUBE BID [facility]


   Last Admin: 19 21:17 Dose:  400 mg


Metoclopramide HCl (Reglan)  10 mg IVPUSH Q6H [facility]


   Last Admin: 19 04:18 Dose:  10 mg


Morphine Sulfate (Morphine 10 Mg/0.5 Ml Oral Syringe)  2 mg .XX Q2HR PRN


   PRN Reason: Pain


   Last Admin: 19 17:33 Dose:  2 mg


Multivitamins (Thera)  1 each .XX BEDTIME [facility]


   Last Admin: 19 21:16 Dose:  1 each


Nystatin (Nystatin Crm)  0 gm TOP BID [facility]


   Last Admin: 19 21:19 Dose:  1 applic


Ondansetron HCl (Zofran Odt)  8 mg GTUBE Q6H PRN


   PRN Reason: Nausea


Oxycodone/Acetaminophen (Percocet 325-5 Mg)  1 tab PO Q4H PRN


   PRN Reason: Pain (moderate 4-6)


   Last Admin: 19 03:58 Dose:  1 tab


Potassium Chloride (Potassium Chloride Solution)  60 meq PO BID [facility]


   Last Admin: 19 21:14 Dose:  60 meq


Saccharomyces Boulardii (Florastor)  250 mg PO TID [facility]


   Last Admin: 19 21:16 Dose:  250 mg


Senna/Docusate Sodium (Senna Plus)  2 tab GTUBE BID [facility]


   Last Admin: 19 21:16 Dose:  2 tab


Tizanidine HCl (Zanaflex)  2 mg GTUBE TID [facility]


   Last Admin: 19 21:17 Dose:  2 mg





Discontinued Medications





Bumetanide (Bumex)  0.5 mg IVPUSH ONETIME ONE


   Stop: 19 08:01


   Last Admin: 19 08:59 Dose:  0.5 mg


Bumetanide (Bumex)  0.5 mg IVPUSH BID [facility]


   Stop: 19 21:01


   Last Admin: 19 20:43 Dose:  0.5 mg


Diltiazem HCl (Cardizem)  10 mg IVPUSH ONETIME ONE


   Stop: 19 15:30


   Last Admin: 19 15:47 Dose:  10 mg


Diltiazem HCl (Cardizem Cd)  180 mg PO DAILY [facility]


   Last Admin: 19 09:22 Dose:  180 mg


Diltiazem HCl (Cardizem Cd)  120 mg PO ONETIME ONE


   Stop: 19 21:01


   Last Admin: 19 21:20 Dose:  120 mg


Enoxaparin Sodium (Lovenox)  40 mg SUBCUT BEDTIME [facility]


   Last Admin: 19 00:14 Dose:  Not Given


Famotidine (Pepcid)  20 mg PO BID [facility]


   Last Admin: 19 08:06 Dose:  20 mg


Fludrocortisone Acetate (Florinef)  0.1 mg PO BIDMEALS [facility]


   Last Admin: 19 06:00 Dose:  0.1 mg


Furosemide (Lasix)  40 mg IVPUSH NOW ONE


   Stop: 19 21:14


   Last Admin: 19 21:23 Dose:  40 mg


Furosemide (Lasix)  20 mg IVPUSH BIDDIURETIC [facility]


   Stop: 19 14:01


   Last Admin: 19 14:19 Dose:  20 mg


Hydrochlorothiazide (Hydrochlorothiazide)  25 mg PO ONETIME ONE


   Stop: 19 21:15


   Last Admin: 19 21:23 Dose:  25 mg


Hydrochlorothiazide (Hydrochlorothiazide)  12.5 mg PO BIDDIURETIC [facility]


   Stop: 19 21:00


   Last Admin: 19 05:07 Dose:  12.5 mg


Hydrocortisone Sodium Succinate (Solu-Cortef)  100 mg IVPUSH Q6H [facility]


   Last Admin: 19 00:14 Dose:  Not Given


Hydrocortisone Sodium Succinate (Solu-Cortef)  100 mg IVPUSH Q6H [facility]


   Last Admin: 19 12:25 Dose:  100 mg


Piperacillin Sod/Tazobactam (Sod 4.5 gm/ Sodium Chloride)  100 mls @ 25 mls/hr 

IV ONETIME STA


   Stop: 19 10:34


   Last Admin: 19 07:18 Dose:  25 mls/hr


Sodium Chloride (Normal Saline)  1,000 mls @ 999 mls/hr IV ONETIME ONE


   Stop: 19 07:35


   Last Admin: 19 07:18 Dose:  999 mls/hr


Dextrose/Sodium Chloride (Dextrose 5%-Normal Saline)  1,000 mls @ 75 mls/hr IV 

ASDIRECTED [facility]


   Last Admin: 19 09:44 Dose:  75 mls/hr


Sodium Chloride (Normal Saline)  2,000 mls @ 999 mls/hr IV ONETIME ONE


   Stop: 19 18:23


   Last Admin: 19 17:16 Dose:  999 mls/hr


Sodium Chloride (Normal Saline) Confirm Administered Dose 1,000 mls @ as 

directed .ROUTE .STK-MED ONE


   Stop: 19 17:32


   Last Admin: 19 17:40 Dose:  Not Given


Sodium Chloride (Normal Saline)  1,000 mls @ 125 mls/hr IV ASDIRECTED [facility]


   Last Infusion: 19 07:00 Dose:  15 mls/hr


Levofloxacin/Dextrose 500 mg/ (Premix)  100 mls @ 100 mls/hr IV Q24H [facility]


   Last Admin: 19 20:17 Dose:  100 mls/hr


Piperacillin Sod/Tazobactam (Sod 4.5 gm/ Sodium Chloride)  100 mls @ 25 mls/hr 

IV Q8H [facility]


   Last Admin: 19 04:03 Dose:  25 mls/hr


Vancomycin HCl 1 gm/ Sodium (Chloride)  250 mls @ 250 mls/hr IV Q12H [facility]


   Last Admin: 19 01:13 Dose:  Not Given


Sodium Chloride (Normal Saline)  1,000 mls @ 15 mls/hr IV ASDIRECTED [facility]


   Last Admin: 19 21:25 Dose:  15 mls/hr


Sodium Chloride (Normal Saline) Confirm Administered Dose 250 mls @ as directed 

.ROUTE .STK-MED ONE


   Stop: 19 16:52


   Last Admin: 19 17:05 Dose:  200 mls/hr


Potassium Chloride 10 meq/ (Premix)  100 mls @ 100 mls/hr IV Q1H [facility]


   Stop: 19 14:59


   Last Admin: 19 13:49 Dose:  100 mls/hr


Magnesium Sulfate 2 gm/ Premix  50 mls @ 25 mls/hr IV ONETIME ONE


   Stop: 19 09:59


   Last Admin: 19 09:54 Dose:  25 mls/hr


Sodium Chloride (Normal Saline)  1,000 mls @ 75 mls/hr IV ASDIRECTED [facility]


   Last Infusion: 19 21:00 Dose:  25 mls/hr


Meropenem/Sodium Chloride 500 (mg/ Premix)  50 mls @ 100 mls/hr IV Q6H [facility]


   Last Admin: 19 14:19 Dose:  100 mls/hr


Sodium Chloride (Normal Saline)  1,000 mls @ 25 mls/hr IV ASDIRECTED [facility]


Lactated Ringer's (Ringers, Lactated)  1,000 mls @ 100 mls/hr IV ASDIRECTED [facility]


   Stop: 19 17:00


   Last Admin: 19 11:26 Dose:  100 mls/hr


Piperacillin Sod/Tazobactam (Sod 4.5 gm/ Sodium Chloride)  100 mls @ 200 mls/hr 

IV ONETIME ONE


   Stop: 19 20:29


   Last Admin: 19 20:19 Dose:  200 mls/hr


Iopamidol (Isovue-300 (61%))  100 ml IVPUSH ONETIME ONE


   Stop: 19 15:26


   Last Admin: 19 15:41 Dose:  100 ml


Iopamidol (Isovue-300 (61%))  100 ml IVPUSH ONETIME ONE


   Stop: 19 09:42


   Last Admin: 19 09:50 Dose:  100 ml


Levalbuterol HCl (Xopenex)  1.25 mg NEB QID [facility]


Lorazepam (Ativan)  1 mg IVPUSH ONETIME ONE


   Stop: 19 09:27


   Last Admin: 19 10:16 Dose:  1 mg


Magnesium Oxide (Magnesium Oxide)  800 mg GTUBE ONETIME ONE


   Stop: 19 12:01


   Last Admin: 19 11:13 Dose:  800 mg


Magnesium Sulfate (Pharmacy To Dose - Magnesium Replacement)  1 dose .XX 

ASDIRECTED [facility]


Metoclopramide HCl (Reglan)  5 mg IVPUSH Q6H [facility]


   Last Admin: 19 12:25 Dose:  5 mg


Metoclopramide HCl (Reglan)  10 mg IVPUSH Q6H [facility]


   Last Admin: 19 05:05 Dose:  Not Given


Metoprolol Tartrate (Lopressor)  5 mg IVPUSH Q4H PRN


   PRN Reason: Tachycardia


   Last Admin: 19 03:20 Dose:  5 mg


Metoprolol Tartrate (Lopressor)  25 mg PO Q12H [facility]


   Last Admin: 19 20:20 Dose:  25 mg


Metoprolol Tartrate (Lopressor)  25 mg PO ONETIME ONE


   Stop: 19 14:06


   Last Admin: 19 14:13 Dose:  25 mg


Midodrine (Midodrine)  5 mg PO TIDAC [facility]


   Stop: 19 07:01


   Last Admin: 19 06:34 Dose:  5 mg


Midodrine (Midodrine)  5 mg PO TIDAC [facility]


   Last Admin: 19 17:09 Dose:  5 mg


Midodrine (Midodrine)  5 mg PO TIDAC [facility]


Non-Formulary Medication (Lactose-Reduced Food/Fiber [Jevity 1.2 Antwan Liquid])  

260 ml GTUBE 5XDAY [facility]


   Last Admin: 19 11:31 Dose:  Not Given


Non-Formulary Medication (Water [Water])  100 ml GTUBE TID [facility]


   Last Admin: 19 10:25 Dose:  Not Given


Potassium Chloride (Potassium Chloride Solution)  20 meq PO BID [facility]


   Last Admin: 19 20:52 Dose:  20 meq


Potassium Chloride (Pharmacy To Dose - Potassium Replacement)  1 dose .XX 

ASDIRECTED [facility]


Potassium Chloride (Potassium Chloride Solution)  20 meq PO BID [facility]


   Last Admin: 19 20:43 Dose:  20 meq


Potassium Chloride (Potassium Chloride Solution)  40 meq PO BID [facility]


Potassium Chloride (Klor-Con M20)  60 meq PO ONETIME ONE


   Stop: 19 09:01


   Last Admin: 19 08:57 Dose:  60 meq


Potassium Chloride (Potassium Chloride Solution)  40 meq PO BID [facility]


   Last Admin: 19 21:59 Dose:  Not Given


Sodium Chloride (Saline Flush)  10 ml FLUSH ONETIME PRN


   PRN Reason: IV FLUSH


   Stop: 19 12:00


Vancomycin HCl (Pharmacy To Dose - Vancomycin)  0 dose .XX ASDIRECTED PRN


   PRN Reason: RX TO DOSE VANCOMYCIN

## 2021-07-21 NOTE — EDM.PDOC
ED HPI GENERAL MEDICAL PROBLEM





- General


Chief Complaint: Possible Sepsis


Stated Complaint: [facility]


Time Seen by Provider: 07/21/21 20:57


Source of Information: Reports: RN Notes Reviewed


History Limitations: Reports: Physical Impairment (Patient unresponsive)





- History of Present Illness


INITIAL COMMENTS - FREE TEXT/NARRATIVE: 





Ms. Farley is an unfortunate 55-year-old woman who is now brought to the ED by 

EMS from [facility] with a report that she was found unresponsive 

in bed by nursing home staff, with the last known check about 30 to 40 minutes 

prior.  Her BP was 60/40, and it was noted that her skin was mottled.  The 

nursing home staff reported to EMS that the patient's urine output via her 

chronic indwelling Aguirre had been low today.





We are told that the patient is DNR/DNI, and that she was recently removed from 

hospice, as she was "getting better".





Here in the ED, the patient's initial vitals find her to be hypotensive at 75/48

with tachycardia 122 bpm.  She is afebrile, saturating 93% on 2 L of oxygen per 

nasal cannula.  She is unresponsive.





Recent review of systems is unobtainable. PMHx/PSHx/SocHx per prior medical 

records.








The patient's PCP is Dr. Olaf Ely.











- Related Data


                                    Allergies











Allergy/AdvReac Type Severity Reaction Status Date / Time


 


No Known Allergies Allergy   Verified 07/21/21 21:03











Home Meds: 


                                    Home Meds





LORazepam [Ativan ORAL Concentrate 1MG/0.5 ML U/D] 0.5 mg GTUBE TID 07/22/21 

[History]


Morphine [Morphine 10 MG/5 ML] 5 mg GTUBE ASDIRECTED PRN 07/22/21 [History]











Past Medical History


Cardiovascular History: Reports: Blood Clots/VTE/DVT


Genitourinary History: Reports: Retention, Urinary (chronic indwelling Aguirre 

catheter)


Musculoskeletal History: Reports: Osteoarthritis, Osteoporosis


Neurological History: Reports: Alzheimers Disease, CVA, MS, Seizure, TIA


Psychiatric History: Reports: Anxiety, Depression, Other (See Below) (Insomnia)


Endocrine/Metabolic History: Reports: Diabetes, Type II





- Infectious Disease History


Infectious Disease History: Reports: Extended Spectrum Beta-Lactamase (ESBL)





- Past Surgical History


Endocrine Surgical History: Reports: None


Other Endocrine Surgeries/Procedures: anemia, hypokalemia, eosinophilia, 

hypomagnesium


Musculoskeletal Surgical History: Reports: None


Other Musculoskeletal Surgeries/Procedures:: contractures of muscles





Social & Family History





- Tobacco Use


Second Hand Smoke Exposure: No





- Living Situation & Occupation


Living situation: Reports: Extended Care Facility ([facility] nursing [place])


Occupation: Disabled





ED ROS GENERAL





- Review of Systems


Review Of Systems: Unable To Obtain


Reason Not Obtained: Patient's condition





ED EXAM, SEPSIS





- Physical Exam


Exam: See Below


Exam Limited By: Physical Impairment (patient unresponsive)


General Appearance: Obtunded


Eye Exam: Bilateral Eye: Other (pupils pinpoint)


Ears: Normal External Exam


Nose: Other (Cool nose:forehead)


Throat/Mouth: Normal Inspection, Normal Lips, No Airway Compromise


Head: Atraumatic, Normocephalic


Neck: Normal Inspection


Respiratory/Chest: No Respiratory Distress, Lungs Clear, Normal Breath Sounds 

(limited exam due to patient not taking deep breaths), No Accessory Muscle Use


Cardiovascular: Normal Peripheral Pulses, No Gallop, No JVD, No Murmur, No Rub, 

Tachycardia (regular)


Peripheral Pulses: 1+: Radial (L), Radial (R)


GI/Abdominal Exam: Normal Bowel Sounds, Soft, No Organomegaly, No Distention, No

 Abnormal Bruit, No Mass, Other (PEG tube site C/D/I)


Back: Normal Inspection, Full Range of Motion, NT


Extremities: Normal Range of Motion, Mottled (x 4), Other (Cool knee:thigh)


Neurological: Unresponsive


Skin: Dry, Intact, No Rash, Cool, Mottled


  ** #1 Interpretation


EKG Date: 07/21/21


Time: 21:55


Rhythm: Other (Sinus tachycardia)


Rate (Beats/Min): 103


Axis: Normal


P-Wave: Present


QRS: Other (Early transition)


ST-T: Normal


QT: Normal


Comparison: Change From Previous EKG (Normal transition 2/21/2019)





Course





- Vital Signs


Last Recorded V/S: 


                                Last Vital Signs











Temp  35.9 C L  07/22/21 09:54


 


Pulse  88   07/22/21 09:07


 


Resp  16   07/22/21 06:33


 


BP  90/47 L  07/22/21 09:07


 


Pulse Ox  98   07/22/21 09:07














- Orders/Labs/Meds


Orders: 


                               Active Orders 24 hr











 Category Date Time Status


 


 Blood Pressure Mgt: Sepsis [RC] [id number] Care  07/21/21 21:12 Active


 


 Insert Aguirre Catheter [Insert Urinary Catheter] [OM.PC] Care  07/21/21 21:15 

Ordered





 Q24H   


 


 Urinary Catheter Assessment [RC] ASDIRECTED Care  07/21/21 21:16 Active


 


 Urinary Catheter Removal [RC] PER UNIT ROUTINE Care  07/21/21 21:15 Active


 


 BLOOD CULTURE [MREF] Stat Lab  07/21/21 21:50 Received


 


 CULTURE URINE [MREF] Stat Lab  07/21/21 22:19 Received


 


 Lactated Ringers [Ringers, Lactated] 1,000 ml Med  07/21/21 21:15 Active





 IV ASDIRECTED   


 


 Norepinephrine [Levophed] 4 mg Med  07/22/21 06:00 Active





 Dextrose 5% in Water 246 ml   





 IV TITRATE   


 


 Sodium Chloride 0.9% [Normal Saline] 1,000 ml Med  07/22/21 04:45 Active





 IV ASDIRECTED   


 


 Sodium Chloride 0.9% [Saline Flush] Med  07/21/21 21:08 Active





 10 ml FLUSH ASDIRECTED PRN   


 


 Blood Culture x2 Reflex Set [OM.PC] Stat Oth  07/21/21 21:09 Ordered


 


 Pulse Oximetry Continuous Monitoring [OM.PC] Routine Oth  07/21/21 21:08 Active


 


 Saline Lock Insert [OM.PC] Stat Oth  07/21/21 21:09 Ordered


 


 EKG 12 Lead [EK] Stat Ther  07/21/21 21:08 Ordered








                                Medication Orders





Acetaminophen (Acetaminophen 325 Mg Tab)  650 mg GTUBE Q4H PRN


   PRN Reason: Pain/Fever


Atropine Sulfate (Atropine 1% Ophth Soln 5 Ml Bottle)  0 ml SL Q4H PRN


   PRN Reason: extra secretions


Fluoxetine HCl (Fluoxetine 10 Mg Cap)  10 mg GTUBE DAILY YOLIS


Heparin Sodium (Porcine) (Heparin Sodium 5,000 Units/Ml Vial)  5,000 units 

SUBCUT Q8H YOLIS


Lactated Ringer's (Ringers, Lactated)  1,000 mls @ 999 mls/hr IV ASDIRECTED YOLIS


   Last Admin: 07/21/21 21:24  Dose: 999 mls/hr


   Documented by: TEJAL


Sodium Chloride (Normal Saline)  1,000 mls @ 125 mls/hr IV ASDIRECTED YOLIS


   Last Admin: 07/22/21 04:49  Dose: 125 mls/hr


   Documented by: TEJAL


Norepinephrine Bitartrate 4 mg (/ Dextrose/Water)  250 mls @ 18.75 mls/hr IV 

TITRATE YOLIS; Protocol


   Last Admin: 07/22/21 06:14  Dose: 5 mcg/min, 18.75 mls/hr


   Documented by: TEJAL


Levofloxacin/Dextrose 250 mg/ (Premix)  50 mls @ 50 mls/hr IV Q24H YOLIS


Lactulose (Lactulose Soln 10 Gm/15 Ml 30 Ml Ud Cup)  10 gm GTUBE DAILY PRN


   PRN Reason: constipation


Levetiracetam (Levetiracetam Soln 500 Mg/5 Ml Cup)  1,000 mg GTUBE BID YOLIS


Lorazepam (Lorazepam 0.5 Mg Tab)  0.5 mg GTUBE TID YOLIS


Morphine Sulfate (Morphine 10 Mg/0.5 Ml Oral Syringe)  5 mg GTUBE Q30M PRN


   PRN Reason: moderate pain/SOB


Morphine Sulfate (Morphine 2 Mg/Ml Syringe)  2 mg IVPUSH Q2H PRN


   PRN Reason: Pain


   Last Admin: 07/22/21 11:13  Dose: 2 mg


   Documented by: LISSET


Ondansetron HCl (Ondansetron 4 Mg Tab.Dis)  4 mg GTUBE Q6H PRN


   PRN Reason: Nausea


Senna/Docusate Sodium (Docusate Sodium/Sennosides 50-8.6 Mg Tab)  2 tab GTUBE 

BID YOLIS


Sodium Chloride (Sodium Chloride 0.9% 10 Ml Syringe)  10 ml FLUSH ASDIRECTED PRN


   PRN Reason: Keep Vein Open


   Last Admin: 07/21/21 21:24  Dose: 10 ml


   Documented by: TEJAL


Tizanidine HCl (Tizanidine 4 Mg Tab)  2 mg GTUBE TID [facility]








Labs: 


                                Laboratory Tests











  07/21/21 07/21/21 07/21/21 Range/Units





  21:24 21:50 21:50 


 


WBC    17.33 H  (3.98-10.04)  K/mm3


 


RBC    3.96 L  (3.98-5.22)  M/mm3


 


Hgb    12.2  D  (11.2-15.7)  gm/dl


 


Hct    35.9  (34.1-44.9)  %


 


MCV    90.7  D  (79.4-94.8)  fl


 


MCH    30.8  (25.6-32.2)  pg


 


MCHC    34.0  (32.2-35.5)  g/dl


 


RDW Std Deviation    56.5 H  (36.4-46.3)  fL


 


Plt Count    215  D  (182-369)  K/mm3


 


MPV    10.8  (9.4-12.3)  fl


 


Neutrophils % (Manual)    83 H  (40-60)  %


 


Band Neutrophils %    4  (0-10)  %


 


Lymphocytes % (Manual)    6 L  (20-40)  %


 


Atypical Lymphs %    0  %


 


Monocytes % (Manual)    7  (2-10)  %


 


Eosinophils % (Manual)    0 L  (0.7-5.8)  %


 


Basophils % (Manual)    0 L  (0.1-1.2)  


 


Platelet Estimate    Adequate  


 


Anisocytosis    1+ slight  


 


RBC Morph Comment    Not Reportable  


 


Puncture Site  Lt radial    


 


ABG pH  7.35    (7.35-7.45)  


 


ABG pCO2  52.4 H    (35.0-45.0)  mmHg


 


ABG pO2  21.0 L*    (80.0-100.0)  mmHg


 


ABG HCO3  27.8 H    (22.0-26.0)  meq/L


 


ABG O2 Saturation  23.2 L    (96.0-97.0)  %


 


ABG Base Excess  1.8    (-2-2.0)  


 


Angel Test  Positive    


 


A-a Gradient  113    mmHg


 


O2 Delivery Device  Nasal cannula    


 


Oxygen Flow Rate  2.0    


 


FiO2  28.00    (21..00)  %


 


Blood Gas Comments  Mixed venous    


 


Sodium   135 L   (136-145)  mEq/L


 


Potassium   4.6   (3.5-5.1)  mEq/L


 


Chloride   97 L   ()  mEq/L


 


Carbon Dioxide   29   (21-32)  mEq/L


 


Anion Gap   13.6   (5-15)  


 


BUN   40 H   (7-18)  mg/dL


 


Creatinine   2.3 H D   (0.55-1.02)  mg/dL


 


Est Cr Clr Drug Dosing   TNP   


 


Estimated GFR (MDRD)   22   (>60)  mL/min


 


BUN/Creatinine Ratio   17.4   (14-18)  


 


Glucose   146 H   (70-99)  mg/dL


 


Lactic Acid     (0.4-2.0)  mmol/L


 


Calcium   10.1   (8.5-10.1)  mg/dL


 


Magnesium     (1.8-2.4)  mg/dL


 


Total Bilirubin   0.5   (0.2-1.0)  mg/dL


 


AST   17   (15-37)  U/L


 


ALT   25   (14-59)  U/L


 


Alkaline Phosphatase   79   ()  U/L


 


Troponin I   < 0.017   (0.00-0.056)  ng/mL


 


C-Reactive Protein   26.2 H*   (<1.0)  mg/dL


 


Total Protein   7.0   (6.4-8.2)  g/dl


 


Albumin   2.4 L   (3.4-5.0)  g/dl


 


Globulin   4.6   gm/dL


 


Albumin/Globulin Ratio   0.5 L   (1-2)  


 


Urine Color     (Yellow)  


 


Urine Appearance     (Clear)  


 


Urine pH     (5.0-8.0)  


 


Ur Specific Gravity     (1.005-1.030)  


 


Urine Protein     (Negative)  


 


Urine Glucose (UA)     (Negative)  


 


Urine Ketones     (Negative)  


 


Urine Occult Blood     (Negative)  


 


Urine Nitrite     (Negative)  


 


Urine Bilirubin     (Negative)  


 


Urine Urobilinogen     (0.2-1.0)  


 


Ur Leukocyte Esterase     (Negative)  


 


Urine RBC     (0-5)  /hpf


 


Urine WBC     (0-5)  /hpf


 


Ur Squamous Epith Cells     (0-5)  /hpf


 


Triple Phos Crystals     (NONE)  /hpf


 


Amorphous Sediment     (NOT SEEN)  /hpf


 


Urine Bacteria     (FEW)  /hpf


 


Urine Mucus     (FEW)  /hpf


 


SARS-CoV-2 RNA (MARY BETH)     (NEGATIVE)  














  07/21/21 07/21/21 07/21/21 Range/Units





  21:50 21:50 22:19 


 


WBC     (3.98-10.04)  K/mm3


 


RBC     (3.98-5.22)  M/mm3


 


Hgb     (11.2-15.7)  gm/dl


 


Hct     (34.1-44.9)  %


 


MCV     (79.4-94.8)  fl


 


MCH     (25.6-32.2)  pg


 


MCHC     (32.2-35.5)  g/dl


 


RDW Std Deviation     (36.4-46.3)  fL


 


Plt Count     (182-369)  K/mm3


 


MPV     (9.4-12.3)  fl


 


Neutrophils % (Manual)     (40-60)  %


 


Band Neutrophils %     (0-10)  %


 


Lymphocytes % (Manual)     (20-40)  %


 


Atypical Lymphs %     %


 


Monocytes % (Manual)     (2-10)  %


 


Eosinophils % (Manual)     (0.7-5.8)  %


 


Basophils % (Manual)     (0.1-1.2)  


 


Platelet Estimate     


 


Anisocytosis     


 


RBC Morph Comment     


 


Puncture Site     


 


ABG pH     (7.35-7.45)  


 


ABG pCO2     (35.0-45.0)  mmHg


 


ABG pO2     (80.0-100.0)  mmHg


 


ABG HCO3     (22.0-26.0)  meq/L


 


ABG O2 Saturation     (96.0-97.0)  %


 


ABG Base Excess     (-2-2.0)  


 


Angel Test     


 


A-a Gradient     mmHg


 


O2 Delivery Device     


 


Oxygen Flow Rate     


 


FiO2     (21..00)  %


 


Blood Gas Comments     


 


Sodium     (136-145)  mEq/L


 


Potassium     (3.5-5.1)  mEq/L


 


Chloride     ()  mEq/L


 


Carbon Dioxide     (21-32)  mEq/L


 


Anion Gap     (5-15)  


 


BUN     (7-18)  mg/dL


 


Creatinine     (0.55-1.02)  mg/dL


 


Est Cr Clr Drug Dosing     


 


Estimated GFR (MDRD)     (>60)  mL/min


 


BUN/Creatinine Ratio     (14-18)  


 


Glucose     (70-99)  mg/dL


 


Lactic Acid  4.0 H*    (0.4-2.0)  mmol/L


 


Calcium     (8.5-10.1)  mg/dL


 


Magnesium   2.0   (1.8-2.4)  mg/dL


 


Total Bilirubin     (0.2-1.0)  mg/dL


 


AST     (15-37)  U/L


 


ALT     (14-59)  U/L


 


Alkaline Phosphatase     ()  U/L


 


Troponin I     (0.00-0.056)  ng/mL


 


C-Reactive Protein     (<1.0)  mg/dL


 


Total Protein     (6.4-8.2)  g/dl


 


Albumin     (3.4-5.0)  g/dl


 


Globulin     gm/dL


 


Albumin/Globulin Ratio     (1-2)  


 


Urine Color    Yellow  (Yellow)  


 


Urine Appearance    Turbid H  (Clear)  


 


Urine pH    8.5 H  (5.0-8.0)  


 


Ur Specific Gravity    1.015  (1.005-1.030)  


 


Urine Protein    3+ H  (Negative)  


 


Urine Glucose (UA)    Negative  (Negative)  


 


Urine Ketones    Negative  (Negative)  


 


Urine Occult Blood    3+ H  (Negative)  


 


Urine Nitrite    Negative  (Negative)  


 


Urine Bilirubin    Negative  (Negative)  


 


Urine Urobilinogen    0.2  (0.2-1.0)  


 


Ur Leukocyte Esterase    3+ H  (Negative)  


 


Urine RBC    50-75 H  (0-5)  /hpf


 


Urine WBC    40-50 H  (0-5)  /hpf


 


Ur Squamous Epith Cells    0-5  (0-5)  /hpf


 


Triple Phos Crystals    Few H  (NONE)  /hpf


 


Amorphous Sediment    Few H  (NOT SEEN)  /hpf


 


Urine Bacteria    Many H  (FEW)  /hpf


 


Urine Mucus    Few  (FEW)  /hpf


 


SARS-CoV-2 RNA (MARY BETH)     (NEGATIVE)  














  07/21/21 07/22/21 Range/Units





  22:25 01:03 


 


WBC    (3.98-10.04)  K/mm3


 


RBC    (3.98-5.22)  M/mm3


 


Hgb    (11.2-15.7)  gm/dl


 


Hct    (34.1-44.9)  %


 


MCV    (79.4-94.8)  fl


 


MCH    (25.6-32.2)  pg


 


MCHC    (32.2-35.5)  g/dl


 


RDW Std Deviation    (36.4-46.3)  fL


 


Plt Count    (182-369)  K/mm3


 


MPV    (9.4-12.3)  fl


 


Neutrophils % (Manual)    (40-60)  %


 


Band Neutrophils %    (0-10)  %


 


Lymphocytes % (Manual)    (20-40)  %


 


Atypical Lymphs %    %


 


Monocytes % (Manual)    (2-10)  %


 


Eosinophils % (Manual)    (0.7-5.8)  %


 


Basophils % (Manual)    (0.1-1.2)  


 


Platelet Estimate    


 


Anisocytosis    


 


RBC Morph Comment    


 


Puncture Site    


 


ABG pH    (7.35-7.45)  


 


ABG pCO2    (35.0-45.0)  mmHg


 


ABG pO2    (80.0-100.0)  mmHg


 


ABG HCO3    (22.0-26.0)  meq/L


 


ABG O2 Saturation    (96.0-97.0)  %


 


ABG Base Excess    (-2-2.0)  


 


Angel Test    


 


A-a Gradient    mmHg


 


O2 Delivery Device    


 


Oxygen Flow Rate    


 


FiO2    (21..00)  %


 


Blood Gas Comments    


 


Sodium    (136-145)  mEq/L


 


Potassium    (3.5-5.1)  mEq/L


 


Chloride    ()  mEq/L


 


Carbon Dioxide    (21-32)  mEq/L


 


Anion Gap    (5-15)  


 


BUN    (7-18)  mg/dL


 


Creatinine    (0.55-1.02)  mg/dL


 


Est Cr Clr Drug Dosing    


 


Estimated GFR (MDRD)    (>60)  mL/min


 


BUN/Creatinine Ratio    (14-18)  


 


Glucose    (70-99)  mg/dL


 


Lactic Acid   3.5 H*  (0.4-2.0)  mmol/L


 


Calcium    (8.5-10.1)  mg/dL


 


Magnesium    (1.8-2.4)  mg/dL


 


Total Bilirubin    (0.2-1.0)  mg/dL


 


AST    (15-37)  U/L


 


ALT    (14-59)  U/L


 


Alkaline Phosphatase    ()  U/L


 


Troponin I    (0.00-0.056)  ng/mL


 


C-Reactive Protein    (<1.0)  mg/dL


 


Total Protein    (6.4-8.2)  g/dl


 


Albumin    (3.4-5.0)  g/dl


 


Globulin    gm/dL


 


Albumin/Globulin Ratio    (1-2)  


 


Urine Color    (Yellow)  


 


Urine Appearance    (Clear)  


 


Urine pH    (5.0-8.0)  


 


Ur Specific Gravity    (1.005-1.030)  


 


Urine Protein    (Negative)  


 


Urine Glucose (UA)    (Negative)  


 


Urine Ketones    (Negative)  


 


Urine Occult Blood    (Negative)  


 


Urine Nitrite    (Negative)  


 


Urine Bilirubin    (Negative)  


 


Urine Urobilinogen    (0.2-1.0)  


 


Ur Leukocyte Esterase    (Negative)  


 


Urine RBC    (0-5)  /hpf


 


Urine WBC    (0-5)  /hpf


 


Ur Squamous Epith Cells    (0-5)  /hpf


 


Triple Phos Crystals    (NONE)  /hpf


 


Amorphous Sediment    (NOT SEEN)  /hpf


 


Urine Bacteria    (FEW)  /hpf


 


Urine Mucus    (FEW)  /hpf


 


SARS-CoV-2 RNA (MARY BETH)  Negative   (NEGATIVE)  











Meds: 


Medications











Generic Name Dose Route Start Last Admin





  Trade Name Freq  PRN Reason Stop Dose Admin


 


Acetaminophen  650 mg  07/22/21 08:24 





  Acetaminophen 325 Mg Tab  GTUBE  





  Q4H PRN  





  Pain/Fever  


 


Atropine Sulfate  0 ml  07/22/21 08:24 





  Atropine 1% Ophth Soln 5 Ml Bottle  SL  





  Q4H PRN  





  extra secretions  


 


Fluoxetine HCl  10 mg  07/22/21 09:00 





  Fluoxetine 10 Mg Cap  GTUBE  





  DAILY YOLIS  


 


Heparin Sodium (Porcine)  5,000 units  07/22/21 08:30 





  Heparin Sodium 5,000 Units/Ml Vial  SUBCUT  





  Q8H YOLIS  


 


Lactated Ringer's  1,000 mls @ 999 mls/hr  07/21/21 21:15  07/21/21 21:24





  Ringers, Lactated  IV   999 mls/hr





  ASDIRECTED YOLIS   Administration


 


Sodium Chloride  1,000 mls @ 125 mls/hr  07/22/21 04:45  07/22/21 04:49





  Normal Saline  IV   125 mls/hr





  ASDIRECTED YOLIS   Administration


 


Norepinephrine Bitartrate 4 mg  250 mls @ 18.75 mls/hr  07/22/21 06:00  07/22/21

 06:14





  / Dextrose/Water  IV   5 mcg/min





  TITRATE YOLIS   18.75 mls/hr





    Administration





  Protocol  





  5 MCG/MIN  


 


Levofloxacin/Dextrose 250 mg/  50 mls @ 50 mls/hr  07/23/21 23:30 





  Premix  IV  





  Q24H YOLIS  


 


Lactulose  10 gm  07/22/21 08:24 





  Lactulose Soln 10 Gm/15 Ml 30 Ml Ud Cup  GTUBE  





  DAILY PRN  





  constipation  


 


Levetiracetam  1,000 mg  07/22/21 09:00 





  Levetiracetam Soln 500 Mg/5 Ml Cup  GTUBE  





  BID YOLIS  


 


Lorazepam  0.5 mg  07/22/21 09:00 





  Lorazepam 0.5 Mg Tab  GTUBE  





  TID YOLIS  


 


Morphine Sulfate  5 mg  07/22/21 08:58 





  Morphine 10 Mg/0.5 Ml Oral Syringe  GTUBE  





  Q30M PRN  





  moderate pain/SOB  


 


Morphine Sulfate  2 mg  07/22/21 11:00  07/22/21 11:13





  Morphine 2 Mg/Ml Syringe  IVPUSH   2 mg





  Q2H PRN   Administration





  Pain  


 


Ondansetron HCl  4 mg  07/22/21 08:59 





  Ondansetron 4 Mg Tab.Dis  GTUBE  





  Q6H PRN  





  Nausea  


 


Senna/Docusate Sodium  2 tab  07/22/21 09:00 





  Docusate Sodium/Sennosides 50-8.6 Mg Tab  GTUBE  





  BID YOLIS  


 


Sodium Chloride  10 ml  07/21/21 21:08  07/21/21 21:24





  Sodium Chloride 0.9% 10 Ml Syringe  FLUSH   10 ml





  ASDIRECTED PRN   Administration





  Keep Vein Open  


 


Tizanidine HCl  2 mg  07/22/21 09:00 





  Tizanidine 4 Mg Tab  GTUBE  





  TID YOLIS  














Discontinued Medications














Generic Name Dose Route Start Last Admin





  Trade Name Freq  PRN Reason Stop Dose Admin


 


Lactated Ringer's  1,000 mls @ 999 mls/hr  07/21/21 22:00  07/21/21 22:00





  Ringers, Lactated  IV  07/21/21 23:00  999 mls/hr





  .BOLUS ONE   Administration


 


Levofloxacin/Dextrose  Confirm  07/21/21 23:30  07/22/21 01:18





  Levaquin In D5w 750 Mg/150 Ml  Administered  07/21/21 23:31  Not Given





  Dose  





  150 mls @ as directed  





  IV  





  .STK-MED ONE  


 


Levofloxacin/Dextrose 750 mg/  150 mls @ 100 mls/hr  07/21/21 23:25  07/21/21 

23:30





  Premix  IV  07/22/21 00:54  100 mls/hr





  ONETIME STA   Administration


 


Morphine Sulfate  5 mg  07/22/21 01:49  07/22/21 02:04





  Morphine 4 Mg/Ml Syringe  IVPUSH  07/22/21 01:50  5 mg





  ONETIME STA   Administration


 


Non-Formulary Medication  850 ml  07/22/21 09:00 





  Lactose-Reduced Food/Fiber [Jevity 1.5 Antwan Liquid]  GTUBE  





  DAILY YOLIS  














- Re-Assessments/Exams


Free Text/Narrative Re-Assessment/Exam: 





07/21/21 21:39


As above, the patient is brought from [facility] nursing [place] after being found 

unresponsive in bed, with a BP of 60/40 and mottled skin.  The nursing home 

reported a low urine output via her chronic indwelling Aguirre today.  Here in the

ED, she is hypotensive and tachycardic, saturating 93% on 2 L of oxygen per 

nasal cannula.  She does indeed have mottled skin, with a cool nose-forehead and

knee-thigh, strongly suggestive of sepsis.  I have ordered a work-up that 

includes numerous blood tests, 2 sets of blood cultures, an ABG, a urinalysis 

via a new Aguirre catheter, a urine culture, a swab for the SARS-CoV-2 virus, a 

portable chest x-ray, and an ECG.  In the meantime, the patient will be given a 

1 L bolus of LR, however, if her blood pressure does not begin to improve soon, 

I will start Levophed.





Of note, the patient is a DNR/DNI.  We are told that she was recently removed 

from hospice, because she was "getting better".  [facility] is going to see if 

she can reach the family to see what their wishes are.








07/21/21 23:20


Portable chest radiograph reviewed.  Less than optimal inspiratory effort.  The 

cardiac silhouette is within normal limits.  No pulmonary vascular congestion.  

No pleural effusions seen on this AP view.  No focal infiltrate.  No 

pneumothorax.  Formal read per the Radiologist pending.





The patient's CBC is remarkable for leukocytosis of 17.33, with 4% bandemia, and

the remainder of her CBC being unremarkable.


Her CMP is remarkable for a BUN/Cr elevated at 40/2.3, and mild hyperglycemia of

146, with remainder of her CMP being unremarkable.


Her magnesium level is within normal limits at 2.0.


Her lactic acid level is elevated at 4.0.


Her CRP is significantly elevated at 26.2.


Her troponin is undetectably low.


Her ABG is likely a venous draw, representing a primary respiratory acidosis wit

h secondary metabolic alkalosis.





Reviewing prior labs, the patient's BUN/Cr were 29/0.9 on 3/1/2019.





Based on the above, I have ordered Levaquin 750 mg IVPB.








07/21/21 23:39


The patient's urinalysis is remarkable for turbid appearance, 3+ occult blood 

with 50-75 RBCs, 3+ leukocyte esterase with 40-50 WBCs, nitrate negative with 

many bacteria, and 0-5 squamous epithelial cells.








07/22/2021 02:30


The patient's repeat lactic acid has improved somewhat, now 3.5.








07/22/21 06:00


The patient was given 500 mL of IV fluid per EMS, and has received 2 full liters

here, plus 125 ml/hr after that.  For the most part, her MAP has been between 

60-65, however, at this time, it is down to 59, with a BP of 80/48, with a HR of

115 bpm.  I have therefore ordered norepinephrine (Levophed) to be started at 5 

mcg/min.








07/22/21 06:32


Notified by [facility] that we will have an ICU bed available sometime this 

morning.





Case discussed with Dr. Ranjit Waddell, Hospitalist, at 06:29.  He accepted the 

patient for admission to the ICU, once a bed becomes available.








07/22/21 08:15


Notified that some family members will be here in about 2 hours, and that Dr. Perez will talk to them at that time about the possibility of returning the 

patient to hospice.  The patient will remain in the ED until then.











Departure





- Departure


Time of Disposition: 06:30


Disposition: Admitted As Inpatient 64


Condition: Serious


Clinical Impression: 


 Septic shock, Pyelonephritis, Acute renal insufficiency








- Discharge Information


*PRESCRIPTION DRUG MONITORING PROGRAM REVIEWED*: Not Applicable


*COPY OF PRESCRIPTION DRUG MONITORING REPORT IN PATIENT AILIN: Not Applicable





Sepsis Event Note (ED)





- Evaluation


Sepsis Screening Result: Possible Severe Sepsis Risk





- Focused Exam


Vital Signs: 


                                   Vital Signs











  Pulse Resp BP Pulse Ox


 


 07/22/21 07:38  112 H   92/52 L  92 L


 


 07/22/21 06:33  103 H  16  108/51 L  95


 


 07/22/21 06:27  96  16  80/51 L  92 L


 


 07/22/21 04:29  122 H  18  91/54 L  92 L


 


 07/22/21 04:22  123 H   85/62 L  95


 


 07/22/21 03:35  122 H  18  82/39 L  93 L














- My Orders


Last 24 Hours: 


My Active Orders





07/21/21 21:08


Sodium Chloride 0.9% [Saline Flush]   10 ml FLUSH ASDIRECTED PRN 


Pulse Oximetry Continuous Monitoring [OM.PC] Routine 


EKG 12 Lead [EK] Stat 





07/21/21 21:09


Blood Culture x2 Reflex Set [OM.PC] Stat 


Saline Lock Insert [OM.PC] Stat 





07/21/21 21:12


Blood Pressure Mgt: Sepsis [RC] [id number] 





07/21/21 21:15


Insert Aguirre Catheter [Insert Urinary Catheter] [OM.PC] Q24H 


Urinary Catheter Removal [RC] PER UNIT ROUTINE 


Lactated Ringers [Ringers, Lactated] 1,000 ml IV ASDIRECTED 





07/21/21 21:16


Urinary Catheter Assessment [RC] ASDIRECTED 





07/21/21 21:50


BLOOD CULTURE [MREF] Stat 





07/21/21 22:19


CULTURE URINE [MREF] Stat 





07/22/21 04:45


Sodium Chloride 0.9% [Normal Saline] 1,000 ml IV ASDIRECTED 





07/22/21 06:00


Norepinephrine [Levophed] 4 mg   Dextrose 5% in Water 246 ml IV TITRATE 














- Assessment/Plan


Last 24 Hours: 


My Active Orders





07/21/21 21:08


Sodium Chloride 0.9% [Saline Flush]   10 ml FLUSH ASDIRECTED PRN 


Pulse Oximetry Continuous Monitoring [OM.PC] Routine 


EKG 12 Lead [EK] Stat 





07/21/21 21:09


Blood Culture x2 Reflex Set [OM.PC] Stat 


Saline Lock Insert [OM.PC] Stat 





07/21/21 21:12


Blood Pressure Mgt: Sepsis [RC] [id number] 





07/21/21 21:15


Insert Aguirre Catheter [Insert Urinary Catheter] [OM.PC] Q24H 


Urinary Catheter Removal [RC] PER UNIT ROUTINE 


Lactated Ringers [Ringers, Lactated] 1,000 ml IV ASDIRECTED 





07/21/21 21:16


Urinary Catheter Assessment [RC] ASDIRECTED 





07/21/21 21:50


BLOOD CULTURE [MREF] Stat 





07/21/21 22:19


CULTURE URINE [MREF] Stat 





07/22/21 04:45


Sodium Chloride 0.9% [Normal Saline] 1,000 ml IV ASDIRECTED 





07/22/21 06:00


Norepinephrine [Levophed] 4 mg   Dextrose 5% in Water 246 ml IV TITRATE

## 2021-07-22 NOTE — [id number]
**Discharge Summary





- Hospital Course


HPI Initial Comments: 





The patient was admitted secondary to septic shock from urinary source.


Diagnosis: Stroke: No





- Discharge Data


Discharge Date: 07/22/21


Discharge Disposition: DC/[facility]fer to [facility]


Condition: Poor





- Referral to Home Health


Primary Care Physician: 


Olaf Ely MD








- Discharge Diagnosis/Problem(s)


(1) Septic shock


SNOMED Code(s): 46057251


   ICD Code: A41.9 - SEPSIS, UNSPECIFIED ORGANISM; R65.21 - SEVERE SEPSIS WITH S

EPTIC SHOCK   Status: Acute   Priority: High   Current Visit: Yes   Problem 

Details: Possible urinary source, on pressors   





(2) UTI (urinary tract infection)


SNOMED Code(s): 09554244


   ICD Code: N39.0 - URINARY TRACT INFECTION, SITE NOT SPECIFIED   Status: Acute

  Priority: High   Current Visit: Yes   Problem Details: Pre-existing catheter. 

 


Qualifiers: 


   Urinary tract infection type: site unspecified   Hematuria presence: with 

hematuria   Qualified Code(s): N39.0 - Urinary tract infection, site not 

specified; R31.9 - Hematuria, unspecified; R31.9 - Hematuria, unspecified   





(3) Sepsis


SNOMED Code(s): 13657754


   ICD Code: A41.9 - SEPSIS, UNSPECIFIED ORGANISM   Status: Acute   Priority: 

High   Current Visit: Yes   


Qualifiers: 


   Sepsis type: sepsis due to unspecified organism   Sepsis acute organ 

dysfunction status: with acute organ dysfunction   Severe sepsis acute organ 

dysfunction type: unspecified   Severe sepsis shock status: with septic shock   

Qualified Code(s): A41.9 - Sepsis, unspecified organism; R65.21 - Severe sepsis 

with septic shock   





(4) Urinary catheter infection


SNOMED Code(s): 742657498


   ICD Code: T83.51XA -    Status: Acute   Priority: High   Current Visit: Yes  

Problem Details: Pre-existing catheter on admission   


Qualifiers: 


   Encounter type: initial encounter   Qualified Code(s): T83.518A - Infection 

and inflammatory reaction due to other urinary catheter, initial encounter   





(5) Tachycardia


SNOMED Code(s): 4955009


   ICD Code: R00.0 - TACHYCARDIA, UNSPECIFIED   Status: Acute   Priority: High  

Current Visit: Yes   





(6) Multiple sclerosis


SNOMED Code(s): 97248494


   ICD Code: G35 - MULTIPLE SCLEROSIS   Status: Chronic   Priority: Medium   

Current Visit: Yes   





(7) Dementia


SNOMED Code(s): 43605151


   ICD Code: F03.90 - UNSPECIFIED DEMENTIA WITHOUT BEHAVIORAL DISTURBANCE   

Status: Chronic   Priority: Medium   Current Visit: Yes   


Qualifiers: 


   Dementia type: Alzheimer's   Alzheimer's disease onset: early-onset   

Dementia behavioral disturbance: without behavioral disturbance   Qualified 

Code(s): G30.0 - Alzheimer's disease with early onset; F02.80 - Dementia in 

other diseases classified elsewhere without behavioral disturbance   





- Patient Summary/Data


Consults: 


                                  Consultations





07/22/21 08:20


Consult to Dietician [CONS] Routine 











Hospital Course: 





The patient is a 55-year-old lady with advanced dementia along with essentially 

end-stage multiple sclerosis has been brought from nursing home to the emergency

 department after being found unresponsive.  Prior to presentation the patient's

 blood pressure was 60/40 and her skin was mottled.  The patient is essentially 

nonverbal and hard able to participate in any meaningful way with her history 

and physical.  As per sepsis protocol and shock the patient was treated 

aggressively initially with IV fluids, antibiotics and pressors.  Long 

discussion was held with the patient's mother, Serene and her brother Maddy 

and they were under the impression that the nursing home was not to transfer 

patient to the hospital.  The patient is chronically ill with essentially end-

stage multiple sclerosis along with dementia.  The patient's family has also 

said that in the past the patient says that she is "ready to go" and does not 

want any heroic measures.  The family had agreed that comfort measures should be

 maintained for the patient and hospice will be contacted.  I ordered that the 

patient have all intervention ceased except for morphine and Ativan as needed to

 help keep the patient comfortable.  Pressors have also been discontinued.  The 

patient will be discharged and transported back to her nursing home where she 

can be kept comfortable until end-of-life.  Further, it is recommended that the 

patient not be transported back to the hospital when she becomes unresponsive.  

Overall the patient's prognosis is very poor.  Comfort measures only.





- Patient Instructions


Diet: NPO


Diet, Other: PEG





- Discharge Plan


*PRESCRIPTION DRUG MONITORING PROGRAM REVIEWED*: Not Applicable


*COPY OF PRESCRIPTION DRUG MONITORING REPORT IN PATIENT AILIN: Not Applicable


Home Medications: 


                                    Home Meds





RX: LORazepam [Ativan ORAL Concentrate 1MG/0.5 ML U/D] 0.5 mg GTUBE TID 07/22/21

[History]


RX: Morphine [Morphine 10 MG/5 ML] 5 mg GTUBE ASDIRECTED PRN 07/22/21 [History]








Other Amb Orders: 


Comfort Measures [OM.PC] Location: None Selected


Oxygen Therapy Mode: Room Air


Forms:  ED Department Discharge


Referrals: 


lOaf Ely MD [Primary Care Provider] - 





- Discharge Summary/Plan Comment


DC Time >30 min.: Yes





- General Info


Date of Service: 07/22/21


Admission Dx/Problem (Free Text: 


Septic shock with likely urinary source.


Subjective Update: 





Not able to respond.  Had a discussion to send the patient back to nursing home 

with family.


Functional Status: Reports: Pain Controlled





- Review of Systems


Systems Review Comment: 





The patient is not reliably able to provide review of systems.





- Patient Data


Vitals - Most Recent: 


                                Last Vital Signs











Temp  35.9 C L  07/22/21 09:54


 


Pulse  88   07/22/21 09:07


 


Resp  16   07/22/21 06:33


 


BP  90/47 L  07/22/21 09:07


 


Pulse Ox  98   07/22/21 09:07











Weight - Most Recent: 58.967 kg


Lab Results - Last 24 hrs: 


                         Laboratory Results - last 24 hr











  07/21/21 07/21/21 07/21/21 Range/Units





  21:24 21:50 21:50 


 


WBC    17.33 H  (3.98-10.04)  K/mm3


 


RBC    3.96 L  (3.98-5.22)  M/mm3


 


Hgb    12.2  D  (11.2-15.7)  gm/dl


 


Hct    35.9  (34.1-44.9)  %


 


MCV    90.7  D  (79.4-94.8)  fl


 


MCH    30.8  (25.6-32.2)  pg


 


MCHC    34.0  (32.2-35.5)  g/dl


 


RDW Std Deviation    56.5 H  (36.4-46.3)  fL


 


Plt Count    215  D  (182-369)  K/mm3


 


MPV    10.8  (9.4-12.3)  fl


 


Neutrophils % (Manual)    83 H  (40-60)  %


 


Band Neutrophils %    4  (0-10)  %


 


Lymphocytes % (Manual)    6 L  (20-40)  %


 


Atypical Lymphs %    0  %


 


Monocytes % (Manual)    7  (2-10)  %


 


Eosinophils % (Manual)    0 L  (0.7-5.8)  %


 


Basophils % (Manual)    0 L  (0.1-1.2)  


 


Platelet Estimate    Adequate  


 


Anisocytosis    1+ slight  


 


RBC Morph Comment    Not Reportable  


 


Puncture Site  Lt radial    


 


ABG pH  7.35    (7.35-7.45)  


 


ABG pCO2  52.4 H    (35.0-45.0)  mmHg


 


ABG pO2  21.0 L*    (80.0-100.0)  mmHg


 


ABG HCO3  27.8 H    (22.0-26.0)  meq/L


 


ABG O2 Saturation  23.2 L    (96.0-97.0)  %


 


ABG Base Excess  1.8    (-2-2.0)  


 


Angel Test  Positive    


 


A-a Gradient  113    mmHg


 


O2 Delivery Device  Nasal cannula    


 


Oxygen Flow Rate  2.0    


 


FiO2  28.00    (21..00)  %


 


Blood Gas Comments  Mixed venous    


 


Sodium   135 L   (136-145)  mEq/L


 


Potassium   4.6   (3.5-5.1)  mEq/L


 


Chloride   97 L   ()  mEq/L


 


Carbon Dioxide   29   (21-32)  mEq/L


 


Anion Gap   13.6   (5-15)  


 


BUN   40 H   (7-18)  mg/dL


 


Creatinine   2.3 H D   (0.55-1.02)  mg/dL


 


Est Cr Clr Drug Dosing   TNP   


 


Estimated GFR (MDRD)   22   (>60)  mL/min


 


BUN/Creatinine Ratio   17.4   (14-18)  


 


Glucose   146 H   (70-99)  mg/dL


 


Lactic Acid     (0.4-2.0)  mmol/L


 


Calcium   10.1   (8.5-10.1)  mg/dL


 


Magnesium     (1.8-2.4)  mg/dL


 


Total Bilirubin   0.5   (0.2-1.0)  mg/dL


 


AST   17   (15-37)  U/L


 


ALT   25   (14-59)  U/L


 


Alkaline Phosphatase   79   ()  U/L


 


Troponin I   < 0.017   (0.00-0.056)  ng/mL


 


C-Reactive Protein   26.2 H*   (<1.0)  mg/dL


 


Total Protein   7.0   (6.4-8.2)  g/dl


 


Albumin   2.4 L   (3.4-5.0)  g/dl


 


Globulin   4.6   gm/dL


 


Albumin/Globulin Ratio   0.5 L   (1-2)  


 


Urine Color     (Yellow)  


 


Urine Appearance     (Clear)  


 


Urine pH     (5.0-8.0)  


 


Ur Specific Gravity     (1.005-1.030)  


 


Urine Protein     (Negative)  


 


Urine Glucose (UA)     (Negative)  


 


Urine Ketones     (Negative)  


 


Urine Occult Blood     (Negative)  


 


Urine Nitrite     (Negative)  


 


Urine Bilirubin     (Negative)  


 


Urine Urobilinogen     (0.2-1.0)  


 


Ur Leukocyte Esterase     (Negative)  


 


Urine RBC     (0-5)  /hpf


 


Urine WBC     (0-5)  /hpf


 


Ur Squamous Epith Cells     (0-5)  /hpf


 


Triple Phos Crystals     (NONE)  /hpf


 


Amorphous Sediment     (NOT SEEN)  /hpf


 


Urine Bacteria     (FEW)  /hpf


 


Urine Mucus     (FEW)  /hpf


 


SARS-CoV-2 RNA (MARY BETH)     (NEGATIVE)  














  07/21/21 07/21/21 07/21/21 Range/Units





  21:50 21:50 22:19 


 


WBC     (3.98-10.04)  K/mm3


 


RBC     (3.98-5.22)  M/mm3


 


Hgb     (11.2-15.7)  gm/dl


 


Hct     (34.1-44.9)  %


 


MCV     (79.4-94.8)  fl


 


MCH     (25.6-32.2)  pg


 


MCHC     (32.2-35.5)  g/dl


 


RDW Std Deviation     (36.4-46.3)  fL


 


Plt Count     (182-369)  K/mm3


 


MPV     (9.4-12.3)  fl


 


Neutrophils % (Manual)     (40-60)  %


 


Band Neutrophils %     (0-10)  %


 


Lymphocytes % (Manual)     (20-40)  %


 


Atypical Lymphs %     %


 


Monocytes % (Manual)     (2-10)  %


 


Eosinophils % (Manual)     (0.7-5.8)  %


 


Basophils % (Manual)     (0.1-1.2)  


 


Platelet Estimate     


 


Anisocytosis     


 


RBC Morph Comment     


 


Puncture Site     


 


ABG pH     (7.35-7.45)  


 


ABG pCO2     (35.0-45.0)  mmHg


 


ABG pO2     (80.0-100.0)  mmHg


 


ABG HCO3     (22.0-26.0)  meq/L


 


ABG O2 Saturation     (96.0-97.0)  %


 


ABG Base Excess     (-2-2.0)  


 


Angel Test     


 


A-a Gradient     mmHg


 


O2 Delivery Device     


 


Oxygen Flow Rate     


 


FiO2     (21..00)  %


 


Blood Gas Comments     


 


Sodium     (136-145)  mEq/L


 


Potassium     (3.5-5.1)  mEq/L


 


Chloride     ()  mEq/L


 


Carbon Dioxide     (21-32)  mEq/L


 


Anion Gap     (5-15)  


 


BUN     (7-18)  mg/dL


 


Creatinine     (0.55-1.02)  mg/dL


 


Est Cr Clr Drug Dosing     


 


Estimated GFR (MDRD)     (>60)  mL/min


 


BUN/Creatinine Ratio     (14-18)  


 


Glucose     (70-99)  mg/dL


 


Lactic Acid  4.0 H*    (0.4-2.0)  mmol/L


 


Calcium     (8.5-10.1)  mg/dL


 


Magnesium   2.0   (1.8-2.4)  mg/dL


 


Total Bilirubin     (0.2-1.0)  mg/dL


 


AST     (15-37)  U/L


 


ALT     (14-59)  U/L


 


Alkaline Phosphatase     ()  U/L


 


Troponin I     (0.00-0.056)  ng/mL


 


C-Reactive Protein     (<1.0)  mg/dL


 


Total Protein     (6.4-8.2)  g/dl


 


Albumin     (3.4-5.0)  g/dl


 


Globulin     gm/dL


 


Albumin/Globulin Ratio     (1-2)  


 


Urine Color    Yellow  (Yellow)  


 


Urine Appearance    Turbid H  (Clear)  


 


Urine pH    8.5 H  (5.0-8.0)  


 


Ur Specific Gravity    1.015  (1.005-1.030)  


 


Urine Protein    3+ H  (Negative)  


 


Urine Glucose (UA)    Negative  (Negative)  


 


Urine Ketones    Negative  (Negative)  


 


Urine Occult Blood    3+ H  (Negative)  


 


Urine Nitrite    Negative  (Negative)  


 


Urine Bilirubin    Negative  (Negative)  


 


Urine Urobilinogen    0.2  (0.2-1.0)  


 


Ur Leukocyte Esterase    3+ H  (Negative)  


 


Urine RBC    50-75 H  (0-5)  /hpf


 


Urine WBC    40-50 H  (0-5)  /hpf


 


Ur Squamous Epith Cells    0-5  (0-5)  /hpf


 


Triple Phos Crystals    Few H  (NONE)  /hpf


 


Amorphous Sediment    Few H  (NOT SEEN)  /hpf


 


Urine Bacteria    Many H  (FEW)  /hpf


 


Urine Mucus    Few  (FEW)  /hpf


 


SARS-CoV-2 RNA (MARY BETH)     (NEGATIVE)  














  07/21/21 07/22/21 Range/Units





  22:25 01:03 


 


WBC    (3.98-10.04)  K/mm3


 


RBC    (3.98-5.22)  M/mm3


 


Hgb    (11.2-15.7)  gm/dl


 


Hct    (34.1-44.9)  %


 


MCV    (79.4-94.8)  fl


 


MCH    (25.6-32.2)  pg


 


MCHC    (32.2-35.5)  g/dl


 


RDW Std Deviation    (36.4-46.3)  fL


 


Plt Count    (182-369)  K/mm3


 


MPV    (9.4-12.3)  fl


 


Neutrophils % (Manual)    (40-60)  %


 


Band Neutrophils %    (0-10)  %


 


Lymphocytes % (Manual)    (20-40)  %


 


Atypical Lymphs %    %


 


Monocytes % (Manual)    (2-10)  %


 


Eosinophils % (Manual)    (0.7-5.8)  %


 


Basophils % (Manual)    (0.1-1.2)  


 


Platelet Estimate    


 


Anisocytosis    


 


RBC Morph Comment    


 


Puncture Site    


 


ABG pH    (7.35-7.45)  


 


ABG pCO2    (35.0-45.0)  mmHg


 


ABG pO2    (80.0-100.0)  mmHg


 


ABG HCO3    (22.0-26.0)  meq/L


 


ABG O2 Saturation    (96.0-97.0)  %


 


ABG Base Excess    (-2-2.0)  


 


Angel Test    


 


A-a Gradient    mmHg


 


O2 Delivery Device    


 


Oxygen Flow Rate    


 


FiO2    (21..00)  %


 


Blood Gas Comments    


 


Sodium    (136-145)  mEq/L


 


Potassium    (3.5-5.1)  mEq/L


 


Chloride    ()  mEq/L


 


Carbon Dioxide    (21-32)  mEq/L


 


Anion Gap    (5-15)  


 


BUN    (7-18)  mg/dL


 


Creatinine    (0.55-1.02)  mg/dL


 


Est Cr Clr Drug Dosing    


 


Estimated GFR (MDRD)    (>60)  mL/min


 


BUN/Creatinine Ratio    (14-18)  


 


Glucose    (70-99)  mg/dL


 


Lactic Acid   3.5 H*  (0.4-2.0)  mmol/L


 


Calcium    (8.5-10.1)  mg/dL


 


Magnesium    (1.8-2.4)  mg/dL


 


Total Bilirubin    (0.2-1.0)  mg/dL


 


AST    (15-37)  U/L


 


ALT    (14-59)  U/L


 


Alkaline Phosphatase    ()  U/L


 


Troponin I    (0.00-0.056)  ng/mL


 


C-Reactive Protein    (<1.0)  mg/dL


 


Total Protein    (6.4-8.2)  g/dl


 


Albumin    (3.4-5.0)  g/dl


 


Globulin    gm/dL


 


Albumin/Globulin Ratio    (1-2)  


 


Urine Color    (Yellow)  


 


Urine Appearance    (Clear)  


 


Urine pH    (5.0-8.0)  


 


Ur Specific Gravity    (1.005-1.030)  


 


Urine Protein    (Negative)  


 


Urine Glucose (UA)    (Negative)  


 


Urine Ketones    (Negative)  


 


Urine Occult Blood    (Negative)  


 


Urine Nitrite    (Negative)  


 


Urine Bilirubin    (Negative)  


 


Urine Urobilinogen    (0.2-1.0)  


 


Ur Leukocyte Esterase    (Negative)  


 


Urine RBC    (0-5)  /hpf


 


Urine WBC    (0-5)  /hpf


 


Ur Squamous Epith Cells    (0-5)  /hpf


 


Triple Phos Crystals    (NONE)  /hpf


 


Amorphous Sediment    (NOT SEEN)  /hpf


 


Urine Bacteria    (FEW)  /hpf


 


Urine Mucus    (FEW)  /hpf


 


SARS-CoV-2 RNA (MARY BETH)  Negative   (NEGATIVE)  











Med Orders - Current: 


                               Current Medications





Acetaminophen (Acetaminophen 325 Mg Tab)  650 mg GTUBE Q4H PRN


   PRN Reason: Pain/Fever


Atropine Sulfate (Atropine 1% Ophth Soln 5 Ml Bottle)  0 ml SL Q4H PRN


   PRN Reason: extra secretions


Fluoxetine HCl (Fluoxetine 10 Mg Cap)  10 mg GTUBE DAILY YOLIS


Heparin Sodium (Porcine) (Heparin Sodium 5,000 Units/Ml Vial)  5,000 units 

SUBCUT Q8H YOLIS


Lactated Ringer's (Ringers, Lactated)  1,000 mls @ 999 mls/hr IV ASDIRECTED YOLIS


   Last Admin: 07/21/21 21:24 Dose:  999 mls/hr


   Documented by: 


Sodium Chloride (Normal Saline)  1,000 mls @ 125 mls/hr IV ASDIRECTED YOLIS


   Last Admin: 07/22/21 04:49 Dose:  125 mls/hr


   Documented by: 


Norepinephrine Bitartrate 4 mg (/ Dextrose/Water)  250 mls @ 18.75 mls/hr IV 

TITRATE YOLIS; Protocol


   Last Admin: 07/22/21 06:14 Dose:  5 mcg/min, 18.75 mls/hr


   Documented by: 


Levofloxacin/Dextrose 250 mg/ (Premix)  50 mls @ 50 mls/hr IV Q24H YOLIS


Lactulose (Lactulose Soln 10 Gm/15 Ml 30 Ml Ud Cup)  10 gm GTUBE DAILY PRN


   PRN Reason: constipation


Levetiracetam (Levetiracetam Soln 500 Mg/5 Ml Cup)  1,000 mg GTUBE BID YOLIS


Lorazepam (Lorazepam 0.5 Mg Tab)  0.5 mg GTUBE TID YOLIS


Morphine Sulfate (Morphine 10 Mg/0.5 Ml Oral Syringe)  5 mg GTUBE Q30M PRN


   PRN Reason: moderate pain/SOB


Morphine Sulfate (Morphine 2 Mg/Ml Syringe)  2 mg IVPUSH Q2H PRN


   PRN Reason: Pain


   Last Admin: 07/22/21 11:13 Dose:  2 mg


   Documented by: 


Ondansetron HCl (Ondansetron 4 Mg Tab.Dis)  4 mg GTUBE Q6H PRN


   PRN Reason: Nausea


Senna/Docusate Sodium (Docusate Sodium/Sennosides 50-8.6 Mg Tab)  2 tab GTUBE 

BID YOLIS


Sodium Chloride (Sodium Chloride 0.9% 10 Ml Syringe)  10 ml FLUSH ASDIRECTED PRN


   PRN Reason: Keep Vein Open


   Last Admin: 07/21/21 21:24 Dose:  10 ml


   Documented by: 


Tizanidine HCl (Tizanidine 4 Mg Tab)  2 mg GTUBE TID YOLIS





Discontinued Medications





Lactated Ringer's (Ringers, Lactated)  1,000 mls @ 999 mls/hr IV .BOLUS ONE


   Stop: 07/21/21 23:00


   Last Admin: 07/21/21 22:00 Dose:  999 mls/hr


   Documented by: 


Levofloxacin/Dextrose (Levaquin In D5w 750 Mg/150 Ml) Confirm Administered Dose 

150 mls @ as directed IV .STK-MED ONE


   Stop: 07/21/21 23:31


   Last Admin: 07/22/21 01:18 Dose:  Not Given


   Documented by: 


Levofloxacin/Dextrose 750 mg/ (Premix)  150 mls @ 100 mls/hr IV ONETIME STA


   Stop: 07/22/21 00:54


   Last Admin: 07/21/21 23:30 Dose:  100 mls/hr


   Documented by: 


Morphine Sulfate (Morphine 4 Mg/Ml Syringe)  5 mg IVPUSH ONETIME STA


   Stop: 07/22/21 01:50


   Last Admin: 07/22/21 02:04 Dose:  5 mg


   Documented by: 


Non-Formulary Medication (Lactose-Reduced Food/Fiber [Jevity 1.5 Antwan Liquid])  

850 ml GTUBE DAILY YOLIS











- Exam


Quality Assessment: Denies: Supplemental Oxygen, DVT Prophylaxis


General: Denies: Alert (Awake but not alert or oriented)


HEENT: Reports: Pupils Equal, Pupils Reactive.  Denies: Mucous Membr. Moist/Pink


Neck: Reports: Supple (I), Trachea Midline


Lungs: Reports: Clear to Auscultation, Normal Respiratory Effort


Cardiovascular: Reports: Tachycardia


GI/Abdominal Exam: Normal Bowel Sounds, No Distention


 (Female) Exam: Deferred


Rectal (Female) Exam: Deferred


Back Exam: Denies: Normal Inspection (Bedridden due to MS), Full Range of Motion


Extremities: No: Normal Inspection (Contractures)


Skin: Reports: Warm, Dry


Neurological: Denies: Normal Gait, Normal Speech


Psy/Mental Status: Denies: Alert

## 2021-07-22 NOTE — PCM.HP.2
H&P History of Present Illness





- General


Date of Service: 07/22/21


Admit Problem/Dx: 


Septic shock with likely urinary source.


Source of Information: Nursing Home Records, Old Records


History Limitations: Reports: Other (Patient is nonverbal due to dementia and 

MS.)





- History of Present Illness


Initial Comments - Free Text/Narative: 





The patient is a 55-year-old lady with advanced dementia along with essentially 

end-stage multiple sclerosis has been brought from nursing home to the emergency

department after being found unresponsive.  Prior to presentation the patient's 

blood pressure was 60/40 and her skin was mottled.  The patient is essentially 

nonverbal and hard able to participate in any meaningful way with her history 

and physical.  Therefore, information has been obtained from her prior charting 

as well as emergency room visit.  Family members are not present yet.  The 

patient at this time is in a DNR/DNI category and there is some question of 

comfort measures.  The patient apparently had been in hospice care prior to this

admission and was discharged from hospice as she may have been stable instead of

declining.


Onset of Symptoms: Reports: Unknown/Unsure


Duration of Symptoms: Reports: Day(s):


Location: Reports: Generalized


Associated Symptoms: Reports: Confusion (Was found unresponsive in nursing home)





- Related Data


Allergies/Adverse Reactions: 


                                    Allergies











Allergy/AdvReac Type Severity Reaction Status Date / Time


 


No Known Allergies Allergy   Verified 07/21/21 21:03











Home Medications: 


                                    Home Meds





tiZANidine HCl [Zanaflex] 2 mg GTUBE TID 01/12/15 [History]


levETIRAcetam [Levetiracetam] 10 ml GTUBE BID 09/30/17 [History]


Acetaminophen 650 mg GTUBE Q4H PRN 12/11/18 [History]


Atropine 1% [Atropine 1% Ophth Soln] 2 drop PO Q4HR PRN 02/21/19 [History]


FLUoxetine [PROzac] 10 mg GTUBE DAILY 02/21/19 [History]


Lactulose 15 ml GTUBE DAILY PRN 02/21/19 [History]


Ondansetron [Zofran] 4 mg GTUBE Q6H PRN 02/21/19 [History]


Sennosides/Docusate Sodium [Senna Plus Tablet] 2 each GTUBE BID 02/21/19 

[History]


Hyoscyamine Sulfate [Levsin] 0.125 mg PO Q4HR PRN 07/22/21 [History]


LORazepam [Ativan ORAL Concentrate 1MG/0.5 ML U/D] 0.5 mg GTUBE TID 07/22/21 

[History]


Lactose-Reduced Food/Fiber [Jevity 1.5 Antwan Liquid] 850 ml GTUBE DAILY 07/22/21 

[History]


Morphine [Morphine 10 MG/5 ML] 5 mg GTUBE ASDIRECTED PRN 07/22/21 [History]


Triamcinolone Acetonide [Triamcinolone Acetonide 0.1% Crm] 1 applicful TOP DAILY

PRN 07/22/21 [History]


Water 275 ml GTUBE QID 07/22/21 [History]


bisacodyL [Bisacodyl] 10 mg GTUBE DAILY PRN 07/22/21 [History]


guaiFENesin/Dextromethorphan [Tussin DM Clear] 5 ml GTUBE Q4HR PRN 07/22/21 

[History]











Past Medical History


HEENT History: Reports: Other (See Below)


Other HEENT History: dysphagia,aphasia


Cardiovascular History: Reports: Blood Clots/VTE/DVT


Other Cardiovascular History: hypomagnesemia, hypokalemia


Respiratory History: Reports: Pneumonia, Recurrent


Gastrointestinal History: Reports: Other (See Below)


Other Gastrointestinal History: martinez-key tube in place


Genitourinary History: Reports: Neurogenic Bladder, Retention, Urinary (chronic 

indwelling Aguirre catheter), Other (See Below) (Chronic indwelling catheter)


Other Genitourinary History: chronic kidney disease


Musculoskeletal History: Reports: Osteoarthritis, Osteoporosis


Other Musculoskeletal History: MS


Neurological History: Reports: Alzheimers Disease, CVA, MS, Seizure, TIA


Other Neuro History: encephalopathy, insomnia


Psychiatric History: Reports: Anxiety, Depression, Other (See Below) (Insomnia)


Other Psychiatric History: insomnia,


Endocrine/Metabolic History: Reports: Diabetes, Type II


Other Endocrine/Metabolic History: disorders of magnesium metabolism, anemia, 

hypercalcemia, hypokalemia


Hematologic History: Reports: Anemia


Other Hematologic History: hyponmagnesemia, septic shock


Other Dermatologic History: candidasis, follicular disorder





- Infectious Disease History


Infectious Disease History: Reports: Extended Spectrum Beta-Lactamase (ESBL)


Other Infectious Disease History: ESBL + 12/16/19 (urine culture)





- Past Surgical History


Head Surgeries/Procedures: Reports: None


HEENT Surgical History: Reports: None


GI Surgical History: Reports: Other (See Below)


Other GI Surgeries/Procedures: taina tube present-on jevity for tube feedings


Female  Surgical History: Reports: None


Endocrine Surgical History: Reports: None


Other Endocrine Surgeries/Procedures: anemia, hypokalemia, eosinophilia, 

hypomagnesium


Musculoskeletal Surgical History: Reports: None


Other Musculoskeletal Surgeries/Procedures:: contractures of muscles





Social & Family History





- Family History


Family Medical History: Unobtainable





- Tobacco Use


Tobacco Use Status *Q: Unknown Ever Used Tobacco


Second Hand Smoke Exposure: No





- Caffeine Use


Caffeine Use: Reports: None





- Living Situation & Occupation


Living situation: Reports: Extended Care Facility ([facility])


Occupation: Disabled





H&P Review of Systems





- Review of Systems:


Review Of Systems: Unable To Obtain


Reason Not Obtained: Patient nonverbal due to dementia and prior CVA.





Exam





- Exam


Exam: See Below





- Vital Signs


Vital Signs: 


                                Last Vital Signs











Temp  37.2 C   07/22/21 03:02


 


Pulse  103 H  07/22/21 06:33


 


Resp  16   07/22/21 06:33


 


BP  108/51 L  07/22/21 06:33


 


Pulse Ox  95   07/22/21 06:33











Weight: 58.967 kg





- Exam


Quality Assessment: No: Supplemental Oxygen, DVT Prophylaxis


General: Mild Distress.  No: Alert (Essentially nonverbal), Oriented


HEENT: Conjunctiva Clear.  No: Mucosa Moist & Pink (Very dry)


Neck: Supple, Trachea Midline


Lungs: Decreased Breath Sounds (Globally)


Cardiovascular: Regular Rhythm, Tachycardia


GI/Abdominal Exam: Normal Bowel Sounds, Soft, No Distention, Other (PEG tube)


 (Female) Exam: Deferred


Rectal (Female) Exam: Deferred


Back Exam: No: Normal Inspection (Disabled due to multiple sclerosis), Full 

Range of Motion (Bedridden)


Extremities: No: Normal Inspection (Contractures), Normal Range of Motion 

(Contractures)


Skin: Warm, Dry


Neurological: No: Cranial Nerves Intact (Unable to assess), Normal Gait 

(Bedridden), Normal Speech


Psychiatric: No: Alert, Normal Affect





- Patient Data


Lab Results Last 24 hrs: 


                         Laboratory Results - last 24 hr











  07/21/21 07/21/21 07/21/21 Range/Units





  21:24 21:50 21:50 


 


WBC    17.33 H  (3.98-10.04)  K/mm3


 


RBC    3.96 L  (3.98-5.22)  M/mm3


 


Hgb    12.2  D  (11.2-15.7)  gm/dl


 


Hct    35.9  (34.1-44.9)  %


 


MCV    90.7  D  (79.4-94.8)  fl


 


MCH    30.8  (25.6-32.2)  pg


 


MCHC    34.0  (32.2-35.5)  g/dl


 


RDW Std Deviation    56.5 H  (36.4-46.3)  fL


 


Plt Count    215  D  (182-369)  K/mm3


 


MPV    10.8  (9.4-12.3)  fl


 


Neutrophils % (Manual)    83 H  (40-60)  %


 


Band Neutrophils %    4  (0-10)  %


 


Lymphocytes % (Manual)    6 L  (20-40)  %


 


Atypical Lymphs %    0  %


 


Monocytes % (Manual)    7  (2-10)  %


 


Eosinophils % (Manual)    0 L  (0.7-5.8)  %


 


Basophils % (Manual)    0 L  (0.1-1.2)  


 


Platelet Estimate    Adequate  


 


Anisocytosis    1+ slight  


 


RBC Morph Comment    Not Reportable  


 


Puncture Site  Lt radial    


 


ABG pH  7.35    (7.35-7.45)  


 


ABG pCO2  52.4 H    (35.0-45.0)  mmHg


 


ABG pO2  21.0 L*    (80.0-100.0)  mmHg


 


ABG HCO3  27.8 H    (22.0-26.0)  meq/L


 


ABG O2 Saturation  23.2 L    (96.0-97.0)  %


 


ABG Base Excess  1.8    (-2-2.0)  


 


Angel Test  Positive    


 


A-a Gradient  113    mmHg


 


O2 Delivery Device  Nasal cannula    


 


Oxygen Flow Rate  2.0    


 


FiO2  28.00    (21..00)  %


 


Blood Gas Comments  Mixed venous    


 


Sodium   135 L   (136-145)  mEq/L


 


Potassium   4.6   (3.5-5.1)  mEq/L


 


Chloride   97 L   ()  mEq/L


 


Carbon Dioxide   29   (21-32)  mEq/L


 


Anion Gap   13.6   (5-15)  


 


BUN   40 H   (7-18)  mg/dL


 


Creatinine   2.3 H D   (0.55-1.02)  mg/dL


 


Est Cr Clr Drug Dosing   TNP   


 


Estimated GFR (MDRD)   22   (>60)  mL/min


 


BUN/Creatinine Ratio   17.4   (14-18)  


 


Glucose   146 H   (70-99)  mg/dL


 


Lactic Acid     (0.4-2.0)  mmol/L


 


Calcium   10.1   (8.5-10.1)  mg/dL


 


Magnesium     (1.8-2.4)  mg/dL


 


Total Bilirubin   0.5   (0.2-1.0)  mg/dL


 


AST   17   (15-37)  U/L


 


ALT   25   (14-59)  U/L


 


Alkaline Phosphatase   79   ()  U/L


 


Troponin I   < 0.017   (0.00-0.056)  ng/mL


 


C-Reactive Protein   26.2 H*   (<1.0)  mg/dL


 


Total Protein   7.0   (6.4-8.2)  g/dl


 


Albumin   2.4 L   (3.4-5.0)  g/dl


 


Globulin   4.6   gm/dL


 


Albumin/Globulin Ratio   0.5 L   (1-2)  


 


Urine Color     (Yellow)  


 


Urine Appearance     (Clear)  


 


Urine pH     (5.0-8.0)  


 


Ur Specific Gravity     (1.005-1.030)  


 


Urine Protein     (Negative)  


 


Urine Glucose (UA)     (Negative)  


 


Urine Ketones     (Negative)  


 


Urine Occult Blood     (Negative)  


 


Urine Nitrite     (Negative)  


 


Urine Bilirubin     (Negative)  


 


Urine Urobilinogen     (0.2-1.0)  


 


Ur Leukocyte Esterase     (Negative)  


 


Urine RBC     (0-5)  /hpf


 


Urine WBC     (0-5)  /hpf


 


Ur Squamous Epith Cells     (0-5)  /hpf


 


Triple Phos Crystals     (NONE)  /hpf


 


Amorphous Sediment     (NOT SEEN)  /hpf


 


Urine Bacteria     (FEW)  /hpf


 


Urine Mucus     (FEW)  /hpf


 


SARS-CoV-2 RNA (MARY BETH)     (NEGATIVE)  














  07/21/21 07/21/21 07/21/21 Range/Units





  21:50 21:50 22:19 


 


WBC     (3.98-10.04)  K/mm3


 


RBC     (3.98-5.22)  M/mm3


 


Hgb     (11.2-15.7)  gm/dl


 


Hct     (34.1-44.9)  %


 


MCV     (79.4-94.8)  fl


 


MCH     (25.6-32.2)  pg


 


MCHC     (32.2-35.5)  g/dl


 


RDW Std Deviation     (36.4-46.3)  fL


 


Plt Count     (182-369)  K/mm3


 


MPV     (9.4-12.3)  fl


 


Neutrophils % (Manual)     (40-60)  %


 


Band Neutrophils %     (0-10)  %


 


Lymphocytes % (Manual)     (20-40)  %


 


Atypical Lymphs %     %


 


Monocytes % (Manual)     (2-10)  %


 


Eosinophils % (Manual)     (0.7-5.8)  %


 


Basophils % (Manual)     (0.1-1.2)  


 


Platelet Estimate     


 


Anisocytosis     


 


RBC Morph Comment     


 


Puncture Site     


 


ABG pH     (7.35-7.45)  


 


ABG pCO2     (35.0-45.0)  mmHg


 


ABG pO2     (80.0-100.0)  mmHg


 


ABG HCO3     (22.0-26.0)  meq/L


 


ABG O2 Saturation     (96.0-97.0)  %


 


ABG Base Excess     (-2-2.0)  


 


Angel Test     


 


A-a Gradient     mmHg


 


O2 Delivery Device     


 


Oxygen Flow Rate     


 


FiO2     (21..00)  %


 


Blood Gas Comments     


 


Sodium     (136-145)  mEq/L


 


Potassium     (3.5-5.1)  mEq/L


 


Chloride     ()  mEq/L


 


Carbon Dioxide     (21-32)  mEq/L


 


Anion Gap     (5-15)  


 


BUN     (7-18)  mg/dL


 


Creatinine     (0.55-1.02)  mg/dL


 


Est Cr Clr Drug Dosing     


 


Estimated GFR (MDRD)     (>60)  mL/min


 


BUN/Creatinine Ratio     (14-18)  


 


Glucose     (70-99)  mg/dL


 


Lactic Acid  4.0 H*    (0.4-2.0)  mmol/L


 


Calcium     (8.5-10.1)  mg/dL


 


Magnesium   2.0   (1.8-2.4)  mg/dL


 


Total Bilirubin     (0.2-1.0)  mg/dL


 


AST     (15-37)  U/L


 


ALT     (14-59)  U/L


 


Alkaline Phosphatase     ()  U/L


 


Troponin I     (0.00-0.056)  ng/mL


 


C-Reactive Protein     (<1.0)  mg/dL


 


Total Protein     (6.4-8.2)  g/dl


 


Albumin     (3.4-5.0)  g/dl


 


Globulin     gm/dL


 


Albumin/Globulin Ratio     (1-2)  


 


Urine Color    Yellow  (Yellow)  


 


Urine Appearance    Turbid H  (Clear)  


 


Urine pH    8.5 H  (5.0-8.0)  


 


Ur Specific Gravity    1.015  (1.005-1.030)  


 


Urine Protein    3+ H  (Negative)  


 


Urine Glucose (UA)    Negative  (Negative)  


 


Urine Ketones    Negative  (Negative)  


 


Urine Occult Blood    3+ H  (Negative)  


 


Urine Nitrite    Negative  (Negative)  


 


Urine Bilirubin    Negative  (Negative)  


 


Urine Urobilinogen    0.2  (0.2-1.0)  


 


Ur Leukocyte Esterase    3+ H  (Negative)  


 


Urine RBC    50-75 H  (0-5)  /hpf


 


Urine WBC    40-50 H  (0-5)  /hpf


 


Ur Squamous Epith Cells    0-5  (0-5)  /hpf


 


Triple Phos Crystals    Few H  (NONE)  /hpf


 


Amorphous Sediment    Few H  (NOT SEEN)  /hpf


 


Urine Bacteria    Many H  (FEW)  /hpf


 


Urine Mucus    Few  (FEW)  /hpf


 


SARS-CoV-2 RNA (MARY BETH)     (NEGATIVE)  














  07/21/21 07/22/21 Range/Units





  22:25 01:03 


 


WBC    (3.98-10.04)  K/mm3


 


RBC    (3.98-5.22)  M/mm3


 


Hgb    (11.2-15.7)  gm/dl


 


Hct    (34.1-44.9)  %


 


MCV    (79.4-94.8)  fl


 


MCH    (25.6-32.2)  pg


 


MCHC    (32.2-35.5)  g/dl


 


RDW Std Deviation    (36.4-46.3)  fL


 


Plt Count    (182-369)  K/mm3


 


MPV    (9.4-12.3)  fl


 


Neutrophils % (Manual)    (40-60)  %


 


Band Neutrophils %    (0-10)  %


 


Lymphocytes % (Manual)    (20-40)  %


 


Atypical Lymphs %    %


 


Monocytes % (Manual)    (2-10)  %


 


Eosinophils % (Manual)    (0.7-5.8)  %


 


Basophils % (Manual)    (0.1-1.2)  


 


Platelet Estimate    


 


Anisocytosis    


 


RBC Morph Comment    


 


Puncture Site    


 


ABG pH    (7.35-7.45)  


 


ABG pCO2    (35.0-45.0)  mmHg


 


ABG pO2    (80.0-100.0)  mmHg


 


ABG HCO3    (22.0-26.0)  meq/L


 


ABG O2 Saturation    (96.0-97.0)  %


 


ABG Base Excess    (-2-2.0)  


 


Angel Test    


 


A-a Gradient    mmHg


 


O2 Delivery Device    


 


Oxygen Flow Rate    


 


FiO2    (21..00)  %


 


Blood Gas Comments    


 


Sodium    (136-145)  mEq/L


 


Potassium    (3.5-5.1)  mEq/L


 


Chloride    ()  mEq/L


 


Carbon Dioxide    (21-32)  mEq/L


 


Anion Gap    (5-15)  


 


BUN    (7-18)  mg/dL


 


Creatinine    (0.55-1.02)  mg/dL


 


Est Cr Clr Drug Dosing    


 


Estimated GFR (MDRD)    (>60)  mL/min


 


BUN/Creatinine Ratio    (14-18)  


 


Glucose    (70-99)  mg/dL


 


Lactic Acid   3.5 H*  (0.4-2.0)  mmol/L


 


Calcium    (8.5-10.1)  mg/dL


 


Magnesium    (1.8-2.4)  mg/dL


 


Total Bilirubin    (0.2-1.0)  mg/dL


 


AST    (15-37)  U/L


 


ALT    (14-59)  U/L


 


Alkaline Phosphatase    ()  U/L


 


Troponin I    (0.00-0.056)  ng/mL


 


C-Reactive Protein    (<1.0)  mg/dL


 


Total Protein    (6.4-8.2)  g/dl


 


Albumin    (3.4-5.0)  g/dl


 


Globulin    gm/dL


 


Albumin/Globulin Ratio    (1-2)  


 


Urine Color    (Yellow)  


 


Urine Appearance    (Clear)  


 


Urine pH    (5.0-8.0)  


 


Ur Specific Gravity    (1.005-1.030)  


 


Urine Protein    (Negative)  


 


Urine Glucose (UA)    (Negative)  


 


Urine Ketones    (Negative)  


 


Urine Occult Blood    (Negative)  


 


Urine Nitrite    (Negative)  


 


Urine Bilirubin    (Negative)  


 


Urine Urobilinogen    (0.2-1.0)  


 


Ur Leukocyte Esterase    (Negative)  


 


Urine RBC    (0-5)  /hpf


 


Urine WBC    (0-5)  /hpf


 


Ur Squamous Epith Cells    (0-5)  /hpf


 


Triple Phos Crystals    (NONE)  /hpf


 


Amorphous Sediment    (NOT SEEN)  /hpf


 


Urine Bacteria    (FEW)  /hpf


 


Urine Mucus    (FEW)  /hpf


 


SARS-CoV-2 RNA (MARY BETH)  Negative   (NEGATIVE)  











Result Diagrams: 


                                 07/21/21 21:50





                                 07/21/21 21:50





Sepsis Event Note





- Evaluation


Sepsis Screening Result: No Definite Risk


Current Stage of Sepsis: Septic Shock


Possible Source of Sepsis: Genitourinary





- Focused Exam


Sepsis Event Note Statement: Focused Sepsis Exam Completed


Vital Signs: 


                                   Vital Signs











  Temp Pulse Resp BP Pulse Ox


 


 07/22/21 06:33   103 H  16  108/51 L  95


 


 07/22/21 06:27   96  16  80/51 L  92 L


 


 07/22/21 04:29   122 H  18  91/54 L  92 L


 


 07/22/21 04:22   123 H   85/62 L  95


 


 07/22/21 03:35   122 H  18  82/39 L  93 L


 


 07/22/21 03:02  37.2 C  137 H  18  81/42 L  92 L


 


 07/22/21 02:05   140 H  18  139/68  99


 


 07/22/21 01:18   101 H  16  137/65  95


 


 07/22/21 00:57   116 H  16  108/79  96


 


 07/22/21 00:23  36.2 C  95  16  92/49 L  97


 


 07/21/21 23:39   103 H  16  92/54 L  96


 


 07/21/21 23:08   100  16  95/46 L  93 L


 


 07/21/21 22:30     91/49 L 


 


 07/21/21 21:50   101 H  16  84/43 L  98


 


 07/21/21 20:59  35.8 C L  122 H  18  75/48 L  93 L











Respiratory Effort Without Exertion: Abdominal Breathing, Shallow


Heart Sounds: Irregular, Other (see below) (Tachycardia)


Capillary Refill, Detail: Less than/Equal to (</=) 2 Seconds


Pulse Description: 1+ Thready


Peripheral Pulse Location: Radial


Skin Exam (Focused Sepsis): Pale


Date Exam was Performed: 07/22/21


Time Exam was Performed: 07:00





- Bedside Monitoring


Bedside Ultrasound Performed: No


Date Bedside Monitoring was Performed: 07/22/21


Time Bedside Monitoring was Performed: 08:13





- Problem List


(1) Septic shock


SNOMED Code(s): 42047109


   ICD Code: A41.9 - SEPSIS, UNSPECIFIED ORGANISM; R65.21 - SEVERE SEPSIS WITH 

SEPTIC SHOCK   Status: Acute   Priority: High   Current Visit: Yes   Problem 

Details: Possible urinary source, on pressors   





(2) UTI (urinary tract infection)


SNOMED Code(s): 75564655


   ICD Code: N39.0 - URINARY TRACT INFECTION, SITE NOT SPECIFIED   Status: Acute

   Priority: High   Current Visit: Yes   Problem Details: Pre-existing catheter.

   


Qualifiers: 


   Urinary tract infection type: site unspecified   Hematuria presence: with 

hematuria   Qualified Code(s): N39.0 - Urinary tract infection, site not 

specified; R31.9 - Hematuria, unspecified; R31.9 - Hematuria, unspecified   





(3) Sepsis


SNOMED Code(s): 91847619


   ICD Code: A41.9 - SEPSIS, UNSPECIFIED ORGANISM   Status: Acute   Priority: 

High   Current Visit: Yes   


Qualifiers: 


   Sepsis type: sepsis due to unspecified organism   Sepsis acute organ 

dysfunction status: with acute organ dysfunction   Severe sepsis acute organ 

dysfunction type: unspecified   Severe sepsis shock status: with septic shock   

Qualified Code(s): A41.9 - Sepsis, unspecified organism; R65.21 - Severe sepsis 

with septic shock   





(4) Urinary catheter infection


SNOMED Code(s): 381332704


   ICD Code: T83.51XA -    Status: Acute   Priority: High   Current Visit: Yes  

 Problem Details: Pre-existing catheter on admission   


Qualifiers: 


   Encounter type: initial encounter   Qualified Code(s): T83.518A - Infection 

and inflammatory reaction due to other urinary catheter, initial encounter   





(5) Tachycardia


SNOMED Code(s): 6910363


   ICD Code: R00.0 - TACHYCARDIA, UNSPECIFIED   Status: Acute   Priority: High  

 Current Visit: Yes   





(6) Multiple sclerosis


SNOMED Code(s): 91874928


   ICD Code: G35 - MULTIPLE SCLEROSIS   Status: Chronic   Priority: Medium   

Current Visit: Yes   





(7) Dementia


SNOMED Code(s): 26312024


   ICD Code: F03.90 - UNSPECIFIED DEMENTIA WITHOUT BEHAVIORAL DISTURBANCE   

Status: Chronic   Priority: Medium   Current Visit: Yes   


Qualifiers: 


   Dementia type: Alzheimer's   Alzheimer's disease onset: early-onset   

Dementia behavioral disturbance: without behavioral disturbance   Qualified 

Code(s): G30.0 - Alzheimer's disease with early onset; F02.80 - Dementia in 

other diseases classified elsewhere without behavioral disturbance   


Problem List Initiated/Reviewed/Updated: Yes


Orders Last 24hrs: 


                               Active Orders 24 hr











 Category Date Time Status


 


 Blood Pressure Mgt: Sepsis [RC] [id number] Care  07/21/21 21:12 Active


 


 Insert Aguirre Catheter [Insert Urinary Catheter] [OM.PC] Care  07/21/21 21:15 

Ordered





 Q24H   


 


 Oxygen Therapy, ED [RC] STAT Care  07/21/21 21:11 Active


 


 Urinary Catheter Assessment [RC] ASDIRECTED Care  07/21/21 21:16 Active


 


 Urinary Catheter Removal [RC] PER UNIT ROUTINE Care  07/21/21 21:15 Active


 


 Chest 1V Frontal [CR] Stat Exams  07/21/21 21:08 Taken


 


 BLOOD CULTURE [MREF] Stat Lab  07/21/21 21:50 Received


 


 CULTURE URINE [MREF] Stat Lab  07/21/21 22:19 Received


 


 Lactated Ringers [Ringers, Lactated] 1,000 ml Med  07/21/21 21:15 Active





 IV ASDIRECTED   


 


 Norepinephrine [Levophed] 4 mg Med  07/22/21 06:00 Active





 Dextrose 5% in Water 246 ml   





 IV TITRATE   


 


 Sodium Chloride 0.9% [Normal Saline] 1,000 ml Med  07/22/21 04:45 Active





 IV ASDIRECTED   


 


 Sodium Chloride 0.9% [Saline Flush] Med  07/21/21 21:08 Active





 10 ml FLUSH ASDIRECTED PRN   


 


 Blood Culture x2 Reflex Set [OM.PC] Stat Oth  07/21/21 21:09 Ordered


 


 Pulse Oximetry Continuous Monitoring [OM.PC] Routine Oth  07/21/21 21:08 Active


 


 Saline Lock Insert [OM.PC] Stat Oth  07/21/21 21:09 Ordered


 


 EKG 12 Lead [EK] Stat Ther  07/21/21 21:08 Ordered








                                Medication Orders





Lactated Ringer's (Ringers, Lactated)  1,000 mls @ 999 mls/hr IV ASDIRECTED YOLIS


   Last Admin: 07/21/21 21:24  Dose: 999 mls/hr


   Documented by: TEJAL


Sodium Chloride (Normal Saline)  1,000 mls @ 125 mls/hr IV ASDIRECTED YOLIS


   Last Admin: 07/22/21 04:49  Dose: 125 mls/hr


   Documented by: TEJAL


Norepinephrine Bitartrate 4 mg (/ Dextrose/Water)  250 mls @ 18.75 mls/hr IV 

TITRATE YOLIS; Protocol


   Last Admin: 07/22/21 06:14  Dose: 5 mcg/min, 18.75 mls/hr


   Documented by: TEJAL


Sodium Chloride (Sodium Chloride 0.9% 10 Ml Syringe)  10 ml FLUSH ASDIRECTED PRN


   PRN Reason: Keep Vein Open


   Last Admin: 07/21/21 21:24  Dose: 10 ml


   Documented by: TEJAL








Assessment/Plan Comment:: 





The patient is a 55-year-old lady with acute medical condition consisting of 

septic shock with urinary source.  The patient also has a number of chronic 

comorbidities which are contributing to the patient's overall poor.  The patient

 has Alzheimer's disease as well as multiple sclerosis and she does have a 

history of septic shock as well.  The patient has been admitted to ICU to 

continue with her pressors to help maintain her blood pressure or her MAP 

greater than 55 mmHg.  Previously noted blood gas had been is thought to be 

venous blood her oxygen saturations have been around 92%.  She will have oxygen 

support to continue to help keep her saturations around 92%.  The patient will 

also have her regular feeding with her PEG tube as needed.  The patient had been

 started initially on Levaquin while in the emergency department and this will 

be continued and will be renally dosed.  Patient is dehydrated so she will be 

gently fluid resuscitated with the use of Ringer's lactate at 75 mL/h.  Repeat l

aboratory studies have been ordered.  The patient is currently in a DO NOT 

INTUBATE/DO NOT RESUSCITATE category and there is some confusion with regards to

 comfort measures or not.  We will discuss this with the family upon arrival.  

Overall the patient's prognosis is poor.





- Mortality Measure


Prognosis:: Poor

## 2021-07-22 NOTE — CR
Chest: Portable view of the chest was obtained.

 

Comparison: Prior chest x-ray of 03/01/19.

 

Heart size and mediastinum are normal.

 

Mild areas of atelectasis are seen within both lung bases.  Minimal 

atelectasis is seen within the left upper lung.  Lungs otherwise are 

clear.  

 

Bony structures are osteopenic.  Minimal vascular calcification is 

seen within the carotid arteries.

 

Impression:

1.  Mild atelectasis as noted above.  Other findings as noted above.

2.  Nothing acute is otherwise seen on portable chest x-ray.

 

Diagnostic code #2